# Patient Record
Sex: MALE | Race: BLACK OR AFRICAN AMERICAN | Employment: UNEMPLOYED | URBAN - METROPOLITAN AREA
[De-identification: names, ages, dates, MRNs, and addresses within clinical notes are randomized per-mention and may not be internally consistent; named-entity substitution may affect disease eponyms.]

---

## 2022-07-15 ENCOUNTER — HOSPITAL ENCOUNTER (INPATIENT)
Age: 62
LOS: 3 days | Discharge: NURSING, REHAB OR LONG-TERM CARE | DRG: 463 | End: 2022-07-18
Attending: EMERGENCY MEDICINE | Admitting: INTERNAL MEDICINE
Payer: MEDICAID

## 2022-07-15 ENCOUNTER — APPOINTMENT (OUTPATIENT)
Dept: CT IMAGING | Age: 62
DRG: 463 | End: 2022-07-15
Payer: MEDICAID

## 2022-07-15 ENCOUNTER — APPOINTMENT (OUTPATIENT)
Dept: GENERAL RADIOLOGY | Age: 62
DRG: 463 | End: 2022-07-15
Payer: MEDICAID

## 2022-07-15 DIAGNOSIS — R41.0 DELIRIUM: Primary | ICD-10-CM

## 2022-07-15 DIAGNOSIS — N30.00 ACUTE CYSTITIS WITHOUT HEMATURIA: ICD-10-CM

## 2022-07-15 PROBLEM — G93.41 ACUTE METABOLIC ENCEPHALOPATHY: Status: ACTIVE | Noted: 2022-07-15

## 2022-07-15 LAB
A/G RATIO: 0.8 (ref 1.1–2.2)
ALBUMIN SERPL-MCNC: 3.5 G/DL (ref 3.4–5)
ALP BLD-CCNC: 144 U/L (ref 40–129)
ALT SERPL-CCNC: 51 U/L (ref 10–40)
ANION GAP SERPL CALCULATED.3IONS-SCNC: 14 MMOL/L (ref 3–16)
AST SERPL-CCNC: 34 U/L (ref 15–37)
BACTERIA: ABNORMAL /HPF
BASE EXCESS VENOUS: 3.5 MMOL/L
BASOPHILS ABSOLUTE: 0.1 K/UL (ref 0–0.2)
BASOPHILS RELATIVE PERCENT: 0.9 %
BILIRUB SERPL-MCNC: 0.8 MG/DL (ref 0–1)
BILIRUBIN URINE: NEGATIVE
BLOOD, URINE: ABNORMAL
BUN BLDV-MCNC: 27 MG/DL (ref 7–20)
CALCIUM SERPL-MCNC: 9.9 MG/DL (ref 8.3–10.6)
CARBOXYHEMOGLOBIN: 1.3 %
CHLORIDE BLD-SCNC: 106 MMOL/L (ref 99–110)
CLARITY: ABNORMAL
CO2: 23 MMOL/L (ref 21–32)
COLOR: YELLOW
COMMENT UA: ABNORMAL
CREAT SERPL-MCNC: 0.9 MG/DL (ref 0.8–1.3)
EKG ATRIAL RATE: 72 BPM
EKG DIAGNOSIS: NORMAL
EKG P AXIS: 32 DEGREES
EKG P-R INTERVAL: 154 MS
EKG Q-T INTERVAL: 398 MS
EKG QRS DURATION: 82 MS
EKG QTC CALCULATION (BAZETT): 435 MS
EKG R AXIS: 33 DEGREES
EKG T AXIS: 51 DEGREES
EKG VENTRICULAR RATE: 72 BPM
EOSINOPHILS ABSOLUTE: 0 K/UL (ref 0–0.6)
EOSINOPHILS RELATIVE PERCENT: 0.1 %
EPITHELIAL CELLS, UA: 0 /HPF (ref 0–5)
GFR AFRICAN AMERICAN: >60
GFR NON-AFRICAN AMERICAN: >60
GLUCOSE BLD-MCNC: 103 MG/DL (ref 70–99)
GLUCOSE URINE: NEGATIVE MG/DL
HCO3 VENOUS: 27 MMOL/L (ref 23–29)
HCT VFR BLD CALC: 37.3 % (ref 40.5–52.5)
HEMOGLOBIN: 12.1 G/DL (ref 13.5–17.5)
HYALINE CASTS: 1 /LPF (ref 0–8)
KETONES, URINE: 15 MG/DL
LACTIC ACID: 1.2 MMOL/L (ref 0.4–2)
LEUKOCYTE ESTERASE, URINE: ABNORMAL
LYMPHOCYTES ABSOLUTE: 0.9 K/UL (ref 1–5.1)
LYMPHOCYTES RELATIVE PERCENT: 5.8 %
MCH RBC QN AUTO: 28.4 PG (ref 26–34)
MCHC RBC AUTO-ENTMCNC: 32.5 G/DL (ref 31–36)
MCV RBC AUTO: 87.5 FL (ref 80–100)
METHEMOGLOBIN VENOUS: 0.2 %
MICROSCOPIC EXAMINATION: YES
MONOCYTES ABSOLUTE: 1.5 K/UL (ref 0–1.3)
MONOCYTES RELATIVE PERCENT: 10.2 %
NEUTROPHILS ABSOLUTE: 12.5 K/UL (ref 1.7–7.7)
NEUTROPHILS RELATIVE PERCENT: 83 %
NITRITE, URINE: POSITIVE
O2 SAT, VEN: 80 %
O2 THERAPY: ABNORMAL
PCO2, VEN: 37.2 MMHG (ref 40–50)
PDW BLD-RTO: 13.7 % (ref 12.4–15.4)
PH UA: 5.5 (ref 5–8)
PH VENOUS: 7.47 (ref 7.35–7.45)
PLATELET # BLD: 346 K/UL (ref 135–450)
PMV BLD AUTO: 8.9 FL (ref 5–10.5)
PO2, VEN: 44 MMHG
POTASSIUM REFLEX MAGNESIUM: 4.5 MMOL/L (ref 3.5–5.1)
PROTEIN UA: NEGATIVE MG/DL
RBC # BLD: 4.26 M/UL (ref 4.2–5.9)
RBC UA: ABNORMAL /HPF (ref 0–4)
SARS-COV-2, NAAT: NOT DETECTED
SODIUM BLD-SCNC: 143 MMOL/L (ref 136–145)
SPECIFIC GRAVITY UA: 1.02 (ref 1–1.03)
TCO2 CALC VENOUS: 28 MMOL/L
TOTAL PROTEIN: 7.9 G/DL (ref 6.4–8.2)
TROPONIN: <0.01 NG/ML
URINE REFLEX TO CULTURE: YES
URINE TYPE: ABNORMAL
UROBILINOGEN, URINE: 2 E.U./DL
WBC # BLD: 15.1 K/UL (ref 4–11)
WBC UA: 129 /HPF (ref 0–5)

## 2022-07-15 PROCEDURE — 2580000003 HC RX 258: Performed by: INTERNAL MEDICINE

## 2022-07-15 PROCEDURE — 6360000002 HC RX W HCPCS: Performed by: INTERNAL MEDICINE

## 2022-07-15 PROCEDURE — 80053 COMPREHEN METABOLIC PANEL: CPT

## 2022-07-15 PROCEDURE — 6370000000 HC RX 637 (ALT 250 FOR IP): Performed by: INTERNAL MEDICINE

## 2022-07-15 PROCEDURE — 99285 EMERGENCY DEPT VISIT HI MDM: CPT

## 2022-07-15 PROCEDURE — 2580000003 HC RX 258: Performed by: EMERGENCY MEDICINE

## 2022-07-15 PROCEDURE — 71045 X-RAY EXAM CHEST 1 VIEW: CPT

## 2022-07-15 PROCEDURE — 87186 SC STD MICRODIL/AGAR DIL: CPT

## 2022-07-15 PROCEDURE — 74176 CT ABD & PELVIS W/O CONTRAST: CPT

## 2022-07-15 PROCEDURE — 87088 URINE BACTERIA CULTURE: CPT

## 2022-07-15 PROCEDURE — 70450 CT HEAD/BRAIN W/O DYE: CPT

## 2022-07-15 PROCEDURE — 81001 URINALYSIS AUTO W/SCOPE: CPT

## 2022-07-15 PROCEDURE — 93005 ELECTROCARDIOGRAM TRACING: CPT | Performed by: EMERGENCY MEDICINE

## 2022-07-15 PROCEDURE — 6360000002 HC RX W HCPCS: Performed by: EMERGENCY MEDICINE

## 2022-07-15 PROCEDURE — 96374 THER/PROPH/DIAG INJ IV PUSH: CPT

## 2022-07-15 PROCEDURE — 93010 ELECTROCARDIOGRAM REPORT: CPT | Performed by: INTERNAL MEDICINE

## 2022-07-15 PROCEDURE — 36415 COLL VENOUS BLD VENIPUNCTURE: CPT

## 2022-07-15 PROCEDURE — 85025 COMPLETE CBC W/AUTO DIFF WBC: CPT

## 2022-07-15 PROCEDURE — 87635 SARS-COV-2 COVID-19 AMP PRB: CPT

## 2022-07-15 PROCEDURE — 82803 BLOOD GASES ANY COMBINATION: CPT

## 2022-07-15 PROCEDURE — 96365 THER/PROPH/DIAG IV INF INIT: CPT

## 2022-07-15 PROCEDURE — 1200000000 HC SEMI PRIVATE

## 2022-07-15 PROCEDURE — 87040 BLOOD CULTURE FOR BACTERIA: CPT

## 2022-07-15 PROCEDURE — 83605 ASSAY OF LACTIC ACID: CPT

## 2022-07-15 PROCEDURE — 84484 ASSAY OF TROPONIN QUANT: CPT

## 2022-07-15 PROCEDURE — 87086 URINE CULTURE/COLONY COUNT: CPT

## 2022-07-15 RX ORDER — HALOPERIDOL 5 MG/ML
5 INJECTION INTRAMUSCULAR ONCE
Status: COMPLETED | OUTPATIENT
Start: 2022-07-15 | End: 2022-07-15

## 2022-07-15 RX ORDER — ENOXAPARIN SODIUM 100 MG/ML
30 INJECTION SUBCUTANEOUS 2 TIMES DAILY
Status: DISCONTINUED | OUTPATIENT
Start: 2022-07-15 | End: 2022-07-18 | Stop reason: HOSPADM

## 2022-07-15 RX ORDER — ACETAMINOPHEN 325 MG/1
650 TABLET ORAL EVERY 6 HOURS PRN
Status: DISCONTINUED | OUTPATIENT
Start: 2022-07-15 | End: 2022-07-18 | Stop reason: HOSPADM

## 2022-07-15 RX ORDER — SODIUM CHLORIDE 0.9 % (FLUSH) 0.9 %
5-40 SYRINGE (ML) INJECTION PRN
Status: DISCONTINUED | OUTPATIENT
Start: 2022-07-15 | End: 2022-07-18 | Stop reason: HOSPADM

## 2022-07-15 RX ORDER — POLYETHYLENE GLYCOL 3350 17 G/17G
17 POWDER, FOR SOLUTION ORAL DAILY PRN
Status: DISCONTINUED | OUTPATIENT
Start: 2022-07-15 | End: 2022-07-18 | Stop reason: HOSPADM

## 2022-07-15 RX ORDER — ONDANSETRON 2 MG/ML
4 INJECTION INTRAMUSCULAR; INTRAVENOUS EVERY 6 HOURS PRN
Status: DISCONTINUED | OUTPATIENT
Start: 2022-07-15 | End: 2022-07-18 | Stop reason: HOSPADM

## 2022-07-15 RX ORDER — ACETAMINOPHEN 650 MG/1
650 SUPPOSITORY RECTAL EVERY 6 HOURS PRN
Status: DISCONTINUED | OUTPATIENT
Start: 2022-07-15 | End: 2022-07-18 | Stop reason: HOSPADM

## 2022-07-15 RX ORDER — ASPIRIN 81 MG/1
81 TABLET, CHEWABLE ORAL DAILY
Status: DISCONTINUED | OUTPATIENT
Start: 2022-07-15 | End: 2022-07-18 | Stop reason: HOSPADM

## 2022-07-15 RX ORDER — 0.9 % SODIUM CHLORIDE 0.9 %
1000 INTRAVENOUS SOLUTION INTRAVENOUS ONCE
Status: COMPLETED | OUTPATIENT
Start: 2022-07-15 | End: 2022-07-15

## 2022-07-15 RX ORDER — SODIUM CHLORIDE 0.9 % (FLUSH) 0.9 %
5-40 SYRINGE (ML) INJECTION EVERY 12 HOURS SCHEDULED
Status: DISCONTINUED | OUTPATIENT
Start: 2022-07-15 | End: 2022-07-18 | Stop reason: HOSPADM

## 2022-07-15 RX ORDER — VITAMIN B COMPLEX
1 TABLET ORAL DAILY
Status: DISCONTINUED | OUTPATIENT
Start: 2022-07-16 | End: 2022-07-18 | Stop reason: HOSPADM

## 2022-07-15 RX ORDER — ONDANSETRON 4 MG/1
4 TABLET, ORALLY DISINTEGRATING ORAL EVERY 8 HOURS PRN
Status: DISCONTINUED | OUTPATIENT
Start: 2022-07-15 | End: 2022-07-18 | Stop reason: HOSPADM

## 2022-07-15 RX ORDER — SODIUM CHLORIDE 9 MG/ML
INJECTION, SOLUTION INTRAVENOUS PRN
Status: DISCONTINUED | OUTPATIENT
Start: 2022-07-15 | End: 2022-07-18 | Stop reason: HOSPADM

## 2022-07-15 RX ADMIN — HALOPERIDOL LACTATE 5 MG: 5 INJECTION, SOLUTION INTRAMUSCULAR at 12:45

## 2022-07-15 RX ADMIN — SODIUM CHLORIDE, PRESERVATIVE FREE 10 ML: 5 INJECTION INTRAVENOUS at 23:33

## 2022-07-15 RX ADMIN — SODIUM CHLORIDE 1000 ML: 9 INJECTION, SOLUTION INTRAVENOUS at 13:20

## 2022-07-15 RX ADMIN — ENOXAPARIN SODIUM 30 MG: 100 INJECTION SUBCUTANEOUS at 21:57

## 2022-07-15 RX ADMIN — ASPIRIN 81 MG 81 MG: 81 TABLET ORAL at 16:44

## 2022-07-15 RX ADMIN — CEFTRIAXONE 1000 MG: 1 INJECTION, POWDER, FOR SOLUTION INTRAMUSCULAR; INTRAVENOUS at 13:20

## 2022-07-15 ASSESSMENT — PAIN SCALES - PAIN ASSESSMENT IN ADVANCED DEMENTIA (PAINAD)
BODYLANGUAGE: 0
NEGVOCALIZATION: 0
FACIALEXPRESSION: 0
BODYLANGUAGE: 0
NEGVOCALIZATION: 0
CONSOLABILITY: 0
BREATHING: 0
TOTALSCORE: 0
TOTALSCORE: 0
BREATHING: 0
CONSOLABILITY: 0
FACIALEXPRESSION: 0

## 2022-07-15 NOTE — ED PROVIDER NOTES
[address] [facility]      Pt Name: Sunny Mosqueda  MRN: [id number]  [place] 1960  Date ofevaluation: 7/15/2022  Provider: Lyssa Dao MD    CHIEF COMPLAINT       Chief Complaint   Patient presents with    Altered Mental Status     Pt to ED via [address] [facility] from [facility] d/t AMS. Per nursing home report pt has not been talking, is confused, agitated and incontinent. States pt is usually alert and oriented with occasional confusion, ambulates with walker and continent of b&b. Per nursing home staff, LKW was 3-4 days ago. HISTORY OF PRESENT ILLNESS   (Location/Symptom, Timing/Onset,Context/Setting, Quality, Duration, Modifying Factors, Severity)  Note limiting factors. Sunny Mosqueda is a 59 y.o. male  who  has a past medical history of Antisocial personality disorder ([address] Blindness right eye category 5, normal vision left eye, Drug induced subacute dyskinesia, Insomnia, Major depressive disorder, recurrent, unspecified ([address] Melena, Schizoaffective disorder ([address] and Substance abuse ([address] who presents to the emergency department    57-year-old male who presents for altered mental status from nursing facility. Patient brought in via EMS. Per nursing home report patient is typically conversive but is often confused however he has not been talking very much for the last 2 days. Unknown last known well. Medical records and paperwork with EMS show a history of schizoaffective disorder, substance abuse, antisocial personality disorder, depression. They are unaware if he is taking any new or different medications. No report of recent fevers or chills. Upon my arrival to the room patient is groaning and moving all extremities opening his eyes but otherwise not providing any further history. No clear or obvious onset no known modifying factors no known associated symptoms.   No interventions have been completed at this time.        NursingNotes were reviewed. REVIEW OF SYSTEMS    (2-9 systems for level 4, 10 or more for level 5)     Review of Systems   Unable to perform ROS: Mental status change     Except as noted above the remainder of the review of systems was reviewed and negative. PAST MEDICAL HISTORY     Past Medical History:   Diagnosis Date    Antisocial [facility]     Blindness right eye category 5, normal vision left eye     Drug induced subacute dyskinesia     Insomnia     Major depressive disorder, recurrent, unspecified ([facility]     Melena     Schizoaffective disorder ([facility]     [facility]          SURGICALHISTORY     History reviewed. No pertinent surgical history. CURRENT MEDICATIONS       Previous Medications    VITAMIN D 25 MCG (1000 UT) CAPS    Take by mouth            Codeine, Thorazine [chlorpromazine], and Tylenol [acetaminophen]    FAMILY HISTORY     History reviewed. No pertinent family history. SOCIAL HISTORY       Social History     Socioeconomic History    Marital status: Single     Spouse name: None    Number of children: None    Years of education: None    Highest education level: None   Tobacco Use    Smoking status: Unknown   Vaping Use    Vaping Use: Unknown   Substance and Sexual Activity    Alcohol use: Not Currently     Comment: unknown    Drug use: Not Currently     Types: Cocaine     Comment: unknown    Sexual activity: Not Currently   Social History Narrative    ** Merged History Encounter **            SCREENINGS    Gregory Coma Scale  Eye Opening: To speech  Best Verbal Response: Inappropriate words  Best Motor Response:  Withdraws from pain  [place] Coma Scale Score: 10        PHYSICAL EXAM    (up to 7 for level 4, 8 or more for level 5)     ED Triage Vitals [07/15/22 1115]   BP Temp Temp Source Heart Rate Resp SpO2 Height Weight   (!) 129/93 99 °F (37.2 °C) Axillary 78 (!) 40 (!) 88 % 5' 10\" (1.778 m) 230 lb 2.6 oz (104.4 kg)       Physical Exam  Vitals and nursing note reviewed. Constitutional:       General: He is not in acute distress. Appearance: He is normal weight. He is not ill-appearing, toxic-appearing or diaphoretic. HENT:      Head: Normocephalic and atraumatic. Mouth/Throat:      Mouth: Mucous membranes are moist.      Pharynx: Oropharynx is clear. Eyes:      Extraocular Movements: Extraocular movements intact. Pupils: Pupils are equal, round, and reactive to light. Cardiovascular:      Rate and Rhythm: Normal rate and regular rhythm. Pulses: Normal pulses. Heart sounds: Normal heart sounds. Pulmonary:      Effort: Pulmonary effort is normal.      Breath sounds: Normal breath sounds. No decreased breath sounds, wheezing, rhonchi or rales. Chest:      Chest wall: No tenderness. Abdominal:      General: Bowel sounds are normal.      Palpations: Abdomen is soft. Tenderness: There is no abdominal tenderness. Musculoskeletal:         General: Normal range of motion. Cervical back: Normal range of motion and neck supple. Right lower leg: No edema. Left lower leg: No edema. Skin:     General: Skin is warm and dry. Capillary Refill: Capillary refill takes less than 2 seconds. Findings: No rash. Neurological:      General: No focal deficit present. Mental Status: He is disoriented and confused. GCS: GCS eye subscore is 4. GCS verbal subscore is 2. GCS motor subscore is 5.    Psychiatric:      Comments: Garbled speech incomprehensible sounds    On further evaluation patient having confabulations and rapid speech       RESULTS     EKG: All EKG's are interpreted by the Emergency Department Physician who either signs or Co-signsthis chart in the absence of a cardiologist.    EKG Interpretation    Interpreted by emergency department physician    Rhythm: normal sinus   Rate: normal  Axis: normal  Ectopy: none  Conduction: normal  ST Segments: normal  T Waves: normal  Q Waves: none    Clinical Impression: Normal sinus rhythm, normal EKG          RADIOLOGY:   Non-plain filmimages such as CT, Ultrasound and MRI are read by the radiologist.   Interpretation per the Radiologist below, if available at the time ofthis note:    CT Head WO Contrast   Final Result   No acute intracranial abnormality. XR CHEST PORTABLE   Final Result   Mild cardiomegaly. Minimal interstitial prominence in the left lung.                ED BEDSIDE ULTRASOUND:   Performed by ED Physician - none    LABS:  Labs Reviewed   CBC WITH AUTO DIFFERENTIAL - Abnormal; Notable for the following components:       Result Value    WBC 15.1 (*)     Hemoglobin 12.1 (*)     Hematocrit 37.3 (*)     Neutrophils Absolute 12.5 (*)     Lymphocytes Absolute 0.9 (*)     Monocytes Absolute 1.5 (*)     All other components within normal limits   COMPREHENSIVE METABOLIC PANEL W/ REFLEX TO MG FOR LOW K - Abnormal; Notable for the following components:    Glucose 103 (*)     BUN 27 (*)     Albumin/Globulin Ratio 0.8 (*)     Alkaline Phosphatase 144 (*)     ALT 51 (*)     All other components within normal limits   URINALYSIS WITH REFLEX TO CULTURE - Abnormal; Notable for the following components:    Clarity, UA CLOUDY (*)     Ketones, Urine 15 (*)     Blood, Urine MODERATE (*)     Urobilinogen, Urine 2.0 (*)     Nitrite, Urine POSITIVE (*)     Leukocyte Esterase, Urine LARGE (*)     All other components within normal limits   BLOOD GAS, VENOUS - Abnormal; Notable for the following components:    pH, Gunnar 7.472 (*)     pCO2, [place] 37.2 (*)     All other components within normal limits   MICROSCOPIC URINALYSIS - Abnormal; Notable for the following components:    Bacteria, UA 1+ (*)     WBC,  (*)     All other components within normal limits   COVID-19, RAPID   CULTURE, BLOOD 1   CULTURE, BLOOD 2   CULTURE, URINE   TROPONIN   LACTIC ACID       All other labs were within normal range or not returned as of this dictation. EMERGENCY DEPARTMENT COURSE and DIFFERENTIAL DIAGNOSIS/MDM:   Vitals:    Vitals:    07/15/22 1116 07/15/22 1130 07/15/22 1215 07/15/22 1301   BP:  131/81 (!) 140/78 134/80   Pulse:  73 65 88   Resp: (!) 39 (!) 34 18 25   Temp:    98.5 °F (36.9 °C)   TempSrc:    Oral   SpO2: 90% 96% 97% 96%   Weight:       Height:           Patient was given thefollowing medications:  Medications   cefTRIAXone (ROCEPHIN) 1000 mg IVPB in 50 mL D5W minibag (1,000 mg IntraVENous New Bag 7/15/22 1320)   0.9 % sodium chloride bolus (1,000 mLs IntraVENous New Bag 7/15/22 1320)   haloperidol lactate (HALDOL) injection 5 mg (5 mg IntraMUSCular Given 7/15/22 1245)       ED COURSE & MEDICAL DECISION MAKING    Pertinent Labs & Imaging studies reviewed. (See chart for details)   -  Patient seen and evaluated in the emergency department. -  Triage and nursing notes reviewed and incorporated. -  Old chart records reviewed and incorporated.   -  Differential diagnosis includes: Differential Diagnosis:    Hypoxemia/ischemic encephalopathy, hepatic encephalopathy   Seizure or postictal state   Alterations of glucose such as hypoglycemia and hyperglycemia    Alterations in perfusions such as hypotension and hypoperfusion    Alterations in electrolytes such as disturbances in sodium or calcium   Infectious processes such as sepsis from a pneumonia or urinary tract infection    Substance use or withdrawal, especially alcohol and drugs    Medication adverse event or interaction    Vitamin deficiencies such as Wernicke's encephalopathy    CNS lesion, injury, infection (CVA, subdural hematoma, meningitis, encephalitis)    Alterations in hormones such as thyroid or adrenal abnormalities    Alterations in cardiac functioning such as arrhythmia, MI or CHF    Alteration in temperature such as hyperthermia or hypothermia    Dehydration, sleep deprivation   Change in medical regimen    Alteration in lifestyle, environment, or personal relationships    26-year-old male presents with altered mental status. Differential is broad at this time however patient has history of psychiatric and substance abuse. We will also order broad laboratory work-up for possible infection including UTI and pneumonia. Will order CT head. Currently patient is calm no indication for emergent medication however if patient becomes more agitated as report was at nursing home will give patient medication for possible sedation which it appears the patient has required multiple times in the past.  Glucose is normal.  Rest of exam relatively unremarkable. Patient is afebrile not tachycardic saturating well on room air. He is normotensive. Will reeval closely and disposition accordingly. -  Work-up included:  See above  -  ED treatment included: See above  -  Results discussed with patient. The patient is agreeable with plan of care and disposition. Is this patient to be included in the SEP-1 Core Measure due to severe sepsis or septic shock? No   Exclusion criteria - the patient is NOT to be included for SEP-1 Core Measure due to:  2+ SIRS criteria are not met     REASSESSMENT     ED Course as of 07/15/22 1343   Fri Jul 15, 2022   1251 Spoke to patient at length he is now more verbal.  He was initially coherent but then began to have confabulations stating that there were people around him throwing firecrackers and that he felt like everybody was flying on space ships. Patient has a history of prior psychiatric disorder as well as drug abuse however his urinalysis shows that he likely has a UTI causing delirium. His vitals otherwise remained stable his white blood cell count is slightly elevated but he is not septic at this time. Awaiting further lab work-up will give antibiotics. [SC]   1257 Lactate normal [SC]   1826 Patient was calm and was able to be taken to CT scan. Back at this time. No other change in vitals.   Still plan at this time is to give IV fluids and antibiotics and admit. [SC]   1340 CT head unremarkable. Will admit at this time. [SC]      ED Course User Index  [SC] Nurys Gross MD         CRITICAL CARE TIME   Total Critical Care time was 36 minutes, excluding separately reportable procedures. There was a high probability of clinically significant/life threatening deterioration in the patient's condition which required my urgent intervention. This includes multiple reevaluations, vital sign monitoring, pulse oximetry monitoring, telemetry monitoring, clinical response to the IV medications, reviewing the nursing notes, consultation time, dictation/documentation time, and interpretation of the labwork. (This time excludes time spent performing procedures). CONSULTS:  IP CONSULT TO HOSPITALIST    PROCEDURES:  Unless otherwise noted below, none     Procedures    FINAL IMPRESSION      1. Delirium    2. Acute cystitis without hematuria          DISPOSITION/PLAN   DISPOSITION Decision To Admit 07/15/2022 01:40:41 PM      PATIENT REFERREDTO:  No follow-up provider specified.     DISCHARGEMEDICATIONS:  New Prescriptions    No medications on file          (Please note that portions of this note were completed with a voice recognition program.  Efforts were made to edit the dictations but occasionally words are mis-transcribed.)    Nurys Gross MD (electronically signed)  Attending Emergency Physician          Nurys Gross MD  07/15/22 9122

## 2022-07-15 NOTE — H&P
Hospital Medicine History & Physical      PCP: No primary care provider on file. Date of Admission: 7/15/2022    Date of Service: Pt seen/examined on 7/15/22 and Admitted to Inpatient with expected LOS greater than two midnights due to medical therapy. Chief Complaint:    Acute metabolic encephalopathy. Patient is unable to give history. History Of Present Illness:   Valentina Carter is a 75-year-old male with a history of drug-induced subacute dyskinesia, insomnia, schizoaffective disorder and a history of substance abuse. He is admitted from his nursing home for confusional state. of 8 today. At baseline patient compresses but has not been doing so in the last 2 days . No fevers or chills described . No nausea or vomiting . No chest pain or cough . Patient unable to give a history and just moaning and groaning in the ED . Past Medical History:          Diagnosis Date    Antisocial [facility] disorder ([facility]     Blindness right eye category 5, normal vision left eye     Drug induced subacute dyskinesia     Insomnia     Major depressive disorder, recurrent, unspecified ([address]     Melena     Schizoaffective disorder ([facility]     Substance abuse ([facility]        Past Surgical History:      History reviewed. No pertinent surgical history. Medications Prior to Admission:      Prior to Admission medications    Medication Sig Start Date End Date Taking? Authorizing Provider   vitamin D 25 MCG (1000 UT) CAPS Take by mouth   Yes Historical Provider, MD       Allergies:  Codeine, Thorazine [chlorpromazine], and Tylenol [acetaminophen]    Social History:      The patient currently lives lives in the nursing home  TOBACCO:   has no history on file for tobacco use. ETOH:   reports that he does not currently use alcohol.   E-cigarette/Vaping       Questions Responses    E-cigarette/Vaping Use Unknown If Ever Used    Start Date     Passive Exposure     Quit Date     Counseling Given     Comments Family History:       Reviewed and negative in regards to presenting illness/complaint. History reviewed. No pertinent family history. REVIEW OF SYSTEMS COMPLETED:   Pertinent positives as noted in the HPI. All other systems reviewed and negative. PHYSICAL EXAM PERFORMED:    /80   Pulse 88   Temp 98.5 °F (36.9 °C) (Oral)   Resp 25   Ht 5' 10\" (1.778 m)   Wt 230 lb 2.6 oz (104.4 kg)   SpO2 96%   BMI 33.02 kg/m²     General appearance:  , Restless speaking incomprehensibly . HEENT:  Normal cephalic, atraumatic without obvious deformity. Pupils equal, round, and reactive to light. Extra ocular muscles intact. Conjunctivae/corneas clear. Neck: Supple, with full range of motion. No jugular venous distention. Trachea midline. Respiratory:  Normal respiratory effort. Clear to auscultation, bilaterally without Rales/Wheezes/Rhonchi. Cardiovascular:  Regular rate and rhythm with normal S1/S2 without murmurs, rubs or gallops. Abdomen: Soft, suprapubic tenderness, non-distended with normal bowel sounds. Musculoskeletal:  No clubbing, cyanosis or edema bilaterally. Full range of motion without deformity. Skin: Skin color, texture, turgor normal.  No rashes or lesions. Neurologic:  Neurovascularly intact without any focal sensory/motor deficits. Cranial nerves: II-XII intact, grossly non-focal.  Psychiatric:  Alert and oriented x 1, thought content appropriate, normal insight  Capillary Refill: Brisk,3 seconds, normal  Peripheral Pulses: +2 palpable, equal bilaterally       Labs:     Recent Labs     07/15/22  1200   WBC 15.1*   HGB 12.1*   HCT 37.3*        Recent Labs     07/15/22  1200      K 4.5      CO2 23   BUN 27*   CREATININE 0.9   CALCIUM 9.9     Recent Labs     07/15/22  1200   AST 34   ALT 51*   BILITOT 0.8   ALKPHOS 144*     No results for input(s): INR in the last 72 hours.   Recent Labs     07/15/22  1200   TROPONINI <0.01       Urinalysis:      Lab Results Component Value Date/Time    NITRU POSITIVE 07/15/2022 12:00 PM    WBCUA 129 07/15/2022 12:00 PM    BACTERIA 1+ 07/15/2022 12:00 PM    RBCUA 0-2 07/15/2022 12:00 PM    BLOODU MODERATE 07/15/2022 12:00 PM    SPECGRAV 1.018 07/15/2022 12:00 PM    GLUCOSEU Negative 07/15/2022 12:00 PM       Radiology:     CXR: I have reviewed the CXR with the following interpretation: Mild cardiomegaly with minimal interstitial prominence in the left lung. EKG:  I have reviewed the EKG with the following interpretation: Normal sinus rhythm. CT Head WO Contrast   Final Result   No acute intracranial abnormality. XR CHEST PORTABLE   Final Result   Mild cardiomegaly. Minimal interstitial prominence in the left lung. Consults:    IP CONSULT TO HOSPITALIST    ASSESSMENT:    1. Acute metabolic encephalopathy secondary to #2  2. UT I with pyuria  3. Schizoaffective disorder  4. Dehydration      PLAN:  1. Fluid resuscitation with normal saline  2. Ceftriaxone 2 g IV daily  3. Blood cultures and urine cultures  4. CT abdomen and pelvis  5. Continue home medications      DVT Prophylaxis: Lovenox  Diet: No diet orders on file  Code Status: No Order    PT/OT Eval Status: Ordered    Dispo -pending clinical improvement       Yaima Henry MD    Thank you No primary care provider on file. for the opportunity to be involved in this patient's care. If you have any questions or concerns please feel free to contact me at [phone].

## 2022-07-15 NOTE — ED NOTES
ED SBAR report provider to [facility]. Patient to be transported to Room 3112 via stretcher by transport tech. Patient transported with bedside cardiac monitor and with IV medications infusing. IV site clean, dry, and intact. MEWS score and pain assessed and documented. Updated patient on plan of care.      Minerva Lopez RN  07/15/22 4000

## 2022-07-15 NOTE — PROGRESS NOTES
Medication Reconciliation     List of medications patient is currently taking is complete. Source of information: 1. Review of paperwork from [facility]                                      2. EPIC records      Allergies  Codeine, Thorazine [chlorpromazine], and Tylenol [acetaminophen]     Notes regarding home medications:   1.  Patient's only medication listed on SNF paperwork was vitamin D3     Ulisses Taylor, Pharmacy Intern  7/15/2022 12:08 PM

## 2022-07-15 NOTE — ED NOTES
Pt to ED via [address] [facility] from [facility] d/t AMS. Per nursing home report pt has not been talking, is confused, agitated and incontinent. States pt is usually alert and oriented with occasional confusion, ambulates with walker and continent of b&b. Per nursing home staff, LKW was 3-4 days ago. Upon ED arrival, pt is not answering questions but will mumble, will briefly open eyes to voice, pt is tachypneic with o2 sats 88% on ra. Pt placed on 3L o2/nc with sats increasing to 95%. Pt placed on cardiac monitor, EKG being obtained and IV being placed.       Delmy Rubalcava, RN  07/15/22 [facility], RN  07/15/22 0703

## 2022-07-15 NOTE — ED NOTES
Report given to receiving SABAS Hawkins, all questions answered.       Scott Oneal RN  07/15/22 9878

## 2022-07-15 NOTE — ED NOTES
Caleb Sarmiento with [facility] called to check on pt status. Rylan Leiva states that she has been pt's  for over 2 years. She states pt is supposed to be receiving psych meds at the nursing home, but she is not sure exactly which meds he is on at this time. The medication sheet provided to hospital has Vitamin D listed as the only medication pt is currently taking. Rylan Leiva left her number for staff to contact her with any further questions. Her number is [phone]. Second attempt made at this time to obtain pt medication list from [facility] without success.        Yamila Sherman, RN  07/15/22 Raúl Del Angel, RN  07/15/22 2845

## 2022-07-16 PROBLEM — E87.0 HYPERNATREMIA: Status: ACTIVE | Noted: 2022-07-16

## 2022-07-16 PROBLEM — N39.0 E. COLI UTI: Status: ACTIVE | Noted: 2022-07-16

## 2022-07-16 PROBLEM — E86.0 DEHYDRATION: Status: ACTIVE | Noted: 2022-07-16

## 2022-07-16 PROBLEM — B96.20 E. COLI UTI: Status: ACTIVE | Noted: 2022-07-16

## 2022-07-16 LAB
A/G RATIO: 0.8 (ref 1.1–2.2)
ALBUMIN SERPL-MCNC: 3.2 G/DL (ref 3.4–5)
ALP BLD-CCNC: 147 U/L (ref 40–129)
ALT SERPL-CCNC: 85 U/L (ref 10–40)
ANION GAP SERPL CALCULATED.3IONS-SCNC: 11 MMOL/L (ref 3–16)
ANION GAP SERPL CALCULATED.3IONS-SCNC: 11 MMOL/L (ref 3–16)
AST SERPL-CCNC: 77 U/L (ref 15–37)
BASE EXCESS VENOUS: 1.9 MMOL/L
BILIRUB SERPL-MCNC: 0.8 MG/DL (ref 0–1)
BUN BLDV-MCNC: 21 MG/DL (ref 7–20)
BUN BLDV-MCNC: 23 MG/DL (ref 7–20)
CALCIUM SERPL-MCNC: 9.4 MG/DL (ref 8.3–10.6)
CALCIUM SERPL-MCNC: 9.5 MG/DL (ref 8.3–10.6)
CARBOXYHEMOGLOBIN: 1 %
CHLORIDE BLD-SCNC: 112 MMOL/L (ref 99–110)
CHLORIDE BLD-SCNC: 113 MMOL/L (ref 99–110)
CO2: 24 MMOL/L (ref 21–32)
CO2: 24 MMOL/L (ref 21–32)
CREAT SERPL-MCNC: 0.8 MG/DL (ref 0.8–1.3)
CREAT SERPL-MCNC: 0.9 MG/DL (ref 0.8–1.3)
ESTIMATED AVERAGE GLUCOSE: 134.1 MG/DL
GFR AFRICAN AMERICAN: >60
GFR AFRICAN AMERICAN: >60
GFR NON-AFRICAN AMERICAN: >60
GFR NON-AFRICAN AMERICAN: >60
GLUCOSE BLD-MCNC: 123 MG/DL (ref 70–99)
GLUCOSE BLD-MCNC: 99 MG/DL (ref 70–99)
HBA1C MFR BLD: 6.3 %
HCO3 VENOUS: 27 MMOL/L (ref 23–29)
HCT VFR BLD CALC: 38.3 % (ref 40.5–52.5)
HEMOGLOBIN: 12.4 G/DL (ref 13.5–17.5)
MCH RBC QN AUTO: 28.2 PG (ref 26–34)
MCHC RBC AUTO-ENTMCNC: 32.4 G/DL (ref 31–36)
MCV RBC AUTO: 87.3 FL (ref 80–100)
METHEMOGLOBIN VENOUS: 0.6 %
O2 SAT, VEN: 89 %
O2 THERAPY: NORMAL
PCO2, VEN: 42.9 MMHG (ref 40–50)
PDW BLD-RTO: 14.1 % (ref 12.4–15.4)
PH VENOUS: 7.41 (ref 7.35–7.45)
PLATELET # BLD: 366 K/UL (ref 135–450)
PMV BLD AUTO: 8.9 FL (ref 5–10.5)
PO2, VEN: 59 MMHG
POTASSIUM REFLEX MAGNESIUM: 4.6 MMOL/L (ref 3.5–5.1)
POTASSIUM SERPL-SCNC: 4.1 MMOL/L (ref 3.5–5.1)
RBC # BLD: 4.38 M/UL (ref 4.2–5.9)
SODIUM BLD-SCNC: 147 MMOL/L (ref 136–145)
SODIUM BLD-SCNC: 148 MMOL/L (ref 136–145)
TCO2 CALC VENOUS: 28 MMOL/L
TOTAL PROTEIN: 7.4 G/DL (ref 6.4–8.2)
WBC # BLD: 15.9 K/UL (ref 4–11)

## 2022-07-16 PROCEDURE — 36415 COLL VENOUS BLD VENIPUNCTURE: CPT

## 2022-07-16 PROCEDURE — 6360000002 HC RX W HCPCS: Performed by: INTERNAL MEDICINE

## 2022-07-16 PROCEDURE — 85027 COMPLETE CBC AUTOMATED: CPT

## 2022-07-16 PROCEDURE — 2580000003 HC RX 258: Performed by: INTERNAL MEDICINE

## 2022-07-16 PROCEDURE — 6370000000 HC RX 637 (ALT 250 FOR IP): Performed by: INTERNAL MEDICINE

## 2022-07-16 PROCEDURE — 83036 HEMOGLOBIN GLYCOSYLATED A1C: CPT

## 2022-07-16 PROCEDURE — 80053 COMPREHEN METABOLIC PANEL: CPT

## 2022-07-16 PROCEDURE — 6360000002 HC RX W HCPCS: Performed by: NURSE PRACTITIONER

## 2022-07-16 PROCEDURE — 82803 BLOOD GASES ANY COMBINATION: CPT

## 2022-07-16 PROCEDURE — 94760 N-INVAS EAR/PLS OXIMETRY 1: CPT

## 2022-07-16 PROCEDURE — 2580000003 HC RX 258: Performed by: NURSE PRACTITIONER

## 2022-07-16 PROCEDURE — 1200000000 HC SEMI PRIVATE

## 2022-07-16 RX ORDER — DEXTROSE MONOHYDRATE 50 MG/ML
INJECTION, SOLUTION INTRAVENOUS CONTINUOUS
Status: DISPENSED | OUTPATIENT
Start: 2022-07-16 | End: 2022-07-17

## 2022-07-16 RX ORDER — ZIPRASIDONE MESYLATE 20 MG/ML
10 INJECTION, POWDER, LYOPHILIZED, FOR SOLUTION INTRAMUSCULAR ONCE
Status: DISCONTINUED | OUTPATIENT
Start: 2022-07-17 | End: 2022-07-18 | Stop reason: HOSPADM

## 2022-07-16 RX ORDER — DIVALPROEX SODIUM 125 MG/1
125 CAPSULE, COATED PELLETS ORAL EVERY 8 HOURS SCHEDULED
Status: DISCONTINUED | OUTPATIENT
Start: 2022-07-16 | End: 2022-07-18 | Stop reason: HOSPADM

## 2022-07-16 RX ORDER — HALOPERIDOL 5 MG/ML
5 INJECTION INTRAMUSCULAR ONCE
Status: COMPLETED | OUTPATIENT
Start: 2022-07-16 | End: 2022-07-16

## 2022-07-16 RX ORDER — SODIUM CHLORIDE 9 MG/ML
INJECTION, SOLUTION INTRAVENOUS CONTINUOUS
Status: DISCONTINUED | OUTPATIENT
Start: 2022-07-16 | End: 2022-07-17

## 2022-07-16 RX ADMIN — ENOXAPARIN SODIUM 30 MG: 100 INJECTION SUBCUTANEOUS at 10:50

## 2022-07-16 RX ADMIN — CEFTRIAXONE 2000 MG: 2 INJECTION, POWDER, FOR SOLUTION INTRAMUSCULAR; INTRAVENOUS at 06:15

## 2022-07-16 RX ADMIN — ENOXAPARIN SODIUM 30 MG: 100 INJECTION SUBCUTANEOUS at 20:20

## 2022-07-16 RX ADMIN — Medication 1000 UNITS: at 10:50

## 2022-07-16 RX ADMIN — DEXTROSE MONOHYDRATE: 50 INJECTION, SOLUTION INTRAVENOUS at 12:21

## 2022-07-16 RX ADMIN — SODIUM CHLORIDE: 9 INJECTION, SOLUTION INTRAVENOUS at 01:32

## 2022-07-16 RX ADMIN — SODIUM CHLORIDE, PRESERVATIVE FREE 10 ML: 5 INJECTION INTRAVENOUS at 20:20

## 2022-07-16 RX ADMIN — ASPIRIN 81 MG 81 MG: 81 TABLET ORAL at 10:50

## 2022-07-16 RX ADMIN — DIVALPROEX SODIUM 125 MG: 125 CAPSULE, COATED PELLETS ORAL at 16:30

## 2022-07-16 RX ADMIN — HALOPERIDOL LACTATE 5 MG: 5 INJECTION, SOLUTION INTRAMUSCULAR at 20:49

## 2022-07-16 RX ADMIN — DIVALPROEX SODIUM 125 MG: 125 CAPSULE, COATED PELLETS ORAL at 20:20

## 2022-07-16 ASSESSMENT — PAIN SCALES - PAIN ASSESSMENT IN ADVANCED DEMENTIA (PAINAD)
NEGVOCALIZATION: 0
BREATHING: 1
TOTALSCORE: 4
FACIALEXPRESSION: 1
CONSOLABILITY: 1
BODYLANGUAGE: 1

## 2022-07-16 ASSESSMENT — PAIN SCALES - WONG BAKER: WONGBAKER_NUMERICALRESPONSE: 0

## 2022-07-16 NOTE — PROGRESS NOTES
Report received from day shift RN. Patient resting comfortably in bed. No signs of discomfort or distress. Bed is in lowest position, wheels locked, 2/2 side rails up. Bedside table and call light within reach. White board updated. Will continue to monitor patient. Ovidio Gallegos RN    8:34 PM  Shift assessment complete. (See findings in flowsheet). Med pass complete. (See MAR). VSS. Patient is very restless/agitated. He pulled out his IV, and is trying to get out of bed. Message sent to provider. One time dose at haldol ordered, and given without complication. Ovidio Gallegos RN    10:16 PM  Patient resting quietly with eyes closed. No apparent needs at this time. Ovidio Gallegos RN    1:04 AM  Patient resting quietly in bed. VSS. Patient is restless, but otherwise calm. Tele sitter in place. Ovidio Gallegos RN    4:49 AM  Patient resting in bed quietly. VSS. Patient is calm and quiet. Still refusing tele box and new IV.  Ovidio Gallegos RN

## 2022-07-16 NOTE — PROGRESS NOTES
Pt has pulled off tele box and refusing to put it back on at this time. Dr Maya Guan made aware awaiting response.

## 2022-07-16 NOTE — PLAN OF CARE
Problem: Discharge Planning  Goal: Discharge to home or other facility with appropriate resources  Outcome: Progressing  Flowsheets (Taken 7/16/2022 0058 by Maria Alejandra Toro RN)  Discharge to home or other facility with appropriate resources:   Identify barriers to discharge with patient and caregiver   Identify discharge learning needs (meds, wound care, etc)   Refer to discharge planning if patient needs post-hospital services based on physician order or complex needs related to functional status, cognitive ability or social support system   Arrange for needed discharge resources and transportation as appropriate   Arrange for interpreters to assist at discharge as needed     Problem: Pain  Goal: Verbalizes/displays adequate comfort level or baseline comfort level  Outcome: Progressing  Flowsheets (Taken 7/16/2022 1556)  Verbalizes/displays adequate comfort level or baseline comfort level:   Encourage patient to monitor pain and request assistance   Administer analgesics based on type and severity of pain and evaluate response   Consider cultural and social influences on pain and pain management   Assess pain using appropriate pain scale   Implement non-pharmacological measures as appropriate and evaluate response   Notify Licensed Independent Practitioner if interventions unsuccessful or patient reports new pain     Problem: Skin/Tissue Integrity  Goal: Absence of new skin breakdown  Outcome: Progressing  Note: No new skin areas       Problem: Safety - Adult  Goal: Free from fall injury  Outcome: Progressing  Flowsheets (Taken 7/16/2022 1556)  Free From Fall Injury: Instruct family/caregiver on patient safety     Problem: ABCDS Injury Assessment  Goal: Absence of physical injury  Outcome: Progressing  Flowsheets (Taken 7/16/2022 1556)  Absence of Physical Injury: Implement safety measures based on patient assessment     Problem: Confusion  Goal: Confusion, delirium, dementia, or psychosis is improved or at baseline  Outcome: Progressing  Flowsheets (Taken 7/16/2022 5162)  Effect of thought disturbance (confusion, delirium, dementia, or psychosis) are managed with adequate functional status:   Assess for contributors to thought disturbance, including medications, impaired vision or hearing, underlying metabolic abnormalities, dehydration, psychiatric diagnoses, notify [place] high risk fall precautions, as indicated   Provide frequent short contacts to provide reality reorientation, refocusing and direction   Decrease environmental stimuli, including noise as appropriate

## 2022-07-16 NOTE — PROGRESS NOTES
Patient continues to rest in bed this shift, alert but does not answer questions, incomprehensible words at times, appears comfortable. IVF and abx infusing, bed alarm on.

## 2022-07-16 NOTE — PROGRESS NOTES
Attempted bedside swallow eval by this RN, patient able to take a sip of water with no straw but agitated through this assessment. Continues to grab at cup while hands are shaky, patient raising his voice at this nurse but cannot understand what patient is trying to say.

## 2022-07-16 NOTE — PROGRESS NOTES
Pt becoming more non cooperative as the day progresses. Trying to verbalize but speech non sensical. Pt begins to flail arms when attempting to take vital signs. Pt also becomes agitated when asked to repeat himself. Dr Nina Loya made aware.

## 2022-07-16 NOTE — PROGRESS NOTES
CALCIUM 9.9 9.5     Recent Labs     07/15/22  1200 07/16/22  0421   AST 34 77*   ALT 51* 85*   BILITOT 0.8 0.8   ALKPHOS 144* 147*     No results for input(s): INR in the last 72 hours. Recent Labs     07/15/22  1200 07/15/22  2012 07/15/22  2253   TROPONINI <0.01 <0.01 <0.01       Urinalysis:      Lab Results   Component Value Date/Time    NITRU POSITIVE 07/15/2022 12:00 PM    WBCUA 129 07/15/2022 12:00 PM    BACTERIA 1+ 07/15/2022 12:00 PM    RBCUA 0-2 07/15/2022 12:00 PM    BLOODU MODERATE 07/15/2022 12:00 PM    SPECGRAV 1.018 07/15/2022 12:00 PM    GLUCOSEU Negative 07/15/2022 12:00 PM       Radiology:  CT ABDOMEN PELVIS WO CONTRAST Additional Contrast? None   Final Result   1. No acute process in the abdomen or pelvis. 2. Suspected changes of diffuse liver disease. Clinical correlation advised. 3. Mild aortic valvular calcification         CT Head WO Contrast   Final Result   No acute intracranial abnormality. XR CHEST PORTABLE   Final Result   Mild cardiomegaly. Minimal interstitial prominence in the left lung. Assessment/Plan:    Active Hospital Problems    Diagnosis     Hypernatremia [E87.0]      Priority: Medium    E. coli UTI [N39.0, B96.20]      Priority: Medium    Dehydration [E86.0]      Priority: Medium    Acute metabolic encephalopathy [E99.10]      Priority: Medium     Cont IV abx empirically  Follow up on urine cx, growing E coli  Adjust abx as needed   Leucocytosis due to above  IVF hydration , d5w  Monitor BMP  Monitor Is and Os   Depakote sprinkles for agitation  Bedside RN to call facility for updated med list, follow up with pharmacy once obtained  Supportive care  Monitor for improvement in mental status with treatment of infection      DVT Prophylaxis: lovenox  Diet: ADULT DIET; Regular;  No Caffeine  Code Status: Full Code    PT/OT when able    Dispo - possible dc back to ECF in 2-3 days pending progress and cultures    Brooklynn Hawkins MD

## 2022-07-16 NOTE — PROGRESS NOTES
Patient to floor from ED, appears anxious and restless, does not answer questions appropriately, incomprehensible words at times. Patient turned in bed and brief changed, neeraj care provided. Tele is on - NSR, bed alarm on, will continue to monitor.

## 2022-07-16 NOTE — PROGRESS NOTES
Spoke with nurse Jeronimo Chandler from [facility] who is sending over an order summary for this patient.

## 2022-07-16 NOTE — PLAN OF CARE
Problem: Discharge Planning  Goal: Discharge to home or other facility with appropriate resources  Outcome: Progressing  Flowsheets (Taken 7/16/2022 0058)  Discharge to home or other facility with appropriate resources:   Identify barriers to discharge with patient and caregiver   Identify discharge learning needs (meds, wound care, etc)   Refer to discharge planning if patient needs post-hospital services based on physician order or complex needs related to functional status, cognitive ability or social support system   Arrange for needed discharge resources and transportation as appropriate   Arrange for interpreters to assist at discharge as needed  Note: Patient will be discharged with appropriate resources      Problem: Pain  Goal: Verbalizes/displays adequate comfort level or baseline comfort level  Outcome: Progressing  Note: Patients comfort level will be at baseline      Problem: Skin/Tissue Integrity  Goal: Absence of new skin breakdown  Description: 1. Monitor for areas of redness and/or skin breakdown  2. Assess vascular access sites hourly  3. Every 4-6 hours minimum:  Change oxygen saturation probe site  4. Every 4-6 hours:  If on nasal continuous positive airway pressure, respiratory therapy assess nares and determine need for appliance change or resting period. Outcome: Progressing  Note: Patient will be absent of new skin breakdown     Problem: Safety - Adult  Goal: Free from fall injury  Outcome: Progressing  Note: Patient will be free from falls      Problem: ABCDS Injury Assessment  Goal: Absence of physical injury  Outcome: Progressing  Note: Patient will be absent of physical injury     Problem: Confusion  Goal: Confusion, delirium, dementia, or psychosis is improved or at baseline  Description: INTERVENTIONS:  1.  Assess for possible contributors to thought disturbance, including medications, impaired vision or hearing, underlying metabolic abnormalities, dehydration, psychiatric diagnoses, and notify attending LIP  2. [place] high risk fall precautions, as indicated  3. Provide frequent short contacts to provide reality reorientation, refocusing and direction  4. Decrease environmental stimuli, including noise as appropriate  5. Monitor and intervene to maintain adequate nutrition, hydration, elimination, sleep and activity  6. If unable to ensure safety without constant attention obtain sitter and review sitter guidelines with assigned personnel  7.  Initiate Psychosocial CNS and Spiritual Care consult, as indicated  Outcome: Progressing  Note: Patients cognition will improve to baseline

## 2022-07-17 LAB
A/G RATIO: 0.7 (ref 1.1–2.2)
ALBUMIN SERPL-MCNC: 3.2 G/DL (ref 3.4–5)
ALP BLD-CCNC: 145 U/L (ref 40–129)
ALT SERPL-CCNC: 92 U/L (ref 10–40)
ANION GAP SERPL CALCULATED.3IONS-SCNC: 13 MMOL/L (ref 3–16)
AST SERPL-CCNC: 54 U/L (ref 15–37)
BILIRUB SERPL-MCNC: 0.5 MG/DL (ref 0–1)
BUN BLDV-MCNC: 16 MG/DL (ref 7–20)
CALCIUM SERPL-MCNC: 9.5 MG/DL (ref 8.3–10.6)
CHLORIDE BLD-SCNC: 111 MMOL/L (ref 99–110)
CO2: 23 MMOL/L (ref 21–32)
CREAT SERPL-MCNC: 0.8 MG/DL (ref 0.8–1.3)
GFR AFRICAN AMERICAN: >60
GFR NON-AFRICAN AMERICAN: >60
GLUCOSE BLD-MCNC: 91 MG/DL (ref 70–99)
HCT VFR BLD CALC: 39.1 % (ref 40.5–52.5)
HEMOGLOBIN: 12.6 G/DL (ref 13.5–17.5)
MCH RBC QN AUTO: 28.1 PG (ref 26–34)
MCHC RBC AUTO-ENTMCNC: 32.2 G/DL (ref 31–36)
MCV RBC AUTO: 87.3 FL (ref 80–100)
ORGANISM: ABNORMAL
PDW BLD-RTO: 13.7 % (ref 12.4–15.4)
PLATELET # BLD: 375 K/UL (ref 135–450)
PMV BLD AUTO: 8.6 FL (ref 5–10.5)
POTASSIUM REFLEX MAGNESIUM: 4.8 MMOL/L (ref 3.5–5.1)
RBC # BLD: 4.48 M/UL (ref 4.2–5.9)
SODIUM BLD-SCNC: 147 MMOL/L (ref 136–145)
TOTAL PROTEIN: 7.5 G/DL (ref 6.4–8.2)
URINE CULTURE, ROUTINE: ABNORMAL
WBC # BLD: 14.1 K/UL (ref 4–11)

## 2022-07-17 PROCEDURE — 85027 COMPLETE CBC AUTOMATED: CPT

## 2022-07-17 PROCEDURE — 6370000000 HC RX 637 (ALT 250 FOR IP): Performed by: INTERNAL MEDICINE

## 2022-07-17 PROCEDURE — 94760 N-INVAS EAR/PLS OXIMETRY 1: CPT

## 2022-07-17 PROCEDURE — 36415 COLL VENOUS BLD VENIPUNCTURE: CPT

## 2022-07-17 PROCEDURE — 6360000002 HC RX W HCPCS: Performed by: INTERNAL MEDICINE

## 2022-07-17 PROCEDURE — 80053 COMPREHEN METABOLIC PANEL: CPT

## 2022-07-17 PROCEDURE — 1200000000 HC SEMI PRIVATE

## 2022-07-17 RX ORDER — CEFUROXIME AXETIL 250 MG/1
250 TABLET ORAL EVERY 12 HOURS SCHEDULED
Status: DISCONTINUED | OUTPATIENT
Start: 2022-07-17 | End: 2022-07-18 | Stop reason: HOSPADM

## 2022-07-17 RX ORDER — DEXTROSE AND SODIUM CHLORIDE 5; .2 G/100ML; G/100ML
INJECTION, SOLUTION INTRAVENOUS CONTINUOUS
Status: DISCONTINUED | OUTPATIENT
Start: 2022-07-17 | End: 2022-07-18 | Stop reason: HOSPADM

## 2022-07-17 RX ADMIN — DIVALPROEX SODIUM 125 MG: 125 CAPSULE, COATED PELLETS ORAL at 17:00

## 2022-07-17 RX ADMIN — DIVALPROEX SODIUM 125 MG: 125 CAPSULE, COATED PELLETS ORAL at 06:31

## 2022-07-17 RX ADMIN — DIVALPROEX SODIUM 125 MG: 125 CAPSULE, COATED PELLETS ORAL at 20:24

## 2022-07-17 RX ADMIN — ENOXAPARIN SODIUM 30 MG: 100 INJECTION SUBCUTANEOUS at 20:25

## 2022-07-17 RX ADMIN — Medication 1000 UNITS: at 09:29

## 2022-07-17 RX ADMIN — ASPIRIN 81 MG 81 MG: 81 TABLET ORAL at 09:29

## 2022-07-17 RX ADMIN — ENOXAPARIN SODIUM 30 MG: 100 INJECTION SUBCUTANEOUS at 09:30

## 2022-07-17 RX ADMIN — CEFUROXIME AXETIL 250 MG: 250 TABLET ORAL at 20:24

## 2022-07-17 NOTE — PLAN OF CARE
Problem: Discharge Planning  Goal: Discharge to home or other facility with appropriate resources  Outcome: Progressing  Flowsheets (Taken 7/16/2022 0800)  Discharge to home or other facility with appropriate resources: Identify barriers to discharge with patient and caregiver     Problem: Pain  Goal: Verbalizes/displays adequate comfort level or baseline comfort level  Outcome: Progressing  Flowsheets (Taken 7/16/2022 1556)  Verbalizes/displays adequate comfort level or baseline comfort level:   Encourage patient to monitor pain and request assistance   Administer analgesics based on type and severity of pain and evaluate response   Consider cultural and social influences on pain and pain management   Assess pain using appropriate pain scale   Implement non-pharmacological measures as appropriate and evaluate response   Notify Licensed Independent Practitioner if interventions unsuccessful or patient reports new pain     Problem: Skin/Tissue Integrity  Goal: Absence of new skin breakdown  Outcome: Progressing  Note: No new areas of breakdown noted     Problem: Safety - Adult  Goal: Free from fall injury  Outcome: Progressing  Flowsheets (Taken 7/17/2022 1527)  Free From Fall Injury: Instruct family/caregiver on patient safety     Problem: ABCDS Injury Assessment  Goal: Absence of physical injury  Outcome: Progressing  Flowsheets (Taken 7/17/2022 1527)  Absence of Physical Injury: Implement safety measures based on patient assessment     Problem: Confusion  Goal: Confusion, delirium, dementia, or psychosis is improved or at baseline  Outcome: Progressing  Flowsheets (Taken 7/16/2022 1556)  Effect of thought disturbance (confusion, delirium, dementia, or psychosis) are managed with adequate functional status:   Assess for contributors to thought disturbance, including medications, impaired vision or hearing, underlying metabolic abnormalities, dehydration, psychiatric diagnoses, notify [place] high risk fall precautions, as indicated   Provide frequent short contacts to provide reality reorientation, refocusing and direction   Decrease environmental stimuli, including noise as appropriate

## 2022-07-17 NOTE — PROGRESS NOTES
Hospitalist Progress Note      PCP: No primary care provider on file. Date of Admission: 7/15/2022      Hospital Course: pt admitted from [facility] with AMS POA thought to be due to underlying infection, UTI. Subjective: Remains intermittently confused. Sodium is still elevated. Not back to baseline. .       Medications:  Reviewed    Infusion Medications    sodium chloride 100 mL/hr at 07/16/22 0132    sodium chloride       Scheduled Medications    divalproex  125 mg Oral 3 times per day    ziprasidone  10 mg IntraMUSCular Once    Vitamin D  1 tablet Oral Daily    sodium chloride flush  5-40 mL IntraVENous 2 times per day    enoxaparin  30 mg SubCUTAneous BID    aspirin  81 mg Oral Daily    cefTRIAXone (ROCEPHIN) IV  2,000 mg IntraVENous Q24H     PRN Meds: sodium chloride flush, sodium chloride, ondansetron **OR** ondansetron, acetaminophen **OR** acetaminophen, polyethylene glycol      Intake/Output Summary (Last 24 hours) at 7/17/2022 1441  Last data filed at 7/17/2022 1204  Gross per 24 hour   Intake 1080 ml   Output --   Net 1080 ml         Physical Exam Performed:    /68   Pulse 61   Temp 98 °F (36.7 °C) (Axillary)   Resp 17   Ht 5' 10\" (1.778 m)   Wt 219 lb 12.8 oz (99.7 kg)   SpO2 96%   BMI 31.54 kg/m²     General appearance: Agitated , restless. Confused  HEENT: Pupils equal, round, and reactive to light. Neck: Supple, with full range of motion. Respiratory:  Normal respiratory effort. Clear to auscultation, bilaterally without Rales/Wheezes/Rhonchi. Cardiovascular: Regular rate and rhythm with normal S1/S2   Abdomen: Soft, non-tender, non-distended with normal bowel sounds. Musculoskeletal: No clubbing, cyanosis or edema bilaterally.     Neurologic:  grossly non-focal.      Labs:   Recent Labs     07/15/22  1200 07/16/22  0421   WBC 15.1* 15.9*   HGB 12.1* 12.4*   HCT 37.3* 38.3*    366       Recent Labs     07/15/22  1200 07/16/22  0421 07/16/22  1623    147* 148*   K 4.5 4.6 4.1    112* 113*   CO2 23 24 24   BUN 27* 23* 21*   CREATININE 0.9 0.8 0.9   CALCIUM 9.9 9.5 9.4       Recent Labs     07/15/22  1200 07/16/22  0421   AST 34 77*   ALT 51* 85*   BILITOT 0.8 0.8   ALKPHOS 144* 147*       No results for input(s): INR in the last 72 hours. Recent Labs     07/15/22  1200 07/15/22  2012 07/15/22  2253   TROPONINI <0.01 <0.01 <0.01         Urinalysis:      Lab Results   Component Value Date/Time    NITRU POSITIVE 07/15/2022 12:00 PM    WBCUA 129 07/15/2022 12:00 PM    BACTERIA 1+ 07/15/2022 12:00 PM    RBCUA 0-2 07/15/2022 12:00 PM    BLOODU MODERATE 07/15/2022 12:00 PM    SPECGRAV 1.018 07/15/2022 12:00 PM    GLUCOSEU Negative 07/15/2022 12:00 PM       Radiology:  CT ABDOMEN PELVIS WO CONTRAST Additional Contrast? None   Final Result   1. No acute process in the abdomen or pelvis. 2. Suspected changes of diffuse liver disease. Clinical correlation advised. 3. Mild aortic valvular calcification         CT Head WO Contrast   Final Result   No acute intracranial abnormality. XR CHEST PORTABLE   Final Result   Mild cardiomegaly. Minimal interstitial prominence in the left lung.                  Assessment/Plan:    Active Hospital Problems    Diagnosis     Hypernatremia [E87.0]      Priority: Medium    E. coli UTI [N39.0, B96.20]      Priority: Medium    Dehydration [E86.0]      Priority: Medium    Acute metabolic encephalopathy [X36.60]      Priority: Medium     Cont IV abx empirically  Still with white count elevation  Await sensitivities may need to be switched to cefepime but will hold for now  IVF hydration , d5w will increase rate we will change IV fluids to D5 quarter normal saline   monitor BMP  Monitor Is and Os   Depangelate sprinkles for agitation  Bedside RN to call facility for updated med list, follow up with pharmacy once obtained  Supportive care  Monitor for improvement in mental status with treatment of infection      DVT Prophylaxis: lovenox  Diet: ADULT DIET; Regular;  No Caffeine  Code Status: Full Code    PT/OT when able    Dispo - possible dc back to ECF in 2-3 days pending progress and cultures    Tani Bolden MD

## 2022-07-18 VITALS
HEART RATE: 71 BPM | DIASTOLIC BLOOD PRESSURE: 72 MMHG | BODY MASS INDEX: 31.47 KG/M2 | WEIGHT: 219.8 LBS | HEIGHT: 70 IN | SYSTOLIC BLOOD PRESSURE: 115 MMHG | OXYGEN SATURATION: 93 % | TEMPERATURE: 98 F | RESPIRATION RATE: 18 BRPM

## 2022-07-18 LAB — SARS-COV-2, NAAT: NOT DETECTED

## 2022-07-18 PROCEDURE — 6370000000 HC RX 637 (ALT 250 FOR IP): Performed by: INTERNAL MEDICINE

## 2022-07-18 PROCEDURE — 87635 SARS-COV-2 COVID-19 AMP PRB: CPT

## 2022-07-18 PROCEDURE — 94760 N-INVAS EAR/PLS OXIMETRY 1: CPT

## 2022-07-18 PROCEDURE — 6360000002 HC RX W HCPCS: Performed by: INTERNAL MEDICINE

## 2022-07-18 RX ORDER — ASPIRIN 81 MG/1
81 TABLET, CHEWABLE ORAL DAILY
Qty: 30 TABLET | Refills: 3 | Status: SHIPPED | OUTPATIENT
Start: 2022-07-19

## 2022-07-18 RX ORDER — CEFUROXIME AXETIL 250 MG/1
250 TABLET ORAL EVERY 12 HOURS SCHEDULED
Qty: 8 TABLET | Refills: 0 | Status: SHIPPED | OUTPATIENT
Start: 2022-07-18 | End: 2022-07-22

## 2022-07-18 RX ORDER — DIVALPROEX SODIUM 125 MG/1
125 CAPSULE, COATED PELLETS ORAL EVERY 8 HOURS SCHEDULED
Qty: 60 CAPSULE | Refills: 3 | Status: SHIPPED | OUTPATIENT
Start: 2022-07-18 | End: 2022-07-21

## 2022-07-18 RX ADMIN — CEFUROXIME AXETIL 250 MG: 250 TABLET ORAL at 09:20

## 2022-07-18 RX ADMIN — DIVALPROEX SODIUM 125 MG: 125 CAPSULE, COATED PELLETS ORAL at 05:28

## 2022-07-18 RX ADMIN — ENOXAPARIN SODIUM 30 MG: 100 INJECTION SUBCUTANEOUS at 09:20

## 2022-07-18 RX ADMIN — DIVALPROEX SODIUM 125 MG: 125 CAPSULE, COATED PELLETS ORAL at 14:11

## 2022-07-18 RX ADMIN — ASPIRIN 81 MG 81 MG: 81 TABLET ORAL at 09:20

## 2022-07-18 RX ADMIN — Medication 1000 UNITS: at 09:20

## 2022-07-18 NOTE — CARE COORDINATION
DISCHARGE SUMMARY and initial assessment     DATE OF DISCHARGE: 7/18/22    DISCHARGE DESTINATION: [address] [place] [address] Avenue:   FACILITY    Level of Care: Long Term    Report Number: [phone]    Fax Number:  [phone]    Precert Obtained: N/A    Nidia Completed: N/A    PASARR: N/A    Notified: RN, Family, and Facility/Agency  Notified pt father Angeles Lincoln # [phone] of dc today- he is agreeable to return to [address] [place] [address] Road: [employer] Name: [employer] up Time: 3:45PM    Phone Number: [phone]    NEW DME ORDERED: N/A    COMMENTS: COVID negative 7/18/22  Electronically signed by Jonny Montelongo on 7/18/2022 at 11:51 AM  #[phone]

## 2022-07-18 NOTE — PROGRESS NOTES
Bedside report received from day shift RN. Patient resting comfortably in bed. No signs of discomfort or distress. Bed is in lowest position, wheels locked, 2/2 side rails up. Bedside table and call light within reach. White board updated. Will continue to monitor patient. Santiago Burdick RN    9:25 PM  Shift assessment complete. (See findings in flowsheet). Med pass complete. (See MAR). VSS. Patient with no complaints at this time. Santiago Burdick RN    2:01 AM  Patient resting quietly in bed. No needs at this time.  Santiago Burdick RN

## 2022-07-18 NOTE — PROGRESS NOTES
Attempted to christiano report to [facility] at [phone] when transferred to nursing floor no one picked up the phone this was attempted X 2.

## 2022-07-18 NOTE — PLAN OF CARE
Problem: Discharge Planning  Goal: Discharge to home or other facility with appropriate resources  Outcome: Resolved/Met     Problem: Pain  Goal: Verbalizes/displays adequate comfort level or baseline comfort level  Outcome: Resolved/Met     Problem: Skin/Tissue Integrity  Goal: Absence of new skin breakdown  Description: 1. Monitor for areas of redness and/or skin breakdown  2. Assess vascular access sites hourly  3. Every 4-6 hours minimum:  Change oxygen saturation probe site  4. Every 4-6 hours:  If on nasal continuous positive airway pressure, respiratory therapy assess nares and determine need for appliance change or resting period. Outcome: Resolved/Met     Problem: Safety - Adult  Goal: Free from fall injury  Outcome: Resolved/Met     Problem: ABCDS Injury Assessment  Goal: Absence of physical injury  Outcome: Resolved/Met     Problem: Confusion  Goal: Confusion, delirium, dementia, or psychosis is improved or at baseline  Description: INTERVENTIONS:  1. Assess for possible contributors to thought disturbance, including medications, impaired vision or hearing, underlying metabolic abnormalities, dehydration, psychiatric diagnoses, and notify attending LIP  2. [place] high risk fall precautions, as indicated  3. Provide frequent short contacts to provide reality reorientation, refocusing and direction  4. Decrease environmental stimuli, including noise as appropriate  5. Monitor and intervene to maintain adequate nutrition, hydration, elimination, sleep and activity  6. If unable to ensure safety without constant attention obtain sitter and review sitter guidelines with assigned personnel  7.  Initiate Psychosocial CNS and Spiritual Care consult, as indicated  Outcome: Resolved/Met

## 2022-07-18 NOTE — PROGRESS NOTES
Hospitalist Progress Note      PCP: No primary care provider on file. Date of Admission: 7/15/2022      Hospital Course: pt admitted from [facility] with AMS POA thought to be due to underlying infection, UTI. Subjective: Feels okay denies complaints. Medications:  Reviewed    Infusion Medications    dextrose 5 % and 0.2 % NaCl Stopped (07/17/22 1701)    sodium chloride       Scheduled Medications    cefUROXime  250 mg Oral 2 times per day    divalproex  125 mg Oral 3 times per day    ziprasidone  10 mg IntraMUSCular Once    Vitamin D  1 tablet Oral Daily    sodium chloride flush  5-40 mL IntraVENous 2 times per day    enoxaparin  30 mg SubCUTAneous BID    aspirin  81 mg Oral Daily     PRN Meds: sodium chloride flush, sodium chloride, ondansetron **OR** ondansetron, acetaminophen **OR** acetaminophen, polyethylene glycol      Intake/Output Summary (Last 24 hours) at 7/18/2022 1523  Last data filed at 7/18/2022 1320  Gross per 24 hour   Intake 1100 ml   Output --   Net 1100 ml         Physical Exam Performed:    /72   Pulse 71   Temp 98 °F (36.7 °C) (Oral)   Resp 18   Ht 5' 10\" (1.778 m)   Wt 219 lb 12.8 oz (99.7 kg)   SpO2 93%   BMI 31.54 kg/m²     General appearance: Agitated , restless. Confused  HEENT: Pupils equal, round, and reactive to light. Neck: Supple, with full range of motion. Respiratory:  Normal respiratory effort. Clear to auscultation, bilaterally without Rales/Wheezes/Rhonchi. Cardiovascular: Regular rate and rhythm with normal S1/S2   Abdomen: Soft, non-tender, non-distended with normal bowel sounds. Musculoskeletal: No clubbing, cyanosis or edema bilaterally.     Neurologic:  grossly non-focal.      Labs:   Recent Labs     07/16/22  0421 07/17/22  1440   WBC 15.9* 14.1*   HGB 12.4* 12.6*   HCT 38.3* 39.1*    375       Recent Labs     07/16/22  0421 07/16/22  1623 07/17/22  1440   * 148* 147*   K 4.6 4.1 4.8   * 113* 111*   CO2 24 24 23   BUN 23* 21* 16   CREATININE 0.8 0.9 0.8   CALCIUM 9.5 9.4 9.5       Recent Labs     07/16/22  0421 07/17/22  1440   AST 77* 54*   ALT 85* 92*   BILITOT 0.8 0.5   ALKPHOS 147* 145*       No results for input(s): INR in the last 72 hours. Recent Labs     07/15/22  2012 07/15/22  2253   TROPONINI <0.01 <0.01         Urinalysis:      Lab Results   Component Value Date/Time    NITRU POSITIVE 07/15/2022 12:00 PM    WBCUA 129 07/15/2022 12:00 PM    BACTERIA 1+ 07/15/2022 12:00 PM    RBCUA 0-2 07/15/2022 12:00 PM    BLOODU MODERATE 07/15/2022 12:00 PM    SPECGRAV 1.018 07/15/2022 12:00 PM    GLUCOSEU Negative 07/15/2022 12:00 PM       Radiology:  CT ABDOMEN PELVIS WO CONTRAST Additional Contrast? None   Final Result   1. No acute process in the abdomen or pelvis. 2. Suspected changes of diffuse liver disease. Clinical correlation advised. 3. Mild aortic valvular calcification         CT Head WO Contrast   Final Result   No acute intracranial abnormality. XR CHEST PORTABLE   Final Result   Mild cardiomegaly. Minimal interstitial prominence in the left lung. Assessment/Plan:    Active Hospital Problems    Diagnosis     Hypernatremia [E87.0]      Priority: Medium    E. coli UTI [N39.0, B96.20]      Priority: Medium    Dehydration [E86.0]      Priority: Medium    Acute metabolic encephalopathy [N78.40]      Priority: Medium     Off antibiotics IV  Changed to oral Ceftin  White count coming down  Encourage oral fluids at facility  DC paperwork done        DVT Prophylaxis: lovenox  Diet: ADULT DIET; Regular;  No Caffeine  Code Status: Full Code    PT/OT when able    Dispo - possible dc back to ECF today if bed is available    Husam Ferraro MD

## 2022-07-18 NOTE — DISCHARGE INSTR - COC
Continuity of Care Form    Patient Name: Rola Worthington   :  1960  MRN:  [id number]    Admit date:  7/15/2022  Discharge date:      Code Status Order: Full Code   Advance Directives:     Admitting Physician:  No admitting provider for patient encounter. PCP: No primary care provider on file. Discharging Nurse: Radha Malcolm RN   [address] Unit/Room#: A3X-7664/3112-01  Discharging Unit Phone Number: [phone]    Emergency Contact:   Extended Emergency Contact Information  Primary Emergency Contact: Armando Lewis  Home Phone: [phone]  Relation: Parent  Secondary Emergency Contact: Jai Meza  Mobile Phone: [phone]  Relation: Other    Past Surgical History:  History reviewed. No pertinent surgical history.     Immunization History:   Immunization History   Administered Date(s) Administered    COVID-19, J&J, (age 18y+), IM, 0.5 mL 2021       Active Problems:  Patient Active Problem List   Diagnosis Code    Acute metabolic encephalopathy X42.32    Hypernatremia E87.0    E. coli UTI N39.0, B96.20    Dehydration E86.0       Isolation/Infection:   Isolation            No Isolation          Patient Infection Status       Infection Onset Added Last Indicated Last Indicated By Review Planned Expiration Resolved Resolved By    None active    Resolved    COVID-19 (Rule Out) 07/15/22 07/15/22 07/15/22 COVID-19, Rapid (Ordered)   07/15/22 Rule-Out Test Resulted            Nurse Assessment:  Last Vital Signs: /72   Pulse 71   Temp 98 °F (36.7 °C) (Oral)   Resp 18   Ht 5' 10\" (1.778 m)   Wt 219 lb 12.8 oz (99.7 kg)   SpO2 93%   BMI 31.54 kg/m²     Last documented pain score (0-10 scale):    Last Weight:   Wt Readings from Last 1 Encounters:   22 219 lb 12.8 oz (99.7 kg)     Mental Status:   calm and cooperative; slow to respond at times     IV Access:  - None    Nursing Mobility/ADLs:  Walking   Assisted  Transfer  Assisted  Bathing  Assisted  Dressing Assisted  Toileting  Dependent  Feeding  Assisted  Med Admin  Assisted  Med Delivery   whole    Wound Care Documentation and Therapy:        Elimination:  Continence: Bowel: No  Bladder: No  Urinary Catheter: None   Colostomy/Ileostomy/Ileal Conduit: No       Date of Last BM: 7/18/2022    Intake/Output Summary (Last 24 hours) at 7/18/2022 1101  Last data filed at 7/18/2022 0905  Gross per 24 hour   Intake 1370 ml   Output --   Net 1370 ml     I/O last 3 completed shifts: In: 1440 [P.O.:1440]  Out: -     Safety Concerns:     History of Falls (last 30 days), At Risk for Falls, and skin breakdown risks     Impairments/Disabilities:      Speech and Vision    Nutrition Therapy:  Current Nutrition Therapy:   - Oral Diet:  General    Routes of Feeding: Oral  Liquids: Thin Liquids  Daily Fluid Restriction: no  Last Modified Barium Swallow with Video (Video Swallowing Test): not done    Treatments at the Time of Hospital Discharge:   Respiratory Treatments: none   Oxygen Therapy:  is not on home oxygen therapy.   Ventilator:    - No ventilator support    Rehab Therapies: Physical Therapy and Occupational Therapy  Weight Bearing Status/Restrictions: No weight bearing restrictions  Other Medical Equipment (for information only, NOT a DME order):  hospital bed  Other Treatments: none     Patient's personal belongings (please select all that are sent with patient):  None    RN SIGNATURE:  Electronically signed by Tasia Bustillo RN on 7/18/22 at 11:14 AM EDT    CASE MANAGEMENT/SOCIAL WORK SECTION    Inpatient Status Date: 07/15/2022    Readmission Risk Assessment Score:  Readmission Risk              Risk of Unplanned Readmission:  [id number]           Discharging to Facility/ Agency   Discharging to Facility/ Agency   Name:  [facility]  Address:  [address] [place] [address]   Phone:  [phone]  Fax:  [phone]    / signature: Electronically signed by Maria Isabel Givens on 7/18/22 at 11:56 AM EDT    PHYSICIAN SECTION    Prognosis: Fair    Condition at Discharge: Stable    Rehab Potential (if transferring to Rehab): Fair    Recommended Labs or Other Treatments After Discharge:     Physician Certification: I certify the above information and transfer of Zaria Ramirez  is necessary for the continuing treatment of the diagnosis listed and that he requires [facility] for less 30 days.      Update Admission H&P: Changes in H&P as follows - see dc summary    PHYSICIAN SIGNATURE:  Electronically signed by Tani Bolden MD on 7/18/22 at 11:01 AM EDT

## 2022-07-18 NOTE — PROGRESS NOTES
Data- discharge order received, pt or father  (appointed legal authority) verbalized agreement to discharge, disposition to [address]  , [address] reviewed and signed by physician. Action- AVS prepared, SHANNAN completed/ reported faxed by case management/. Discharge instruction summary: Diet- regular , Activity- bedrest , immunizations reviewed and up to date , medications prescriptions to be filled at receiving facility except for the controlled prescriptions to be sent: none , Transfer code status: Full Code, LDAs to remain with discharge: none . DME used: no .     Response- Bedside RN to call report to receiving facility. Pt belongings gathered, peripheral IV and cardiac monitoring removed. Disposition to Discharged via cart/stretcher and via ambulance to skilled nursing by EMS transportation, no complications reported.

## 2022-07-19 LAB
BLOOD CULTURE, ROUTINE: NORMAL
CULTURE, BLOOD 2: NORMAL

## 2022-08-15 PROBLEM — E86.0 DEHYDRATION: Status: RESOLVED | Noted: 2022-07-16 | Resolved: 2022-08-15

## 2022-08-25 NOTE — DISCHARGE SUMMARY
Hospital Medicine Discharge Summary    Patient ID: Sunny Mosqueda      Patient's PCP: No primary care provider on file. Admit Date: 7/15/2022     Discharge Date: 7/18/2022      Admitting Provider: María Brooks MD     Discharge Provider: Buell Lanes, MD     Discharge Diagnoses: Active Hospital Problems    Diagnosis     Hypernatremia [E87.0]      Priority: Medium    E. coli UTI [N39.0, B96.20]      Priority: Medium    Acute metabolic encephalopathy [U10.11]      Priority: Medium       The patient was seen and examined on day of discharge and this discharge summary is in conjunction with any daily progress note from day of discharge. Hospital Course: 67-year-old male admitted to the hospital with altered mental status. Found to have UTI. Found to be hyponatremic. Received IV fluids. Received IV antibiotics. This was switched to oral Ceftin. Encouraged to take oral fluids. White count came down. Sodium levels improved          Physical Exam Performed:     /72   Pulse 71   Temp 98 °F (36.7 °C) (Oral)   Resp 18   Ht 5' 10\" (1.778 m)   Wt 219 lb 12.8 oz (99.7 kg)   SpO2 93%   BMI 31.54 kg/m²       General appearance:  No apparent distress, appears stated age and cooperative. HEENT:  Normal cephalic, atraumatic without obvious deformity. Pupils equal, round, and reactive to light. Extra ocular muscles intact. Conjunctivae/corneas clear. Neck: Supple, with full range of motion. No jugular venous distention. Trachea midline. Respiratory:  Normal respiratory effort. Clear to auscultation, bilaterally without Rales/Wheezes/Rhonchi. Cardiovascular:  Regular rate and rhythm with normal S1/S2 without murmurs, rubs or gallops. Abdomen: Soft, non-tender, non-distended with normal bowel sounds. Musculoskeletal:  No clubbing, cyanosis or edema bilaterally. Full range of motion without deformity. Skin: Skin color, texture, turgor normal.  No rashes or lesions.   Neurologic: Neurovascularly intact without any focal sensory/motor deficits. Cranial nerves: II-XII intact, grossly non-focal.  Psychiatric:  Alert and oriented, thought content appropriate, normal insight  Capillary Refill: Brisk,< 3 seconds   Peripheral Pulses: +2 palpable, equal bilaterally       Labs: For convenience and continuity at follow-up the following most recent labs are provided:      CBC:    Lab Results   Component Value Date/Time    WBC 14.1 07/17/2022 02:40 PM    HGB 12.6 07/17/2022 02:40 PM    HCT 39.1 07/17/2022 02:40 PM     07/17/2022 02:40 PM       Renal:    Lab Results   Component Value Date/Time     07/17/2022 02:40 PM    K 4.8 07/17/2022 02:40 PM     07/17/2022 02:40 PM    CO2 23 07/17/2022 02:40 PM    BUN 16 07/17/2022 02:40 PM    CREATININE 0.8 07/17/2022 02:40 PM    CALCIUM 9.5 07/17/2022 02:40 PM    PHOS 3.7 11/01/2011 07:50 AM         Significant Diagnostic Studies    Radiology:   CT ABDOMEN PELVIS WO CONTRAST Additional Contrast? None   Final Result   1. No acute process in the abdomen or pelvis. 2. Suspected changes of diffuse liver disease. Clinical correlation advised. 3. Mild aortic valvular calcification         CT Head WO Contrast   Final Result   No acute intracranial abnormality. XR CHEST PORTABLE   Final Result   Mild cardiomegaly. Minimal interstitial prominence in the left lung. Consults:     IP CONSULT TO HOSPITALIST    Disposition:  ecf     Condition at Discharge: Stable    Discharge Instructions/Follow-up:  No primary care provider on file. Code Status: full    Activity: activity as tolerated    Diet: cardiac diet      Discharge Medications:     Discharge Medication List as of 7/18/2022  3:24 PM             Details   cefUROXime (CEFTIN) 250 MG tablet Take 1 tablet by mouth in the morning and 1 tablet before bedtime. Do all this for 8 doses. , Disp-8 tablet, R-0Print      aspirin 81 MG chewable tablet Take 1 tablet by mouth in the morning., Disp-30 tablet, R-3Print      divalproex (DEPAKOTE SPRINKLE) 125 MG capsule Take 1 capsule by mouth in the morning and 1 capsule at noon and 1 capsule before bedtime. Do all this for 9 doses. , Disp-60 capsule, R-3Print                Details   vitamin D 25 MCG (1000 UT) CAPS Take by mouthHistorical Med             Time Spent on discharge is more than 45 minutes in the examination, evaluation, counseling and review of medications and discharge plan. Signed:    Martha Lepe MD   8/25/2022      Thank you No primary care provider on file. for the opportunity to be involved in this patient's care. If you have any questions or concerns, please feel free to contact me at [phone].

## 2022-11-27 ENCOUNTER — APPOINTMENT (OUTPATIENT)
Dept: CT IMAGING | Age: 62
DRG: 720 | End: 2022-11-27
Payer: MEDICAID

## 2022-11-27 ENCOUNTER — HOSPITAL ENCOUNTER (INPATIENT)
Age: 62
LOS: 9 days | Discharge: OTHER INSTITUTION | DRG: 720 | End: 2022-12-07
Attending: EMERGENCY MEDICINE | Admitting: INTERNAL MEDICINE
Payer: MEDICAID

## 2022-11-27 ENCOUNTER — APPOINTMENT (OUTPATIENT)
Dept: GENERAL RADIOLOGY | Age: 62
DRG: 720 | End: 2022-11-27
Payer: MEDICAID

## 2022-11-27 DIAGNOSIS — A41.9 SEPTICEMIA (HCC): Primary | ICD-10-CM

## 2022-11-27 LAB
A/G RATIO: 0.8 (ref 1.1–2.2)
ALBUMIN SERPL-MCNC: 3.2 G/DL (ref 3.4–5)
ALP BLD-CCNC: 101 U/L (ref 40–129)
ALT SERPL-CCNC: 58 U/L (ref 10–40)
ANION GAP SERPL CALCULATED.3IONS-SCNC: 13 MMOL/L (ref 3–16)
AST SERPL-CCNC: 105 U/L (ref 15–37)
BACTERIA: NORMAL /HPF
BASE EXCESS ARTERIAL: 5.8 MMOL/L (ref -3–3)
BASOPHILS ABSOLUTE: 0 K/UL (ref 0–0.2)
BASOPHILS RELATIVE PERCENT: 0 %
BILIRUB SERPL-MCNC: 0.4 MG/DL (ref 0–1)
BILIRUBIN URINE: NEGATIVE
BLOOD, URINE: NEGATIVE
BUN BLDV-MCNC: 25 MG/DL (ref 7–20)
C-REACTIVE PROTEIN: 143.2 MG/L (ref 0–5.1)
CALCIUM SERPL-MCNC: 9.6 MG/DL (ref 8.3–10.6)
CARBOXYHEMOGLOBIN ARTERIAL: 1.8 % (ref 0–1.5)
CHLORIDE BLD-SCNC: 103 MMOL/L (ref 99–110)
CLARITY: CLEAR
CO2: 25 MMOL/L (ref 21–32)
COLOR: YELLOW
CREAT SERPL-MCNC: 0.8 MG/DL (ref 0.8–1.3)
EOSINOPHILS ABSOLUTE: 0 K/UL (ref 0–0.6)
EOSINOPHILS RELATIVE PERCENT: 0 %
EPITHELIAL CELLS, UA: 0 /HPF (ref 0–5)
GFR SERPL CREATININE-BSD FRML MDRD: >60 ML/MIN/{1.73_M2}
GLUCOSE BLD-MCNC: 112 MG/DL (ref 70–99)
GLUCOSE URINE: NEGATIVE MG/DL
HCO3 ARTERIAL: 30.8 MMOL/L (ref 21–29)
HCT VFR BLD CALC: 39.1 % (ref 40.5–52.5)
HEMATOLOGY PATH CONSULT: YES
HEMOGLOBIN, ART, EXTENDED: 12.9 G/DL (ref 13.5–17.5)
HEMOGLOBIN: 12.6 G/DL (ref 13.5–17.5)
HYALINE CASTS: 1 /LPF (ref 0–8)
KETONES, URINE: NEGATIVE MG/DL
LACTIC ACID: 1 MMOL/L (ref 0.4–2)
LEUKOCYTE ESTERASE, URINE: NEGATIVE
LYMPHOCYTES ABSOLUTE: 0.5 K/UL (ref 1–5.1)
LYMPHOCYTES RELATIVE PERCENT: 3 %
MCH RBC QN AUTO: 27.3 PG (ref 26–34)
MCHC RBC AUTO-ENTMCNC: 32.1 G/DL (ref 31–36)
MCV RBC AUTO: 84.9 FL (ref 80–100)
METHEMOGLOBIN ARTERIAL: 0.1 %
MICROSCOPIC EXAMINATION: YES
MONOCYTES ABSOLUTE: 2 K/UL (ref 0–1.3)
MONOCYTES RELATIVE PERCENT: 11 %
NEUTROPHILS ABSOLUTE: 15.5 K/UL (ref 1.7–7.7)
NEUTROPHILS RELATIVE PERCENT: 86 %
NITRITE, URINE: NEGATIVE
O2 SAT, ARTERIAL: 93.7 %
O2 THERAPY: ABNORMAL
PCO2 ARTERIAL: 45.1 MMHG (ref 35–45)
PDW BLD-RTO: 15.5 % (ref 12.4–15.4)
PH ARTERIAL: 7.44 (ref 7.35–7.45)
PH UA: 5 (ref 5–8)
PLATELET # BLD: 233 K/UL (ref 135–450)
PMV BLD AUTO: 9.2 FL (ref 5–10.5)
PO2 ARTERIAL: 66.1 MMHG (ref 75–108)
POIKILOCYTES: ABNORMAL
POTASSIUM REFLEX MAGNESIUM: 4.3 MMOL/L (ref 3.5–5.1)
PROCALCITONIN: 0.09 NG/ML (ref 0–0.15)
PROTEIN UA: ABNORMAL MG/DL
RAPID INFLUENZA  B AGN: NEGATIVE
RAPID INFLUENZA A AGN: NEGATIVE
RBC # BLD: 4.61 M/UL (ref 4.2–5.9)
RBC UA: 1 /HPF (ref 0–4)
SARS-COV-2, NAAT: NOT DETECTED
SODIUM BLD-SCNC: 141 MMOL/L (ref 136–145)
SPECIFIC GRAVITY UA: 1.04 (ref 1–1.03)
TARGET CELLS: ABNORMAL
TCO2 ARTERIAL: 32.2 MMOL/L
TOTAL PROTEIN: 7.3 G/DL (ref 6.4–8.2)
URINE REFLEX TO CULTURE: ABNORMAL
URINE TYPE: ABNORMAL
UROBILINOGEN, URINE: 1 E.U./DL
WBC # BLD: 18 K/UL (ref 4–11)
WBC UA: 1 /HPF (ref 0–5)

## 2022-11-27 PROCEDURE — 86140 C-REACTIVE PROTEIN: CPT

## 2022-11-27 PROCEDURE — 87635 SARS-COV-2 COVID-19 AMP PRB: CPT

## 2022-11-27 PROCEDURE — 6360000004 HC RX CONTRAST MEDICATION: Performed by: NURSE PRACTITIONER

## 2022-11-27 PROCEDURE — 96365 THER/PROPH/DIAG IV INF INIT: CPT

## 2022-11-27 PROCEDURE — 81001 URINALYSIS AUTO W/SCOPE: CPT

## 2022-11-27 PROCEDURE — 36600 WITHDRAWAL OF ARTERIAL BLOOD: CPT

## 2022-11-27 PROCEDURE — 99285 EMERGENCY DEPT VISIT HI MDM: CPT

## 2022-11-27 PROCEDURE — 36415 COLL VENOUS BLD VENIPUNCTURE: CPT

## 2022-11-27 PROCEDURE — 2580000003 HC RX 258: Performed by: NURSE PRACTITIONER

## 2022-11-27 PROCEDURE — 6360000002 HC RX W HCPCS: Performed by: NURSE PRACTITIONER

## 2022-11-27 PROCEDURE — 74177 CT ABD & PELVIS W/CONTRAST: CPT

## 2022-11-27 PROCEDURE — 87804 INFLUENZA ASSAY W/OPTIC: CPT

## 2022-11-27 PROCEDURE — 83605 ASSAY OF LACTIC ACID: CPT

## 2022-11-27 PROCEDURE — 71045 X-RAY EXAM CHEST 1 VIEW: CPT

## 2022-11-27 PROCEDURE — 80053 COMPREHEN METABOLIC PANEL: CPT

## 2022-11-27 PROCEDURE — 84145 PROCALCITONIN (PCT): CPT

## 2022-11-27 PROCEDURE — 82803 BLOOD GASES ANY COMBINATION: CPT

## 2022-11-27 PROCEDURE — 87040 BLOOD CULTURE FOR BACTERIA: CPT

## 2022-11-27 PROCEDURE — 85025 COMPLETE CBC W/AUTO DIFF WBC: CPT

## 2022-11-27 PROCEDURE — 94761 N-INVAS EAR/PLS OXIMETRY MLT: CPT

## 2022-11-27 RX ORDER — 0.9 % SODIUM CHLORIDE 0.9 %
30 INTRAVENOUS SOLUTION INTRAVENOUS ONCE
Status: COMPLETED | OUTPATIENT
Start: 2022-11-27 | End: 2022-11-28

## 2022-11-27 RX ADMIN — IOPAMIDOL 75 ML: 755 INJECTION, SOLUTION INTRAVENOUS at 20:09

## 2022-11-27 RX ADMIN — PIPERACILLIN AND TAZOBACTAM 3375 MG: 3; .375 INJECTION, POWDER, FOR SOLUTION INTRAVENOUS at 23:39

## 2022-11-27 NOTE — ED PROVIDER NOTES
[address]  [address] [address]  Dept: [phone]  Loc: [facility]      I have seen and evaluated this patient with my supervising physician, Dr. Iwona Knight. CHIEF COMPLAINT    Chief Complaint   Patient presents with    Emesis     Per nursing home pt was acting fine after dinner pt had emesis and was not feeling well        HPI    Saintclair Senters is a 57 y.o. nontoxic and well-appearing, mildly distressed male with medical history including but not limited to, antisocial personality disorder, blindness of right eye, drug-induced subacute dyskinesia, insomnia, major depressive disorder, melanoma, and schizoaffective disorder who presents to the emergency department from [facility] with reported nausea and vomiting. Onset was prior to arrival. The context was shortly PTA after dinner. The patient complains of a cough. The quality is dry. Patient denies chest pain, lightheadedness, dizziness, presyncope, shortness of breath, fever, chills, sweats, changeability smell/taste hemoptysis, leg/calf pain or swelling, headache and body aches, abdominal pain, diarrhea, urinary symptoms/retention, other concerns. He does arrive with bilateral nephrostomy tubes in place. [address] [facility] [facility] [address] [place]ON[place] and spoke with the nurse on duty Augusta Jason who states that the nurse who was present during the day just left but she could try to get me information regarding the patient. She states that the patient is generally awake and alert. He normally uses a walker to ambulate but over the past 1-2 weeks she has noticed a gradual decline in his arm, leg, and hand strength. She also endorses that the nurse did not note some nausea and vomiting but did not report the number of episodes or character of the emesis.     REVIEW OF SYSTEMS    Review of Systems   Unable to perform ROS: Psychiatric disorder Patient only responds that he has had some nausea and vomiting and that he has a cough. He thereafter states \"why you ask so many questions? All my information is in the computer\" and stopped responding to questions. Called SNF regarding patient. PAST MEDICAL & SURGICAL HISTORY    Past Medical History:   Diagnosis Date    Antisocial personality disorder ([facility]     Blindness right eye category 5, normal vision left eye     Drug induced subacute dyskinesia     Insomnia     Major depressive disorder, recurrent, unspecified ([facility]     Melena     Schizoaffective disorder ([facility]     Substance abuse ([facility]      No past surgical history on file. CURRENT MEDICATIONS    Current Outpatient Rx   Medication Sig Dispense Refill    aspirin 81 MG chewable tablet Take 1 tablet by mouth in the morning. 30 tablet 3    divalproex (DEPAKOTE SPRINKLE) 125 MG capsule Take 1 capsule by mouth in the morning and 1 capsule at noon and 1 capsule before bedtime. Do all this for 9 doses. 60 capsule 3    vitamin D 25 MCG (1000 UT) CAPS Take by mouth         ALLERGIES    Allergies   Allergen Reactions    Codeine     Thorazine [Chlorpromazine]     Tylenol [Acetaminophen]        SOCIAL & FAMILY HISTORY    Social History     Socioeconomic History    Marital status: Single   Tobacco Use    Smoking status: Unknown    Tobacco comments:     Quit since in [facility]    Vaping Use    Vaping Use: Unknown   Substance and Sexual Activity    Alcohol use: Not Currently     Comment: unknown    Drug use: Not Currently     Types: Cocaine     Comment: unknown    Sexual activity: Not Currently   Social History Narrative    ** Merged History Encounter **          No family history on file.     PHYSICAL EXAM    VITAL SIGNS: BP 94/64   Pulse 94   Temp 98.3 °F (36.8 °C)   Resp 22   Ht 5' 11\" (1.803 m)   Wt 236 lb 8.9 oz (107.3 kg)   SpO2 94%   BMI 32.99 kg/m²    Constitutional:  Ill appearing, appearing in mild acute distress   Eyes: Left eye reactive to light, sclera nonicteric  HENT:  Araumatic, no trismus  Neck: supple, no JVD, no  posterior neck tenderness  Respiratory:  Lungs diminished x 5 lung fields, no retractions   Cardiovascular:  Regular rate and rhythm, no murmurs, rubs, or gallops  GI:  Soft, nontender, normal bowel sounds  Back: No bony midline thoracic or lumbar spine tenderness. + bilateral nephrostomy tubes in place with clear appearing urine present bilaterally  Musculoskeletal: No deformity, tenderness, or decreased ROM of bilateral up or lower extremities  Vascular:  pulses are 2+ equal bilaterally  Integument:  Well hydrated, no petechiae   Neurologic:  Awake, alert, oriented to self,  place, and situation, no aphasia, no slurred speech, CN II-XII intact, normal finger to nose test bilaterally, sensation to light touch intact bilaterally, patellar reflexes 2+ and equal bilaterally  Psych: Flat affect, no hallucinations          EKG   Please see the ED Physician note for EKG interpretation. RADIOLOGY  (interpreted by the radiologist)  No orders to display       ED [facility] studies reviewed and interpreted. (See chart for details)    See chart for details of medications given during the ED stay. Vitals:    11/27/22 1852   BP: 94/64   Pulse: 94   Resp: 22   Temp: 98.3 °F (36.8 °C)   SpO2: 94%   Weight: 236 lb 8.9 oz (107.3 kg)   Height: 5' 11\" (1.803 m)       Differential Diagnosis: Cardiac Arrhythmia, Stroke, intravascular source of sepsis (infected intravenous line), CNS source of sepsis (bacterial meningitis), lower respiratory tract cause of sepsis (pneumonia, empyema, lung abscess), gastrointestinal source of sepsis (liver, gallbladder, colon)      Patient presents to the emergency department from [facility] with a cute onset of nausea and vomiting that onset shortly after he ate today. I will call facility to get more of a report.   Patient is awake and alert but unable to tell me his date of birth \"right now\" or way he is. He asks Kaye Chantel are you asking so many questions\". He does have bilateral nephrostomy tubes in place. There is a strong smell/foul smell of urine emanating from his presence. He is potentially uroseptic versus pneumonia as his oxygen saturation is as low as 91% on room air in the room. He does have a bit of a cough. Patient is hypotensive at 94/64 mmHg, has a heart rate of 94, respirations are 22-32 bpm and oxygen saturation is low. We will obtain basic and sepsis labs, COVID-19, influenza, and CXR as the patient is in congregate living. Work-up pending.       Labs reviewed:  I have reviewed and interpreted all of the currently available lab results from this visit:  Results for orders placed or performed during the hospital encounter of 11/27/22   COVID-19, Rapid    Specimen: Nasopharyngeal Swab   Result Value Ref Range    SARS-CoV-2, NAAT Not Detected Not Detected   Rapid influenza A/B antigens    Specimen: Nasopharyngeal   Result Value Ref Range    Rapid Influenza A Ag Negative Negative    Rapid Influenza B Ag Negative Negative   CBC with Auto Differential   Result Value Ref Range    WBC 18.0 (H) 4.0 - 11.0 K/uL    RBC 4.61 4.20 - 5.90 M/uL    Hemoglobin 12.6 (L) 13.5 - 17.5 g/dL    Hematocrit 39.1 (L) 40.5 - 52.5 %    MCV 84.9 80.0 - 100.0 fL    MCH 27.3 26.0 - 34.0 pg    MCHC 32.1 31.0 - 36.0 g/dL    RDW 15.5 (H) 12.4 - 15.4 %    Platelets 371 962 - 537 K/uL    MPV 9.2 5.0 - 10.5 fL    Path Consult Yes     Neutrophils % 86.0 %    Lymphocytes % 3.0 %    Monocytes % 11.0 %    Eosinophils % 0.0 %    Basophils % 0.0 %    Neutrophils Absolute 15.5 (H) 1.7 - 7.7 K/uL    Lymphocytes Absolute 0.5 (L) 1.0 - 5.1 K/uL    Monocytes Absolute 2.0 (H) 0.0 - 1.3 K/uL    Eosinophils Absolute 0.0 0.0 - 0.6 K/uL    Basophils Absolute 0.0 0.0 - 0.2 K/uL    Poikilocytes Occasional (A)     Target Cells Occasional (A)    Comprehensive Metabolic Panel w/ Reflex to MG   Result Value Ref Range Sodium 141 136 - 145 mmol/L    Potassium reflex Magnesium 4.3 3.5 - 5.1 mmol/L    Chloride 103 99 - 110 mmol/L    CO2 25 21 - 32 mmol/L    Anion Gap 13 3 - 16    Glucose 112 (H) 70 - 99 mg/dL    BUN 25 (H) 7 - 20 mg/dL    Creatinine 0.8 0.8 - 1.3 mg/dL    Est, Glom Filt Rate >60 >60    Calcium 9.6 8.3 - 10.6 mg/dL    Total Protein 7.3 6.4 - 8.2 g/dL    Albumin 3.2 (L) 3.4 - 5.0 g/dL    Albumin/Globulin Ratio 0.8 (L) 1.1 - 2.2    Total Bilirubin 0.4 0.0 - 1.0 mg/dL    Alkaline Phosphatase 101 40 - 129 U/L    ALT 58 (H) 10 - 40 U/L     (H) 15 - 37 U/L   Lactic Acid   Result Value Ref Range    Lactic Acid 1.0 0.4 - 2.0 mmol/L   C-Reactive Protein   Result Value Ref Range    .2 (H) 0.0 - 5.1 mg/L   Procalcitonin   Result Value Ref Range    Procalcitonin 0.09 0.00 - 0.15 ng/mL   Blood Gas, Arterial   Result Value Ref Range    pH, Arterial 7.443 7.350 - 7.450    pCO2, Arterial 45.1 (H) 35.0 - 45.0 mmHg    pO2, Arterial 66.1 (L) 75.0 - 108.0 mmHg    HCO3, Arterial 30.8 (H) 21.0 - 29.0 mmol/L    Base Excess, Arterial 5.8 (H) -3.0 - 3.0 mmol/L    Hemoglobin, Art, Extended 12.9 (L) 13.5 - 17.5 g/dL    O2 Sat, Arterial 93.7 >92 %    Carboxyhgb, Arterial 1.8 (H) 0.0 - 1.5 %    Methemoglobin, Arterial 0.1 <1.5 %    TCO2, Arterial 32.2 Not Established mmol/L    O2 Therapy Unknown    Urinalysis with Reflex to Culture    Specimen: Urine   Result Value Ref Range    Color, UA Yellow Straw/Yellow    Clarity, UA Clear Clear    Glucose, Ur Negative Negative mg/dL    Bilirubin Urine Negative Negative    Ketones, Urine Negative Negative mg/dL    Specific Gravity, UA 1.039 1.005 - 1.030    Blood, Urine Negative Negative    pH, UA 5.0 5.0 - 8.0    Protein, UA TRACE (A) Negative mg/dL    Urobilinogen, Urine 1.0 <2.0 E.U./dL    Nitrite, Urine Negative Negative    Leukocyte Esterase, Urine Negative Negative    Microscopic Examination YES     Urine Type NotGiven     Urine Reflex to Culture Not Indicated    Microscopic Urinalysis   Result Value Ref Range    Bacteria, UA None Seen None Seen /HPF    Hyaline Casts, UA 1 0 - 8 /LPF    WBC, UA 1 0 - 5 /HPF    RBC, UA 1 0 - 4 /HPF    Epithelial Cells, UA 0 0 - 5 /HPF         Imaging reviewed:  CT ABDOMEN PELVIS W IV CONTRAST Additional Contrast? None    Result Date: 11/28/2022  EXAMINATION: CT OF THE ABDOMEN AND PELVIS WITH CONTRAST 11/27/2022 7:59 pm TECHNIQUE: CT of the abdomen and pelvis was performed with the administration of intravenous contrast. Multiplanar reformatted images are provided for review. Automated exposure control, iterative reconstruction, and/or weight based adjustment of the mA/kV was utilized to reduce the radiation dose to as low as reasonably achievable. COMPARISON: 07/15/2022 HISTORY: ORDERING SYSTEM PROVIDED HISTORY: r/o intra-abd abnorrmality and eval nephrostomy tubes/other intra-abdominal pathology. TECHNOLOGIST PROVIDED HISTORY: Reason for exam:->r/o intra-abd abnorrmality and eval nephrostomy tubes/other intra-abdominal pathology. Additional Contrast?->None Decision Support Exception - unselect if not a suspected or confirmed emergency medical condition->Emergency Medical Condition (MA) Reason for Exam: r/o intra-abd abnorrmality and eval nephrostomy tubes/other intra-abdominal pathology. FINDINGS: Lower Chest: There is a trace left pleural effusion. The lung bases are otherwise clear. Organs: Patient motion limits evaluation. Limited evaluation of the liver, spleen, pancreas, gallbladder, adrenal glands and kidneys demonstrates no acute findings. GI/Bowel: There are no abnormally dilated loops of large or small bowel present. There is no mesenteric inflammatory stranding present. Pelvis: The urinary bladder is normal in appearance. There is no free fluid or pelvic mass identified. Peritoneum/Retroperitoneum: There is no pathologically enlarged lymphadenopathy present. Bones/Soft Tissues: No lytic or blastic osseous lesions are identified.      1. Limited exam due to patient motion with no acute findings identified within the abdomen or pelvis. 2. Trace left pleural effusion. XR CHEST PORTABLE    Result Date: 11/28/2022  EXAMINATION: ONE XRAY VIEW OF THE CHEST 11/27/2022 7:41 pm COMPARISON: 07/15/2022 HISTORY: ORDERING SYSTEM PROVIDED HISTORY: r/o pna TECHNOLOGIST PROVIDED HISTORY: Reason for exam:->r/o pna FINDINGS: The patient is rotated to the right which has accentuated the mediastinal and cardiac contours. There is no focal consolidation, pleural effusion or evidence of edema. No pneumothorax identified. A remote posterior left rib fracture is noted. Stable appearance of the chest without acute cardiopulmonary process identified.       Work-up reveals:  ABG: Normal pH with a PCO2 45.1, PO2 66.1, bicarb 30.8, BE ART at 5.8, hemoglobin, heart, extended 12.9, carboxyhemoglobin 1.8  COVID-19 not detected  Rapid influenza A/B: Negative  Urine reflex to culture: Protein trace without nitrites or leukocyte Estrace, or blood, inconsistent with a UTI  Microscopic urinalysis: Also inconsistent with a UTI with no bacteria seen, WBC 1, RBC 1, 0 epithelials inconsistent with UTI  Lactic acid: [email or web address]  CBC: Leukocytosis 18, hemoglobin hematocrit reduced to 12.6 and 39.1% respectively, RDW increased at 15.5, neutrophils absolute elevated at 15.5, lymphocytes absolute reduced at 0.5, monocytes absolute elevated at 2.0, poikilocytes occasional, target cells occasional, otherwise unremarkable  Metabolic panel: Hyperglycemic at 112 mg/dL, elevated BUN at 25 with normal creatinine and GFR at 0.8 and >60 respectively, elevated ALT at 58, reduced albumin at 3.2, reduced albumin/globulin [email or web address], elevated AST at 105, with normal bilirubin and alkaline phosphatase, otherwise unremarkable  CRP: Elevated 114.2  Procalcitonin: [email or web address]  CT A/P: As noted above identifying no acute findings and a motion degraded study with trace left pleural effusion  CXR: As noted above identifies stable appearance of the chest without acute cardiopulmonary process identified      Is this patient to be included in the SEP-1 Core Measure due to severe sepsis or septic shock? Yes   SEP-1 CORE MEASURE DATA      Sepsis Criteria   Severe Sepsis Criteria   Septic Shock Criteria     Must be confirmed or suspected to move forward with diagnosis of sepsis. Must meet 2:    [] Temperature > 100.9 F (38.3 C)        or < 96.8 F (36 C)  [x] HR > 90  [x] RR > 20  [x] WBC > 12 or < 4 or 10% bands      AND:      [x] Infection Confirmed or        Suspected. Must meet 1:    [] Lactate > 2       or   [] Signs of Organ Dysfunction:    - SBP < 90 or MAP < 65  - Altered mental status  - Creatinine > 2 or increased from      baseline  - Urine Output < 0.5 ml/kg/hr  - Bilirubin > 2  - INR > 1.5 (not anticoagulated)  - Platelets < 382,048  - Acute Respiratory Failure as     evidenced by new need for NIPPV     or mechanical ventilation      [x] No criteria met for Severe Sepsis. Must meet 1:    [] Lactate > 4        or   [] SBP < 90 or MAP < 65 for at        least two readings in the first        hour after fluid bolus        administration      [] Vasopressors initiated (if hypotension persists after fluid resuscitation)        [] No criteria met for Septic Shock. Patient Vitals for the past 6 hrs:   BP Temp Pulse Resp SpO2 Height Weight Weight Method Percent Weight Change   11/27/22 1852 94/64 98.3 °F (36.8 °C) 94 22 94 % 5' 11\" (1.803 m) 236 lb 8.9 oz (107.3 kg) Actual 0      No results for input(s): WBC, LACTA, CREATININE, BILITOT, INR, PLT in the last 72 hours. Time  1900  Identified: Sepsis    Fluid Resuscitation Rational: at least 30mL/kg based on entered actual weight at time of triage      Repeat lactate level: not indicated due to initial lactate < 2    Reassessment Exam:   Not applicable. Patient does not have septic shock.       CRITICAL CARE NOTE:  There was a high probability of clinically significant life-threatening deterioration of the patient's condition requiring my urgent intervention. Total critical care time was at least 17 minutes. This includes vital sign monitoring, pulse oximetry monitoring, telemetry monitoring, clinical response to the IV medications, reviewing the nursing notes, consultation time, dictation/documentation time, and interpretation of the labwork. This excludes any separately billable procedures performed. The critical care time above also includes time spent obtaining history from staff RN Melissa Saxena from [facility], as the patient was unable to provide the history AND the history obtained was directly relevant to the care of the patient. Consultation with hospitalist: I contacted Dr. Hyacinth Dey via [employer], and the patient was accepted for admission. FINAL IMPRESSION      ICD-10-CM    1.  Septicemia ([facility]  A41.9             PLAN  Admission to the hospital      (Please note that this note was completed with a voice recognition program.  Every attempt was made to edit the dictations, but inevitably there remain words that are mis-transcribed.)           MARIANO Zavala - CNP  11/28/22 9470

## 2022-11-28 PROBLEM — A41.9 SEPSIS (HCC): Status: ACTIVE | Noted: 2022-11-28

## 2022-11-28 LAB
HCT VFR BLD CALC: 36.9 % (ref 40.5–52.5)
HEMATOLOGY PATH CONSULT: NORMAL
HEMOGLOBIN: 11.8 G/DL (ref 13.5–17.5)
PROCALCITONIN: 0.11 NG/ML (ref 0–0.15)

## 2022-11-28 PROCEDURE — 84145 PROCALCITONIN (PCT): CPT

## 2022-11-28 PROCEDURE — 6360000002 HC RX W HCPCS: Performed by: INTERNAL MEDICINE

## 2022-11-28 PROCEDURE — 99254 IP/OBS CNSLTJ NEW/EST MOD 60: CPT | Performed by: INTERNAL MEDICINE

## 2022-11-28 PROCEDURE — 2580000003 HC RX 258: Performed by: INTERNAL MEDICINE

## 2022-11-28 PROCEDURE — 1200000000 HC SEMI PRIVATE

## 2022-11-28 PROCEDURE — 87040 BLOOD CULTURE FOR BACTERIA: CPT

## 2022-11-28 PROCEDURE — 96367 TX/PROPH/DG ADDL SEQ IV INF: CPT

## 2022-11-28 PROCEDURE — 36415 COLL VENOUS BLD VENIPUNCTURE: CPT

## 2022-11-28 PROCEDURE — 6370000000 HC RX 637 (ALT 250 FOR IP): Performed by: INTERNAL MEDICINE

## 2022-11-28 PROCEDURE — C9113 INJ PANTOPRAZOLE SODIUM, VIA: HCPCS | Performed by: INTERNAL MEDICINE

## 2022-11-28 PROCEDURE — 85014 HEMATOCRIT: CPT

## 2022-11-28 PROCEDURE — 2580000003 HC RX 258: Performed by: NURSE PRACTITIONER

## 2022-11-28 PROCEDURE — 6360000002 HC RX W HCPCS: Performed by: NURSE PRACTITIONER

## 2022-11-28 PROCEDURE — 85018 HEMOGLOBIN: CPT

## 2022-11-28 RX ORDER — GAUZE BANDAGE 2" X 2"
100 BANDAGE TOPICAL DAILY
Status: DISCONTINUED | OUTPATIENT
Start: 2022-11-28 | End: 2022-12-07 | Stop reason: HOSPADM

## 2022-11-28 RX ORDER — TAMSULOSIN HYDROCHLORIDE 0.4 MG/1
0.4 CAPSULE ORAL DAILY
COMMUNITY

## 2022-11-28 RX ORDER — ONDANSETRON 2 MG/ML
4 INJECTION INTRAMUSCULAR; INTRAVENOUS EVERY 6 HOURS PRN
Status: DISCONTINUED | OUTPATIENT
Start: 2022-11-28 | End: 2022-12-07 | Stop reason: HOSPADM

## 2022-11-28 RX ORDER — LANOLIN ALCOHOL/MO/W.PET/CERES
3 CREAM (GRAM) TOPICAL NIGHTLY PRN
COMMUNITY

## 2022-11-28 RX ORDER — FLUOXETINE 10 MG/1
10 TABLET, FILM COATED ORAL DAILY
COMMUNITY

## 2022-11-28 RX ORDER — ONDANSETRON 4 MG/1
4 TABLET, ORALLY DISINTEGRATING ORAL EVERY 8 HOURS PRN
Status: DISCONTINUED | OUTPATIENT
Start: 2022-11-28 | End: 2022-12-07 | Stop reason: HOSPADM

## 2022-11-28 RX ORDER — RISPERIDONE 1 MG/1
1 TABLET ORAL EVERY MORNING
COMMUNITY

## 2022-11-28 RX ORDER — ACETAMINOPHEN 650 MG/1
650 SUPPOSITORY RECTAL EVERY 6 HOURS PRN
Status: DISCONTINUED | OUTPATIENT
Start: 2022-11-28 | End: 2022-11-28

## 2022-11-28 RX ORDER — SODIUM CHLORIDE 9 MG/ML
INJECTION, SOLUTION INTRAVENOUS PRN
Status: DISCONTINUED | OUTPATIENT
Start: 2022-11-28 | End: 2022-12-07 | Stop reason: HOSPADM

## 2022-11-28 RX ORDER — SODIUM CHLORIDE 0.9 % (FLUSH) 0.9 %
5-40 SYRINGE (ML) INJECTION PRN
Status: DISCONTINUED | OUTPATIENT
Start: 2022-11-28 | End: 2022-12-07 | Stop reason: HOSPADM

## 2022-11-28 RX ORDER — PANTOPRAZOLE SODIUM 40 MG/10ML
40 INJECTION, POWDER, LYOPHILIZED, FOR SOLUTION INTRAVENOUS DAILY
Status: DISCONTINUED | OUTPATIENT
Start: 2022-11-28 | End: 2022-11-29

## 2022-11-28 RX ORDER — SODIUM CHLORIDE 0.9 % (FLUSH) 0.9 %
5-40 SYRINGE (ML) INJECTION EVERY 12 HOURS SCHEDULED
Status: DISCONTINUED | OUTPATIENT
Start: 2022-11-28 | End: 2022-12-07 | Stop reason: HOSPADM

## 2022-11-28 RX ORDER — ACETAMINOPHEN 325 MG/1
650 TABLET ORAL EVERY 6 HOURS PRN
Status: DISCONTINUED | OUTPATIENT
Start: 2022-11-28 | End: 2022-11-28

## 2022-11-28 RX ORDER — RISPERIDONE 1 MG/1
2 TABLET ORAL NIGHTLY
COMMUNITY

## 2022-11-28 RX ORDER — MIRTAZAPINE 7.5 MG/1
7.5 TABLET, FILM COATED ORAL NIGHTLY
COMMUNITY

## 2022-11-28 RX ORDER — FERROUS SULFATE 325(65) MG
325 TABLET ORAL
Status: ON HOLD | COMMUNITY
End: 2022-12-03 | Stop reason: HOSPADM

## 2022-11-28 RX ORDER — ENOXAPARIN SODIUM 100 MG/ML
30 INJECTION SUBCUTANEOUS 2 TIMES DAILY
Status: DISCONTINUED | OUTPATIENT
Start: 2022-11-28 | End: 2022-12-07 | Stop reason: HOSPADM

## 2022-11-28 RX ORDER — POLYETHYLENE GLYCOL 3350 17 G/17G
17 POWDER, FOR SOLUTION ORAL DAILY PRN
Status: DISCONTINUED | OUTPATIENT
Start: 2022-11-28 | End: 2022-12-07 | Stop reason: HOSPADM

## 2022-11-28 RX ADMIN — Medication 1500 MG: at 14:05

## 2022-11-28 RX ADMIN — ONDANSETRON 4 MG: 2 INJECTION INTRAMUSCULAR; INTRAVENOUS at 05:22

## 2022-11-28 RX ADMIN — SODIUM CHLORIDE 8 MG/HR: 9 INJECTION, SOLUTION INTRAVENOUS at 11:31

## 2022-11-28 RX ADMIN — SODIUM CHLORIDE 3219 ML: 9 INJECTION, SOLUTION INTRAVENOUS at 00:33

## 2022-11-28 RX ADMIN — PANTOPRAZOLE SODIUM 40 MG: 40 INJECTION, POWDER, FOR SOLUTION INTRAVENOUS at 17:06

## 2022-11-28 RX ADMIN — CEFEPIME 2000 MG: 2 INJECTION, POWDER, FOR SOLUTION INTRAVENOUS at 10:52

## 2022-11-28 RX ADMIN — Medication 100 MG: at 10:52

## 2022-11-28 RX ADMIN — SODIUM CHLORIDE 80 MG: 9 INJECTION, SOLUTION INTRAVENOUS at 09:32

## 2022-11-28 RX ADMIN — VANCOMYCIN HYDROCHLORIDE 1750 MG: 1 INJECTION, POWDER, LYOPHILIZED, FOR SOLUTION INTRAVENOUS at 00:30

## 2022-11-28 RX ADMIN — CEFEPIME 2000 MG: 2 INJECTION, POWDER, FOR SOLUTION INTRAVENOUS at 17:06

## 2022-11-28 ASSESSMENT — PAIN - FUNCTIONAL ASSESSMENT: PAIN_FUNCTIONAL_ASSESSMENT: NONE - DENIES PAIN

## 2022-11-28 ASSESSMENT — PAIN SCALES - GENERAL
PAINLEVEL_OUTOF10: 2
PAINLEVEL_OUTOF10: 2
PAINLEVEL_OUTOF10: 0

## 2022-11-28 ASSESSMENT — PAIN SCALES - WONG BAKER: WONGBAKER_NUMERICALRESPONSE: 2

## 2022-11-28 NOTE — ED NOTES
Patient resting comfortably, respirations easy, unlabored. Patient in no acute distress. Denies needs at this time. Call light within reach, bed in lowest position, side rails up x 2.         Polly Phillip RN  11/28/22 0047

## 2022-11-28 NOTE — PROGRESS NOTES
Medication Reconciliation    List of medications patient is currently taking is complete. Source of information: 1.  Conversation with patient/RN                                      2. EPIC records                                       3. Medication records from [address] [place] [address]  Codeine, Thorazine [chlorpromazine], and Tylenol [acetaminophen]     Renaldo Keenan, [address] [place] [address], PharmD, BCPS  11/28/2022 2:53 PM

## 2022-11-28 NOTE — CONSULTS
Infectious Diseases Inpatient Consult Note      Reason for Consult:  Sepsis, fevers WBC elevation     Requesting Physician:         Primary Care Physician:  No primary care provider on file. History Obtained From:  Epic     CHIEF COMPLAINT:     Chief Complaint   Patient presents with    Emesis     Per nursing home pt was acting fine after dinner pt had emesis and was not feeling well          HISTORY OF PRESENT ILLNESS:  57 y.o. man nursing home resident history of major depressive disorder, seizure affective disorder history of substance abuse in the past, antisocial personality disorder, drug-induced subacute dyskinesia, admitted to the hospital secondary to not feeling well fevers and change in mental status. Patient is unable to provide any history admission labs indicate WBC elevation T-max 100.9 blood cultures are obtained urinalysis negative, CRP elevated 143.2 ALT 58 , COVID-19 negative rapid flu negative urinalysis negative given the concern for sepsis we are consulted for recommendations. Past Medical History:    Past Medical History:   Diagnosis Date    Antisocial personality disorder ([facility] [address]     Blindness right eye category 5, normal vision left eye     Drug induced subacute dyskinesia     Insomnia     Major depressive disorder, recurrent, unspecified ([facility] [address]     Melena     Schizoaffective disorder ([facility] [address]     Substance abuse ([facility] [address]        Past Surgical History:    History reviewed. No pertinent surgical history.     Current Medications:    Outpatient Medications Marked as Taking for the 11/27/22 encounter [facility] Encounter)   Medication Sig Dispense Refill    tamsulosin (FLOMAX) 0.4 MG capsule Take 0.4 mg by mouth daily      cyanocobalamin 1000 MCG tablet Take 1,000 mcg by mouth daily      ferrous sulfate (IRON 325) 325 (65 Fe) MG tablet Take 325 mg by mouth daily (with breakfast)      FLUoxetine (PROZAC) 10 MG tablet Take 10 mg by mouth daily      melatonin 3 MG TABS tablet Take 3 mg by mouth nightly as needed      mirtazapine (REMERON) 7.5 MG tablet Take 7.5 mg by mouth nightly      risperiDONE (RISPERDAL) 1 MG tablet Take 1 mg by mouth every morning      risperiDONE (RISPERDAL) 1 MG tablet Take 2 mg by mouth nightly         Allergies:  Codeine, Thorazine [chlorpromazine], and Tylenol [acetaminophen]    Immunizations :   Immunization History   Administered Date(s) Administered    COVID-19, J&J, (age 18y+), IM, 0.5 mL 05/13/2021         Social History:    Social History     Tobacco Use    Smoking status: Unknown    Smokeless tobacco: Never    Tobacco comments:     Quit since in [facility]    Vaping Use    Vaping Use: Unknown   Substance Use Topics    Alcohol use: Not Currently     Comment: unknown    Drug use: Not Currently     Types: Cocaine     Comment: unknown     Social History     Tobacco Use   Smoking Status Unknown   Smokeless Tobacco Never   Tobacco Comments    Quit since in [facility]       Family History : not available       REVIEW OF SYSTEMS:     Not possible due to change in mentation   PHYSICAL EXAM:      Vitals:    /86   Pulse 88   Temp 99 °F (37.2 °C) (Axillary)   Resp 22   Ht 5' 11\" (1.803 m)   Wt 227 lb 8.2 oz (103.2 kg)   SpO2 96%   BMI 31.73 kg/m²     General Appearance: awake not following commands and has Dyskinesia++ acute distress, no pallor, no icterus   Skin: warm and dry, no rash or erythema  Head: normocephalic and atraumatic  Eyes: pupils equal, round, and reactive to light, conjunctivae normal  ENT: tympanic membrane, external ear and ear canal normal bilaterally, nose without deformity, nasal mucosa and turbinates normal without polyps  Neck: supple and non-tender without mass, no thyromegaly  no cervical lymphadenopathy  Pulmonary/Chest: clear to auscultation bilaterally- no wheezes, rales or rhonchi, normal air movement, no respiratory distress  Cardiovascular: normal rate, regular rhythm, normal S1 and S2, no murmurs, rubs, clicks, or gallops, no carotid bruits  Abdomen: soft, non-tender, non-distended, normal bowel sounds, no masses or organomegaly  Extremities: no cyanosis, clubbing or edema  Musculoskeletal: normal range of motion, no joint swelling, deformity or tenderness  Integumentary: No rashes, no abnormal skin lesions, no petechiae  Neurologic: reflexes normal and symmetric, no cranial nerve deficit Dyskinesia+ lynda praxial ++     Lines: IV    DATA:    CBC:   Lab Results   Component Value Date    WBC 18.0 (H) 11/27/2022    HGB 11.8 (L) 11/28/2022    HCT 36.9 (L) 11/28/2022    MCV 84.9 11/27/2022     11/27/2022     RENAL:   Lab Results   Component Value Date    CREATININE 0.8 11/27/2022    BUN 25 (H) 11/27/2022     11/27/2022    K 4.3 11/27/2022     11/27/2022    CO2 25 11/27/2022     SED RATE: No results found for: SEDRATE  CK:   Lab Results   Component Value Date/Time    CKTOTAL 678 07/21/2017 06:00 AM     CRP:   Lab Results   Component Value Date/Time    .2 11/27/2022 07:05 PM     Hepatic Function Panel:   Lab Results   Component Value Date/Time    ALKPHOS 101 11/27/2022 07:05 PM    ALT 58 11/27/2022 07:05 PM     11/27/2022 07:05 PM    PROT 7.3 11/27/2022 07:05 PM    PROT 7.1 11/01/2011 07:50 AM    BILITOT 0.4 11/27/2022 07:05 PM    BILIDIR 0.0 11/01/2011 07:50 AM    LABALBU 3.2 11/27/2022 07:05 PM     UA:  Lab Results   Component Value Date/Time    COLORU Yellow 11/27/2022 07:18 PM    CLARITYU Clear 11/27/2022 07:18 PM    GLUCOSEU Negative 11/27/2022 07:18 PM    BILIRUBINUR Negative 11/27/2022 07:18 PM    KETUA Negative 11/27/2022 07:18 PM    SPECGRAV 1.039 11/27/2022 07:18 PM    BLOODU Negative 11/27/2022 07:18 PM    PHUR 5.0 11/27/2022 07:18 PM    PROTEINU TRACE 11/27/2022 07:18 PM    UROBILINOGEN 1.0 11/27/2022 07:18 PM    NITRU Negative 11/27/2022 07:18 PM    LEUKOCYTESUR Negative 11/27/2022 07:18 PM    LABMICR YES 11/27/2022 07:18 PM    URINETYPE NotGiven 11/27/2022 07:18 PM      Urine Microscopic:   Lab Results   Component Value Date/Time    BACTERIA None Seen 11/27/2022 07:18 PM    COMU see below 07/15/2022 12:00 PM    HYALCAST 1 11/27/2022 07:18 PM    WBCUA 1 11/27/2022 07:18 PM    RBCUA 1 11/27/2022 07:18 PM    EPIU 0 11/27/2022 07:18 PM     Urine Reflex to Culture:   Lab Results   Component Value Date/Time    URRFLXCULT Not Indicated 11/27/2022 07:18 PM         MICRO: cultures reviewed and updated by me        Micro results (current encounter only)    Procedure Component Value Units Date/Time   Culture, Blood 2 [[id number]] Collected: 11/28/22 1037   Order Status: Sent Specimen: Blood Updated: 11/28/22 1048   Culture, Urine [[id number]]    Order Status: Sent Specimen: Urine, clean catch    COVID-19, Rapid [[id number]] Collected: 11/27/22 1919   Order Status: Completed Specimen: Nasopharyngeal Swab Updated: 11/27/22 2003    SARS-CoV-2, NAAT Not Detected    Comment: Rapid NAAT:   Negative results should be treated as presumptive and,   if inconsistent with clinical signs and symptoms or necessary for   patient management, should be tested with an alternative molecular   assay. Negative results do not preclude SARS-CoV-2 infection and   should not be used as the sole basis for patient management decisions. This test has been authorized by the FDA under an Emergency Use   Authorization (EUA) for use by authorized laboratories.      Fact sheet for Healthcare Providers:   http://www.negin-jose.lucretia/   Fact sheet for Patients: http://www.negin-jose.lucretia/     METHODOLOGY: Isothermal Nucleic Acid Amplification       Rapid influenza A/B antigens [[id number]] Collected: 11/27/22 1919   Order Status: Completed Specimen: Nasopharyngeal Updated: 11/27/22 1949    Rapid Influenza A Ag Negative    Rapid Influenza B Ag Negative   Culture, Blood 1 [[id number]] Collected: 11/27/22 1905   Order Status: Sent Specimen: Blood Updated       Blood Culture:   Lab Results   Component Value Date/Time    BC No Growth after 4 days of incubation. 07/15/2022 12:00 PM    BLOODCULT2 No Growth after 4 days of incubation. 07/15/2022 08:12 PM       Viral Culture:    Lab Results   Component Value Date/Time    COVID19 Not Detected 11/27/2022 07:19 PM     Urine Culture: No results for input(s): Elfida Dominion in the last 72 hours. Scheduled Meds:   sodium chloride flush  5-40 mL IntraVENous 2 times per day    [Held by provider] enoxaparin  30 mg SubCUTAneous BID    thiamine mononitrate  100 mg Oral Daily    cefepime  2,000 mg IntraVENous Q8H    vancomycin  1,500 mg IntraVENous Q12H    vancomycin (VANCOCIN) intermittent dosing (placeholder)   Other RX Placeholder    pantoprazole  40 mg IntraVENous Daily       Continuous Infusions:   sodium chloride         PRN Meds:  sodium chloride flush, sodium chloride, ondansetron **OR** ondansetron, polyethylene glycol    Imaging:   CT ABDOMEN PELVIS W IV CONTRAST Additional Contrast? None   Final Result   1. Limited exam due to patient motion with no acute findings identified   within the abdomen or pelvis. 2. Trace left pleural effusion. XR CHEST PORTABLE   Final Result   Stable appearance of the chest without acute cardiopulmonary process   identified. US ABDOMEN COMPLETE    (Results Pending)       All pertinent images and reports for the current Hospitalization were reviewed by me. IMPRESSION:    Patient Active Problem List   Diagnosis    Acute metabolic encephalopathy    Hypernatremia    E. coli UTI    Sepsis ([facility] [address]     Sepsis   Source not clear  WBC elevation   Fevers on admit  Change in mentation  CT abd/pelvis -ve  CXR  -VE  UA  -ve  LFT elevation   CRP elevation     Wbc NOW trending down on IV abx and Fever subsiding and he has Dyskinesia from his Psychiatric medications no history from patient - will be able to complete IV abx course soon     Per RN he was receiving IV Fluids through subcutaneous needle at the NH ?  Due to concern for Dehydration       Labs, Microbiology, Radiology and pertinent results from current hospitalization and care every where were reviewed by me as a part of the consultation. PLAN :  Cont IV  Cefepime x 2 gm q 8 HRS  bLOOD cx in process  Check CK  D/C IV Vancomycin    Tren LFT  USG of the liver      Discussed with patient/Family and Nursing   Risk of Complications/Morbidity: High      Illness(es)/ Infection present that pose threat to bodily function. There is potential for severe exacerbation of infection/side effects of treatment. Therapy requires intensive monitoring for antimicrobial agent toxicity. Thanks for allowing me to participate in your patient's care please call me with any questions or concerns.     Dr. Hilda Gutierrez MD  [address] [place] [address] Physician  Phone: [phone]   Fax : [phone]

## 2022-11-28 NOTE — ED NOTES
Pt sheets, gown and depends changed with the assistance of SABAS Coker. tubes noted in pts back. Pt unable to say what these tubes were. Pt tolerated well.       Alferd Bence, RN  11/28/22 5221

## 2022-11-28 NOTE — ED NOTES
Pt is vomiting on the floor and all over the room, pt is refusing to use a vomit bag when this Rn handed him a vomit bag he hit my arm and threw the bag on the floor and stated \" get the fuck out of my room I ain't puking no dam bag\"  pt then started yelling for water      Nichelle Netters, RN  11/28/22 7463

## 2022-11-28 NOTE — CONSULTS
Clinical Pharmacy Note  Vancomycin Consult    Ame Arteaga is a 57 y.o. male ordered Vancomycin for sepsis; consult received from Dr. Juanita Aquino to manage therapy. Also receiving Cefepime. Allergies:  Codeine, Thorazine [chlorpromazine], and Tylenol [acetaminophen]     Temp max:  Temp (24hrs), Av.8 °F (37.7 °C), Min:98.3 °F (36.8 °C), Max:100.9 °F (38.3 °C)      Recent Labs     22  190   WBC 18.0*       Recent Labs     22  190   BUN 25*   CREATININE 0.8       No intake or output data in the 24 hours ending 22 0749    Culture Results:      Ht Readings from Last 1 Encounters:   22 5' 11\" (1.803 m)        Wt Readings from Last 1 Encounters:   22 236 lb 8.9 oz (107.3 kg)         Estimated Creatinine Clearance: 116 mL/min (based on SCr of 0.8 mg/dL). Assessment/Plan:  Day # 1 of vancomycin. Patient received Vancomycin 1750mg in ED at 0030  Vancomycin 1500 mg IV every 12 hours ordered to start at noon today  Goal -600  Predicted   Random level prior to 4 th dose      Thank you for the consult.    Viri Andres, [facility], 2022 7:50 AM

## 2022-11-28 NOTE — ED NOTES
Pt allowing two staff to completely change his gown and sheets. Pt also allowing phlebotomy to draw morning labs and second set of blood cultures.       Linda Delong RN  11/28/22 6878

## 2022-11-28 NOTE — CONSULTS
Clinical Pharmacy Note  Vancomycin Consult    Pharmacy consult received for one-time dose of vancomycin in the Emergency Department per MARIANO Munguia. Ht Readings from Last 1 Encounters:   11/27/22 5' 11\" (1.803 m)        Wt Readings from Last 1 Encounters:   11/27/22 236 lb 8.9 oz (107.3 kg)         Assessment/Plan:  Vancomycin 1750 x 1 in ED. If Vancomycin is to continue on admission and pharmacy is to manage dosing, please re-consult with admission orders.     Kermit Orozco, PharmD  11/28/2022 4:27 AM

## 2022-11-28 NOTE — PROGRESS NOTES
Called [facility] to obtain information on patient. Per facility patient had two needle that were being used to give Clysis treatments through. MD notified.

## 2022-11-28 NOTE — ED NOTES
Pt is refusing an emesis bag and is spitting all over the floor and his self      Mian Hernandez RN  11/28/22 7143

## 2022-11-28 NOTE — ED PROVIDER NOTES
Hospitalist was notified by the nurse practitioner. .    Vitals:    11/28/22 0523   BP: 127/82   Pulse: 88   Resp: 30   Temp:    SpO2: 95%       Lab results  Labs Reviewed   CBC WITH AUTO DIFFERENTIAL - Abnormal; Notable for the following components:       Result Value    WBC 18.0 (*)     Hemoglobin 12.6 (*)     Hematocrit 39.1 (*)     RDW 15.5 (*)     Neutrophils Absolute 15.5 (*)     Lymphocytes Absolute 0.5 (*)     Monocytes Absolute 2.0 (*)     Poikilocytes Occasional (*)     Target Cells Occasional (*)     All other components within normal limits   COMPREHENSIVE METABOLIC PANEL W/ REFLEX TO MG FOR LOW K - Abnormal; Notable for the following components:    Glucose 112 (*)     BUN 25 (*)     Albumin 3.2 (*)     Albumin/Globulin Ratio 0.8 (*)     ALT 58 (*)      (*)     All other components within normal limits   C-REACTIVE PROTEIN - Abnormal; Notable for the following components:    .2 (*)     All other components within normal limits   BLOOD GAS, ARTERIAL - Abnormal; Notable for the following components:    pCO2, Arterial 45.1 (*)     pO2, Arterial 66.1 (*)     HCO3, Arterial 30.8 (*)     Base Excess, Arterial 5.8 (*)     Hemoglobin, Art, Extended 12.9 (*)     Carboxyhgb, Arterial 1.8 (*)     All other components within normal limits   URINALYSIS WITH REFLEX TO CULTURE - Abnormal; Notable for the following components:    Protein, UA TRACE (*)     All other components within normal limits   COVID-19, RAPID   RAPID INFLUENZA A/B ANTIGENS   CULTURE, BLOOD 1   CULTURE, BLOOD 2   LACTIC ACID   PROCALCITONIN   MICROSCOPIC URINALYSIS         Radiology results  CT ABDOMEN PELVIS W IV CONTRAST Additional Contrast? None   Final Result   1. Limited exam due to patient motion with no acute findings identified   within the abdomen or pelvis. 2. Trace left pleural effusion. XR CHEST PORTABLE   Final Result   Stable appearance of the chest without acute cardiopulmonary process   identified. Medications   sodium chloride flush 0.9 % injection 5-40 mL (has no administration in time range)   sodium chloride flush 0.9 % injection 5-40 mL (has no administration in time range)   0.9 % sodium chloride infusion (has no administration in time range)   enoxaparin Sodium (LOVENOX) injection 30 mg (has no administration in time range)   ondansetron (ZOFRAN-ODT) disintegrating tablet 4 mg ( Oral See Alternative 11/28/22 0522)     Or   ondansetron (ZOFRAN) injection 4 mg (4 mg IntraVENous Given 11/28/22 0522)   polyethylene glycol (GLYCOLAX) packet 17 g (has no administration in time range)   iopamidol (ISOVUE-370) 76 % injection 75 mL (75 mLs IntraVENous Given 11/27/22 2009)   piperacillin-tazobactam (ZOSYN) 3,375 mg in dextrose 5 % 50 mL IVPB (mini-bag) (0 mg IntraVENous Stopped 11/28/22 0022)   0.9 % sodium chloride bolus (0 mL/kg × 107.3 kg IntraVENous Stopped 11/28/22 0417)   vancomycin (VANCOCIN) 1,750 mg in dextrose 5 % 500 mL IVPB (0 mg IntraVENous Stopped 11/28/22 0417)       New Prescriptions    No medications on file       The patient's blood pressure was not found to be elevated according to CMS/Medicare and the Affordable Care Act/ObamaCare criteria. IMPRESSION(S):  1. Septicemia ([address]      ?           Sonal Casillas, DO  11/28/22 9884

## 2022-11-28 NOTE — ED NOTES
Dr. Worley Leaks at bedside      [facility], [address] [place] [address]  11/28/22 4480

## 2022-11-28 NOTE — CONSULTS
GASTROENTEROLOGY INPATIENT CONSULTATION        IDENTIFYING DATA/REASON FOR CONSULTATION   PATIENT:  Norma Rodriguez  MRN:  [id number]  ADMIT DATE: 11/27/2022  TIME OF EVALUATION: 11/28/2022 1:07 PM  HOSPITAL STAY:   LOS: 0 days     REASON FOR CONSULTATION:  black vomit    HISTORY OF PRESENT ILLNESS   Norma Rodriguez is a 57 y.o. male with a PMH of Schizoaffective disorder, substance abuse who presented on 11/27/2022 with weakness, nausea, vomiting. We have been consulted regarding black emesis. According to RN notes patient had multiple vomiting episodes while in the ED. Emesis dark in color. He has been uncooperative with using an emesis bag and instead has been vomiting on the floor. He has been stating profanity. Pt appears to be a poor historian. At time of my visit he states he is feeling well, denies nausea or abdominal pain. He reports bowel movements are normal but not sure of any recent black stools. He is unsure of NSAID use. He admits to drinking alcohol, mostly beer. He is unsure of when he last drank alcohol. CT A/P showed no acute findings however was limited due to patient motion. Blood work noted white count of 18, hgb of 12.6, normal platelet count. BUN 25. Lactic acid normal. Liver enzymes mildly elevated with , ALT 58. Alk phos and bili normal.        Prior Endoscopic Evaluations: 5/11/21 EGD    Findings: The examined esophagus was normal.  The entire examined stomach was normal.  One cratered duodenal ulcer with a visible vessel was found in the   duodenal bulb. The lesion was 30 mm in largest dimension. Area was   successfully injected with of a 1:10,000 solution of epinephrine for   hemostasis prior to intervention. For hemostasis, one hemostatic clip   was successfully placed. Upon placement of clip the vessel began acutely   bleeding. Second clip was attempted however due to the amount of   bleeding visualization was poor and had to be aborted.    PAST MEDICAL, SURGICAL, FAMILY, and SOCIAL HISTORY     Past Medical History:   Diagnosis Date    Antisocial personality disorder ([facility]     Blindness right eye category 5, normal vision left eye     Drug induced subacute dyskinesia     Insomnia     Major depressive disorder, recurrent, unspecified ([facility]     Melena     Schizoaffective disorder ([facility]     Substance abuse ([facility]      History reviewed. No pertinent surgical history. History reviewed. No pertinent family history.   Social History     Socioeconomic History    Marital status: Single     Spouse name: None    Number of children: None    Years of education: None    Highest education level: None   Tobacco Use    Smoking status: Unknown    Smokeless tobacco: Never    Tobacco comments:     Quit since in [facility]    Vaping Use    Vaping Use: Unknown   Substance and Sexual Activity    Alcohol use: Not Currently     Comment: unknown    Drug use: Not Currently     Types: Cocaine     Comment: unknown    Sexual activity: Not Currently   Social History Narrative    ** Merged History Encounter **            MEDICATIONS   SCHEDULED:  sodium chloride flush, 5-40 mL, 2 times per day  [Held by provider] enoxaparin, 30 mg, BID  thiamine mononitrate, 100 mg, Daily  cefepime, 2,000 mg, Q8H  vancomycin, 1,500 mg, Q12H  vancomycin (VANCOCIN) intermittent dosing (placeholder), , RX Placeholder      FLUIDS/DRIPS:     sodium chloride      pantoprazole 8 mg/hr (11/28/22 1131)     PRNs: sodium chloride flush, 5-40 mL, PRN  sodium chloride, , PRN  ondansetron, 4 mg, Q8H PRN   Or  ondansetron, 4 mg, Q6H PRN  polyethylene glycol, 17 g, Daily PRN      ALLERGIES:  He   Allergies   Allergen Reactions    Codeine     Thorazine [Chlorpromazine]     Tylenol [Acetaminophen]        REVIEW OF SYSTEMS   Pertinent ROS noted in HPI    PHYSICAL EXAM     Vitals:    11/28/22 0523 11/28/22 0647 11/28/22 0930 11/28/22 1045   BP: 127/82 123/82 104/70 (!) 135/104   Pulse: 88 91 (!) 102 96   Resp: 30 30 (!) 36 25   Temp:  99.9 °F (37.7 °C) 99 °F (37.2 °C)    TempSrc:  Axillary Axillary    SpO2: 95% 95% 95% 95%   Weight:       Height:           No intake/output data recorded. Physical Exam:  General appearance: disheveled, NAD  Eyes: Anicteric, right eye nystagmus    Head: Normocephalic, without obvious abnormality  Lungs: clear to auscultation bilaterally, Normal Effort  Heart: regular rate and rhythm, normal S1 and S2  Abdomen: soft, distended, non-tender. Bowel sounds normal  Extremities: atraumatic, no cyanosis or edema  Skin: warm and dry, no jaundice  Neuro: Grossly intact, alert, confused      LABS AND IMAGING   Laboratory   Recent Labs     11/27/22 1905   WBC 18.0*   HGB 12.6*   HCT 39.1*   MCV 84.9        Recent Labs     11/27/22 1905      K 4.3      CO2 25   BUN 25*   CREATININE 0.8     Recent Labs     11/27/22 1905   *   ALT 58*   BILITOT 0.4   ALKPHOS 101     No results for input(s): LIPASE, AMYLASE in the last 72 hours. No results for input(s): PROTIME, INR in the last 72 hours. Imaging  CT ABDOMEN PELVIS W IV CONTRAST Additional Contrast? None   Final Result   1. Limited exam due to patient motion with no acute findings identified   within the abdomen or pelvis. 2. Trace left pleural effusion. XR CHEST PORTABLE   Final Result   Stable appearance of the chest without acute cardiopulmonary process   identified. ASSESSMENT AND RECOMMENDATIONS   57 y.o. male with a PMH of Schizoaffective disorder, substance abuse who presented on 11/27/2022 with weakness, nausea, vomiting. IMPRESSION:  Nausea, vomiting. Cause unclear, ?acute gastroenteritis. Pt poor historian. CT with no acute finding. LFTs mildly elevated which I suspect is chronic and related to fatty liver/alcohol use (has been elevated at least since July). Continue supportive care and monitor  Dark emesis. Hgb appears stable. BUN stable. Will treat with PPI and monitor for now.     Sepsis with leukocytosis and tachycardia. Source unclear. UA neg. CXR unremarkable. CT with no acute findings. Started on empiric antibiotics. RECOMMENDATIONS:    PPI IV BID  Vanc and Cefepime per primary team.  May need ID input if leukocytosis worsened  Follow up on blood cultures. Monitor h/h and for sign of overt bleeding  Antiemetics as needed  Further input and recommendations by Dr. Santos Berger to follow. If you have any questions or need any further information, please feel free to contact our consult team.  Thank you for allowing us to participate in the care of David Hughes. The note was completed using Dragon voice recognition transcription. Every effort was made to ensure accuracy; however, inadvertent transcription errors may be present despite my best efforts to edit errors. Tanya St PA-C    Attending physician addendum:      I have personally seen and examined the patient, reviewed the patient's medical record and pertinent labs and clinical imaging. I have personally staffed the case with JARRET Anderson. I agree with her consultation note, exam findings, assessment and plans  as written above. I have made appropriate modifications and edited her assessment and plan where needed to reflect my impression and plans for this patient. 58-year-old male with a history of schizoaffective disorder, polysubstance abuse including alcohol, remote peptic ulcer disease from a duodenal ulcer thought to be H. pylori related in May 2021, who had presented with complaints of weakness, nausea, vomiting. He had reported some symptoms of possible coffee-ground emesis. He also had been poorly cooperative in the ER and is a poor historian. He denies any nausea, vomiting, or abdominal pain currently when examined. He did not report any melenic stool or hematochezia. He does admit to drinking alcohol. He does not quantify this. He is unsure of NSAID use.   He had a CT of the abdomen pelvis performed on admission that showed no acute findings, but was limited due to patient motion. Patient's blood work showed a significant leukocytosis of 18,000 hemoglobin 12.6 with normal platelet count. BUN is slightly elevated at 25. Lactic acid was normal.  Liver enzymes showed an elevated AST of 105 and ALT of 58, with normal alkaline phosphatase and bilirubin. We are consulted for the elevated liver enzymes in the symptoms of nausea vomiting and question of coffee-ground emesis    /86   Pulse 88   Temp 99 °F (37.2 °C) (Axillary)   Resp 22   Ht 5' 11\" (1.803 m)   Wt 227 lb 8.2 oz (103.2 kg)   SpO2 96%   BMI 31.73 kg/m²      Gen- NAD, A and O x 1  ENT_ poor dentition  CV- RRR  Lungs- CTAB  Abd- soft, ND, NT    Labs and CT reviewed. Impression:    1) Leukocytosis - .2, Procalcitonin normal.  UA normal.  CXR negative. CT negative. Covid 19 negative. Flu negativeBlood cultures negative. 2) Nausea and vomiting with ? Coffee ground emesis-prior history of duodenal ulcer in May 2021.  ? NSAID vs H. pylori induced. Unclear if the patient clearly received any eradication of H.pylori. Patient reportedly was taking aspirin although not clear if he was actually taking this medicine. No PPI was reported in his medication list.  Possibility of gastritis versus recurrent peptic ulcer disease. Hemoglobin has remained stable    3) Elevated Liver enzymes- were elevated in 7/2022.  ? Fatty liver vs Etoh related. 4) Schizoaffective disorder    Plan:  Check H pylori stool antigen  H and H q 12  PPI IV bid   If patient with dropping Hgb or signs of active GI bleeding, could repeat EGD  Etoh cessation  Monitor trend of LFT  Could check an US abdomen if concern for cholecystitis or abscess clinically. Will hold off for now  Check iron studies. Thank you for allowing me to participate in this patient's care.   If there are any questions or concerns regarding this patient, or the plan we have set in place, please feel free to contact me at [phone].      Kenny Cleary, DO

## 2022-11-28 NOTE — PROGRESS NOTES
Patient admitted to room 4123. Alert to self only. Unable to complete any admission questions. Patient non cooperative for any questions at this time. Patient soiled in urine. When rolled to clean up. Two tubes were laying in the bed, not attached to patient. MD notified. Bed locked in lowest position, call light in reach.

## 2022-11-28 NOTE — H&P
Hospital Medicine History & Physical      PCP: No primary care provider on file. Date of Admission: 11/27/2022    Date of Service: Pt seen/examined on 11/28/2022 and Admitted to Inpatient with expected LOS greater than two midnights due to medical therapy. Chief Complaint: Generalized weakness, nausea vomiting. History Of Present Illness:      57 y.o. male who presented to [facility] with reported generalized weakness for few days, worsening, without obvious elevator or aggravating factors, patient does have bilateral nephrostomy tubes presentation to ED found to be tachycardic, febrile and tachypneic with elevated white count. Patient is very poor historian cannot elaborate more on history including context, severity, aggravating or aggravating factors, but note that he is also complaining of nausea and vomiting, with his darker content. No family member at bedside again patient cannot give any further history. Patient does have involuntary facial movement. Past Medical History:          Diagnosis Date    Antisocial personality disorder ([facility]     Blindness right eye category 5, normal vision left eye     Drug induced subacute dyskinesia     Insomnia     Major depressive disorder, recurrent, unspecified ([facility]     Melena     Schizoaffective disorder ([facility]     Substance abuse ([facility]        Past Surgical History:      History reviewed. No pertinent surgical history. Medications Prior to Admission:      Prior to Admission medications    Medication Sig Start Date End Date Taking? Authorizing Provider   aspirin 81 MG chewable tablet Take 1 tablet by mouth in the morning. 7/19/22   Tam Ramon MD   divalproex (DEPAKOTE SPRINKLE) 125 MG capsule Take 1 capsule by mouth in the morning and 1 capsule at noon and 1 capsule before bedtime. Do all this for 9 doses.  7/18/22 7/21/22  Tam Ramon MD   vitamin D 25 MCG (1000 UT) CAPS Take by mouth    Historical Provider, MD       Allergies:  Codeine, Thorazine [chlorpromazine], and Tylenol [acetaminophen]    Social History:      The patient currently lives at a NH    TOBACCO:   reports that he has an unknown smoking status. He has never used smokeless tobacco.  ETOH:   reports that he does not currently use alcohol. E-cigarette/Vaping       Questions Responses    E-cigarette/Vaping Use Unknown If Ever Used    Start Date     Passive Exposure     Quit Date     Counseling Given     Comments               Family History:      Reviewed and negative in regards to presenting illness/complaint. History reviewed. No pertinent family history. REVIEW OF SYSTEMS COMPLETED:   Pertinent positives as noted in the HPI. All other systems reviewed and negative with the limitation of patient being very poor historian. PHYSICAL EXAM PERFORMED:    /82   Pulse 91   Temp 99.9 °F (37.7 °C) (Axillary)   Resp 30   Ht 5' 11\" (1.803 m)   Wt 236 lb 8.9 oz (107.3 kg)   SpO2 95%   BMI 32.99 kg/m²     General appearance:  No apparent distress  HEENT:  Normal cephalic  Neck: Supple  Respiratory:  Normal respiratory effort. Clear to auscultation, bilaterally without Rales/Wheezes/Rhonchi. Cardiovascular:  Regular rate and rhythm with normal S1/S2 without murmurs, rubs or gallops. Abdomen: Soft, non-tender  Musculoskeletal:  No clubbing, cyanosis  Skin: Skin color, texture, turgor normal.  No rashes or lesions. Neurologic: Moving his extremities  Psychiatric: Awake with some confusion  Capillary Refill: Brisk,3 seconds, normal  Peripheral Pulses: +2 palpable, equal bilaterally       Labs:     Recent Labs     11/27/22 1905   WBC 18.0*   HGB 12.6*   HCT 39.1*        Recent Labs     11/27/22 1905      K 4.3      CO2 25   BUN 25*   CREATININE 0.8   CALCIUM 9.6     Recent Labs     11/27/22 1905   *   ALT 58*   BILITOT 0.4   ALKPHOS 101     No results for input(s): INR in the last 72 hours.   No results for input(s): Marie Moulton in the last 72 hours. Urinalysis:      Lab Results   Component Value Date/Time    NITRU Negative 11/27/2022 07:18 PM    WBCUA 1 11/27/2022 07:18 PM    BACTERIA None Seen 11/27/2022 07:18 PM    RBCUA 1 11/27/2022 07:18 PM    BLOODU Negative 11/27/2022 07:18 PM    SPECGRAV 1.039 11/27/2022 07:18 PM    GLUCOSEU Negative 11/27/2022 07:18 PM       Radiology:     CXR: I have reviewed the CXR with the following interpretation: No infiltrate      CT ABDOMEN PELVIS W IV CONTRAST Additional Contrast? None   Final Result   1. Limited exam due to patient motion with no acute findings identified   within the abdomen or pelvis. 2. Trace left pleural effusion. XR CHEST PORTABLE   Final Result   Stable appearance of the chest without acute cardiopulmonary process   identified. Consults:    PHARMACY TO DOSE VANCOMYCIN  IP CONSULT TO HOSPITALIST  PHARMACY TO DOSE VANCOMYCIN  IP CONSULT TO INFECTIOUS DISEASES  Corrected to GI consult    ASSESSMENT:    Active Hospital Problems    Diagnosis Date Noted    Sepsis ([facility] [A41.9] 11/28/2022     Priority: Medium         PLAN:    Sepsis of unknown origin, suspecting urine, suspecting complicated UTI, patient refused to give urine so far, patient started on cefepime and vancomycin will keep for now, ID consulted, will repeat CBC CMP in a.m., CRP very elevated, will repeat in a.m., also order procalcitonin. Nausea and vomiting was dark content, will hold Lovenox, started on pantoprazole drip, consulted GI for further recommendations, frequent H&H  Generalized weakness, PT OT  Acute likely on chronic metabolic encephalopathy likely due to infection, monitor closely for now expecting improvement discussed with nursing staff who got report from nursing facility  History of dyskinesia, monitor closely noticed involuntary facial movement  History of antisocial personality disorder          DVT Prophylaxis: SCD  Diet: ADULT DIET;  Regular  Code Status: Full Code    PT/OT Eval Status: Ordered    Dispo -inpatient 2 to 3 days       Luis Enrique Hawkins MD    Thank you No primary care provider on file. for the opportunity to be involved in this patient's care. If you have any questions or concerns please feel free to contact me at [phone].

## 2022-11-28 NOTE — ED NOTES
Patient continues to \"spit\" color of sputum appears to be brown. Patient was given zofran IV 4mg @ 0522. Perfect serve to IGNACIO Hernandez MD.      Nya Mace RN  11/28/22 4218

## 2022-11-28 NOTE — ED NOTES
Pt continuing to refuse to allow for an emesis bag or basin to be placed on his lap. Garbage can placed at the head of the bed and pt instructed to try to vomit in the can when and if he has to. Pt assisted to drink his OJ and milk at this time.       Linda Delong RN  11/28/22 4373

## 2022-11-28 NOTE — ED NOTES
Report received from [address] in bed with eyes closed even respirations, no distress noted.  VSS, NSR  Bed in lowest position, call light within reach, side rails up x2   Will continue to monitor          Chalmer Hatchet, RN  11/28/22 9755

## 2022-11-28 NOTE — ED NOTES
Pt found as per report with darkly colored emesis on the floor and his person. Pt saying that he doesn't want it cleaned up and he can't vomit into a bag or basin because his hands don't work. Pt moving his upper extremities freely but states that he can't move his lower limbs. This nurse proceeded to clean as much emesis as possible. Pt refusing to allow an emesis bag or basin to stay on his lap. Provided the pt with ice water to drink as requested and contacted lab for morning labs and blood cultures so abx can be started.       Linda Delong, SABAS  11/28/22 30 [facility], SABAS  11/28/22 3006

## 2022-11-28 NOTE — ED NOTES
Patient in room laying on cot. Patient continues to spit refuses to use emesis bag. This nurse cleaning up floor and patient. Gave zofran 4mg IV per order. Patient with nausea.       Deven Aguirre RN  11/28/22 4014

## 2022-11-29 ENCOUNTER — APPOINTMENT (OUTPATIENT)
Dept: ULTRASOUND IMAGING | Age: 62
DRG: 720 | End: 2022-11-29
Payer: MEDICAID

## 2022-11-29 PROBLEM — R50.9 FEVER AND CHILLS: Status: ACTIVE | Noted: 2022-11-29

## 2022-11-29 PROBLEM — A41.9 SEPTICEMIA (HCC): Status: ACTIVE | Noted: 2022-11-29

## 2022-11-29 PROBLEM — R79.89 LFT ELEVATION: Status: ACTIVE | Noted: 2022-11-29

## 2022-11-29 PROBLEM — D72.9 NEUTROPHILIA: Status: ACTIVE | Noted: 2022-11-29

## 2022-11-29 PROBLEM — R79.82 CRP ELEVATED: Status: ACTIVE | Noted: 2022-11-29

## 2022-11-29 PROBLEM — R41.0 DISORIENTATION: Status: ACTIVE | Noted: 2022-11-29

## 2022-11-29 PROBLEM — D72.828 NEUTROPHILIA: Status: ACTIVE | Noted: 2022-11-29

## 2022-11-29 LAB
A/G RATIO: 1.1 (ref 1.1–2.2)
ALBUMIN SERPL-MCNC: 3.2 G/DL (ref 3.4–5)
ALP BLD-CCNC: 114 U/L (ref 40–129)
ALT SERPL-CCNC: 84 U/L (ref 10–40)
ANION GAP SERPL CALCULATED.3IONS-SCNC: 12 MMOL/L (ref 3–16)
AST SERPL-CCNC: 108 U/L (ref 15–37)
BASOPHILS ABSOLUTE: 0 K/UL (ref 0–0.2)
BASOPHILS RELATIVE PERCENT: 0.3 %
BILIRUB SERPL-MCNC: 0.5 MG/DL (ref 0–1)
BUN BLDV-MCNC: 22 MG/DL (ref 7–20)
C-REACTIVE PROTEIN: 131.6 MG/L (ref 0–5.1)
CALCIUM SERPL-MCNC: 9 MG/DL (ref 8.3–10.6)
CHLORIDE BLD-SCNC: 102 MMOL/L (ref 99–110)
CO2: 26 MMOL/L (ref 21–32)
CREAT SERPL-MCNC: 0.7 MG/DL (ref 0.8–1.3)
EOSINOPHILS ABSOLUTE: 0.2 K/UL (ref 0–0.6)
EOSINOPHILS RELATIVE PERCENT: 1.6 %
FERRITIN: 1240 NG/ML (ref 30–400)
GFR SERPL CREATININE-BSD FRML MDRD: >60 ML/MIN/{1.73_M2}
GLUCOSE BLD-MCNC: 100 MG/DL (ref 70–99)
HCT VFR BLD CALC: 35.9 % (ref 40.5–52.5)
HCT VFR BLD CALC: 36 % (ref 40.5–52.5)
HEMATOLOGY PATH CONSULT: NO
HEMOGLOBIN: 11.5 G/DL (ref 13.5–17.5)
HEMOGLOBIN: 11.7 G/DL (ref 13.5–17.5)
IRON SATURATION: 11 % (ref 20–50)
IRON: 22 UG/DL (ref 59–158)
LYMPHOCYTES ABSOLUTE: 0.8 K/UL (ref 1–5.1)
LYMPHOCYTES RELATIVE PERCENT: 6.1 %
MCH RBC QN AUTO: 27.8 PG (ref 26–34)
MCHC RBC AUTO-ENTMCNC: 32.6 G/DL (ref 31–36)
MCV RBC AUTO: 85.5 FL (ref 80–100)
MONOCYTES ABSOLUTE: 2.1 K/UL (ref 0–1.3)
MONOCYTES RELATIVE PERCENT: 17.4 %
NEUTROPHILS ABSOLUTE: 9.2 K/UL (ref 1.7–7.7)
NEUTROPHILS RELATIVE PERCENT: 74.6 %
PDW BLD-RTO: 15.5 % (ref 12.4–15.4)
PLATELET # BLD: 229 K/UL (ref 135–450)
PMV BLD AUTO: 8.6 FL (ref 5–10.5)
POTASSIUM SERPL-SCNC: 4.4 MMOL/L (ref 3.5–5.1)
RBC # BLD: 4.2 M/UL (ref 4.2–5.9)
SODIUM BLD-SCNC: 140 MMOL/L (ref 136–145)
TOTAL CK: 1960 U/L (ref 39–308)
TOTAL IRON BINDING CAPACITY: 192 UG/DL (ref 260–445)
TOTAL PROTEIN: 6.1 G/DL (ref 6.4–8.2)
WBC # BLD: 12.3 K/UL (ref 4–11)

## 2022-11-29 PROCEDURE — 97530 THERAPEUTIC ACTIVITIES: CPT

## 2022-11-29 PROCEDURE — 85014 HEMATOCRIT: CPT

## 2022-11-29 PROCEDURE — 80053 COMPREHEN METABOLIC PANEL: CPT

## 2022-11-29 PROCEDURE — 97161 PT EVAL LOW COMPLEX 20 MIN: CPT

## 2022-11-29 PROCEDURE — 36415 COLL VENOUS BLD VENIPUNCTURE: CPT

## 2022-11-29 PROCEDURE — 6360000002 HC RX W HCPCS: Performed by: INTERNAL MEDICINE

## 2022-11-29 PROCEDURE — 82728 ASSAY OF FERRITIN: CPT

## 2022-11-29 PROCEDURE — 1200000000 HC SEMI PRIVATE

## 2022-11-29 PROCEDURE — 97535 SELF CARE MNGMENT TRAINING: CPT

## 2022-11-29 PROCEDURE — 82550 ASSAY OF CK (CPK): CPT

## 2022-11-29 PROCEDURE — 2580000003 HC RX 258: Performed by: INTERNAL MEDICINE

## 2022-11-29 PROCEDURE — 6370000000 HC RX 637 (ALT 250 FOR IP): Performed by: INTERNAL MEDICINE

## 2022-11-29 PROCEDURE — 85018 HEMOGLOBIN: CPT

## 2022-11-29 PROCEDURE — 86140 C-REACTIVE PROTEIN: CPT

## 2022-11-29 PROCEDURE — 94760 N-INVAS EAR/PLS OXIMETRY 1: CPT

## 2022-11-29 PROCEDURE — 83540 ASSAY OF IRON: CPT

## 2022-11-29 PROCEDURE — 85025 COMPLETE CBC W/AUTO DIFF WBC: CPT

## 2022-11-29 PROCEDURE — C9113 INJ PANTOPRAZOLE SODIUM, VIA: HCPCS | Performed by: INTERNAL MEDICINE

## 2022-11-29 PROCEDURE — 76700 US EXAM ABDOM COMPLETE: CPT

## 2022-11-29 PROCEDURE — 99232 SBSQ HOSP IP/OBS MODERATE 35: CPT | Performed by: INTERNAL MEDICINE

## 2022-11-29 PROCEDURE — 97166 OT EVAL MOD COMPLEX 45 MIN: CPT

## 2022-11-29 PROCEDURE — 83550 IRON BINDING TEST: CPT

## 2022-11-29 RX ORDER — TAMSULOSIN HYDROCHLORIDE 0.4 MG/1
0.4 CAPSULE ORAL DAILY
Status: DISCONTINUED | OUTPATIENT
Start: 2022-11-29 | End: 2022-12-07 | Stop reason: HOSPADM

## 2022-11-29 RX ORDER — MIRTAZAPINE 15 MG/1
7.5 TABLET, FILM COATED ORAL NIGHTLY
Status: DISCONTINUED | OUTPATIENT
Start: 2022-11-29 | End: 2022-12-07 | Stop reason: HOSPADM

## 2022-11-29 RX ORDER — FLUOXETINE 10 MG/1
10 TABLET, FILM COATED ORAL DAILY
Status: DISCONTINUED | OUTPATIENT
Start: 2022-11-29 | End: 2022-12-07 | Stop reason: HOSPADM

## 2022-11-29 RX ORDER — FERROUS SULFATE TAB EC 324 MG (65 MG FE EQUIVALENT) 324 (65 FE) MG
325 TABLET DELAYED RESPONSE ORAL
Status: DISCONTINUED | OUTPATIENT
Start: 2022-11-29 | End: 2022-11-30

## 2022-11-29 RX ORDER — PANTOPRAZOLE SODIUM 40 MG/10ML
40 INJECTION, POWDER, LYOPHILIZED, FOR SOLUTION INTRAVENOUS 2 TIMES DAILY
Status: DISCONTINUED | OUTPATIENT
Start: 2022-11-29 | End: 2022-12-07 | Stop reason: HOSPADM

## 2022-11-29 RX ORDER — RISPERIDONE 0.5 MG/1
2 TABLET ORAL NIGHTLY
Status: DISCONTINUED | OUTPATIENT
Start: 2022-11-29 | End: 2022-12-07 | Stop reason: HOSPADM

## 2022-11-29 RX ORDER — RISPERIDONE 0.5 MG/1
1 TABLET ORAL EVERY MORNING
Status: DISCONTINUED | OUTPATIENT
Start: 2022-11-29 | End: 2022-12-07 | Stop reason: HOSPADM

## 2022-11-29 RX ORDER — LANOLIN ALCOHOL/MO/W.PET/CERES
1000 CREAM (GRAM) TOPICAL DAILY
Status: DISCONTINUED | OUTPATIENT
Start: 2022-11-29 | End: 2022-12-07 | Stop reason: HOSPADM

## 2022-11-29 RX ADMIN — RISPERIDONE 2 MG: 1 TABLET ORAL at 23:21

## 2022-11-29 RX ADMIN — SODIUM CHLORIDE, PRESERVATIVE FREE 10 ML: 5 INJECTION INTRAVENOUS at 10:19

## 2022-11-29 RX ADMIN — FLUOXETINE HYDROCHLORIDE 10 MG: 10 TABLET ORAL at 10:41

## 2022-11-29 RX ADMIN — RISPERIDONE 1 MG: 1 TABLET ORAL at 10:18

## 2022-11-29 RX ADMIN — PANTOPRAZOLE SODIUM 40 MG: 40 INJECTION, POWDER, FOR SOLUTION INTRAVENOUS at 10:18

## 2022-11-29 RX ADMIN — PANTOPRAZOLE SODIUM 40 MG: 40 INJECTION, POWDER, FOR SOLUTION INTRAVENOUS at 23:21

## 2022-11-29 RX ADMIN — CYANOCOBALAMIN TAB 1000 MCG 1000 MCG: 1000 TAB at 10:41

## 2022-11-29 RX ADMIN — FERROUS SULFATE TAB EC 324 MG (65 MG FE EQUIVALENT) 325 MG: 324 (65 FE) TABLET DELAYED RESPONSE at 10:18

## 2022-11-29 RX ADMIN — CEFEPIME 2000 MG: 2 INJECTION, POWDER, FOR SOLUTION INTRAVENOUS at 16:25

## 2022-11-29 RX ADMIN — SODIUM CHLORIDE, PRESERVATIVE FREE 10 ML: 5 INJECTION INTRAVENOUS at 23:21

## 2022-11-29 RX ADMIN — Medication 100 MG: at 10:18

## 2022-11-29 RX ADMIN — CEFEPIME 2000 MG: 2 INJECTION, POWDER, FOR SOLUTION INTRAVENOUS at 10:20

## 2022-11-29 RX ADMIN — CEFEPIME 2000 MG: 2 INJECTION, POWDER, FOR SOLUTION INTRAVENOUS at 02:07

## 2022-11-29 RX ADMIN — Medication 1500 MG: at 00:21

## 2022-11-29 RX ADMIN — TAMSULOSIN HYDROCHLORIDE 0.4 MG: 0.4 CAPSULE ORAL at 10:18

## 2022-11-29 RX ADMIN — MIRTAZAPINE 7.5 MG: 15 TABLET, FILM COATED ORAL at 23:20

## 2022-11-29 ASSESSMENT — PAIN SCALES - GENERAL
PAINLEVEL_OUTOF10: 0
PAINLEVEL_OUTOF10: 0

## 2022-11-29 NOTE — CARE COORDINATION
Patient came from [facility] prior to arrival.  Call to Riki Worthy, [phone], at [facility] who confirmed the patient is:  [x] [address], no Precert required for return. [x] Is fully vaccinated for Covid. [x] Rapid Covid test needed before return within: [x] 48 hours    SW will f/u with pt to confirm d/c plan. Infectious disease MD is following. SW will keep Riki Worthy at [facility] updated with pts d/c needs.      Electronically signed by Verdene Nissen on 11/29/2022 at 10:13 AM

## 2022-11-29 NOTE — PROGRESS NOTES
Occupational Therapy  Facility/Department: [facility]  Occupational Therapy Initial Assessment and Discharge    Name: Sofia Zaidi  : 1960  MRN: [id number]  Date of Service: 2022    Discharge Recommendations:  [address] without OT        Sofia Zaidi scored a 6/24 on the AM-PAC ADL Inpatient form. At this time, no further OT is recommended upon discharge due to pf functioning at/near baseline. Recommend patient returns to prior setting with prior services. Patient Diagnosis(es): The encounter diagnosis was Septicemia ([facility] Past Medical History:  has a past medical history of Antisocial personality disorder ([facility] [facility] Blindness right eye category 5, normal vision left eye, Drug induced subacute dyskinesia, Insomnia, Major depressive disorder, recurrent, unspecified ([facility] [facility] Melena, Schizoaffective disorder ([facility] [address] [facility] and Substance abuse ([facility] [facility] Past Surgical History:  has no past surgical history on file. Assessment   Performance deficits / Impairments: Decreased functional mobility ; Decreased ADL status; Decreased ROM; Decreased strength;Decreased cognition;Decreased balance;Decreased fine motor control;Decreased coordination  Assessment: The pt is a 56 yo male who presented to the ED with generalized weakness x few days. In the ED he was found to be tachy, febrile and with an elevated white count. The pt is a LT resident of [facility] and per pt report he no longer gets out of the bed to the / and has been non-ambulatory. He appears that he is dependent for self-care and for feeding due to tremors/impaired coordination/strength/ROM B UE's. PMHx:  Antisocial personality disorder, Blindness right eye category 5, normal vision left eye, Drug induced subacute dyskinesia,  Insomnia,  Major depressive disorder, recurrent, unspecified, Melena,  Schizoaffective disorder,  Substance abuse, bilateral nephrostomy tubes.   This date, pt mod A x2 for rolling, max A/total A for partial grooming in bed, and anticipate pt would require max A/total A for ADLs. Pt currently functioning at/near baseline, and does not require additional acute OT. Anticipate return to [facility] with total care as prior. No acute OT services indicated,  will discharge. Prognosis: Poor;Guarded  Decision Making: Medium Complexity  History: see above  REQUIRES OT FOLLOW-UP: No  Activity Tolerance  Activity Tolerance: Patient Tolerated treatment well        Plan   Occupational Therapy Plan  Times Per Week: d/c acute OT     Restrictions  Restrictions/Precautions  Restrictions/Precautions: Fall Risk  Position Activity Restriction  Other position/activity restrictions: IV, pure-wick; uncontrolled movements of B UE's    Subjective   General  Chart Reviewed: Yes  Additional Pertinent Hx: Per Valentino Turk MD H&P on 11-: The pt is a 56 yo male who presented to the ED with generalized weakness x few days. In the ED he was found to be tachy, febrile and with an elevated white count. PMHx:  Antisocial personality disorder, Blindness right eye category 5, normal vision left eye, Drug induced subacute dyskinesia,  Insomnia,  Major depressive disorder, recurrent, unspecified, Melena,  Schizoaffective disorder,  Substance abuse, bilateral nephrostomy tubes  Family / Caregiver Present: No  Referring Practitioner: Valentino Turk MD  Diagnosis: Sepsis of unknown origin; Nausea and vomiting; Generalized weakness; Acute likely on chronic metabolic encephalopathy  Subjective  Subjective: Pt met b/s for OT eval/cotx with PT. Pt in bed on arrival, agreeable to participate in therapy. Speech difficult to understand, but able to answer questions with increased time. Pt reporting discomfort R eye and not being able to open it.      Social/Functional History  Social/Functional History  Type of Home: Facility (LTC at [facility])  Has the patient had two or more falls in the past year or any fall with injury in the past year?: Unknown  ADL Assistance: Needs assistance (reports he gets a bed bath and uses depends for toileting)  Homemaking Responsibilities: No  Ambulation Assistance: Non-ambulatory  Transfer Assistance: Needs assistance (reports he doesn't get up to a w/c anymore)  Active : No       Objective       Observation/Palpation  Observation: uncontrolled tremors of B UE and uncontrolled mouth/tongue movements  Edema: B hands appeared edematous    Safety Devices  Type of Devices: Bed alarm in place;Call light within reach; Heels elevated for pressure relief;Patient at risk for falls; Left in bed;Nurse notified    Gait  Overall Level of Assistance:  (the pt is non-ambulatory at baseline)    AROM: Grossly decreased, non-functional (carlyn elbow flexion lacks ~15* end range, carlyn shoulder flexion limited to ~20*, sig limitations digit flexion carlyn hands pt unable to use hands functionally for ADLs)  PROM:  (carlyn shoulders and elbows WFL, impaired digit ext)  Strength: Grossly decreased, non-functional  Coordination: Grossly decreased, non-functional    ADL  Grooming: Maximum assistance;Dependent/Total  Grooming Skilled Clinical Factors: to wash face with prepared cloth, B UE's very uncoordinated and pt unable to grasp washcloth  Additional Comments: Anticipate pt is total A feeding, grooming, bathing, and dressing based on ROM/strength, cognition, endurance, rolling.     Activity Tolerance  Activity Tolerance: Patient tolerated evaluation without incident    Bed mobility  Rolling to Right: Moderate assistance;2 Person assistance  Supine to Sit: Unable to assess  Sit to Supine: Unable to assess  Scooting: Dependent/Total;2 Person assistance (to scoot up in the bed)  Bed Mobility Comments: did not sit the pt up on the EOB due to tremors and decreased safety; also it is an activity he no longer does at his facility    Transfers  Transfer Comments: deemed unsafe to have the pt in the chair due to tremors; he reports he no longer gets up to his w/c at the [facility]    Vision  Vision: Impaired (per PMHx he is blind in the R eye and normal vision in the L eye)  Hearing  Hearing: Within functional limits    Cognition  Overall Cognitive Status: Exceptions  Arousal/Alertness: Delayed responses to stimuli  Following Commands: Follows one step commands with increased time; Follows one step commands with repetition  Attention Span: Attends with cues to redirect  Memory: Decreased recall of biographical Information;Decreased recall of precautions;Decreased recall of recent events;Decreased short term memory  Safety Judgement: Decreased awareness of need for assistance;Decreased awareness of need for safety  Initiation: Requires cues for some  Sequencing: Requires cues for some  Orientation  Overall Orientation Status: Impaired  Orientation Level: Oriented to time;Oriented to place;Oriented to person;Disoriented to situation      Education Given To: Patient  Education Provided: Role of Therapy  Barriers to Learning: Cognition                            AM-PAC Score        AM-PAC Inpatient Daily Activity Raw Score: 6 (11/29/22 1559)  AM-PAC Inpatient ADL T-Scale Score : 17.07 (11/29/22 1559)  ADL Inpatient CMS 0-100% Score: 100 (11/29/22 1559)  ADL Inpatient CMS G-Code Modifier : CN (11/29/22 1559)         Goals  Short Term Goals  Time Frame for Short Term Goals: no acute OT goals identified.        Therapy Time   Individual Concurrent Group Co-treatment   Time In 1300         Time Out 1339         Minutes 39         Timed Code Treatment Minutes: [address], OTR/L 1109

## 2022-11-29 NOTE — PROGRESS NOTES
INPATIENT PROGRESS NOTE        IDENTIFYING DATA/REASON FOR CONSULTATION   PATIENT:  Sofia Zaidi  MRN:  [id number]  ADMIT DATE: 2022  TIME OF EVALUATION: 2022 11:02 AM  HOSPITAL STAY:   LOS: 1 day   CONSULTING PHYSICIAN: Luis Enrique Pedro MD   REASON FOR CONSULTATION: black vomit    Subjective:    Patient seen in follow up   Resting in bed no acute distress  Denies abd pain this morning. Denies nausea or vomiting. MEDICATIONS   SCHEDULED:  pantoprazole, 40 mg, BID  cyanocobalamin, 1,000 mcg, Daily  ferrous sulfate, 325 mg, Daily with breakfast  FLUoxetine, 10 mg, Daily  mirtazapine, 7.5 mg, Nightly  risperiDONE, 1 mg, QAM  risperiDONE, 2 mg, Nightly  tamsulosin, 0.4 mg, Daily  sodium chloride flush, 5-40 mL, 2 times per day  [Held by provider] enoxaparin, 30 mg, BID  thiamine mononitrate, 100 mg, Daily  cefepime, 2,000 mg, Q8H      FLUIDS/DRIPS:     sodium chloride       PRNs: sodium chloride flush, 5-40 mL, PRN  sodium chloride, , PRN  ondansetron, 4 mg, Q8H PRN   Or  ondansetron, 4 mg, Q6H PRN  polyethylene glycol, 17 g, Daily PRN      ALLERGIES:    Allergies   Allergen Reactions    Codeine     Thorazine [Chlorpromazine]     Tylenol [Acetaminophen]          PHYSICAL EXAM   VITALS:  /68   Pulse 70   Temp 98.2 °F (36.8 °C) (Oral)   Resp 22   Ht 5' 11\" (1.803 m)   Wt 227 lb 1.2 oz (103 kg)   SpO2 93%   BMI 31.67 kg/m²   TEMPERATURE:  Current - Temp: 98.2 °F (36.8 °C); Max - Temp  Av.8 °F (37.1 °C)  Min: 98.2 °F (36.8 °C)  Max: 99.1 °F (37.3 °C)    Physical Exam:  General appearance: dishevel, NAD  Eyes: Anicteric  Head: Normocephalic, without obvious abnormality  Lungs: clear to auscultation bilaterally, Normal Effort  Heart: regular rate and rhythm, normal S1 and S2, no murmurs or rubs  Abdomen: soft, non-distended, non-tender.  Bowel sounds normal.   Extremities: atraumatic, no cyanosis or edema  Skin: warm and dry, no jaundice  Neuro: Grossly intact, Alert, confused    LABS AND IMAGING   Laboratory   Recent Labs     11/27/22 1905 11/28/22  1406 11/29/22  0509   WBC 18.0*  --  12.3*   HGB 12.6* 11.8* 11.7*   HCT 39.1* 36.9* 35.9*   MCV 84.9  --  85.5     --  229     Recent Labs     11/27/22  1905 11/29/22  0509    140   K 4.3 4.4    102   CO2 25 26   BUN 25* 22*   CREATININE 0.8 0.7*     Recent Labs     11/27/22  1905 11/29/22  0509   * 108*   ALT 58* 84*   BILITOT 0.4 0.5   ALKPHOS 101 114     No results for input(s): LIPASE, AMYLASE in the last 72 hours. No results for input(s): PROTIME, INR in the last 72 hours. Imaging  US ABDOMEN COMPLETE   Final Result   1. Nonvisualization of the pancreas and abdominal aorta due to bowel gas. 2. Top-normal liver size. No focal lesions in the liver. 3. Normal gallbladder, common bile duct, spleen and kidneys. 4. Trace ascites right upper quadrant. CT ABDOMEN PELVIS W IV CONTRAST Additional Contrast? None   Final Result   1. Limited exam due to patient motion with no acute findings identified   within the abdomen or pelvis. 2. Trace left pleural effusion. XR CHEST PORTABLE   Final Result   Stable appearance of the chest without acute cardiopulmonary process   identified. Endoscopy      ASSESSMENT AND RECOMMENDATIONS   Lambert Rojas is a 57 y.o. male with PMH of of Schizoaffective disorder, substance abuse who presented on 11/27/2022 with weakness, nausea, vomiting    Sepsis with leukocytosis, elevated CRP, tachycardia. Source unclear. UA neg. CXR unremarkable. CT with no acute findings. Started on empiric antibiotics. Nausea, vomiting with dark emesis. Prior history of duodenal ulcer in May 2021.  ? NSAID vs H. pylori induced. CT with no acute finding. LFTs mildly elevated which I suspect is chronic and related to fatty liver/alcohol use (has been elevated at least since July). RUQ US unremarkable. His hgb is stable and no further vomiting episodes overnight.   Will continue to treat with PPI BID and supportive care     RECOMMENDATIONS:    PPI BID  Antiemetics as needed  Diet advancement as tolerated    If you have any questions or need any further information, please feel free to contact us [phone]. Thank you for allowing us to participate in the care of Nettie Nunn. The note was completed using Dragon voice recognition transcription. Every effort was made to ensure accuracy; however, inadvertent transcription errors may be present despite my best efforts to edit errors. Dov WHEAT    Attending physician addendum:      I have personally seen and examined the patient, reviewed the patient's medical record and pertinent labs and clinical imaging. I have personally staffed the case with JARRET Lee. I agree with her consultation note, exam findings, assessment and plans  as written above. I have made appropriate modifications and edited her assessment and plan where needed to reflect my impression and plans for this patient. Patient more alert and responsive than yesterday    /72   Pulse 92   Temp 99 °F (37.2 °C) (Oral)   Resp 16   Ht 5' 11\" (1.803 m)   Wt 227 lb 1.2 oz (103 kg)   SpO2 92%   BMI 31.67 kg/m²      Gen- NAD, nods and responds to questions  ENT_ poor dentition  CV- RRR  Lungs- CTAB  Abd- soft, ND, NT     Labs and CT reviewed. US reviewed. GB normal.  Trace ascites. Impression:     1) Leukocytosis - improving. Cultures and imaging not showing source. Initial .2, Procalcitonin normal.  UA normal.  CXR negative. CT negative. Covid 19 negative. Flu negativeBlood cultures negative. Trace ascites on US. On Cefepime      2) Nausea and vomiting with ? Coffee ground emesis- resolved. Hgb stable. Prior history of duodenal ulcer in May 2021.  ? NSAID vs H. pylori induced. Unclear if the patient clearly received any eradication of H.pylori.   Patient reportedly was taking aspirin although not clear if he was actually taking this medicine. No PPI was reported in his medication list.  Possibility of gastritis versus recurrent peptic ulcer disease. Hemoglobin has remained stable     3) Elevated Liver enzymes- were elevated in 7/2022.  ? Fatty liver vs Etoh related. 4) Schizoaffective disorder    5) Anemia of chronic disease     Plan:  Check H pylori stool antigen  CBC daily  Abx duration per ID  PPI PO bid   If patient with dropping Hgb or signs of active GI bleeding, could repeat EGD  Etoh cessation  Monitor trend of LFT      Thank you for allowing me to participate in this patient's care. If there are any questions or concerns regarding this patient, or the plan we have set in place, please feel free to contact me at [phone].      Duke Light, DO

## 2022-11-29 NOTE — PROGRESS NOTES
Infectious Diseases Inpatient Progress notes     CHIEF COMPLAINT:     Sepsis  Fevers  WBC elevation    HISTORY OF PRESENT ILLNESS:  57 y.o. man nursing home resident history of major depressive disorder, seizure affective disorder history of substance abuse in the past, antisocial personality disorder, drug-induced subacute dyskinesia, admitted to the hospital secondary to not feeling well fevers and change in mental status. Patient is unable to provide any history admission labs indicate WBC elevation T-max 100.9 blood cultures are obtained urinalysis negative, CRP elevated 143.2 ALT 58 , COVID-19 negative rapid flu negative urinalysis negative given the concern for sepsis we are consulted for recommendations. Interval :  fever trend down and WBC improving no chills and no history from patient    Past Medical History:    Past Medical History:   Diagnosis Date    Antisocial personality disorder ([facility] [address]     Blindness right eye category 5, normal vision left eye     Drug induced subacute dyskinesia     Insomnia     Major depressive disorder, recurrent, unspecified ([facility] [address]     Melena     Schizoaffective disorder ([facility] [address]     Substance abuse ([facility] [address]        Past Surgical History:    History reviewed. No pertinent surgical history.     Current Medications:    Outpatient Medications Marked as Taking for the 11/27/22 encounter [facility] Encounter)   Medication Sig Dispense Refill    tamsulosin (FLOMAX) 0.4 MG capsule Take 0.4 mg by mouth daily      cyanocobalamin 1000 MCG tablet Take 1,000 mcg by mouth daily      ferrous sulfate (IRON 325) 325 (65 Fe) MG tablet Take 325 mg by mouth daily (with breakfast)      FLUoxetine (PROZAC) 10 MG tablet Take 10 mg by mouth daily      melatonin 3 MG TABS tablet Take 3 mg by mouth nightly as needed      mirtazapine (REMERON) 7.5 MG tablet Take 7.5 mg by mouth nightly      risperiDONE (RISPERDAL) 1 MG tablet Take 1 mg by mouth every morning      risperiDONE (RISPERDAL) 1 MG tablet Take 2 mg by mouth nightly         Allergies:  Codeine, Thorazine [chlorpromazine], and Tylenol [acetaminophen]    Immunizations :   Immunization History   Administered Date(s) Administered    COVID-19, J&J, (age 18y+), IM, 0.5 mL 05/13/2021         Social History:    Social History     Tobacco Use    Smoking status: Unknown    Smokeless tobacco: Never    Tobacco comments:     Quit since in [facility]    Vaping Use    Vaping Use: Unknown   Substance Use Topics    Alcohol use: Not Currently     Comment: unknown    Drug use: Not Currently     Types: Cocaine     Comment: unknown     Social History     Tobacco Use   Smoking Status Unknown   Smokeless Tobacco Never   Tobacco Comments    Quit since in [facility]       Family History : not available       REVIEW OF SYSTEMS:     Not possible due to change in mentation   PHYSICAL EXAM:      Vitals:    /68   Pulse 70   Temp 98.2 °F (36.8 °C) (Oral)   Resp 18   Ht 5' 11\" (1.803 m)   Wt 227 lb 1.2 oz (103 kg)   SpO2 93%   BMI 31.67 kg/m²     General Appearance: awake not following commands and has Dyskinesia++ acute distress, no pallor, no icterus   Skin: warm and dry, no rash or erythema  Head: normocephalic and atraumatic  Eyes: pupils equal, round, and reactive to light, conjunctivae normal  ENT: tympanic membrane, external ear and ear canal normal bilaterally, nose without deformity, nasal mucosa and turbinates normal without polyps  Neck: supple and non-tender without mass, no thyromegaly  no cervical lymphadenopathy  Pulmonary/Chest: clear to auscultation bilaterally- no wheezes, rales or rhonchi, normal air movement, no respiratory distress  Cardiovascular: normal rate, regular rhythm, normal S1 and S2, no murmurs, rubs, clicks, or gallops, no carotid bruits  Abdomen: soft, non-tender, non-distended, normal bowel sounds, no masses or organomegaly  Extremities: no cyanosis, clubbing or edema  Musculoskeletal: normal range of motion, no joint swelling, deformity or tenderness  Integumentary: No rashes, no abnormal skin lesions, no petechiae  Neurologic: reflexes normal and symmetric, no cranial nerve deficit Dyskinesia+ lynda praxial ++     Lines: IV    DATA:    CBC:   Lab Results   Component Value Date    WBC 12.3 (H) 11/29/2022    HGB 11.5 (L) 11/29/2022    HCT 36.0 (L) 11/29/2022    MCV 85.5 11/29/2022     11/29/2022     RENAL:   Lab Results   Component Value Date    CREATININE 0.7 (L) 11/29/2022    BUN 22 (H) 11/29/2022     11/29/2022    K 4.4 11/29/2022     11/29/2022    CO2 26 11/29/2022     SED RATE: No results found for: SEDRATE  CK:   Lab Results   Component Value Date/Time    CKTOTAL 1,960 11/29/2022 05:09 AM     CRP:   Lab Results   Component Value Date/Time    .6 11/29/2022 05:09 AM     Hepatic Function Panel:   Lab Results   Component Value Date/Time    ALKPHOS 114 11/29/2022 05:09 AM    ALT 84 11/29/2022 05:09 AM     11/29/2022 05:09 AM    PROT 6.1 11/29/2022 05:09 AM    PROT 7.1 11/01/2011 07:50 AM    BILITOT 0.5 11/29/2022 05:09 AM    BILIDIR 0.0 11/01/2011 07:50 AM    LABALBU 3.2 11/29/2022 05:09 AM     UA:  Lab Results   Component Value Date/Time    COLORU Yellow 11/27/2022 07:18 PM    CLARITYU Clear 11/27/2022 07:18 PM    GLUCOSEU Negative 11/27/2022 07:18 PM    BILIRUBINUR Negative 11/27/2022 07:18 PM    KETUA Negative 11/27/2022 07:18 PM    SPECGRAV 1.039 11/27/2022 07:18 PM    BLOODU Negative 11/27/2022 07:18 PM    PHUR 5.0 11/27/2022 07:18 PM    PROTEINU TRACE 11/27/2022 07:18 PM    UROBILINOGEN 1.0 11/27/2022 07:18 PM    NITRU Negative 11/27/2022 07:18 PM    LEUKOCYTESUR Negative 11/27/2022 07:18 PM    LABMICR YES 11/27/2022 07:18 PM    URINETYPE NotGiven 11/27/2022 07:18 PM      Urine Microscopic:   Lab Results   Component Value Date/Time    BACTERIA None Seen 11/27/2022 07:18 PM    COMU see below 07/15/2022 12:00 PM    HYALCAST 1 11/27/2022 07:18 PM    WBCUA 1 11/27/2022 07:18 PM    RBCUA 1 11/27/2022 07:18 PM    EPIU 0 11/27/2022 07:18 PM     Urine Reflex to Culture:   Lab Results   Component Value Date/Time    URRFLXCULT Not Indicated 11/27/2022 07:18 PM         MICRO: cultures reviewed and updated by me        Micro results (current encounter only)    Procedure Component Value Units Date/Time   Culture, Blood 2 [[id number]] Collected: 11/28/22 1037   Order Status: Sent Specimen: Blood Updated: 11/28/22 1048   Culture, Urine [[id number]]    Order Status: Sent Specimen: Urine, clean catch    COVID-19, Rapid [[id number]] Collected: 11/27/22 1919   Order Status: Completed Specimen: Nasopharyngeal Swab Updated: 11/27/22 2003    SARS-CoV-2, NAAT Not Detected    Comment: Rapid NAAT:   Negative results should be treated as presumptive and,   if inconsistent with clinical signs and symptoms or necessary for   patient management, should be tested with an alternative molecular   assay. Negative results do not preclude SARS-CoV-2 infection and   should not be used as the sole basis for patient management decisions. This test has been authorized by the FDA under an Emergency Use   Authorization (EUA) for use by authorized laboratories. Fact sheet for Healthcare Providers:   http://www.negin-jose.biankita/   Fact sheet for Patients: http://www.kam-garcia.biz/     METHODOLOGY: Isothermal Nucleic Acid Amplification       Rapid influenza A/B antigens [[id number]] Collected: 11/27/22 1919   Order Status: Completed Specimen: Nasopharyngeal Updated: 11/27/22 1949    Rapid Influenza A Ag Negative    Rapid Influenza B Ag Negative   Culture, Blood 1 [[id number]] Collected: 11/27/22 1905   Order Status: Sent Specimen: Blood Updated       Blood Culture:   Lab Results   Component Value Date/Time    [facility]  11/27/2022 07:05 PM     No Growth to date. Any change in status will be called. Jose Blood  11/28/2022 10:37 AM     No Growth to date. Any change in status will be called.        Viral Culture:    Lab Results   Component Value Date/Time    COVID19 Not Detected 11/27/2022 07:19 PM     Urine Culture: No results for input(s): LABURIN in the last 72 hours. Scheduled Meds:   pantoprazole  40 mg IntraVENous BID    cyanocobalamin  1,000 mcg Oral Daily    ferrous sulfate  325 mg Oral Daily with breakfast    FLUoxetine  10 mg Oral Daily    mirtazapine  7.5 mg Oral Nightly    risperiDONE  1 mg Oral QAM    risperiDONE  2 mg Oral Nightly    tamsulosin  0.4 mg Oral Daily    sodium chloride flush  5-40 mL IntraVENous 2 times per day    [Held by provider] enoxaparin  30 mg SubCUTAneous BID    thiamine mononitrate  100 mg Oral Daily    cefepime  2,000 mg IntraVENous Q8H       Continuous Infusions:   sodium chloride         PRN Meds:  sodium chloride flush, sodium chloride, ondansetron **OR** ondansetron, polyethylene glycol    Imaging:   US ABDOMEN COMPLETE   Final Result   1. Nonvisualization of the pancreas and abdominal aorta due to bowel gas. 2. Top-normal liver size. No focal lesions in the liver. 3. Normal gallbladder, common bile duct, spleen and kidneys. 4. Trace ascites right upper quadrant. CT ABDOMEN PELVIS W IV CONTRAST Additional Contrast? None   Final Result   1. Limited exam due to patient motion with no acute findings identified   within the abdomen or pelvis. 2. Trace left pleural effusion. XR CHEST PORTABLE   Final Result   Stable appearance of the chest without acute cardiopulmonary process   identified. All pertinent images and reports for the current Hospitalization were reviewed by me.     IMPRESSION:    Patient Active Problem List   Diagnosis    Acute metabolic encephalopathy    Hypernatremia    E. coli UTI    Sepsis ([facility] [address]    Fever and chills    Neutrophilia    CRP elevated    Disorientation    LFT elevation    Septicemia (HCC)     Sepsis   Source not clear  WBC elevation   Fevers on admit  Change in mentation  CT abd/pelvis -ve  CXR  -VE  UA  -ve  LFT elevation   CRP elevation     Wbc NOW trending down on IV abx and Fever subsiding and he has Dyskinesia from his Psychiatric medications no history from patient - will be able to complete IV abx course soon      USG of the LIver noted and no obstruction and CRP still elevated and will have to cont IV abx pending clinical improvement       Labs, Microbiology, Radiology and pertinent results from current hospitalization and care every where were reviewed by me as a part of the consultation. PLAN :  Cont IV  Cefepime x 2 gm q 8 HRS  bLOOD cx in process NGTD  Check CK 1960 concern if the AST/ALT from the muscle check Gamma GT  USG of the liver  -VE       Discussed with patient/Family and Nursing   Risk of Complications/Morbidity: High      Illness(es)/ Infection present that pose threat to bodily function. There is potential for severe exacerbation of infection/side effects of treatment. Therapy requires intensive monitoring for antimicrobial agent toxicity. Thanks for allowing me to participate in your patient's care please call me with any questions or concerns.     Dr. Hilda Gutierrez MD  [facility] Physician  Phone: [phone]   Fax : [phone]

## 2022-11-29 NOTE — PROGRESS NOTES
Physical Therapy  Facility/Department: [facility]  Physical Therapy Initial and Discharge Assessment    Name: Joaquina Bhat  : 1960  MRN: [id number]  Date of Service: 2022    Discharge Recommendations:  [address] without PT, No therapy recommended at discharge   Joaquina Bhat scored a 7/24 on the AM-PAC short mobility form. At this time, no further PT is recommended upon discharge due to no needs. Recommend patient returns to prior setting with prior services. PT Equipment Recommendations  Equipment Needed: No      Patient Diagnosis(es): The encounter diagnosis was Septicemia ([facility] [address] Past Medical History:  has a past medical history of Antisocial personality disorder ([facility] [address] Blindness right eye category 5, normal vision left eye, Drug induced subacute dyskinesia, Insomnia, Major depressive disorder, recurrent, unspecified ([facility] [address] Melena, Schizoaffective disorder ([facility] [address] and Substance abuse ([facility] [address] Past Surgical History:  has no past surgical history on file. Assessment   Assessment: The pt is a 56 yo male who presented to the ED with generalized weakness x few days. In the ED he was found to be tachy, febrile and with an elevated white count. The pt is a LTC resident of [facility] and per pt report he no longer gets out of the bed to the w/c and has been non-ambulatory. He appears that he is dependent for self-care and for feeding due to tremors. PMHx:  Antisocial personality disorder, Blindness right eye category 5, normal vision left eye, Drug induced subacute dyskinesia,  Insomnia,  Major depressive disorder, recurrent, unspecified, Melena,  Schizoaffective disorder,  Substance abuse, bilateral nephrostomy tubes    Today, the pt demonstrated that he is at his baseline of being dependent for self-care and anticipate dependent for mobility. The pt required mod A of 2 to turn to the R in the bed and was dependent for scooting up in the bed.  Attempts at sitting EOB and getting out of bed were not completed due to safety concerns and his tremors. Anticipate that the pt is at his baseline and that he will return to his LTC facility at d/c. Will d/c from acute care PT services due to no needs. Decision Making: Low Complexity  Barriers to Learning: cog  No Skilled PT: No PT goals identified  Requires PT Follow-Up: No  Activity Tolerance  Activity Tolerance: Patient tolerated evaluation without incident     Plan   Physcial Therapy Plan  Additional Comments: d/c from acute care PT services  Safety Devices  Type of Devices: Bed alarm in place, Call light within reach, Heels elevated for pressure relief, Patient at risk for falls, Left in bed, Nurse notified     Restrictions  Restrictions/Precautions  Restrictions/Precautions: Fall Risk, Strict Bedrest  Position Activity Restriction  Other position/activity restrictions: IV, pure-wick; uncontrolled movements of B UE's     Subjective   General  Chart Reviewed: Yes  Additional Pertinent Hx: Per Tamie Infante MD H&P on 11-: The pt is a 58 yo male who presented to the ED with generalized weakness x few days. In the ED he was found to be tachy, febrile and with an elevated white count. PMHx:  Antisocial personality disorder, Blindness right eye category 5, normal vision left eye, Drug induced subacute dyskinesia,  Insomnia,  Major depressive disorder, recurrent, unspecified, Melena,  Schizoaffective disorder,  Substance abuse, bilateral nephrostomy tubes  Response To Previous Treatment: Not applicable  Family / Caregiver Present: No  Referring Practitioner: Tamie Infante MD  Referral Date : 11/28/22  Diagnosis: Sepsis of unknown origin; Nausea and vomiting; Generalized weakness;  Acute likely on chronic metabolic encephalopathy  Follows Commands: Impaired  Subjective  Subjective: the pt was found to be asleep in the bed upon arrival to the room; the pt awakened and initially was limited in verbalizations but began to be able to answer questions; speech difficult to understand due to tremors; reporting discomfort in his R eye and not being able to open it         Social/Functional History  Social/Functional History  Type of Home: Facility (LTC at [facility])  Has the patient had two or more falls in the past year or any fall with injury in the past year?: Unknown  ADL Assistance: Needs assistance (reports he gets a bed bath and uses depends for toileting)  Homemaking Responsibilities: No  Ambulation Assistance: Non-ambulatory  Transfer Assistance: Needs assistance (reports he doesn't get up to a w/c anymore)  Active : No  Vision/Hearing  Vision  Vision: Impaired (per PMHx he is blind in the R eye and normal vision in the L eye)  Hearing  Hearing: Within functional limits    Cognition   Orientation  Overall Orientation Status: Impaired  Orientation Level: Oriented to time;Oriented to place;Oriented to person;Disoriented to situation  Cognition  Overall Cognitive Status: Exceptions  Arousal/Alertness: Delayed responses to stimuli  Following Commands: Follows one step commands with increased time; Follows one step commands with repetition  Attention Span: Attends with cues to redirect  Memory: Decreased recall of biographical Information;Decreased recall of precautions;Decreased recall of recent events;Decreased short term memory  Safety Judgement: Decreased awareness of need for assistance;Decreased awareness of need for safety  Initiation: Requires cues for some  Sequencing: Requires cues for some     Objective        Observation/Palpation  Observation: uncontrolled tremors of B UE and uncontrolled mouth/tongue movements  Edema: B hands appeared edematous    Gross Assessment  AROM: Grossly decreased, non-functional  PROM: Grossly decreased, non-functional  Strength: Grossly decreased, non-functional  Coordination: Grossly decreased, non-functional  Tone: Abnormal     Bed mobility  Rolling to Right: Moderate assistance;2 Person assistance  Supine to Sit: Unable to assess  Sit to Supine: Unable to assess  Scooting: Dependent/Total;2 Person assistance (to scoot up in the bed)  Bed Mobility Comments: did not sit the pt up on the EOB due to tremors and decreased safety; also it is an activity he no longer does at his facility  Transfers  Sit to Stand: Unable to assess  Stand to Sit: Unable to assess  Bed to Chair: Unable to assess  Comment: deemed unsafe to have the pt in the chair due to tremors; he reports he no longer gets up to his w/c at the [facility]  Ambulation  Comments: the pt is non-ambulatory at baseline                 AM-PAC Score  AM-PAC Inpatient Mobility Raw Score : 7 (11/29/22 1337)  AM-PAC Inpatient T-Scale Score : 26.42 (11/29/22 1337)  Mobility Inpatient CMS 0-100% Score: 92.36 (11/29/22 1337)  Mobility Inpatient CMS G-Code Modifier : CM (11/29/22 1337)          Goals  Short Term Goals  Time Frame for Short Term Goals: no acute care PT goals identified; the pt is dependent at baseline  Patient Goals   Patient Goals : none stated per pt       Education  Patient Education  Education Given To: Patient  Education Provided: Role of Therapy  Education Method: Verbal  Barriers to Learning: Cognition;Vision  Education Outcome: Verbalized understanding      Therapy Time   Individual Concurrent Group Co-treatment   Time In 1306         Time Out 1345         Minutes 39         Timed Code Treatment Minutes: 24 Minutes     Electronically signed by Eliza Nguyen, PT [id number] on 11/29/2022 at 1:48 PM

## 2022-11-29 NOTE — PROGRESS NOTES
Hospitalist Progress Note      PCP: No primary care provider on file. Date of Admission: 11/27/2022    Subjective: More awake less agitated no fever or chills      Medications:  Reviewed    Infusion Medications    sodium chloride       Scheduled Medications    pantoprazole  40 mg IntraVENous BID    sodium chloride flush  5-40 mL IntraVENous 2 times per day    [Held by provider] enoxaparin  30 mg SubCUTAneous BID    thiamine mononitrate  100 mg Oral Daily    cefepime  2,000 mg IntraVENous Q8H    vancomycin  1,500 mg IntraVENous Q12H    vancomycin (VANCOCIN) intermittent dosing (placeholder)   Other RX Placeholder     PRN Meds: sodium chloride flush, sodium chloride, ondansetron **OR** ondansetron, polyethylene glycol      Intake/Output Summary (Last 24 hours) at 11/29/2022 0815  Last data filed at 11/29/2022 0431  Gross per 24 hour   Intake --   Output 700 ml   Net -700 ml       Physical Exam Performed:    /74   Pulse 80   Temp 99.1 °F (37.3 °C) (Axillary)   Resp 22   Ht 5' 11\" (1.803 m)   Wt 227 lb 1.2 oz (103 kg)   SpO2 95%   BMI 31.67 kg/m²     General appearance: No apparent distress  Neck: Supple  Respiratory:  Normal respiratory effort. Clear to auscultation, bilaterally without Rales/Wheezes/Rhonchi. Cardiovascular: Regular rate and rhythm with normal S1/S2 without murmurs, rubs or gallops. Abdomen: Soft, non-tender  Musculoskeletal: No clubbing, cyanosis  Skin: Skin color, texture, turgor normal.  No rashes or lesions.   Neurologic: Moving all his extremities  Psychiatric: awake   Capillary Refill: Brisk, 3 seconds, normal   Peripheral Pulses: +2 palpable, equal bilaterally       Labs:   Recent Labs     11/27/22 1905 11/28/22  1406 11/29/22  0509   WBC 18.0*  --  12.3*   HGB 12.6* 11.8* 11.7*   HCT 39.1* 36.9* 35.9*     --  229     Recent Labs     11/27/22  1905 11/29/22  0509    140   K 4.3 4.4    102   CO2 25 26   BUN 25* 22*   CREATININE 0.8 0.7*   CALCIUM 9.6 9.0 Recent Labs     11/27/22  1905 11/29/22  0509   * 108*   ALT 58* 84*   BILITOT 0.4 0.5   ALKPHOS 101 114     No results for input(s): INR in the last 72 hours. No results for input(s): [place] in the last 72 hours. Urinalysis:      Lab Results   Component Value Date/Time    NITRU Negative 11/27/2022 07:18 PM    WBCUA 1 11/27/2022 07:18 PM    BACTERIA None Seen 11/27/2022 07:18 PM    RBCUA 1 11/27/2022 07:18 PM    BLOODU Negative 11/27/2022 07:18 PM    SPECGRAV 1.039 11/27/2022 07:18 PM    GLUCOSEU Negative 11/27/2022 07:18 PM       Radiology:  CT ABDOMEN PELVIS W IV CONTRAST Additional Contrast? None   Final Result   1. Limited exam due to patient motion with no acute findings identified   within the abdomen or pelvis. 2. Trace left pleural effusion. XR CHEST PORTABLE   Final Result   Stable appearance of the chest without acute cardiopulmonary process   identified. US ABDOMEN COMPLETE    (Results Pending)       PHARMACY TO DOSE VANCOMYCIN  IP CONSULT TO HOSPITALIST  PHARMACY TO DOSE VANCOMYCIN  IP CONSULT TO INFECTIOUS DISEASES  IP CONSULT TO GI    Assessment/Plan:    Active Hospital Problems    Diagnosis     Sepsis ([facility] [address] [A41.9]      Priority: Medium       Sepsis of unknown origin, suspecting urine, with urine infection on admission, patient refused to give urine admission patient started on cefepime and vancomycin will keep for now, ID consulted, procalcitonin within normal limits, CRP repeated this morning still significantly elevated  Nausea and vomiting was dark content, GI consulted on admission patient was started on pantoprazole drip, changed to scheduled twice daily per GI.   Anemia, appears iron deficiency anemia, hemoglobin dropped minimally since admission could be dilutional  Generalized weakness, PT OT  Acute likely on chronic metabolic encephalopathy likely due to infection, monitor closely   History of antisocial personality disorder          Diet: ADULT DIET; Regular  Code Status: Full Code      Ekta Foy MD

## 2022-11-30 LAB
GAMMA GLUTAMYL TRANSFERASE: 27 U/L (ref 8–61)
HCT VFR BLD CALC: 35 % (ref 40.5–52.5)
HEMOGLOBIN: 11.3 G/DL (ref 13.5–17.5)
MCH RBC QN AUTO: 27.6 PG (ref 26–34)
MCHC RBC AUTO-ENTMCNC: 32.3 G/DL (ref 31–36)
MCV RBC AUTO: 85.4 FL (ref 80–100)
PDW BLD-RTO: 15.7 % (ref 12.4–15.4)
PLATELET # BLD: 245 K/UL (ref 135–450)
PMV BLD AUTO: 9.2 FL (ref 5–10.5)
RBC # BLD: 4.1 M/UL (ref 4.2–5.9)
TOTAL CK: 770 U/L (ref 39–308)
WBC # BLD: 11.1 K/UL (ref 4–11)

## 2022-11-30 PROCEDURE — C9113 INJ PANTOPRAZOLE SODIUM, VIA: HCPCS | Performed by: INTERNAL MEDICINE

## 2022-11-30 PROCEDURE — 6360000002 HC RX W HCPCS: Performed by: INTERNAL MEDICINE

## 2022-11-30 PROCEDURE — 2580000003 HC RX 258: Performed by: INTERNAL MEDICINE

## 2022-11-30 PROCEDURE — 6370000000 HC RX 637 (ALT 250 FOR IP): Performed by: INTERNAL MEDICINE

## 2022-11-30 PROCEDURE — 82977 ASSAY OF GGT: CPT

## 2022-11-30 PROCEDURE — 1200000000 HC SEMI PRIVATE

## 2022-11-30 PROCEDURE — 99233 SBSQ HOSP IP/OBS HIGH 50: CPT | Performed by: INTERNAL MEDICINE

## 2022-11-30 PROCEDURE — 85027 COMPLETE CBC AUTOMATED: CPT

## 2022-11-30 PROCEDURE — 82550 ASSAY OF CK (CPK): CPT

## 2022-11-30 PROCEDURE — 36415 COLL VENOUS BLD VENIPUNCTURE: CPT

## 2022-11-30 RX ORDER — SODIUM CHLORIDE 9 MG/ML
INJECTION, SOLUTION INTRAVENOUS CONTINUOUS
Status: DISCONTINUED | OUTPATIENT
Start: 2022-11-30 | End: 2022-12-01

## 2022-11-30 RX ADMIN — Medication 100 MG: at 08:47

## 2022-11-30 RX ADMIN — CEFEPIME 2000 MG: 2 INJECTION, POWDER, FOR SOLUTION INTRAVENOUS at 16:59

## 2022-11-30 RX ADMIN — RISPERIDONE 1 MG: 1 TABLET ORAL at 08:46

## 2022-11-30 RX ADMIN — RISPERIDONE 2 MG: 1 TABLET ORAL at 22:09

## 2022-11-30 RX ADMIN — FLUOXETINE HYDROCHLORIDE 10 MG: 10 TABLET ORAL at 08:46

## 2022-11-30 RX ADMIN — POLYETHYLENE GLYCOL 3350 17 G: 17 POWDER, FOR SOLUTION ORAL at 08:46

## 2022-11-30 RX ADMIN — SODIUM CHLORIDE: 9 INJECTION, SOLUTION INTRAVENOUS at 10:46

## 2022-11-30 RX ADMIN — PANTOPRAZOLE SODIUM 40 MG: 40 INJECTION, POWDER, FOR SOLUTION INTRAVENOUS at 08:46

## 2022-11-30 RX ADMIN — TAMSULOSIN HYDROCHLORIDE 0.4 MG: 0.4 CAPSULE ORAL at 08:47

## 2022-11-30 RX ADMIN — PANTOPRAZOLE SODIUM 40 MG: 40 INJECTION, POWDER, FOR SOLUTION INTRAVENOUS at 22:09

## 2022-11-30 RX ADMIN — SODIUM CHLORIDE, PRESERVATIVE FREE 10 ML: 5 INJECTION INTRAVENOUS at 22:10

## 2022-11-30 RX ADMIN — CYANOCOBALAMIN TAB 1000 MCG 1000 MCG: 1000 TAB at 08:47

## 2022-11-30 RX ADMIN — SODIUM CHLORIDE, PRESERVATIVE FREE 10 ML: 5 INJECTION INTRAVENOUS at 08:54

## 2022-11-30 RX ADMIN — FERROUS SULFATE TAB EC 324 MG (65 MG FE EQUIVALENT) 325 MG: 324 (65 FE) TABLET DELAYED RESPONSE at 08:47

## 2022-11-30 RX ADMIN — CEFEPIME 2000 MG: 2 INJECTION, POWDER, FOR SOLUTION INTRAVENOUS at 01:27

## 2022-11-30 RX ADMIN — MIRTAZAPINE 7.5 MG: 15 TABLET, FILM COATED ORAL at 22:09

## 2022-11-30 RX ADMIN — CEFEPIME 2000 MG: 2 INJECTION, POWDER, FOR SOLUTION INTRAVENOUS at 08:47

## 2022-11-30 NOTE — PROGRESS NOTES
Hospitalist Progress Note      PCP: No primary care provider on file. Date of Admission: 11/27/2022        Subjective: No new complaints, denies chest pain or shortness of breath no family member at bedside. Denies any muscle cramps or tenderness. Medications:  Reviewed    Infusion Medications    sodium chloride      sodium chloride       Scheduled Medications    pantoprazole  40 mg IntraVENous BID    cyanocobalamin  1,000 mcg Oral Daily    ferrous sulfate  325 mg Oral Daily with breakfast    FLUoxetine  10 mg Oral Daily    mirtazapine  7.5 mg Oral Nightly    risperiDONE  1 mg Oral QAM    risperiDONE  2 mg Oral Nightly    tamsulosin  0.4 mg Oral Daily    sodium chloride flush  5-40 mL IntraVENous 2 times per day    [Held by provider] enoxaparin  30 mg SubCUTAneous BID    thiamine mononitrate  100 mg Oral Daily    cefepime  2,000 mg IntraVENous Q8H     PRN Meds: sodium chloride flush, sodium chloride, ondansetron **OR** ondansetron, polyethylene glycol      Intake/Output Summary (Last 24 hours) at 11/30/2022 1017  Last data filed at 11/30/2022 0944  Gross per 24 hour   Intake 700 ml   Output 1100 ml   Net -400 ml       Physical Exam Performed:    /76   Pulse 77   Temp 98 °F (36.7 °C)   Resp 20   Ht 5' 11\" (1.803 m)   Wt 228 lb 2.8 oz (103.5 kg)   SpO2 96%   BMI 31.82 kg/m²     General appearance: No apparent distress  Neck: Supple  Respiratory:  Normal respiratory effort. Clear to auscultation, bilaterally without Rales/Wheezes/Rhonchi. Cardiovascular: Regular rate and rhythm with normal S1/S2 without murmurs, rubs or gallops. Abdomen: Soft, non-tender, non-distended   Musculoskeletal: No clubbing, cyanosis   Skin: Skin color, texture, turgor normal.  No rashes or lesions.   Neurologic: Moving his extremities  Psychiatric: Alert   Capillary Refill: Brisk, 3 seconds, normal   Peripheral Pulses: +2 palpable, equal bilaterally       Labs:   Recent Labs     11/27/22  1905 11/28/22  1406 11/29/22  0509 11/29/22  1415 11/30/22  0535   WBC 18.0*  --  12.3*  --  11.1*   HGB 12.6*   < > 11.7* 11.5* 11.3*   HCT 39.1*   < > 35.9* 36.0* 35.0*     --  229  --  245    < > = values in this interval not displayed. Recent Labs     11/27/22  1905 11/29/22  0509    140   K 4.3 4.4    102   CO2 25 26   BUN 25* 22*   CREATININE 0.8 0.7*   CALCIUM 9.6 9.0     Recent Labs     11/27/22  1905 11/29/22  0509   * 108*   ALT 58* 84*   BILITOT 0.4 0.5   ALKPHOS 101 114     No results for input(s): INR in the last 72 hours. Recent Labs     11/29/22  0509   CKTOTAL 1,960*       Urinalysis:      Lab Results   Component Value Date/Time    NITRU Negative 11/27/2022 07:18 PM    WBCUA 1 11/27/2022 07:18 PM    BACTERIA None Seen 11/27/2022 07:18 PM    RBCUA 1 11/27/2022 07:18 PM    BLOODU Negative 11/27/2022 07:18 PM    SPECGRAV 1.039 11/27/2022 07:18 PM    GLUCOSEU Negative 11/27/2022 07:18 PM       Radiology:  US ABDOMEN COMPLETE   Final Result   1. Nonvisualization of the pancreas and abdominal aorta due to bowel gas. 2. Top-normal liver size. No focal lesions in the liver. 3. Normal gallbladder, common bile duct, spleen and kidneys. 4. Trace ascites right upper quadrant. CT ABDOMEN PELVIS W IV CONTRAST Additional Contrast? None   Final Result   1. Limited exam due to patient motion with no acute findings identified   within the abdomen or pelvis. 2. Trace left pleural effusion. XR CHEST PORTABLE   Final Result   Stable appearance of the chest without acute cardiopulmonary process   identified.              PHARMACY TO DOSE VANCOMYCIN  IP CONSULT TO HOSPITALIST  IP CONSULT TO INFECTIOUS DISEASES  IP CONSULT TO GI    Assessment/Plan:    Active Hospital Problems    Diagnosis     Fever and chills [R50.9]      Priority: Medium    Neutrophilia [D72.9]      Priority: Medium    CRP elevated [R79.82]      Priority: Medium    Disorientation [R41.0]      Priority: Medium    LFT elevation [R79.89]      Priority: Medium    Septicemia ([facility] [address] [A41.9]      Priority: Medium    Sepsis ([facility] [address] [A41.9]      Priority: Medium     Sepsis of unknown origin, suspecting urine, with urine infection on admission, patient refused to give urine admission patient started on cefepime and vancomycin, ID consulted currently patient continues to be on cefepime. Nausea and vomiting was dark content, GI consulted on admission patient was started on pantoprazole drip, changed to scheduled twice daily per GI. Anemia, appears iron deficiency anemia, hemoglobin dropped minimally since admission could be dilutional, added p.o. iron  Generalized weakness, PT OT  Acute likely on chronic metabolic encephalopathy likely due to infection, monitor closely near baseline  History of antisocial personality disorder  Elevated CK, possibly rhabdomyolysis, nontraumatic, IV fluid we will repeat      Diet: ADULT DIET;  Regular  Code Status: Full Code      Saul Lepe MD

## 2022-11-30 NOTE — PROGRESS NOTES
mouth every morning      risperiDONE (RISPERDAL) 1 MG tablet Take 2 mg by mouth nightly         Allergies:  Codeine, Thorazine [chlorpromazine], and Tylenol [acetaminophen]    Immunizations :   Immunization History   Administered Date(s) Administered    COVID-19, J&J, (age 18y+), IM, 0.5 mL 05/13/2021         Social History:    Social History     Tobacco Use    Smoking status: Unknown    Smokeless tobacco: Never    Tobacco comments:     Quit since in ECF    Vaping Use    Vaping Use: Unknown   Substance Use Topics    Alcohol use: Not Currently     Comment: unknown    Drug use: Not Currently     Types: Cocaine     Comment: unknown     Social History     Tobacco Use   Smoking Status Unknown   Smokeless Tobacco Never   Tobacco Comments    Quit since in ECF       Family History : not available       REVIEW OF SYSTEMS:     Not possible due to change in mentation   PHYSICAL EXAM:      Vitals:    /76   Pulse 77   Temp 98 °F (36.7 °C)   Resp 20   Ht 5' 11\" (1.803 m)   Wt 228 lb 2.8 oz (103.5 kg)   SpO2 96%   BMI 31.82 kg/m²     General Appearance: awake not following commands and has Dyskinesia++ acute distress, no pallor, no icterus   Skin: warm and dry, no rash or erythema  Head: normocephalic and atraumatic  Eyes: pupils equal, round, and reactive to light, conjunctivae normal  ENT: tympanic membrane, external ear and ear canal normal bilaterally, nose without deformity, nasal mucosa and turbinates normal without polyps  Neck: supple and non-tender without mass, no thyromegaly  no cervical lymphadenopathy  Pulmonary/Chest: clear to auscultation bilaterally- no wheezes, rales or rhonchi, normal air movement, no respiratory distress  Cardiovascular: normal rate, regular rhythm, normal S1 and S2, no murmurs, rubs, clicks, or gallops, no carotid bruits  Abdomen: soft, non-tender, non-distended, normal bowel sounds, no masses or organomegaly  Extremities: no cyanosis, clubbing or edema  Musculoskeletal: normal range of motion, no joint swelling, deformity or tenderness  Integumentary: No rashes, no abnormal skin lesions, no petechiae  Neurologic: reflexes normal and symmetric, no cranial nerve deficit Dyskinesia+ lynda praxial ++     Lines: IV    DATA:    CBC:   Lab Results   Component Value Date    WBC 11.1 (H) 11/30/2022    HGB 11.3 (L) 11/30/2022    HCT 35.0 (L) 11/30/2022    MCV 85.4 11/30/2022     11/30/2022     RENAL:   Lab Results   Component Value Date    CREATININE 0.7 (L) 11/29/2022    BUN 22 (H) 11/29/2022     11/29/2022    K 4.4 11/29/2022     11/29/2022    CO2 26 11/29/2022     SED RATE: No results found for: SEDRATE  CK:   Lab Results   Component Value Date/Time    CKTOTAL 770 11/30/2022 05:35 AM     CRP:   Lab Results   Component Value Date/Time    .6 11/29/2022 05:09 AM     Hepatic Function Panel:   Lab Results   Component Value Date/Time    ALKPHOS 114 11/29/2022 05:09 AM    ALT 84 11/29/2022 05:09 AM     11/29/2022 05:09 AM    PROT 6.1 11/29/2022 05:09 AM    PROT 7.1 11/01/2011 07:50 AM    BILITOT 0.5 11/29/2022 05:09 AM    BILIDIR 0.0 11/01/2011 07:50 AM    LABALBU 3.2 11/29/2022 05:09 AM     UA:  Lab Results   Component Value Date/Time    COLORU Yellow 11/27/2022 07:18 PM    CLARITYU Clear 11/27/2022 07:18 PM    GLUCOSEU Negative 11/27/2022 07:18 PM    BILIRUBINUR Negative 11/27/2022 07:18 PM    KETUA Negative 11/27/2022 07:18 PM    SPECGRAV 1.039 11/27/2022 07:18 PM    BLOODU Negative 11/27/2022 07:18 PM    PHUR 5.0 11/27/2022 07:18 PM    PROTEINU TRACE 11/27/2022 07:18 PM    UROBILINOGEN 1.0 11/27/2022 07:18 PM    NITRU Negative 11/27/2022 07:18 PM    LEUKOCYTESUR Negative 11/27/2022 07:18 PM    LABMICR YES 11/27/2022 07:18 PM    Kenyon Salamanca NotGiven 11/27/2022 07:18 PM      Urine Microscopic:   Lab Results   Component Value Date/Time    BACTERIA None Seen 11/27/2022 07:18 PM    COMU see below 07/15/2022 12:00 PM    HYALCAST 1 11/27/2022 07:18 PM    WBCUA 1 11/27/2022 07:18 PM    RBCUA 1 11/27/2022 07:18 PM    EPIU 0 11/27/2022 07:18 PM     Urine Reflex to Culture:   Lab Results   Component Value Date/Time    URRFLXCULT Not Indicated 11/27/2022 07:18 PM         MICRO: cultures reviewed and updated by me        Micro results (current encounter only)    Procedure Component Value Units Date/Time   Culture, Blood 2 [[id number]] Collected: 11/28/22 1037   Order Status: Sent Specimen: Blood Updated: 11/28/22 1048   Culture, Urine [[id number]]    Order Status: Sent Specimen: Urine, clean catch    COVID-19, Rapid [[id number]] Collected: 11/27/22 1919   Order Status: Completed Specimen: Nasopharyngeal Swab Updated: 11/27/22 2003    SARS-CoV-2, NAAT Not Detected    Comment: Rapid NAAT:   Negative results should be treated as presumptive and,   if inconsistent with clinical signs and symptoms or necessary for   patient management, should be tested with an alternative molecular   assay. Negative results do not preclude SARS-CoV-2 infection and   should not be used as the sole basis for patient management decisions. This test has been authorized by the FDA under an Emergency Use   Authorization (EUA) for use by authorized laboratories. Fact sheet for Healthcare Providers:   http://www.yahir.lucretia/   Fact sheet for Patients: http://www.negin-jose.lucretia/     METHODOLOGY: Isothermal Nucleic Acid Amplification       Rapid influenza A/B antigens [[id number]] Collected: 11/27/22 1919   Order Status: Completed Specimen: Nasopharyngeal Updated: 11/27/22 1949    Rapid Influenza A Ag Negative    Rapid Influenza B Ag Negative   Culture, Blood 1 [[id number]] Collected: 11/27/22 1905   Order Status: Sent Specimen: Blood Updated       Blood Culture:   Lab Results   Component Value Date/Time    [facility]  11/27/2022 07:05 PM     No Growth to date. Any change in status will be called. [address] [place] [address]  11/28/2022 10:37 AM     No Growth to date.   Any change in status will be called. Viral Culture:    Lab Results   Component Value Date/Time    COVID19 Not Detected 11/27/2022 07:19 PM     Urine Culture: No results for input(s): Naomi Feliz in the last 72 hours. Scheduled Meds:   pantoprazole  40 mg IntraVENous BID    cyanocobalamin  1,000 mcg Oral Daily    ferrous sulfate  325 mg Oral Daily with breakfast    FLUoxetine  10 mg Oral Daily    mirtazapine  7.5 mg Oral Nightly    risperiDONE  1 mg Oral QAM    risperiDONE  2 mg Oral Nightly    tamsulosin  0.4 mg Oral Daily    sodium chloride flush  5-40 mL IntraVENous 2 times per day    [Held by provider] enoxaparin  30 mg SubCUTAneous BID    thiamine mononitrate  100 mg Oral Daily    cefepime  2,000 mg IntraVENous Q8H       Continuous Infusions:   sodium chloride 100 mL/hr at 11/30/22 1046    sodium chloride         PRN Meds:  sodium chloride flush, sodium chloride, ondansetron **OR** ondansetron, polyethylene glycol    Imaging:   US ABDOMEN COMPLETE   Final Result   1. Nonvisualization of the pancreas and abdominal aorta due to bowel gas. 2. Top-normal liver size. No focal lesions in the liver. 3. Normal gallbladder, common bile duct, spleen and kidneys. 4. Trace ascites right upper quadrant. CT ABDOMEN PELVIS W IV CONTRAST Additional Contrast? None   Final Result   1. Limited exam due to patient motion with no acute findings identified   within the abdomen or pelvis. 2. Trace left pleural effusion. XR CHEST PORTABLE   Final Result   Stable appearance of the chest without acute cardiopulmonary process   identified. All pertinent images and reports for the current Hospitalization were reviewed by me.     IMPRESSION:    Patient Active Problem List   Diagnosis    Acute metabolic encephalopathy    Hypernatremia    E. coli UTI    Sepsis ([facility] [address]    Fever and chills    Neutrophilia    CRP elevated    Disorientation    LFT elevation    Septicemia (HCC)     Sepsis   Source not clear  WBC elevation   Fevers on admit  Change in mentation  CT abd/pelvis -ve  CXR  -VE  UA  -ve  LFT elevation   CRP elevation     Wbc NOW trending down on IV abx and Fever subsiding and he has Dyskinesia from his Psychiatric medications no history from patient - will be able to complete IV abx course soon      USG of the LIver noted and no obstruction and CRP still elevated and will have to cont IV abx pending clinical improvement     AST/ALT elevation from the Muscle damage than from the Liver       Labs, Microbiology, Radiology and pertinent results from current hospitalization and care every where were reviewed by me as a part of the consultation. PLAN :  Cont IV  Cefepime x 2 gm q 12  hrs for now  If he does well and WBC normal can d/c abx   Do not anticipate abx at d/c if he improves   bLOOD cx in process NGTD  Check CK 1960 concern if the AST/ALT from the muscle check Gamma GT NORMAL   USG of the liver  -VE       Discussed with patient/Family and Nursing   Risk of Complications/Morbidity: High      Illness(es)/ Infection present that pose threat to bodily function. There is potential for severe exacerbation of infection/side effects of treatment. Therapy requires intensive monitoring for antimicrobial agent toxicity. Thanks for allowing me to participate in your patient's care please call me with any questions or concerns.     Dr. Memo Cervantes MD  [facility] Physician  Phone: [phone]   Fax : [phone]

## 2022-11-30 NOTE — ACP (ADVANCE CARE PLANNING)
Advance Care Planning     Advance Care Planning Activator (Inpatient)  Conversation Note      Date of ACP Conversation: 11/30/2022     Conversation Conducted with:     ACP Activator: Malika Escobar RN    Health Care Decision Maker:     Current Designated Health Care Decision Maker: No One    Care Preferences    Ventilation: \"If you were in your present state of health and suddenly became very ill and were unable to breathe on your own, what would your preference be about the use of a ventilator (breathing machine) if it were available to you? \"      Would the patient desire the use of ventilator (breathing machine)?: Pt. Unable to answer    \"If your health worsens and it becomes clear that your chance of recovery is unlikely, what would your preference be about the use of a ventilator (breathing machine) if it were available to you? \"     Would the patient desire the use of ventilator (breathing machine)?: Pt. Unable to answer      Resuscitation  \"CPR works best to restart the heart when there is a sudden event, like a heart attack, in someone who is otherwise healthy. Unfortunately, CPR does not typically restart the heart for people who have serious health conditions or who are very sick. \"    \"In the event your heart stopped as a result of an underlying serious health condition, would you want attempts to be made to restart your heart (answer \"yes\" for attempt to resuscitate) or would you prefer a natural death (answer \"no\" for do not attempt to resuscitate)? \"   Pt. Unable to answer     [] Yes   [x] No   Educated Patient / Sharon Grimm regarding differences between Advance Directives and portable DNR orders.     Length of ACP Conversation in minutes:  5    Conversation Outcomes:  [] ACP discussion completed  [] Existing advance directive reviewed with patient; no changes to patient's previously recorded wishes  [] New Advance Directive completed  [] Portable Do Not Rescitate prepared for Provider review and signature  [] POLST/POST/MOLST/MOST prepared for Provider review and signature      Follow-up plan:    [] Schedule follow-up conversation to continue planning  [] Referred individual to Provider for additional questions/concerns   [] Advised patient/agent/surrogate to review completed ACP document and update if needed with changes in condition, patient preferences or care setting    [] This note routed to one or more involved healthcare providers    No decision maker. Patient is unable to answer questions.

## 2022-11-30 NOTE — PROGRESS NOTES
INPATIENT PROGRESS NOTE        IDENTIFYING DATA/REASON FOR CONSULTATION   PATIENT:  David Hughes  MRN:  [id number]  ADMIT DATE: 2022  TIME OF EVALUATION: 2022 9:19 AM  HOSPITAL STAY:   LOS: 2 days   CONSULTING PHYSICIAN: Luis Enrique Dalton MD   REASON FOR CONSULTATION: black vomit    Subjective:    Patient seen in follow up   Resting in bed no acute distress  No further nausea or vomiting  No fevers overnight     MEDICATIONS   SCHEDULED:  pantoprazole, 40 mg, BID  cyanocobalamin, 1,000 mcg, Daily  ferrous sulfate, 325 mg, Daily with breakfast  FLUoxetine, 10 mg, Daily  mirtazapine, 7.5 mg, Nightly  risperiDONE, 1 mg, QAM  risperiDONE, 2 mg, Nightly  tamsulosin, 0.4 mg, Daily  sodium chloride flush, 5-40 mL, 2 times per day  [Held by provider] enoxaparin, 30 mg, BID  thiamine mononitrate, 100 mg, Daily  cefepime, 2,000 mg, Q8H    FLUIDS/DRIPS:     sodium chloride       PRNs: sodium chloride flush, 5-40 mL, PRN  sodium chloride, , PRN  ondansetron, 4 mg, Q8H PRN   Or  ondansetron, 4 mg, Q6H PRN  polyethylene glycol, 17 g, Daily PRN    ALLERGIES:    Allergies   Allergen Reactions    Codeine     Thorazine [Chlorpromazine]     Tylenol [Acetaminophen]          PHYSICAL EXAM   VITALS:  /76   Pulse 77   Temp 98 °F (36.7 °C)   Resp 20   Ht 5' 11\" (1.803 m)   Wt 228 lb 2.8 oz (103.5 kg)   SpO2 96%   BMI 31.82 kg/m²   TEMPERATURE:  Current - Temp: 98 °F (36.7 °C); Max - Temp  Av.6 °F (37 °C)  Min: 97.7 °F (36.5 °C)  Max: 100.3 °F (37.9 °C)    Physical Exam:  General appearance: dishevel, NAD  Eyes: Anicteric  Head: Normocephalic, without obvious abnormality  Lungs: clear to auscultation bilaterally, Normal Effort  Heart: regular rate and rhythm, normal S1 and S2, no murmurs or rubs  Abdomen: soft, non-distended, non-tender.  Bowel sounds normal.   Extremities: atraumatic, no cyanosis or edema  Skin: warm and dry, no jaundice  Neuro: Grossly intact, Alert, confused    LABS AND IMAGING   Laboratory Recent Labs     11/27/22  1905 11/28/22  1406 11/29/22  0509 11/29/22  1415 11/30/22  0535   WBC 18.0*  --  12.3*  --  11.1*   HGB 12.6*   < > 11.7* 11.5* 11.3*   HCT 39.1*   < > 35.9* 36.0* 35.0*   MCV 84.9  --  85.5  --  85.4     --  229  --  245    < > = values in this interval not displayed. Recent Labs     11/27/22  1905 11/29/22  0509    140   K 4.3 4.4    102   CO2 25 26   BUN 25* 22*   CREATININE 0.8 0.7*     Recent Labs     11/27/22 1905 11/29/22  0509   * 108*   ALT 58* 84*   BILITOT 0.4 0.5   ALKPHOS 101 114     No results for input(s): LIPASE, AMYLASE in the last 72 hours. No results for input(s): PROTIME, INR in the last 72 hours. Imaging  US ABDOMEN COMPLETE   Final Result   1. Nonvisualization of the pancreas and abdominal aorta due to bowel gas. 2. Top-normal liver size. No focal lesions in the liver. 3. Normal gallbladder, common bile duct, spleen and kidneys. 4. Trace ascites right upper quadrant. CT ABDOMEN PELVIS W IV CONTRAST Additional Contrast? None   Final Result   1. Limited exam due to patient motion with no acute findings identified   within the abdomen or pelvis. 2. Trace left pleural effusion. XR CHEST PORTABLE   Final Result   Stable appearance of the chest without acute cardiopulmonary process   identified. Endoscopy      ASSESSMENT AND RECOMMENDATIONS   Karissa Lee is a 57 y.o. male with PMH of of Schizoaffective disorder, substance abuse who presented on 11/27/2022 with weakness, nausea, vomiting    Sepsis with leukocytosis, elevated CRP, tachycardia. Source unclear. UA neg. CXR unremarkable. CT with no acute findings. US unremarkable. ID following. Started on empiric antibiotics. Nausea, vomiting with dark emesis. Prior history of duodenal ulcer in May 2021.  ? NSAID vs H. pylori induced. CT with no acute finding.   LFTs mildly elevated which I suspect is chronic and related to fatty liver/alcohol use (has been elevated at least since July). RUQ US unremarkable. His hgb is stable and no further vomiting episodes since admission. Will continue to treat with PPI BID and supportive care   Elevated LFTs. ? Fatty liver vs Etoh related. Stable  Alcohol abuse  Schizoaffective disorder    RECOMMENDATIONS:    PPI BID  Antiemetics as needed  Antibiotics per ID  Diet advancement as tolerated  Encourage ETOH cessation  Will sign off. Please call with questions    If you have any questions or need any further information, please feel free to contact us [phone]. Thank you for allowing us to participate in the care of Ellen Ventura. The note was completed using Dragon voice recognition transcription. Every effort was made to ensure accuracy; however, inadvertent transcription errors may be present despite my best efforts to edit errors. Richard WHEAT    Attending physician addendum:      I have personally seen and examined the patient, reviewed the patient's medical record and pertinent labs and clinical imaging. I have personally staffed the case with JARRET Francisco. I agree with her consultation note, exam findings, assessment and plans  as written above. I have made appropriate modifications and edited her assessment and plan where needed to reflect my impression and plans for this patient. No further CGE or melena reported    BP (!) 144/78   Pulse 78   Temp 98.7 °F (37.1 °C) (Axillary)   Resp 19   Ht 5' 11\" (1.803 m)   Wt 228 lb 2.8 oz (103.5 kg)   SpO2 96%   BMI 31.82 kg/m²      Gen- NAD, nods and responds to questions  ENT_ poor dentition  CV- RRR  Lungs- CTAB  Abd- soft, ND, NT     Labs and CT reviewed. US reviewed. GB normal.  Trace ascites. Impression:     1) Leukocytosis - improving. ID empirically placed on Cefepime. No clear source. Cultures and imaging not showing source. Initial .2, Procalcitonin normal.  UA normal.  CXR negative. CT negative.  Covid 19 negative. Flu negativeBlood cultures negative. Trace ascites on US. 2) Nausea and vomiting with ? Coffee ground emesis- resolved. Hgb stable. Prior history of duodenal ulcer in May 2021.  ? NSAID vs H. pylori induced. Unclear if the patient clearly received any eradication of H.pylori. Patient reportedly was taking aspirin although not clear if he was actually taking this medicine. No PPI was reported in his medication list.  Possibility of gastritis versus recurrent peptic ulcer disease. Hemoglobin has remained stable     3) Elevated Liver enzymes- were elevated in 7/2022. GGT normal.  CK elevated to 1960. So may be partially related to rhabdo +/- Fatty liver vs Etoh related. 4) Schizoaffective disorder     5) Anemia of chronic disease- ferritin >1000. Plan:  D/C PO iron  Follow up on H pylori stool antigen  CBC daily  Abx duration per ID  PPI PO bid   If patient with dropping Hgb or signs of active GI bleeding, could repeat EGD  Etoh cessation  Monitor trend of LFT, suspect they may have been elevated from Rhabdo and elevated CK (likely underlying fatty liver)     Will sign off. Call with ? Or active bleeding. Thank you for allowing me to participate in this patient's care. If there are any questions or concerns regarding this patient, or the plan we have set in place, please feel free to contact me at [phone].      Bevely Last, DO

## 2022-12-01 LAB
BLOOD CULTURE, ROUTINE: NORMAL
HCT VFR BLD CALC: 33.7 % (ref 40.5–52.5)
HEMOGLOBIN: 10.8 G/DL (ref 13.5–17.5)
MCH RBC QN AUTO: 27.4 PG (ref 26–34)
MCHC RBC AUTO-ENTMCNC: 32.2 G/DL (ref 31–36)
MCV RBC AUTO: 85.1 FL (ref 80–100)
PDW BLD-RTO: 15.3 % (ref 12.4–15.4)
PLATELET # BLD: 244 K/UL (ref 135–450)
PMV BLD AUTO: 9.2 FL (ref 5–10.5)
RBC # BLD: 3.95 M/UL (ref 4.2–5.9)
WBC # BLD: 11.5 K/UL (ref 4–11)

## 2022-12-01 PROCEDURE — 2580000003 HC RX 258: Performed by: INTERNAL MEDICINE

## 2022-12-01 PROCEDURE — 6370000000 HC RX 637 (ALT 250 FOR IP): Performed by: INTERNAL MEDICINE

## 2022-12-01 PROCEDURE — C9113 INJ PANTOPRAZOLE SODIUM, VIA: HCPCS | Performed by: INTERNAL MEDICINE

## 2022-12-01 PROCEDURE — 85027 COMPLETE CBC AUTOMATED: CPT

## 2022-12-01 PROCEDURE — 6360000002 HC RX W HCPCS: Performed by: INTERNAL MEDICINE

## 2022-12-01 PROCEDURE — 1200000000 HC SEMI PRIVATE

## 2022-12-01 PROCEDURE — 94760 N-INVAS EAR/PLS OXIMETRY 1: CPT

## 2022-12-01 PROCEDURE — 36415 COLL VENOUS BLD VENIPUNCTURE: CPT

## 2022-12-01 PROCEDURE — 99232 SBSQ HOSP IP/OBS MODERATE 35: CPT | Performed by: INTERNAL MEDICINE

## 2022-12-01 RX ADMIN — CEFEPIME 2000 MG: 2 INJECTION, POWDER, FOR SOLUTION INTRAVENOUS at 17:52

## 2022-12-01 RX ADMIN — FLUOXETINE HYDROCHLORIDE 10 MG: 10 TABLET ORAL at 08:29

## 2022-12-01 RX ADMIN — MIRTAZAPINE 7.5 MG: 15 TABLET, FILM COATED ORAL at 20:28

## 2022-12-01 RX ADMIN — CEFEPIME 2000 MG: 2 INJECTION, POWDER, FOR SOLUTION INTRAVENOUS at 06:22

## 2022-12-01 RX ADMIN — Medication 100 MG: at 08:29

## 2022-12-01 RX ADMIN — SODIUM CHLORIDE, PRESERVATIVE FREE 10 ML: 5 INJECTION INTRAVENOUS at 20:29

## 2022-12-01 RX ADMIN — RISPERIDONE 1 MG: 1 TABLET ORAL at 08:29

## 2022-12-01 RX ADMIN — PANTOPRAZOLE SODIUM 40 MG: 40 INJECTION, POWDER, FOR SOLUTION INTRAVENOUS at 20:28

## 2022-12-01 RX ADMIN — CYANOCOBALAMIN TAB 1000 MCG 1000 MCG: 1000 TAB at 08:29

## 2022-12-01 RX ADMIN — SODIUM CHLORIDE: 9 INJECTION, SOLUTION INTRAVENOUS at 17:51

## 2022-12-01 RX ADMIN — RISPERIDONE 2 MG: 1 TABLET ORAL at 20:28

## 2022-12-01 RX ADMIN — SODIUM CHLORIDE: 9 INJECTION, SOLUTION INTRAVENOUS at 03:26

## 2022-12-01 RX ADMIN — TAMSULOSIN HYDROCHLORIDE 0.4 MG: 0.4 CAPSULE ORAL at 08:29

## 2022-12-01 ASSESSMENT — PAIN SCALES - GENERAL: PAINLEVEL_OUTOF10: 0

## 2022-12-01 ASSESSMENT — PAIN SCALES - WONG BAKER: WONGBAKER_NUMERICALRESPONSE: 0

## 2022-12-01 NOTE — PROGRESS NOTES
Hospitalist Progress Note      PCP: No primary care provider on file. Date of Admission: 11/27/2022      Subjective: More talkative and communicative today, denies fever chills nausea vomiting no abdominal pain. Medications:  Reviewed    Infusion Medications    sodium chloride 100 mL/hr at 12/01/22 0326    sodium chloride       Scheduled Medications    cefepime  2,000 mg IntraVENous Q12H    pantoprazole  40 mg IntraVENous BID    cyanocobalamin  1,000 mcg Oral Daily    FLUoxetine  10 mg Oral Daily    mirtazapine  7.5 mg Oral Nightly    risperiDONE  1 mg Oral QAM    risperiDONE  2 mg Oral Nightly    tamsulosin  0.4 mg Oral Daily    sodium chloride flush  5-40 mL IntraVENous 2 times per day    [Held by provider] enoxaparin  30 mg SubCUTAneous BID    thiamine mononitrate  100 mg Oral Daily     PRN Meds: sodium chloride flush, sodium chloride, ondansetron **OR** ondansetron, polyethylene glycol      Intake/Output Summary (Last 24 hours) at 12/1/2022 0749  Last data filed at 12/1/2022 8576  Gross per 24 hour   Intake 3025.13 ml   Output 1600 ml   Net 1425.13 ml       Physical Exam Performed:    /82   Pulse 71   Temp 98.1 °F (36.7 °C) (Oral)   Resp 18   Ht 5' 11\" (1.803 m)   Wt 229 lb 4.5 oz (104 kg)   SpO2 94%   BMI 31.98 kg/m²     General appearance: No apparent distress  Neck: Supple  Respiratory:  Normal respiratory effort. Clear to auscultation, bilaterally without Rales/Wheezes/Rhonchi. Cardiovascular: Regular rate and rhythm with normal S1/S2 without murmurs, rubs or gallops. Abdomen: Soft, non-tender  Musculoskeletal: No clubbing, cyanosis   Skin: Skin color, texture, turgor normal.  No rashes or lesions.   Neurologic: No drifting  Psychiatric: Alert   Capillary Refill: Brisk, 3 seconds, normal   Peripheral Pulses: +2 palpable, equal bilaterally       Labs:   Recent Labs     11/29/22  0509 11/29/22  1415 11/30/22  0535 12/01/22  0509   WBC 12.3*  --  11.1* 11.5*   HGB 11.7* 11.5* 11.3* 10.8*   HCT 35.9* 36.0* 35.0* 33.7*     --  245 244     Recent Labs     11/29/22  0509      K 4.4      CO2 26   BUN 22*   CREATININE 0.7*   CALCIUM 9.0     Recent Labs     11/29/22  0509   *   ALT 84*   BILITOT 0.5   ALKPHOS 114     No results for input(s): INR in the last 72 hours. Recent Labs     11/29/22  0509 11/30/22  0535   CKTOTAL 1,960* 770*       Urinalysis:      Lab Results   Component Value Date/Time    NITRU Negative 11/27/2022 07:18 PM    WBCUA 1 11/27/2022 07:18 PM    BACTERIA None Seen 11/27/2022 07:18 PM    RBCUA 1 11/27/2022 07:18 PM    BLOODU Negative 11/27/2022 07:18 PM    SPECGRAV 1.039 11/27/2022 07:18 PM    GLUCOSEU Negative 11/27/2022 07:18 PM       Radiology:  US ABDOMEN COMPLETE   Final Result   1. Nonvisualization of the pancreas and abdominal aorta due to bowel gas. 2. Top-normal liver size. No focal lesions in the liver. 3. Normal gallbladder, common bile duct, spleen and kidneys. 4. Trace ascites right upper quadrant. CT ABDOMEN PELVIS W IV CONTRAST Additional Contrast? None   Final Result   1. Limited exam due to patient motion with no acute findings identified   within the abdomen or pelvis. 2. Trace left pleural effusion. XR CHEST PORTABLE   Final Result   Stable appearance of the chest without acute cardiopulmonary process   identified.              PHARMACY TO DOSE VANCOMYCIN  IP CONSULT TO HOSPITALIST  IP CONSULT TO INFECTIOUS DISEASES  IP CONSULT TO GI    Assessment/Plan:    Active Hospital Problems    Diagnosis     Fever and chills [R50.9]      Priority: Medium    Neutrophilia [D72.9]      Priority: Medium    CRP elevated [R79.82]      Priority: Medium    Disorientation [R41.0]      Priority: Medium    LFT elevation [R79.89]      Priority: Medium    Septicemia ([address] [A41.9]      Priority: Medium    Sepsis ([address] [A41.9]      Priority: Medium     Sepsis of unknown origin, suspecting urine, with urine infection on admission, patient refused to give urine admission patient started on cefepime and vancomycin, ID consulted currently patient continues to be on cefepime. Febrile for the last 24 hours White count still elevated but improving  Nausea and vomiting was dark content, GI consulted on admission patient was started on pantoprazole drip, changed to scheduled twice daily per GI. Anemia, per GI with chronic disease  Generalized weakness, PT OT  Acute likely on chronic metabolic encephalopathy likely due to infection, monitor closely near baseline  History of antisocial personality disorder  Elevated CK, possibly rhabdomyolysis, nontraumatic, CK improved yesterday less than 1000 we will discontinue IV fluid today      Diet: ADULT DIET;  Regular  Code Status: Full Code      Manjeet Jackson MD

## 2022-12-01 NOTE — PROGRESS NOTES
Infectious Diseases Inpatient Progress notes     CHIEF COMPLAINT:     Sepsis  Fevers  WBC elevation  CK elevation     HISTORY OF PRESENT ILLNESS:  57 y.o. man nursing home resident history of major depressive disorder, seizure affective disorder history of substance abuse in the past, antisocial personality disorder, drug-induced subacute dyskinesia, admitted to the hospital secondary to not feeling well fevers and change in mental status. Patient is unable to provide any history admission labs indicate WBC elevation T-max 100.9 blood cultures are obtained urinalysis negative, CRP elevated 143.2 ALT 58 , COVID-19 negative rapid flu negative urinalysis negative given the concern for sepsis we are consulted for recommendations. Interval :  fever trend down and WBC improving not normalized and will need to Trend CK still elevated        Past Medical History:    Past Medical History:   Diagnosis Date    Antisocial personality disorder ([facility]     Blindness right eye category 5, normal vision left eye     Drug induced subacute dyskinesia     Insomnia     Major depressive disorder, recurrent, unspecified ([facility]     Melena     Schizoaffective disorder ([facility]     Substance abuse ([facility]        Past Surgical History:    History reviewed. No pertinent surgical history.     Current Medications:    Outpatient Medications Marked as Taking for the 11/27/22 encounter [facility] Encounter)   Medication Sig Dispense Refill    tamsulosin (FLOMAX) 0.4 MG capsule Take 0.4 mg by mouth daily      cyanocobalamin 1000 MCG tablet Take 1,000 mcg by mouth daily      ferrous sulfate (IRON 325) 325 (65 Fe) MG tablet Take 325 mg by mouth daily (with breakfast)      FLUoxetine (PROZAC) 10 MG tablet Take 10 mg by mouth daily      melatonin 3 MG TABS tablet Take 3 mg by mouth nightly as needed      mirtazapine (REMERON) 7.5 MG tablet Take 7.5 mg by mouth nightly      risperiDONE (RISPERDAL) 1 MG tablet Take 1 mg by mouth every morning risperiDONE (RISPERDAL) 1 MG tablet Take 2 mg by mouth nightly         Allergies:  Codeine, Thorazine [chlorpromazine], and Tylenol [acetaminophen]    Immunizations :   Immunization History   Administered Date(s) Administered    COVID-19, J&J, (age 18y+), IM, 0.5 mL 05/13/2021         Social History:    Social History     Tobacco Use    Smoking status: Unknown    Smokeless tobacco: Never    Tobacco comments:     Quit since in [facility]    Vaping Use    Vaping Use: Unknown   Substance Use Topics    Alcohol use: Not Currently     Comment: unknown    Drug use: Not Currently     Types: Cocaine     Comment: unknown     Social History     Tobacco Use   Smoking Status Unknown   Smokeless Tobacco Never   Tobacco Comments    Quit since in F       Family History : not available       REVIEW OF SYSTEMS:     Not possible due to change in mentation   PHYSICAL EXAM:      Vitals:    /82   Pulse 71   Temp 98.1 °F (36.7 °C) (Oral)   Resp 18   Ht 5' 11\" (1.803 m)   Wt 229 lb 4.5 oz (104 kg)   SpO2 94%   BMI 31.98 kg/m²     General Appearance: awake not following commands and has Dyskinesia++ acute distress, no pallor, no icterus   Skin: warm and dry, no rash or erythema  Head: normocephalic and atraumatic  Eyes: pupils equal, round, and reactive to light, conjunctivae normal  ENT: tympanic membrane, external ear and ear canal normal bilaterally, nose without deformity, nasal mucosa and turbinates normal without polyps  Neck: supple and non-tender without mass, no thyromegaly  no cervical lymphadenopathy  Pulmonary/Chest: clear to auscultation bilaterally- no wheezes, rales or rhonchi, normal air movement, no respiratory distress  Cardiovascular: normal rate, regular rhythm, normal S1 and S2, no murmurs, rubs, clicks, or gallops, no carotid bruits  Abdomen: soft, non-tender, non-distended, normal bowel sounds, no masses or organomegaly  Extremities: no cyanosis, clubbing or edema  Musculoskeletal: normal range of motion, no joint swelling, deformity or tenderness  Integumentary: No rashes, no abnormal skin lesions, no petechiae  Neurologic: reflexes normal and symmetric, no cranial nerve deficit Dyskinesia+ lynda praxial ++     Lines: IV    DATA:    CBC:   Lab Results   Component Value Date    WBC 11.5 (H) 12/01/2022    HGB 10.8 (L) 12/01/2022    HCT 33.7 (L) 12/01/2022    MCV 85.1 12/01/2022     12/01/2022     RENAL:   Lab Results   Component Value Date    CREATININE 0.7 (L) 11/29/2022    BUN 22 (H) 11/29/2022     11/29/2022    K 4.4 11/29/2022     11/29/2022    CO2 26 11/29/2022     SED RATE: No results found for: SEDRATE  CK:   Lab Results   Component Value Date/Time    CKTOTAL 770 11/30/2022 05:35 AM     CRP:   Lab Results   Component Value Date/Time    .6 11/29/2022 05:09 AM     Hepatic Function Panel:   Lab Results   Component Value Date/Time    ALKPHOS 114 11/29/2022 05:09 AM    ALT 84 11/29/2022 05:09 AM     11/29/2022 05:09 AM    PROT 6.1 11/29/2022 05:09 AM    PROT 7.1 11/01/2011 07:50 AM    BILITOT 0.5 11/29/2022 05:09 AM    BILIDIR 0.0 11/01/2011 07:50 AM    LABALBU 3.2 11/29/2022 05:09 AM     UA:  Lab Results   Component Value Date/Time    COLORU Yellow 11/27/2022 07:18 PM    CLARITYU Clear 11/27/2022 07:18 PM    GLUCOSEU Negative 11/27/2022 07:18 PM    BILIRUBINUR Negative 11/27/2022 07:18 PM    KETUA Negative 11/27/2022 07:18 PM    SPECGRAV 1.039 11/27/2022 07:18 PM    BLOODU Negative 11/27/2022 07:18 PM    PHUR 5.0 11/27/2022 07:18 PM    PROTEINU TRACE 11/27/2022 07:18 PM    UROBILINOGEN 1.0 11/27/2022 07:18 PM    NITRU Negative 11/27/2022 07:18 PM    LEUKOCYTESUR Negative 11/27/2022 07:18 PM    LABMICR YES 11/27/2022 07:18 PM    URINETYPE NotGiven 11/27/2022 07:18 PM      Urine Microscopic:   Lab Results   Component Value Date/Time    BACTERIA None Seen 11/27/2022 07:18 PM    COMU see below 07/15/2022 12:00 PM    HYALCAST 1 11/27/2022 07:18 PM    WBCUA 1 11/27/2022 07:18 PM    RBCUA 1 11/27/2022 07:18 PM    EPIU 0 11/27/2022 07:18 PM     Urine Reflex to Culture:   Lab Results   Component Value Date/Time    URRFLXCULT Not Indicated 11/27/2022 07:18 PM         MICRO: cultures reviewed and updated by me        Micro results (current encounter only)    Procedure Component Value Units Date/Time   Culture, Blood 2 [[id number]] Collected: 11/28/22 1037   Order Status: Sent Specimen: Blood Updated: 11/28/22 1048   Culture, Urine [[id number]]    Order Status: Sent Specimen: Urine, clean catch    COVID-19, Rapid [[id number]] Collected: 11/27/22 1919   Order Status: Completed Specimen: Nasopharyngeal Swab Updated: 11/27/22 2003    SARS-CoV-2, NAAT Not Detected    Comment: Rapid NAAT:   Negative results should be treated as presumptive and,   if inconsistent with clinical signs and symptoms or necessary for   patient management, should be tested with an alternative molecular   assay. Negative results do not preclude SARS-CoV-2 infection and   should not be used as the sole basis for patient management decisions. This test has been authorized by the FDA under an Emergency Use   Authorization (EUA) for use by authorized laboratories. Fact sheet for Healthcare Providers:   http://www.negin-jose.lucretia/   Fact sheet for Patients: http://www.negin-jose.biankita/     METHODOLOGY: Isothermal Nucleic Acid Amplification       Rapid influenza A/B antigens [[id number]] Collected: 11/27/22 1919   Order Status: Completed Specimen: Nasopharyngeal Updated: 11/27/22 1949    Rapid Influenza A Ag Negative    Rapid Influenza B Ag Negative   Culture, Blood 1 [[id number]] Collected: 11/27/22 1905   Order Status: Sent Specimen: Blood Updated       Blood Culture:   Lab Results   Component Value Date/Time    [facility]  11/27/2022 07:05 PM     No Growth to date. Any change in status will be called. [facility] [employer] [place]  11/28/2022 10:37 AM     No Growth to date. Any change in status will be called.        Viral on IV abx and Fever subsiding and he has Dyskinesia from his Psychiatric medications no history from patient - will be able to complete IV abx course soon      USG of the LIver noted and no obstruction and CRP still elevated and will have to cont IV abx pending clinical improvement     AST/ALT elevation from the Muscle damage than from the Liver     If more fevers will send Resp virus panel as some times Rapid flu can be negative        Labs, Microbiology, Radiology and pertinent results from current hospitalization and care every where were reviewed by me as a part of the consultation. PLAN :  Cont IV  Cefepime x 2 gm q 12  hrs for now  If he does well and WBC normal can d/c abx   Do not anticipate abx at d/c if he improves   bLOOD cx in process NGTD  Check CK 1960 concern if the AST/ALT from the muscle check Gamma GT NORMAL   USG of the liver  -VE   If more fevers send resp virus panel as Rapid flu -ve but PCR can be positive        Discussed with patient/Family and Nursing   Risk of Complications/Morbidity: High      Illness(es)/ Infection present that pose threat to bodily function. There is potential for severe exacerbation of infection/side effects of treatment. Therapy requires intensive monitoring for antimicrobial agent toxicity. Thanks for allowing me to participate in your patient's care please call me with any questions or concerns.     Dr. Rojelio Skiff MD  [facility] Physician  Phone: [phone]   Fax : [phone]

## 2022-12-01 NOTE — PLAN OF CARE
Problem: Discharge Planning  Goal: Discharge to home or other facility with appropriate resources  Outcome: Progressing  Flowsheets (Taken 12/1/2022 0900)  Discharge to home or other facility with appropriate resources: Identify barriers to discharge with patient and caregiver     Problem: Pain  Goal: Verbalizes/displays adequate comfort level or baseline comfort level  Outcome: Progressing     Problem: Skin/Tissue Integrity  Goal: Absence of new skin breakdown  Description: 1. Monitor for areas of redness and/or skin breakdown  2. Assess vascular access sites hourly  3. Every 4-6 hours minimum:  Change oxygen saturation probe site  4. Every 4-6 hours:  If on nasal continuous positive airway pressure, respiratory therapy assess nares and determine need for appliance change or resting period.   Outcome: Progressing     Problem: Safety - Adult  Goal: Free from fall injury  Outcome: Progressing     Problem: ABCDS Injury Assessment  Goal: Absence of physical injury  Outcome: Progressing

## 2022-12-02 LAB
CULTURE, BLOOD 2: NORMAL
GLUCOSE BLD-MCNC: 109 MG/DL (ref 70–99)
HCT VFR BLD CALC: 35.6 % (ref 40.5–52.5)
HEMOGLOBIN: 11.3 G/DL (ref 13.5–17.5)
MCH RBC QN AUTO: 27.4 PG (ref 26–34)
MCHC RBC AUTO-ENTMCNC: 31.7 G/DL (ref 31–36)
MCV RBC AUTO: 86.4 FL (ref 80–100)
ORGANISM: ABNORMAL
PDW BLD-RTO: 15.4 % (ref 12.4–15.4)
PERFORMED ON: ABNORMAL
PLATELET # BLD: 281 K/UL (ref 135–450)
PMV BLD AUTO: 9.1 FL (ref 5–10.5)
RBC # BLD: 4.12 M/UL (ref 4.2–5.9)
REPORT: NORMAL
RESPIRATORY PANEL PCR: ABNORMAL
WBC # BLD: 11.7 K/UL (ref 4–11)

## 2022-12-02 PROCEDURE — 6360000002 HC RX W HCPCS: Performed by: INTERNAL MEDICINE

## 2022-12-02 PROCEDURE — 85027 COMPLETE CBC AUTOMATED: CPT

## 2022-12-02 PROCEDURE — 0202U NFCT DS 22 TRGT SARS-COV-2: CPT

## 2022-12-02 PROCEDURE — 36415 COLL VENOUS BLD VENIPUNCTURE: CPT

## 2022-12-02 PROCEDURE — C9113 INJ PANTOPRAZOLE SODIUM, VIA: HCPCS | Performed by: INTERNAL MEDICINE

## 2022-12-02 PROCEDURE — 6370000000 HC RX 637 (ALT 250 FOR IP): Performed by: INTERNAL MEDICINE

## 2022-12-02 PROCEDURE — 2580000003 HC RX 258: Performed by: INTERNAL MEDICINE

## 2022-12-02 PROCEDURE — 99232 SBSQ HOSP IP/OBS MODERATE 35: CPT | Performed by: INTERNAL MEDICINE

## 2022-12-02 PROCEDURE — 1200000000 HC SEMI PRIVATE

## 2022-12-02 PROCEDURE — 87040 BLOOD CULTURE FOR BACTERIA: CPT

## 2022-12-02 PROCEDURE — 94760 N-INVAS EAR/PLS OXIMETRY 1: CPT

## 2022-12-02 RX ADMIN — SODIUM CHLORIDE, PRESERVATIVE FREE 10 ML: 5 INJECTION INTRAVENOUS at 10:51

## 2022-12-02 RX ADMIN — MIRTAZAPINE 7.5 MG: 15 TABLET, FILM COATED ORAL at 22:28

## 2022-12-02 RX ADMIN — RISPERIDONE 1 MG: 1 TABLET ORAL at 10:51

## 2022-12-02 RX ADMIN — SODIUM CHLORIDE, PRESERVATIVE FREE 10 ML: 5 INJECTION INTRAVENOUS at 22:29

## 2022-12-02 RX ADMIN — CEFEPIME 2000 MG: 2 INJECTION, POWDER, FOR SOLUTION INTRAVENOUS at 05:39

## 2022-12-02 RX ADMIN — Medication 100 MG: at 10:51

## 2022-12-02 RX ADMIN — PANTOPRAZOLE SODIUM 40 MG: 40 INJECTION, POWDER, FOR SOLUTION INTRAVENOUS at 22:29

## 2022-12-02 RX ADMIN — RISPERIDONE 2 MG: 1 TABLET ORAL at 22:28

## 2022-12-02 RX ADMIN — CYANOCOBALAMIN TAB 1000 MCG 1000 MCG: 1000 TAB at 10:51

## 2022-12-02 RX ADMIN — TAMSULOSIN HYDROCHLORIDE 0.4 MG: 0.4 CAPSULE ORAL at 10:51

## 2022-12-02 RX ADMIN — PANTOPRAZOLE SODIUM 40 MG: 40 INJECTION, POWDER, FOR SOLUTION INTRAVENOUS at 10:50

## 2022-12-02 RX ADMIN — CEFEPIME 2000 MG: 2 INJECTION, POWDER, FOR SOLUTION INTRAVENOUS at 17:30

## 2022-12-02 RX ADMIN — FLUOXETINE HYDROCHLORIDE 10 MG: 10 TABLET ORAL at 10:51

## 2022-12-02 NOTE — PROGRESS NOTES
Infectious Diseases Inpatient Progress notes     CHIEF COMPLAINT:     Sepsis  Fevers  WBC elevation  CK elevation   RSV     HISTORY OF PRESENT ILLNESS:  57 y.o. man nursing home resident history of major depressive disorder, seizure affective disorder history of substance abuse in the past, antisocial personality disorder, drug-induced subacute dyskinesia, admitted to the hospital secondary to not feeling well fevers and change in mental status. Patient is unable to provide any history admission labs indicate WBC elevation T-max 100.9 blood cultures are obtained urinalysis negative, CRP elevated 143.2 ALT 58 , COVID-19 negative rapid flu negative urinalysis negative given the concern for sepsis we are consulted for recommendations. Interval :  fever SPIKE again and not much history from pt due to his medical status - wILL trend ck given lack of symptoms check for virus illness as influenza or rsv CAN leak to CK elevation       Past Medical History:    Past Medical History:   Diagnosis Date    Antisocial personality disorder ([facility] [address]     Blindness right eye category 5, normal vision left eye     Drug induced subacute dyskinesia     Insomnia     Major depressive disorder, recurrent, unspecified ([facility] [address]     Melena     Schizoaffective disorder ([facility] [address]     Substance abuse ([facility] [address]        Past Surgical History:    History reviewed. No pertinent surgical history.     Current Medications:    Outpatient Medications Marked as Taking for the 11/27/22 encounter [facility] Encounter)   Medication Sig Dispense Refill    tamsulosin (FLOMAX) 0.4 MG capsule Take 0.4 mg by mouth daily      cyanocobalamin 1000 MCG tablet Take 1,000 mcg by mouth daily      ferrous sulfate (IRON 325) 325 (65 Fe) MG tablet Take 325 mg by mouth daily (with breakfast)      FLUoxetine (PROZAC) 10 MG tablet Take 10 mg by mouth daily      melatonin 3 MG TABS tablet Take 3 mg by mouth nightly as needed      mirtazapine (REMERON) 7.5 MG tablet Take 7.5 mg by mouth nightly      risperiDONE (RISPERDAL) 1 MG tablet Take 1 mg by mouth every morning      risperiDONE (RISPERDAL) 1 MG tablet Take 2 mg by mouth nightly         Allergies:  Codeine, Thorazine [chlorpromazine], and Tylenol [acetaminophen]    Immunizations :   Immunization History   Administered Date(s) Administered    COVID-19, J&J, (age 18y+), IM, 0.5 mL 05/13/2021         Social History:    Social History     Tobacco Use    Smoking status: Unknown    Smokeless tobacco: Never    Tobacco comments:     Quit since in [facility]    Vaping Use    Vaping Use: Unknown   Substance Use Topics    Alcohol use: Not Currently     Comment: unknown    Drug use: Not Currently     Types: Cocaine     Comment: unknown     Social History     Tobacco Use   Smoking Status Unknown   Smokeless Tobacco Never   Tobacco Comments    Quit since in [facility]       Family History : not available       REVIEW OF SYSTEMS:     Not possible due to change in mentation   PHYSICAL EXAM:      Vitals:    /72   Pulse 87   Temp 99.3 °F (37.4 °C) (Axillary)   Resp 18   Ht 5' 11\" (1.803 m)   Wt 228 lb 6.3 oz (103.6 kg)   SpO2 93%   BMI 31.85 kg/m²     General Appearance: awake not following commands and has Dyskinesia++ acute distress, no pallor, no icterus   Skin: warm and dry, no rash or erythema  Head: normocephalic and atraumatic  Eyes: pupils equal, round, and reactive to light, conjunctivae normal  ENT: tympanic membrane, external ear and ear canal normal bilaterally, nose without deformity, nasal mucosa and turbinates normal without polyps  Neck: supple and non-tender without mass, no thyromegaly  no cervical lymphadenopathy  Pulmonary/Chest: clear to auscultation bilaterally- no wheezes, rales or rhonchi, normal air movement, no respiratory distress  Cardiovascular: normal rate, regular rhythm, normal S1 and S2, no murmurs, rubs, clicks, or gallops, no carotid bruits  Abdomen: soft, non-tender, non-distended, normal bowel sounds, no masses or organomegaly  Extremities: no cyanosis, clubbing or edema  Musculoskeletal: normal range of motion, no joint swelling, deformity or tenderness  Integumentary: No rashes, no abnormal skin lesions, no petechiae  Neurologic: reflexes normal and symmetric, no cranial nerve deficit Dyskinesia+ lynda praxial ++     Lines: IV    DATA:    CBC:   Lab Results   Component Value Date    WBC 11.7 (H) 12/02/2022    HGB 11.3 (L) 12/02/2022    HCT 35.6 (L) 12/02/2022    MCV 86.4 12/02/2022     12/02/2022     RENAL:   Lab Results   Component Value Date    CREATININE 0.7 (L) 11/29/2022    BUN 22 (H) 11/29/2022     11/29/2022    K 4.4 11/29/2022     11/29/2022    CO2 26 11/29/2022     SED RATE: No results found for: SEDRATE  CK:   Lab Results   Component Value Date/Time    CKTOTAL 770 11/30/2022 05:35 AM     CRP:   Lab Results   Component Value Date/Time    .6 11/29/2022 05:09 AM     Hepatic Function Panel:   Lab Results   Component Value Date/Time    ALKPHOS 114 11/29/2022 05:09 AM    ALT 84 11/29/2022 05:09 AM     11/29/2022 05:09 AM    PROT 6.1 11/29/2022 05:09 AM    PROT 7.1 11/01/2011 07:50 AM    BILITOT 0.5 11/29/2022 05:09 AM    BILIDIR 0.0 11/01/2011 07:50 AM    LABALBU 3.2 11/29/2022 05:09 AM     UA:  Lab Results   Component Value Date/Time    COLORU Yellow 11/27/2022 07:18 PM    CLARITYU Clear 11/27/2022 07:18 PM    GLUCOSEU Negative 11/27/2022 07:18 PM    BILIRUBINUR Negative 11/27/2022 07:18 PM    KETUA Negative 11/27/2022 07:18 PM    SPECGRAV 1.039 11/27/2022 07:18 PM    BLOODU Negative 11/27/2022 07:18 PM    PHUR 5.0 11/27/2022 07:18 PM    PROTEINU TRACE 11/27/2022 07:18 PM    UROBILINOGEN 1.0 11/27/2022 07:18 PM    NITRU Negative 11/27/2022 07:18 PM    LEUKOCYTESUR Negative 11/27/2022 07:18 PM    LABMICR YES 11/27/2022 07:18 PM    URINETYPE NotGiven 11/27/2022 07:18 PM      Urine Microscopic:   Lab Results   Component Value Date/Time    BACTERIA None Seen 11/27/2022 07:18 PM    COMU see below 07/15/2022 12:00 PM    HYALCAST 1 11/27/2022 07:18 PM    WBCUA 1 11/27/2022 07:18 PM    RBCUA 1 11/27/2022 07:18 PM    EPIU 0 11/27/2022 07:18 PM     Urine Reflex to Culture:   Lab Results   Component Value Date/Time    URRFLXCULT Not Indicated 11/27/2022 07:18 PM         MICRO: cultures reviewed and updated by me        Micro results (current encounter only)    Procedure Component Value Units Date/Time   Culture, Blood 2 [[id number]] Collected: 11/28/22 1037   Order Status: Sent Specimen: Blood Updated: 11/28/22 1048   Culture, Urine [[id number]]    Order Status: Sent Specimen: Urine, clean catch    COVID-19, Rapid [[id number]] Collected: 11/27/22 1919   Order Status: Completed Specimen: Nasopharyngeal Swab Updated: 11/27/22 2003    SARS-CoV-2, NAAT Not Detected    Comment: Rapid NAAT:   Negative results should be treated as presumptive and,   if inconsistent with clinical signs and symptoms or necessary for   patient management, should be tested with an alternative molecular   assay. Negative results do not preclude SARS-CoV-2 infection and   should not be used as the sole basis for patient management decisions. This test has been authorized by the FDA under an Emergency Use   Authorization (EUA) for use by authorized laboratories. Fact sheet for Healthcare Providers:   http://www.yahir.lucretia/   Fact sheet for Patients: http://www.negin-jose.biz/     METHODOLOGY: Isothermal Nucleic Acid Amplification       Rapid influenza A/B antigens [[id number]] Collected: 11/27/22 1919   Order Status: Completed Specimen: Nasopharyngeal Updated: 11/27/22 1949    Rapid Influenza A Ag Negative    Rapid Influenza B Ag Negative   Culture, Blood 1 [[id number]] Collected: 11/27/22 1905   Order Status: Sent Specimen: Blood Updated       Blood Culture:   Lab Results   Component Value Date/Time    BC No Growth after 4 days of incubation.  11/27/2022 07:05 PM    BLOODCULT2  11/28/2022 10:37 AM No Growth to date. Any change in status will be called. Viral Culture:    Lab Results   Component Value Date/Time    COVID19 Not Detected 11/27/2022 07:19 PM     Urine Culture: No results for input(s): Juana Sethi in the last 72 hours. Scheduled Meds:   cefepime  2,000 mg IntraVENous Q12H    pantoprazole  40 mg IntraVENous BID    cyanocobalamin  1,000 mcg Oral Daily    FLUoxetine  10 mg Oral Daily    mirtazapine  7.5 mg Oral Nightly    risperiDONE  1 mg Oral QAM    risperiDONE  2 mg Oral Nightly    tamsulosin  0.4 mg Oral Daily    sodium chloride flush  5-40 mL IntraVENous 2 times per day    [Held by provider] enoxaparin  30 mg SubCUTAneous BID    thiamine mononitrate  100 mg Oral Daily       Continuous Infusions:   sodium chloride Stopped (12/02/22 0539)       PRN Meds:  sodium chloride flush, sodium chloride, ondansetron **OR** ondansetron, polyethylene glycol    Imaging:   US ABDOMEN COMPLETE   Final Result   1. Nonvisualization of the pancreas and abdominal aorta due to bowel gas. 2. Top-normal liver size. No focal lesions in the liver. 3. Normal gallbladder, common bile duct, spleen and kidneys. 4. Trace ascites right upper quadrant. CT ABDOMEN PELVIS W IV CONTRAST Additional Contrast? None   Final Result   1. Limited exam due to patient motion with no acute findings identified   within the abdomen or pelvis. 2. Trace left pleural effusion. XR CHEST PORTABLE   Final Result   Stable appearance of the chest without acute cardiopulmonary process   identified. All pertinent images and reports for the current Hospitalization were reviewed by me.     IMPRESSION:    Patient Active Problem List   Diagnosis    Acute metabolic encephalopathy    Hypernatremia    E. coli UTI    Sepsis ([facility] [address]    Fever and chills    Neutrophilia    CRP elevated    Disorientation    LFT elevation    Septicemia (HCC)     Sepsis   Source not clear  WBC elevation   Fevers on admit  Change in mentation  CT abd/pelvis -ve  CXR  -VE  UA  -ve  LFT elevation   CRP elevation     Wbc NOW trending down on IV abx and Fever subsiding and he has Dyskinesia from his Psychiatric medications no history from patient - will be able to complete IV abx course soon      USG of the LIver noted and no obstruction and CRP still elevated and will have to cont IV abx pending clinical improvement     AST/ALT elevation from the Muscle damage than from the Liver     If more fevers will send Resp virus panel as some times Rapid flu can be negative  and check for RSV      Influenza and RSV can lead to CK elevation -        Labs, Microbiology, Radiology and pertinent results from current hospitalization and care every where were reviewed by me as a part of the consultation. PLAN :  D/c IV  Cefepime   If he does well and WBC normal can d/c abx   Do not anticipate abx at d/c if he improves   bLOOD cx in process NGTD  Check CK 1960 concern if the AST/ALT from the muscle check Gamma GT NORMAL   USG of the liver  -VE   Will send RESP virus panel due to fever spike        Addendeum : +VE FOR RSV + AND could explain fever and CK elevation      If no fevers and doing ok can make d/c plans =      Discussed with patient/Family and Nursing   Risk of Complications/Morbidity: High      Illness(es)/ Infection present that pose threat to bodily function. There is potential for severe exacerbation of infection/side effects of treatment. Therapy requires intensive monitoring for antimicrobial agent toxicity. Thanks for allowing me to participate in your patient's care please call me with any questions or concerns.     Dr. Nallely Little MD  [address] [place] [address] Physician  Phone: [phone]   Fax : [phone]

## 2022-12-02 NOTE — PROGRESS NOTES
Hospitalist Progress Note      PCP: No primary care provider on file. Date of Admission: 11/27/2022    Subjective: Feels okay, no new complaints      Medications:  Reviewed    Infusion Medications    sodium chloride 10 mL/hr at 12/01/22 2300     Scheduled Medications    cefepime  2,000 mg IntraVENous Q12H    pantoprazole  40 mg IntraVENous BID    cyanocobalamin  1,000 mcg Oral Daily    FLUoxetine  10 mg Oral Daily    mirtazapine  7.5 mg Oral Nightly    risperiDONE  1 mg Oral QAM    risperiDONE  2 mg Oral Nightly    tamsulosin  0.4 mg Oral Daily    sodium chloride flush  5-40 mL IntraVENous 2 times per day    [Held by provider] enoxaparin  30 mg SubCUTAneous BID    thiamine mononitrate  100 mg Oral Daily     PRN Meds: sodium chloride flush, sodium chloride, ondansetron **OR** ondansetron, polyethylene glycol      Intake/Output Summary (Last 24 hours) at 12/2/2022 0447  Last data filed at 12/1/2022 2300  Gross per 24 hour   Intake 1256.07 ml   Output 1900 ml   Net -643.93 ml       Physical Exam Performed:    /71   Pulse 75   Temp 99.7 °F (37.6 °C) (Axillary)   Resp 19   Ht 5' 11\" (1.803 m)   Wt 229 lb 4.5 oz (104 kg)   SpO2 91%   BMI 31.98 kg/m²     General appearance: No apparent distress  Neck: Supple  Respiratory:  Normal respiratory effort. Clear to auscultation, bilaterally without Rales/Wheezes/Rhonchi. Cardiovascular: Regular rate and rhythm with normal S1/S2 without murmurs, rubs or gallops. Abdomen: Soft, non-tender  Musculoskeletal: No clubbing, cyanosis   Skin: Skin color, texture, turgor normal.  No rashes or lesions.   Neurologic: Moving his extremities  Psychiatric: Awake  Capillary Refill: Brisk, 3 seconds, normal   Peripheral Pulses: +2 palpable, equal bilaterally       Labs:   Recent Labs     11/29/22  0509 11/29/22  1415 11/30/22  0535 12/01/22  0509   WBC 12.3*  --  11.1* 11.5*   HGB 11.7* 11.5* 11.3* 10.8*   HCT 35.9* 36.0* 35.0* 33.7*     --  245 244     Recent Labs 11/29/22  0509      K 4.4      CO2 26   BUN 22*   CREATININE 0.7*   CALCIUM 9.0     Recent Labs     11/29/22  0509   *   ALT 84*   BILITOT 0.5   ALKPHOS 114     No results for input(s): INR in the last 72 hours. Recent Labs     11/29/22  0509 11/30/22  0535   CKTOTAL 1,960* 770*       Urinalysis:      Lab Results   Component Value Date/Time    NITRU Negative 11/27/2022 07:18 PM    WBCUA 1 11/27/2022 07:18 PM    BACTERIA None Seen 11/27/2022 07:18 PM    RBCUA 1 11/27/2022 07:18 PM    BLOODU Negative 11/27/2022 07:18 PM    SPECGRAV 1.039 11/27/2022 07:18 PM    GLUCOSEU Negative 11/27/2022 07:18 PM       Radiology:  US ABDOMEN COMPLETE   Final Result   1. Nonvisualization of the pancreas and abdominal aorta due to bowel gas. 2. Top-normal liver size. No focal lesions in the liver. 3. Normal gallbladder, common bile duct, spleen and kidneys. 4. Trace ascites right upper quadrant. CT ABDOMEN PELVIS W IV CONTRAST Additional Contrast? None   Final Result   1. Limited exam due to patient motion with no acute findings identified   within the abdomen or pelvis. 2. Trace left pleural effusion. XR CHEST PORTABLE   Final Result   Stable appearance of the chest without acute cardiopulmonary process   identified.              PHARMACY TO DOSE VANCOMYCIN  IP CONSULT TO HOSPITALIST  IP CONSULT TO INFECTIOUS DISEASES  IP CONSULT TO GI    Assessment/Plan:    Active Hospital Problems    Diagnosis     Fever and chills [R50.9]      Priority: Medium    Neutrophilia [D72.9]      Priority: Medium    CRP elevated [R79.82]      Priority: Medium    Disorientation [R41.0]      Priority: Medium    LFT elevation [R79.89]      Priority: Medium    Septicemia ([address] [A41.9]      Priority: Medium    Sepsis ([address] [A41.9]      Priority: Medium     Sepsis of unknown origin, suspecting urine, with urine infection on admission, patient refused to give urine admission patient started on cefepime and vancomycin, ID consulted currently patient continues to be on cefepime. Spiking again fever up to 102 this morning, blood culture ordered. Nausea and vomiting was dark content, GI consulted on admission patient was started on pantoprazole drip, changed to scheduled twice daily per GI. Anemia, per GI with chronic disease  Generalized weakness, PT OT  Acute likely on chronic metabolic encephalopathy likely due to infection, monitor closely near baseline  History of antisocial personality disorder  Elevated CK, possibly rhabdomyolysis, nontraumatic, CK improved yesterday less than 1000 we will discontinue IV fluid today      Diet: ADULT DIET;  Regular  Code Status: Full Code      Gaurav Foote MD

## 2022-12-02 NOTE — PLAN OF CARE
Problem: Discharge Planning  Goal: Discharge to home or other facility with appropriate resources  12/1/2022 2248 by Shannan Cunningham RN  Outcome: Progressing  Flowsheets (Taken 12/1/2022 2035)  Discharge to home or other facility with appropriate resources:   Identify barriers to discharge with patient and caregiver   Arrange for needed discharge resources and transportation as appropriate   Identify discharge learning needs (meds, wound care, etc)   Refer to discharge planning if patient needs post-hospital services based on physician order or complex needs related to functional status, cognitive ability or social support system  Flowsheets (Taken 12/1/2022 0900)  Discharge to home or other facility with appropriate resources: Identify barriers to discharge with patient and caregiver     Problem: Pain  Goal: Verbalizes/displays adequate comfort level or baseline comfort level  12/1/2022 2248 by Shannan Cunningham RN  Outcome: Progressing     Problem: Skin/Tissue Integrity  Goal: Absence of new skin breakdown  Description: 1. Monitor for areas of redness and/or skin breakdown  2. Assess vascular access sites hourly  3. Every 4-6 hours minimum:  Change oxygen saturation probe site  4. Every 4-6 hours:  If on nasal continuous positive airway pressure, respiratory therapy assess nares and determine need for appliance change or resting period.   12/1/2022 2248 by Shannan Cunningham RN  Outcome: Progressing     Problem: Safety - Adult  Goal: Free from fall injury  12/1/2022 2248 by Shannan Cunningham RN  Outcome: Progressing     Problem: ABCDS Injury Assessment  Goal: Absence of physical injury  12/1/2022 2248 by Shannan Cunningham RN  Outcome: Progressing     Problem: Gastrointestinal - Adult  Goal: Minimal or absence of nausea and vomiting  Outcome: Progressing  Goal: Maintains or returns to baseline bowel function  Outcome: Progressing  Flowsheets (Taken 12/1/2022 2035)  Maintains or returns to baseline bowel function: Assess bowel function

## 2022-12-02 NOTE — PLAN OF CARE
Problem: Discharge Planning  Goal: Discharge to home or other facility with appropriate resources  12/2/2022 1114 by Marco A Newman RN  Outcome: Progressing  12/1/2022 2248 by Esthela Carr RN  Outcome: Progressing  Flowsheets (Taken 12/1/2022 2035)  Discharge to home or other facility with appropriate resources:   Identify barriers to discharge with patient and caregiver   Arrange for needed discharge resources and transportation as appropriate   Identify discharge learning needs (meds, wound care, etc)   Refer to discharge planning if patient needs post-hospital services based on physician order or complex needs related to functional status, cognitive ability or social support system     Problem: Pain  Goal: Verbalizes/displays adequate comfort level or baseline comfort level  12/2/2022 1114 by Marco A Newman RN  Outcome: Progressing  12/1/2022 2248 by Esthela Carr RN  Outcome: Progressing     Problem: Skin/Tissue Integrity  Goal: Absence of new skin breakdown  Description: 1. Monitor for areas of redness and/or skin breakdown  2. Assess vascular access sites hourly  3. Every 4-6 hours minimum:  Change oxygen saturation probe site  4. Every 4-6 hours:  If on nasal continuous positive airway pressure, respiratory therapy assess nares and determine need for appliance change or resting period.   12/2/2022 1114 by Marco A Newman RN  Outcome: Progressing  12/1/2022 2248 by Esthela Carr RN  Outcome: Progressing     Problem: Safety - Adult  Goal: Free from fall injury  12/2/2022 1114 by Marco A Newman RN  Outcome: Nichole Little (Taken 12/1/2022 2357 by Esthela Carr RN)  Free From Fall Injury: Instruct family/caregiver on patient safety  12/1/2022 2248 by Esthela Carr RN  Outcome: Progressing     Problem: ABCDS Injury Assessment  Goal: Absence of physical injury  12/2/2022 1114 by Marco A Newman RN  Outcome: Progressing  Flowsheets (Taken 12/1/2022 2357 by Bell Wilson Fredia Muckle, RN)  Absence of Physical Injury: Implement safety measures based on patient assessment  12/1/2022 2248 by Burak Alamo RN  Outcome: Progressing     Problem: Gastrointestinal - Adult  Goal: Minimal or absence of nausea and vomiting  12/2/2022 1114 by Max Dennis RN  Outcome: Progressing  12/1/2022 2248 by Burak Alamo RN  Outcome: Progressing  Goal: Maintains or returns to baseline bowel function  12/2/2022 1114 by Max Dennis RN  Outcome: Progressing  12/1/2022 2248 by Burak Alamo RN  Outcome: Progressing  Flowsheets (Taken 12/1/2022 2035)  Maintains or returns to baseline bowel function: Assess bowel function

## 2022-12-03 LAB
A/G RATIO: 0.9 (ref 1.1–2.2)
ALBUMIN SERPL-MCNC: 2.7 G/DL (ref 3.4–5)
ALP BLD-CCNC: 150 U/L (ref 40–129)
ALT SERPL-CCNC: 138 U/L (ref 10–40)
ANION GAP SERPL CALCULATED.3IONS-SCNC: 9 MMOL/L (ref 3–16)
AST SERPL-CCNC: 114 U/L (ref 15–37)
BASOPHILS ABSOLUTE: 0.1 K/UL (ref 0–0.2)
BASOPHILS RELATIVE PERCENT: 0.7 %
BILIRUB SERPL-MCNC: 0.3 MG/DL (ref 0–1)
BUN BLDV-MCNC: 14 MG/DL (ref 7–20)
CALCIUM SERPL-MCNC: 8.9 MG/DL (ref 8.3–10.6)
CHLORIDE BLD-SCNC: 100 MMOL/L (ref 99–110)
CO2: 26 MMOL/L (ref 21–32)
CREAT SERPL-MCNC: 0.6 MG/DL (ref 0.8–1.3)
EOSINOPHILS ABSOLUTE: 0.3 K/UL (ref 0–0.6)
EOSINOPHILS RELATIVE PERCENT: 2.7 %
GFR SERPL CREATININE-BSD FRML MDRD: >60 ML/MIN/{1.73_M2}
GLUCOSE BLD-MCNC: 120 MG/DL (ref 70–99)
HCT VFR BLD CALC: 35.7 % (ref 40.5–52.5)
HEMATOLOGY PATH CONSULT: NO
HEMOGLOBIN: 11.4 G/DL (ref 13.5–17.5)
LYMPHOCYTES ABSOLUTE: 0.9 K/UL (ref 1–5.1)
LYMPHOCYTES RELATIVE PERCENT: 7.4 %
MCH RBC QN AUTO: 27.3 PG (ref 26–34)
MCHC RBC AUTO-ENTMCNC: 31.9 G/DL (ref 31–36)
MCV RBC AUTO: 85.6 FL (ref 80–100)
MONOCYTES ABSOLUTE: 1.8 K/UL (ref 0–1.3)
MONOCYTES RELATIVE PERCENT: 14.2 %
NEUTROPHILS ABSOLUTE: 9.4 K/UL (ref 1.7–7.7)
NEUTROPHILS RELATIVE PERCENT: 75 %
PDW BLD-RTO: 15.5 % (ref 12.4–15.4)
PLATELET # BLD: 314 K/UL (ref 135–450)
PMV BLD AUTO: 9.1 FL (ref 5–10.5)
POTASSIUM SERPL-SCNC: 4.4 MMOL/L (ref 3.5–5.1)
RBC # BLD: 4.17 M/UL (ref 4.2–5.9)
SODIUM BLD-SCNC: 135 MMOL/L (ref 136–145)
TOTAL PROTEIN: 5.7 G/DL (ref 6.4–8.2)
WBC # BLD: 12.6 K/UL (ref 4–11)

## 2022-12-03 PROCEDURE — 6360000002 HC RX W HCPCS: Performed by: INTERNAL MEDICINE

## 2022-12-03 PROCEDURE — 6370000000 HC RX 637 (ALT 250 FOR IP): Performed by: INTERNAL MEDICINE

## 2022-12-03 PROCEDURE — 2580000003 HC RX 258: Performed by: INTERNAL MEDICINE

## 2022-12-03 PROCEDURE — 94760 N-INVAS EAR/PLS OXIMETRY 1: CPT

## 2022-12-03 PROCEDURE — 36415 COLL VENOUS BLD VENIPUNCTURE: CPT

## 2022-12-03 PROCEDURE — 80053 COMPREHEN METABOLIC PANEL: CPT

## 2022-12-03 PROCEDURE — C9113 INJ PANTOPRAZOLE SODIUM, VIA: HCPCS | Performed by: INTERNAL MEDICINE

## 2022-12-03 PROCEDURE — 85025 COMPLETE CBC W/AUTO DIFF WBC: CPT

## 2022-12-03 PROCEDURE — 1200000000 HC SEMI PRIVATE

## 2022-12-03 RX ORDER — THIAMINE MONONITRATE (VIT B1) 100 MG
100 TABLET ORAL DAILY
Qty: 30 TABLET | Refills: 0
Start: 2022-12-04

## 2022-12-03 RX ORDER — PANTOPRAZOLE SODIUM 40 MG/1
40 TABLET, DELAYED RELEASE ORAL
Qty: 60 TABLET | Refills: 0
Start: 2022-12-03

## 2022-12-03 RX ADMIN — FLUOXETINE HYDROCHLORIDE 10 MG: 10 TABLET ORAL at 08:17

## 2022-12-03 RX ADMIN — PANTOPRAZOLE SODIUM 40 MG: 40 INJECTION, POWDER, FOR SOLUTION INTRAVENOUS at 20:26

## 2022-12-03 RX ADMIN — CEFEPIME 2000 MG: 2 INJECTION, POWDER, FOR SOLUTION INTRAVENOUS at 04:59

## 2022-12-03 RX ADMIN — CYANOCOBALAMIN TAB 1000 MCG 1000 MCG: 1000 TAB at 08:17

## 2022-12-03 RX ADMIN — Medication 100 MG: at 08:17

## 2022-12-03 RX ADMIN — TAMSULOSIN HYDROCHLORIDE 0.4 MG: 0.4 CAPSULE ORAL at 08:17

## 2022-12-03 RX ADMIN — RISPERIDONE 2 MG: 1 TABLET ORAL at 20:26

## 2022-12-03 RX ADMIN — PANTOPRAZOLE SODIUM 40 MG: 40 INJECTION, POWDER, FOR SOLUTION INTRAVENOUS at 08:17

## 2022-12-03 RX ADMIN — ENOXAPARIN SODIUM 30 MG: 100 INJECTION SUBCUTANEOUS at 20:26

## 2022-12-03 RX ADMIN — MIRTAZAPINE 7.5 MG: 15 TABLET, FILM COATED ORAL at 20:26

## 2022-12-03 RX ADMIN — SODIUM CHLORIDE, PRESERVATIVE FREE 10 ML: 5 INJECTION INTRAVENOUS at 08:18

## 2022-12-03 RX ADMIN — RISPERIDONE 1 MG: 1 TABLET ORAL at 08:17

## 2022-12-03 ASSESSMENT — PAIN SCALES - GENERAL: PAINLEVEL_OUTOF10: 0

## 2022-12-03 NOTE — PROGRESS NOTES
Hospitalist Progress Note      PCP: No primary care provider on file. Date of Admission: 11/27/2022        Subjective: Feels okay, no new complaints. Medications:  Reviewed    Infusion Medications    sodium chloride Stopped (12/02/22 0539)     Scheduled Medications    pantoprazole  40 mg IntraVENous BID    cyanocobalamin  1,000 mcg Oral Daily    FLUoxetine  10 mg Oral Daily    mirtazapine  7.5 mg Oral Nightly    risperiDONE  1 mg Oral QAM    risperiDONE  2 mg Oral Nightly    tamsulosin  0.4 mg Oral Daily    sodium chloride flush  5-40 mL IntraVENous 2 times per day    enoxaparin  30 mg SubCUTAneous BID    thiamine mononitrate  100 mg Oral Daily     PRN Meds: sodium chloride flush, sodium chloride, ondansetron **OR** ondansetron, polyethylene glycol      Intake/Output Summary (Last 24 hours) at 12/3/2022 1323  Last data filed at 12/3/2022 1319  Gross per 24 hour   Intake 626 ml   Output 425 ml   Net 201 ml       Physical Exam Performed:    /74   Pulse 79   Temp 97.9 °F (36.6 °C) (Oral)   Resp 16   Ht 5' 11\" (1.803 m)   Wt 229 lb 15 oz (104.3 kg)   SpO2 94%   BMI 32.07 kg/m²     General appearance: No apparent distress  Neck: Supple  Respiratory:  Normal respiratory effort. Clear to auscultation, bilaterally without Rales/Wheezes/Rhonchi. Cardiovascular: Regular rate and rhythm with normal S1/S2 without murmurs, rubs or gallops. Abdomen: Soft, non-tender  Musculoskeletal: No clubbing, cyanosis   Skin: Skin color, texture, turgor normal.  No rashes or lesions. Neurologic: Moving his extremities  Psychiatric: Awake  Capillary Refill: Brisk, 3 seconds, normal   Peripheral Pulses: +2 palpable, equal bilaterally       Labs:   Recent Labs     12/01/22  0509 12/02/22  0531   WBC 11.5* 11.7*   HGB 10.8* 11.3*   HCT 33.7* 35.6*    281     No results for input(s): NA, K, CL, CO2, BUN, CREATININE, CALCIUM, PHOS in the last 72 hours.     Invalid input(s): MAGNES  No results for input(s): AST, ALT, BILIDIR, BILITOT, ALKPHOS in the last 72 hours. No results for input(s): INR in the last 72 hours. No results for input(s): Gladys Forget in the last 72 hours. Urinalysis:      Lab Results   Component Value Date/Time    NITRU Negative 11/27/2022 07:18 PM    WBCUA 1 11/27/2022 07:18 PM    BACTERIA None Seen 11/27/2022 07:18 PM    RBCUA 1 11/27/2022 07:18 PM    BLOODU Negative 11/27/2022 07:18 PM    SPECGRAV 1.039 11/27/2022 07:18 PM    GLUCOSEU Negative 11/27/2022 07:18 PM       Radiology:  US ABDOMEN COMPLETE   Final Result   1. Nonvisualization of the pancreas and abdominal aorta due to bowel gas. 2. Top-normal liver size. No focal lesions in the liver. 3. Normal gallbladder, common bile duct, spleen and kidneys. 4. Trace ascites right upper quadrant. CT ABDOMEN PELVIS W IV CONTRAST Additional Contrast? None   Final Result   1. Limited exam due to patient motion with no acute findings identified   within the abdomen or pelvis. 2. Trace left pleural effusion. XR CHEST PORTABLE   Final Result   Stable appearance of the chest without acute cardiopulmonary process   identified. PHARMACY TO DOSE VANCOMYCIN  IP CONSULT TO HOSPITALIST  IP CONSULT TO INFECTIOUS DISEASES  IP CONSULT TO GI    Assessment/Plan:    Active Hospital Problems    Diagnosis     Fever and chills [R50.9]      Priority: Medium    Neutrophilia [D72.9]      Priority: Medium    CRP elevated [R79.82]      Priority: Medium    Disorientation [R41.0]      Priority: Medium    LFT elevation [R79.89]      Priority: Medium    Septicemia ([address] [A41.9]      Priority: Medium    Sepsis ([address] [A41.9]      Priority: Medium     Sepsis of unknown origin on admission, suspecting urine, with urine infection on admission, patient refused to give urine admission patient started on cefepime and vancomycin, ID consulted completed course.   Fever could be due to sepsis of unknown origin but also patient tested positive for RSV, improved now  Nausea and vomiting was dark content, GI consulted on admission patient was started on pantoprazole drip, changed to scheduled twice daily per GI. No further recommendation per GI relatively stable, will start Lovenox for DVT prophylaxis  Anemia, per GI with chronic disease  Generalized weakness, PT OT  Acute likely on chronic metabolic encephalopathy likely due to infection, at baseline. History of antisocial personality disorder  Elevated CK, possibly rhabdomyolysis, nontraumatic, CK improved and IV fluid discontinued    Diet: ADULT DIET;  Regular  Code Status: Full Code    Kell Stoll MD

## 2022-12-03 NOTE — DISCHARGE INSTR - COC
Continuity of Care Form    Patient Name: Wily Lockhart   :  1960  MRN:  [id number]    Admit date:  2022  Discharge date:  2022    Code Status Order: Full Code   Advance Directives:     Admitting Physician:  Sharonda Nascimento MD  PCP: No primary care provider on file. Discharging Nurse: Lyly Murrieta [facility] Unit/Room#: K6X-7695/1923-84  Discharging Unit Phone Number: [phone]    Emergency Contact:   Extended Emergency Contact Information  Primary Emergency Contact: Armando Lewis  Home Phone: [phone]  Relation: Parent  Secondary Emergency Contact: Jai Meza  Mobile Phone: [phone]  Relation: Other    Past Surgical History:  History reviewed. No pertinent surgical history.     Immunization History:   Immunization History   Administered Date(s) Administered    COVID-19, J&J, (age 18y+), IM, 0.5 mL 2021       Active Problems:  Patient Active Problem List   Diagnosis Code    Acute metabolic encephalopathy K39.07    Hypernatremia E87.0    E. coli UTI N39.0, B96.20    Sepsis ([address] A41.9    Fever and chills R50.9    Neutrophilia D72.9    CRP elevated R79.82    Disorientation R41.0    LFT elevation R79.89    Septicemia ([address] A41.9       Isolation/Infection:   Isolation            No Isolation          Patient Infection Status       Infection Onset Added Last Indicated Last Indicated By Review Planned Expiration Resolved Resolved By    None active    Resolved    COVID-19 (Rule Out) 22 Respiratory Panel, Molecular, with COVID-19 (Restricted: peds pts or suitable admitted adults) (Ordered)   22 Rule-Out Test Resulted    COVID-19 (Rule Out) 22 COVID-19, Rapid (Ordered)   22 Rule-Out Test Resulted    COVID-19 (Rule Out) 07/15/22 07/15/22 07/15/22 COVID-19, Rapid (Ordered)   07/15/22 Rule-Out Test Resulted            Nurse Assessment:  Last Vital Signs: /74   Pulse 79   Temp 97.9 °F (36.6 °C) (Oral)   Resp 16   Ht 5' 11\" (1.803 m)   Wt 229 lb 15 oz (104.3 kg)   SpO2 94%   BMI 32.07 kg/m²     Last documented pain score (0-10 scale): Pain Level: 0  Last Weight:   Wt Readings from Last 1 Encounters:   12/03/22 229 lb 15 oz (104.3 kg)     Mental Status:  oriented, alert, and to person and place    IV Access:  - None    Nursing Mobility/ADLs:  Walking   Dependent  Transfer  Dependent  Bathing  Dependent  Dressing  Dependent  Toileting  Dependent  Feeding  Dependent  Med Admin  Dependent  Med Delivery   whole and preferred mixed with pudding or apple sauce    Wound Care Documentation and Therapy:        Elimination:  Continence: Bowel: No  Bladder: No  Urinary Catheter: None   Colostomy/Ileostomy/Ileal Conduit: No       Date of Last BM: 12/06/2022, smear    Intake/Output Summary (Last 24 hours) at 12/3/2022 1358  Last data filed at 12/3/2022 1319  Gross per 24 hour   Intake 626 ml   Output 425 ml   Net 201 ml     I/O last 3 completed shifts: In: 1226.8 [P.O.:740; I.V.:382.7; IV Piggyback:104.1]  Out: 1325 [Urine:1325]    Safety Concerns: At Risk for Falls    Impairments/Disabilities:      Vision    Nutrition Therapy:  Current Nutrition Therapy:   - Oral Diet:  General    Routes of Feeding: Oral  Liquids: No Restrictions  Daily Fluid Restriction: no  Last Modified Barium Swallow with Video (Video Swallowing Test): not done    Treatments at the Time of Hospital Discharge:   Respiratory Treatments: no  Oxygen Therapy:  is on oxygen at 2.5 L/min per nasal cannula.   Ventilator:    - No ventilator support    Rehab Therapies: Physical Therapy and Occupational Therapy  Weight Bearing Status/Restrictions: No weight bearing restrictions  Other Medical Equipment (for information only, NOT a DME order):  hospital bed  Other Treatments: none    Patient's personal belongings (please select all that are sent with patient):  Patient discharge with all belongings    RN SIGNATURE:  Electronically signed by Paco Chandler RN on 12/7/22 at 5:34 PM EST    CASE MANAGEMENT/SOCIAL WORK SECTION    Inpatient Status Date: 11/28/2022    Readmission Risk Assessment Score:  Readmission Risk              Risk of Unplanned Readmission:  16         Discharging to Facility/ Agency   Name:  [facility]  Address:  [address] [place], [place] [address] [address]   Phone:  [phone]  Fax:  [phone]   / signature: Electronically signed by Penny Mansfield RN on 12/7/22 at 5:01 PM EST    PHYSICIAN SECTION    Prognosis: Good    Condition at Discharge: Stable    Rehab Potential (if transferring to Rehab): Good    Recommended Labs or Other Treatments After Discharge: PT OT, follow-up with PCP in 1 week, follow-up with GI in 1 week    Physician Certification: I certify the above information and transfer of Elodia Gu  is necessary for the continuing treatment of the diagnosis listed and that he requires Intermediate/Mental Retardation/Developmental Disabilities Care for greater 30 days.      Update Admission H&P: No change in H&P    PHYSICIAN SIGNATURE:  Electronically signed by Nicolle Sarmiento MD on 12/7/2022 at 4:38 PM

## 2022-12-04 LAB
A/G RATIO: 0.9 (ref 1.1–2.2)
ALBUMIN SERPL-MCNC: 2.9 G/DL (ref 3.4–5)
ALP BLD-CCNC: 192 U/L (ref 40–129)
ALT SERPL-CCNC: 201 U/L (ref 10–40)
ANION GAP SERPL CALCULATED.3IONS-SCNC: 13 MMOL/L (ref 3–16)
ANISOCYTOSIS: ABNORMAL
AST SERPL-CCNC: 176 U/L (ref 15–37)
BASOPHILS ABSOLUTE: 0.1 K/UL (ref 0–0.2)
BASOPHILS RELATIVE PERCENT: 0.8 %
BILIRUB SERPL-MCNC: 0.5 MG/DL (ref 0–1)
BUN BLDV-MCNC: 14 MG/DL (ref 7–20)
CALCIUM SERPL-MCNC: 9.2 MG/DL (ref 8.3–10.6)
CHLORIDE BLD-SCNC: 102 MMOL/L (ref 99–110)
CO2: 25 MMOL/L (ref 21–32)
CREAT SERPL-MCNC: 0.8 MG/DL (ref 0.8–1.3)
EOSINOPHILS ABSOLUTE: 0.2 K/UL (ref 0–0.6)
EOSINOPHILS RELATIVE PERCENT: 1.5 %
GFR SERPL CREATININE-BSD FRML MDRD: >60 ML/MIN/{1.73_M2}
GLUCOSE BLD-MCNC: 94 MG/DL (ref 70–99)
HCT VFR BLD CALC: 34.7 % (ref 40.5–52.5)
HEMATOLOGY PATH CONSULT: NO
HEMOGLOBIN: 11.2 G/DL (ref 13.5–17.5)
HYPOCHROMIA: ABNORMAL
LYMPHOCYTES ABSOLUTE: 0.9 K/UL (ref 1–5.1)
LYMPHOCYTES RELATIVE PERCENT: 6.3 %
MCH RBC QN AUTO: 27 PG (ref 26–34)
MCHC RBC AUTO-ENTMCNC: 32.1 G/DL (ref 31–36)
MCV RBC AUTO: 83.9 FL (ref 80–100)
MONOCYTES ABSOLUTE: 2.3 K/UL (ref 0–1.3)
MONOCYTES RELATIVE PERCENT: 16.6 %
NEUTROPHILS ABSOLUTE: 10.5 K/UL (ref 1.7–7.7)
NEUTROPHILS RELATIVE PERCENT: 74.8 %
OVALOCYTES: ABNORMAL
PDW BLD-RTO: 15.3 % (ref 12.4–15.4)
PLATELET # BLD: 358 K/UL (ref 135–450)
PMV BLD AUTO: 8.6 FL (ref 5–10.5)
POIKILOCYTES: ABNORMAL
POLYCHROMASIA: ABNORMAL
POTASSIUM SERPL-SCNC: 4.7 MMOL/L (ref 3.5–5.1)
RBC # BLD: 4.13 M/UL (ref 4.2–5.9)
SLIDE REVIEW: ABNORMAL
SODIUM BLD-SCNC: 140 MMOL/L (ref 136–145)
TEAR DROP CELLS: ABNORMAL
TOTAL PROTEIN: 6.1 G/DL (ref 6.4–8.2)
WBC # BLD: 14.1 K/UL (ref 4–11)

## 2022-12-04 PROCEDURE — 6360000002 HC RX W HCPCS: Performed by: INTERNAL MEDICINE

## 2022-12-04 PROCEDURE — 36415 COLL VENOUS BLD VENIPUNCTURE: CPT

## 2022-12-04 PROCEDURE — C9113 INJ PANTOPRAZOLE SODIUM, VIA: HCPCS | Performed by: INTERNAL MEDICINE

## 2022-12-04 PROCEDURE — 80053 COMPREHEN METABOLIC PANEL: CPT

## 2022-12-04 PROCEDURE — 6360000002 HC RX W HCPCS: Performed by: NURSE PRACTITIONER

## 2022-12-04 PROCEDURE — 1200000000 HC SEMI PRIVATE

## 2022-12-04 PROCEDURE — 2580000003 HC RX 258: Performed by: INTERNAL MEDICINE

## 2022-12-04 PROCEDURE — 6370000000 HC RX 637 (ALT 250 FOR IP): Performed by: INTERNAL MEDICINE

## 2022-12-04 PROCEDURE — 85025 COMPLETE CBC W/AUTO DIFF WBC: CPT

## 2022-12-04 RX ORDER — KETOROLAC TROMETHAMINE 15 MG/ML
15 INJECTION, SOLUTION INTRAMUSCULAR; INTRAVENOUS ONCE
Status: COMPLETED | OUTPATIENT
Start: 2022-12-04 | End: 2022-12-04

## 2022-12-04 RX ORDER — DEXAMETHASONE SODIUM PHOSPHATE 10 MG/ML
10 INJECTION INTRAMUSCULAR; INTRAVENOUS ONCE
Status: COMPLETED | OUTPATIENT
Start: 2022-12-04 | End: 2022-12-04

## 2022-12-04 RX ADMIN — MIRTAZAPINE 7.5 MG: 15 TABLET, FILM COATED ORAL at 21:01

## 2022-12-04 RX ADMIN — DEXAMETHASONE SODIUM PHOSPHATE 10 MG: 10 INJECTION INTRAMUSCULAR; INTRAVENOUS at 23:22

## 2022-12-04 RX ADMIN — SODIUM CHLORIDE, PRESERVATIVE FREE 10 ML: 5 INJECTION INTRAVENOUS at 08:54

## 2022-12-04 RX ADMIN — TAMSULOSIN HYDROCHLORIDE 0.4 MG: 0.4 CAPSULE ORAL at 08:52

## 2022-12-04 RX ADMIN — Medication 100 MG: at 08:52

## 2022-12-04 RX ADMIN — FLUOXETINE HYDROCHLORIDE 10 MG: 10 TABLET ORAL at 08:52

## 2022-12-04 RX ADMIN — PANTOPRAZOLE SODIUM 40 MG: 40 INJECTION, POWDER, FOR SOLUTION INTRAVENOUS at 21:01

## 2022-12-04 RX ADMIN — RISPERIDONE 2 MG: 1 TABLET ORAL at 21:01

## 2022-12-04 RX ADMIN — ENOXAPARIN SODIUM 30 MG: 100 INJECTION SUBCUTANEOUS at 08:52

## 2022-12-04 RX ADMIN — ENOXAPARIN SODIUM 30 MG: 100 INJECTION SUBCUTANEOUS at 21:00

## 2022-12-04 RX ADMIN — PANTOPRAZOLE SODIUM 40 MG: 40 INJECTION, POWDER, FOR SOLUTION INTRAVENOUS at 08:52

## 2022-12-04 RX ADMIN — SODIUM CHLORIDE, PRESERVATIVE FREE 10 ML: 5 INJECTION INTRAVENOUS at 21:01

## 2022-12-04 RX ADMIN — KETOROLAC TROMETHAMINE 15 MG: 15 INJECTION, SOLUTION INTRAMUSCULAR; INTRAVENOUS at 23:22

## 2022-12-04 RX ADMIN — CYANOCOBALAMIN TAB 1000 MCG 1000 MCG: 1000 TAB at 08:52

## 2022-12-04 RX ADMIN — RISPERIDONE 1 MG: 1 TABLET ORAL at 08:52

## 2022-12-04 ASSESSMENT — PAIN SCALES - GENERAL: PAINLEVEL_OUTOF10: 0

## 2022-12-04 NOTE — PROGRESS NOTES
INPATIENT CONSULTATION:    IDENTIFYING DATA/REASON FOR CONSULTATION   PATIENT:  Christiane Shafer  MRN:  [id number]  ADMIT DATE: 11/27/2022  TIME OF EVALUATION: 12/4/2022 5:14 PM  HOSPITAL STAY:   LOS: 6 days   CONSULTING PHYSICIAN:  REASON FOR CONSULTATION:    Subjective:    Nausea/vomiting improved. Lethargic. MEDICATIONS   SCHEDULED:  pantoprazole, 40 mg, BID  cyanocobalamin, 1,000 mcg, Daily  FLUoxetine, 10 mg, Daily  mirtazapine, 7.5 mg, Nightly  risperiDONE, 1 mg, QAM  risperiDONE, 2 mg, Nightly  tamsulosin, 0.4 mg, Daily  sodium chloride flush, 5-40 mL, 2 times per day  enoxaparin, 30 mg, BID  thiamine mononitrate, 100 mg, Daily      FLUIDS/DRIPS:     sodium chloride Stopped (12/02/22 0539)     PRNs: sodium chloride flush, 5-40 mL, PRN  sodium chloride, , PRN  ondansetron, 4 mg, Q8H PRN   Or  ondansetron, 4 mg, Q6H PRN  polyethylene glycol, 17 g, Daily PRN      ALLERGIES:  He [unfilled]      PHYSICAL EXAM   [unfilled]   I/O last 3 completed shifts:   In: 1056 [P.O.:1046; I.V.:10]  Out: 1025 [Urine:1025]  Oxygen Therapy:  @[id number])@  @JOLVMWMNPH45([id number])@  @WJAGNNIOQT58([id number])@    Physical Exam:  Gen: Resting in bed, NAD   CV: RRR no MRG   Pul: CTAB   Abd: Good bowel sounds throughout, no scars, soft, NT/ND, no masses, no HSM   Ext: No edema   Neuro: No asterixis   Skin: No jaundice, spider angiomas, wilkerson erythema     LABS AND IMAGING     Recent Results (from the past 24 hour(s))   Comprehensive Metabolic Panel    Collection Time: 12/04/22  1:44 PM   Result Value Ref Range    Sodium 140 136 - 145 mmol/L    Potassium 4.7 3.5 - 5.1 mmol/L    Chloride 102 99 - 110 mmol/L    CO2 25 21 - 32 mmol/L    Anion Gap 13 3 - 16    Glucose 94 70 - 99 mg/dL    BUN 14 7 - 20 mg/dL    Creatinine 0.8 0.8 - 1.3 mg/dL    Est, Glom Filt Rate >60 >60    Calcium 9.2 8.3 - 10.6 mg/dL    Total Protein 6.1 (L) 6.4 - 8.2 g/dL    Albumin 2.9 (L) 3.4 - 5.0 g/dL    Albumin/Globulin Ratio 0.9 (L) 1.1 - 2.2    Total Bilirubin 0.5 0.0 - 1.0 mg/dL    Alkaline Phosphatase 192 (H) 40 - 129 U/L     (H) 10 - 40 U/L     (H) 15 - 37 U/L   CBC with Auto Differential    Collection Time: 12/04/22  1:44 PM   Result Value Ref Range    WBC 14.1 (H) 4.0 - 11.0 K/uL    RBC 4.13 (L) 4.20 - 5.90 M/uL    Hemoglobin 11.2 (L) 13.5 - 17.5 g/dL    Hematocrit 34.7 (L) 40.5 - 52.5 %    MCV 83.9 80.0 - 100.0 fL    MCH 27.0 26.0 - 34.0 pg    MCHC 32.1 31.0 - 36.0 g/dL    RDW 15.3 12.4 - 15.4 %    Platelets 320 997 - 970 K/uL    MPV 8.6 5.0 - 10.5 fL    SLIDE REVIEW see below     Path Consult No     Neutrophils % 74.8 %    Lymphocytes % 6.3 %    Monocytes % 16.6 %    Eosinophils % 1.5 %    Basophils % 0.8 %    Neutrophils Absolute 10.5 (H) 1.7 - 7.7 K/uL    Lymphocytes Absolute 0.9 (L) 1.0 - 5.1 K/uL    Monocytes Absolute 2.3 (H) 0.0 - 1.3 K/uL    Eosinophils Absolute 0.2 0.0 - 0.6 K/uL    Basophils Absolute 0.1 0.0 - 0.2 K/uL    Anisocytosis Occasional (A)     Polychromasia Occasional (A)     Hypochromia Occasional (A)     Poikilocytes Occasional (A)     Ovalocytes Occasional (A)     Tear Drop Cells Occasional (A)      Other Labs    Imaging    ASSESSMENT AND RECOMMENDATIONS   Elodia Gu is a 57 y.o. male with PMH of of Schizoaffective disorder, substance abuse who presented on 11/27/2022 with weakness, nausea, vomiting     Sepsis with leukocytosis, elevated CRP, tachycardia. Source unclear. UA neg. CXR unremarkable. CT with no acute findings. US unremarkable. ID following. Started on empiric antibiotics. Nausea, vomiting with dark emesis. Prior history of duodenal ulcer in May 2021.  ? NSAID vs H. pylori induced. CT with no acute finding. LFTs mildly elevated which I suspect is chronic and related to fatty liver/alcohol use (has been elevated at least since July). RUQ US unremarkable. His hgb is stable and no further vomiting episodes since admission. Will continue to treat with PPI BID and supportive care   Elevated LFTs. ? Fatty liver vs Etoh related. Stable  Alcohol abuse  Schizoaffective disorder     RECOMMENDATIONS  -Abnormal LFT-Chronic elevation. RUQ US normal appearing liver. No fatty liver. Concern for acute on chronic elevation secondary to infection/sepsis but have been chronically elevated and not worked up. Hx ETOH abuse and pattern was consistent on admission with AST >ALT. Will check a serologic work-up for now to exclude additional etiologies. Continue to trend LFT. -Nausea/vomiting improved and suspected secondary to underlying infection. H/H stable. Continue PPI. If you have any questions or need any further information, please feel free to contact anyone on our consult team.  Thank you for allowing us to participate in the care of Danielle Barnett.     Shawanda Sanches MD  [address] [facility]

## 2022-12-04 NOTE — PROGRESS NOTES
Pt turned and repositioned. Incontinence care performed and pt's stage 2 bedsores treated with zinc paste. Will continue to monitor pt.

## 2022-12-04 NOTE — PROGRESS NOTES
Hospitalist Progress Note      PCP: No primary care provider on file. Date of Admission: 11/27/2022      Subjective: No new complaints no fever or chills today      Medications:  Reviewed    Infusion Medications    sodium chloride Stopped (12/02/22 0539)     Scheduled Medications    pantoprazole  40 mg IntraVENous BID    cyanocobalamin  1,000 mcg Oral Daily    FLUoxetine  10 mg Oral Daily    mirtazapine  7.5 mg Oral Nightly    risperiDONE  1 mg Oral QAM    risperiDONE  2 mg Oral Nightly    tamsulosin  0.4 mg Oral Daily    sodium chloride flush  5-40 mL IntraVENous 2 times per day    enoxaparin  30 mg SubCUTAneous BID    thiamine mononitrate  100 mg Oral Daily     PRN Meds: sodium chloride flush, sodium chloride, ondansetron **OR** ondansetron, polyethylene glycol      Intake/Output Summary (Last 24 hours) at 12/4/2022 1435  Last data filed at 12/4/2022 1429  Gross per 24 hour   Intake 1330 ml   Output 2200 ml   Net -870 ml       Physical Exam Performed:    /74   Pulse 89   Temp 97.8 °F (36.6 °C) (Oral)   Resp 18   Ht 5' 11\" (1.803 m)   Wt 229 lb 15 oz (104.3 kg)   SpO2 90%   BMI 32.07 kg/m²     General appearance: No apparent distress  Neck: Supple. Respiratory:  Normal respiratory effort. Clear to auscultation, bilaterally without Rales/Wheezes/Rhonchi. Cardiovascular: Regular rate and rhythm with normal S1/S2 without murmurs, rubs or gallops. Abdomen: Soft, non-tender  Musculoskeletal: No clubbing, cyanosis  Skin: Skin color, texture, turgor normal.  No rashes or lesions.   Neurologic: Moving his extremities  Psychiatric: Awake  Capillary Refill: Brisk, 3 seconds, normal   Peripheral Pulses: +2 palpable, equal bilaterally       Labs:   Recent Labs     12/02/22  0531 12/03/22  1333 12/04/22  1344   WBC 11.7* 12.6* 14.1*   HGB 11.3* 11.4* 11.2*   HCT 35.6* 35.7* 34.7*    314 358     Recent Labs     12/03/22  1333 12/04/22  1344   * 140   K 4.4 4.7    102   CO2 26 25   BUN 14 14   CREATININE 0.6* 0.8   CALCIUM 8.9 9.2     Recent Labs     12/03/22  1333 12/04/22  1344   * 176*   * 201*   BILITOT 0.3 0.5   ALKPHOS 150* 192*     No results for input(s): INR in the last 72 hours. No results for input(s): Gladys Cruz in the last 72 hours. Urinalysis:      Lab Results   Component Value Date/Time    NITRU Negative 11/27/2022 07:18 PM    WBCUA 1 11/27/2022 07:18 PM    BACTERIA None Seen 11/27/2022 07:18 PM    RBCUA 1 11/27/2022 07:18 PM    BLOODU Negative 11/27/2022 07:18 PM    SPECGRAV 1.039 11/27/2022 07:18 PM    GLUCOSEU Negative 11/27/2022 07:18 PM       Radiology:  US ABDOMEN COMPLETE   Final Result   1. Nonvisualization of the pancreas and abdominal aorta due to bowel gas. 2. Top-normal liver size. No focal lesions in the liver. 3. Normal gallbladder, common bile duct, spleen and kidneys. 4. Trace ascites right upper quadrant. CT ABDOMEN PELVIS W IV CONTRAST Additional Contrast? None   Final Result   1. Limited exam due to patient motion with no acute findings identified   within the abdomen or pelvis. 2. Trace left pleural effusion. XR CHEST PORTABLE   Final Result   Stable appearance of the chest without acute cardiopulmonary process   identified.              PHARMACY TO DOSE VANCOMYCIN  IP CONSULT TO HOSPITALIST  IP CONSULT TO INFECTIOUS DISEASES  IP CONSULT TO GI  IP CONSULT TO GI    Assessment/Plan:    Active Hospital Problems    Diagnosis     Fever and chills [R50.9]      Priority: Medium    Neutrophilia [D72.9]      Priority: Medium    CRP elevated [R79.82]      Priority: Medium    Disorientation [R41.0]      Priority: Medium    LFT elevation [R79.89]      Priority: Medium    Septicemia ([address] [A41.9]      Priority: Medium    Sepsis ([address] [A41.9]      Priority: Medium     Sepsis of unknown origin on admission, suspecting urine, with urine infection on admission, patient refused to give urine admission patient started on cefepime and vancomycin, ID consulted completed course. Fever could be due to sepsis of unknown origin but also patient tested positive for RSV, improved now. Worsening elevation of LFTs, will reconsult GI for further recommendations likely to discharge tomorrow  Nausea and vomiting was dark content, GI consulted on admission patient was started on pantoprazole drip, changed to scheduled twice daily per GI. No further recommendation per GI relatively stable, started Lovenox for DVT prophylaxis  Anemia, per GI with chronic disease  Generalized weakness, PT OT  Acute likely on chronic metabolic encephalopathy likely due to infection, at baseline. History of antisocial personality disorder  Elevated CK, possibly rhabdomyolysis, nontraumatic, CK improved and IV fluid discontinued      Diet: ADULT DIET;  Regular  Code Status: Full Code      Juan Abarca MD

## 2022-12-05 ENCOUNTER — APPOINTMENT (OUTPATIENT)
Dept: GENERAL RADIOLOGY | Age: 62
DRG: 720 | End: 2022-12-05
Payer: MEDICAID

## 2022-12-05 ENCOUNTER — APPOINTMENT (OUTPATIENT)
Dept: CT IMAGING | Age: 62
DRG: 720 | End: 2022-12-05
Payer: MEDICAID

## 2022-12-05 LAB
C-REACTIVE PROTEIN: 147.1 MG/L (ref 0–5.1)
FERRITIN: 1190 NG/ML (ref 30–400)
HAV IGM SER IA-ACNC: NORMAL
HEPATITIS B CORE IGM ANTIBODY: NORMAL
HEPATITIS B SURFACE ANTIGEN INTERPRETATION: NORMAL
HEPATITIS C ANTIBODY INTERPRETATION: NORMAL
HIV AG/AB: NORMAL
HIV ANTIGEN: NORMAL
HIV-1 ANTIBODY: NORMAL
HIV-2 AB: NORMAL
IGA: 282 MG/DL (ref 70–400)
IRON SATURATION: 11 % (ref 20–50)
IRON: 20 UG/DL (ref 59–158)
PROCALCITONIN: 0.12 NG/ML (ref 0–0.15)
SEDIMENTATION RATE, ERYTHROCYTE: >130 MM/HR (ref 0–20)
TOTAL CK: 270 U/L (ref 39–308)
TOTAL IRON BINDING CAPACITY: 181 UG/DL (ref 260–445)

## 2022-12-05 PROCEDURE — 85652 RBC SED RATE AUTOMATED: CPT

## 2022-12-05 PROCEDURE — 6370000000 HC RX 637 (ALT 250 FOR IP): Performed by: INTERNAL MEDICINE

## 2022-12-05 PROCEDURE — 36415 COLL VENOUS BLD VENIPUNCTURE: CPT

## 2022-12-05 PROCEDURE — 6360000002 HC RX W HCPCS: Performed by: INTERNAL MEDICINE

## 2022-12-05 PROCEDURE — 2580000003 HC RX 258: Performed by: INTERNAL MEDICINE

## 2022-12-05 PROCEDURE — 86701 HIV-1ANTIBODY: CPT

## 2022-12-05 PROCEDURE — C9113 INJ PANTOPRAZOLE SODIUM, VIA: HCPCS | Performed by: INTERNAL MEDICINE

## 2022-12-05 PROCEDURE — 82390 ASSAY OF CERULOPLASMIN: CPT

## 2022-12-05 PROCEDURE — 82728 ASSAY OF FERRITIN: CPT

## 2022-12-05 PROCEDURE — 86038 ANTINUCLEAR ANTIBODIES: CPT

## 2022-12-05 PROCEDURE — 1200000000 HC SEMI PRIVATE

## 2022-12-05 PROCEDURE — 82550 ASSAY OF CK (CPK): CPT

## 2022-12-05 PROCEDURE — 82103 ALPHA-1-ANTITRYPSIN TOTAL: CPT

## 2022-12-05 PROCEDURE — 86702 HIV-2 ANTIBODY: CPT

## 2022-12-05 PROCEDURE — 86015 ACTIN ANTIBODY EACH: CPT

## 2022-12-05 PROCEDURE — 86140 C-REACTIVE PROTEIN: CPT

## 2022-12-05 PROCEDURE — 83550 IRON BINDING TEST: CPT

## 2022-12-05 PROCEDURE — 80074 ACUTE HEPATITIS PANEL: CPT

## 2022-12-05 PROCEDURE — 99233 SBSQ HOSP IP/OBS HIGH 50: CPT | Performed by: INTERNAL MEDICINE

## 2022-12-05 PROCEDURE — 84145 PROCALCITONIN (PCT): CPT

## 2022-12-05 PROCEDURE — 82784 ASSAY IGA/IGD/IGG/IGM EACH: CPT

## 2022-12-05 PROCEDURE — 74177 CT ABD & PELVIS W/CONTRAST: CPT

## 2022-12-05 PROCEDURE — 71045 X-RAY EXAM CHEST 1 VIEW: CPT

## 2022-12-05 PROCEDURE — 83540 ASSAY OF IRON: CPT

## 2022-12-05 PROCEDURE — 83516 IMMUNOASSAY NONANTIBODY: CPT

## 2022-12-05 PROCEDURE — 87390 HIV-1 AG IA: CPT

## 2022-12-05 PROCEDURE — 6360000004 HC RX CONTRAST MEDICATION: Performed by: INTERNAL MEDICINE

## 2022-12-05 RX ADMIN — SODIUM CHLORIDE, PRESERVATIVE FREE 10 ML: 5 INJECTION INTRAVENOUS at 22:10

## 2022-12-05 RX ADMIN — CYANOCOBALAMIN TAB 1000 MCG 1000 MCG: 1000 TAB at 11:23

## 2022-12-05 RX ADMIN — MIRTAZAPINE 7.5 MG: 15 TABLET, FILM COATED ORAL at 22:10

## 2022-12-05 RX ADMIN — CEFEPIME 2000 MG: 2 INJECTION, POWDER, FOR SOLUTION INTRAVENOUS at 15:12

## 2022-12-05 RX ADMIN — IOPAMIDOL 75 ML: 755 INJECTION, SOLUTION INTRAVENOUS at 16:53

## 2022-12-05 RX ADMIN — ENOXAPARIN SODIUM 30 MG: 100 INJECTION SUBCUTANEOUS at 22:11

## 2022-12-05 RX ADMIN — ENOXAPARIN SODIUM 30 MG: 100 INJECTION SUBCUTANEOUS at 11:22

## 2022-12-05 RX ADMIN — PANTOPRAZOLE SODIUM 40 MG: 40 INJECTION, POWDER, FOR SOLUTION INTRAVENOUS at 22:10

## 2022-12-05 RX ADMIN — SODIUM CHLORIDE, PRESERVATIVE FREE 10 ML: 5 INJECTION INTRAVENOUS at 11:38

## 2022-12-05 RX ADMIN — Medication 100 MG: at 11:23

## 2022-12-05 RX ADMIN — PANTOPRAZOLE SODIUM 40 MG: 40 INJECTION, POWDER, FOR SOLUTION INTRAVENOUS at 11:23

## 2022-12-05 RX ADMIN — RISPERIDONE 1 MG: 1 TABLET ORAL at 11:23

## 2022-12-05 RX ADMIN — TAMSULOSIN HYDROCHLORIDE 0.4 MG: 0.4 CAPSULE ORAL at 11:23

## 2022-12-05 RX ADMIN — FLUOXETINE HYDROCHLORIDE 10 MG: 10 TABLET ORAL at 11:33

## 2022-12-05 RX ADMIN — RISPERIDONE 2 MG: 1 TABLET ORAL at 22:10

## 2022-12-05 RX ADMIN — CEFEPIME 2000 MG: 2 INJECTION, POWDER, FOR SOLUTION INTRAVENOUS at 11:26

## 2022-12-05 ASSESSMENT — PAIN SCALES - WONG BAKER
WONGBAKER_NUMERICALRESPONSE: 0

## 2022-12-05 NOTE — PLAN OF CARE
Problem: Discharge Planning  Goal: Discharge to home or other facility with appropriate resources  12/5/2022 0223 by Ameena Sevilla RN  Outcome: Progressing     Problem: Pain  Goal: Verbalizes/displays adequate comfort level or baseline comfort level  12/5/2022 0223 by Ameena Sevilla RN  Outcome: Progressing     Problem: Skin/Tissue Integrity  Goal: Absence of new skin breakdown  Description: 1. Monitor for areas of redness and/or skin breakdown  2. Assess vascular access sites hourly  3. Every 4-6 hours minimum:  Change oxygen saturation probe site  4. Every 4-6 hours:  If on nasal continuous positive airway pressure, respiratory therapy assess nares and determine need for appliance change or resting period.   12/5/2022 0223 by Ameena Sevilla RN  Outcome: Progressing     Problem: Safety - Adult  Goal: Free from fall injury  12/5/2022 0223 by Ameena Sevilla RN  Outcome: Progressing     Problem: ABCDS Injury Assessment  Goal: Absence of physical injury  12/5/2022 0223 by Ameena Sevilla RN  Outcome: Progressing     Problem: Gastrointestinal - Adult  Goal: Minimal or absence of nausea and vomiting  12/5/2022 0223 by Ameena Sevilla RN  Outcome: Progressing     Problem: Gastrointestinal - Adult  Goal: Maintains or returns to baseline bowel function  12/5/2022 0223 by Ameena Sevilla RN  Outcome: Progressing

## 2022-12-05 NOTE — PROGRESS NOTES
Infectious Diseases Inpatient Progress notes     CHIEF COMPLAINT:     Sepsis  Fevers  WBC elevation  CK elevation   RSV     HISTORY OF PRESENT ILLNESS:  57 y.o. man nursing home resident history of major depressive disorder, seizure affective disorder history of substance abuse in the past, antisocial personality disorder, drug-induced subacute dyskinesia, admitted to the hospital secondary to not feeling well fevers and change in mental status. Patient is unable to provide any history admission labs indicate WBC elevation T-max 100.9 blood cultures are obtained urinalysis negative, CRP elevated 143.2 ALT 58 , COVID-19 negative rapid flu negative urinalysis negative given the concern for sepsis we are consulted for recommendations. Interval :  intermittent fevers spikes noted and no history from pt no rash but WBC up from Monocytosis and RSV  + on resp panel cough + and was placed back on IV abx due to fevers     Past Medical History:    Past Medical History:   Diagnosis Date    Antisocial personality disorder ([facility] [address]     Blindness right eye category 5, normal vision left eye     Drug induced subacute dyskinesia     Insomnia     Major depressive disorder, recurrent, unspecified ([facility] [address]     Melena     Schizoaffective disorder ([address]     Substance abuse ([facility] [address]        Past Surgical History:    History reviewed. No pertinent surgical history.     Current Medications:    Outpatient Medications Marked as Taking for the 11/27/22 encounter [facility] Encounter)   Medication Sig Dispense Refill    pantoprazole (PROTONIX) 40 MG tablet Take 1 tablet by mouth 2 times daily (before meals) 60 tablet 0    thiamine mononitrate (THIAMINE) 100 MG tablet Take 1 tablet by mouth daily 30 tablet 0    tamsulosin (FLOMAX) 0.4 MG capsule Take 0.4 mg by mouth daily      cyanocobalamin 1000 MCG tablet Take 1,000 mcg by mouth daily      FLUoxetine (PROZAC) 10 MG tablet Take 10 mg by mouth daily      melatonin 3 MG TABS tablet Take 3 mg by mouth nightly as needed      mirtazapine (REMERON) 7.5 MG tablet Take 7.5 mg by mouth nightly      risperiDONE (RISPERDAL) 1 MG tablet Take 1 mg by mouth every morning      risperiDONE (RISPERDAL) 1 MG tablet Take 2 mg by mouth nightly         Allergies:  Codeine, Thorazine [chlorpromazine], and Tylenol [acetaminophen]    Immunizations :   Immunization History   Administered Date(s) Administered    COVID-19, J&J, (age 18y+), IM, 0.5 mL 05/13/2021         Social History:    Social History     Tobacco Use    Smoking status: Unknown    Smokeless tobacco: Never    Tobacco comments:     Quit since in [facility]    Vaping Use    Vaping Use: Unknown   Substance Use Topics    Alcohol use: Not Currently     Comment: unknown    Drug use: Not Currently     Types: Cocaine     Comment: unknown     Social History     Tobacco Use   Smoking Status Unknown   Smokeless Tobacco Never   Tobacco Comments    Quit since in [facility]       Family History : not available       REVIEW OF SYSTEMS:     Not possible due to change in mentation   PHYSICAL EXAM:      Vitals:    /67   Pulse 71   Temp 98.8 °F (37.1 °C) (Axillary)   Resp 20   Ht 5' 11\" (1.803 m)   Wt 223 lb 1.7 oz (101.2 kg)   SpO2 91%   BMI 31.12 kg/m²     General Appearance: awake not following commands and has Dyskinesia++ acute distress, no pallor, no icterus on nasal cannula++  Skin: warm and dry, no rash or erythema  Head: normocephalic and atraumatic  Eyes: Rt eye visual impairment  , round, and reactive to light, conjunctivae normal  ENT: tympanic membrane, external ear and ear canal normal bilaterally, nose without deformity, nasal mucosa and turbinates normal without polyps  Neck: supple and non-tender without mass, no thyromegaly  no cervical lymphadenopathy  Pulmonary/Chest: clear to auscultation bilaterally- no wheezes, rales or rhonchi, normal air movement, no respiratory distress  Cardiovascular: normal rate, regular rhythm, normal S1 and S2, no murmurs, rubs, clicks, or gallops, no carotid bruits  Abdomen: soft, non-tender, non-distended, normal bowel sounds, no masses or organomegaly  Extremities: no cyanosis, clubbing or edema  Musculoskeletal: normal range of motion, no joint swelling, deformity or tenderness  Integumentary: No rashes, no abnormal skin lesions, no petechiae  Neurologic: reflexes normal and symmetric, no cranial nerve deficit Dyskinesia+ lynda praxial ++     Lines: IV    DATA:    CBC:   Lab Results   Component Value Date    WBC 14.1 (H) 12/04/2022    HGB 11.2 (L) 12/04/2022    HCT 34.7 (L) 12/04/2022    MCV 83.9 12/04/2022     12/04/2022     RENAL:   Lab Results   Component Value Date    CREATININE 0.8 12/04/2022    BUN 14 12/04/2022     12/04/2022    K 4.7 12/04/2022     12/04/2022    CO2 25 12/04/2022     SED RATE: No results found for: SEDRATE  CK:   Lab Results   Component Value Date/Time    CKTOTAL 770 11/30/2022 05:35 AM     CRP:   Lab Results   Component Value Date/Time    .6 11/29/2022 05:09 AM     Hepatic Function Panel:   Lab Results   Component Value Date/Time    ALKPHOS 192 12/04/2022 01:44 PM     12/04/2022 01:44 PM     12/04/2022 01:44 PM    PROT 6.1 12/04/2022 01:44 PM    PROT 7.1 11/01/2011 07:50 AM    BILITOT 0.5 12/04/2022 01:44 PM    BILIDIR 0.0 11/01/2011 07:50 AM    LABALBU 2.9 12/04/2022 01:44 PM     UA:  Lab Results   Component Value Date/Time    COLORU Yellow 11/27/2022 07:18 PM    CLARITYU Clear 11/27/2022 07:18 PM    GLUCOSEU Negative 11/27/2022 07:18 PM    BILIRUBINUR Negative 11/27/2022 07:18 PM    KETUA Negative 11/27/2022 07:18 PM    SPECGRAV 1.039 11/27/2022 07:18 PM    BLOODU Negative 11/27/2022 07:18 PM    PHUR 5.0 11/27/2022 07:18 PM    PROTEINU TRACE 11/27/2022 07:18 PM    UROBILINOGEN 1.0 11/27/2022 07:18 PM    NITRU Negative 11/27/2022 07:18 PM    LEUKOCYTESUR Negative 11/27/2022 07:18 PM    LABMICR YES 11/27/2022 07:18 PM    URINETYPE NotGiven 11/27/2022 07:18 PM      Urine Microscopic:   Lab Results   Component Value Date/Time    BACTERIA None Seen 11/27/2022 07:18 PM    COMU see below 07/15/2022 12:00 PM    HYALCAST 1 11/27/2022 07:18 PM    WBCUA 1 11/27/2022 07:18 PM    RBCUA 1 11/27/2022 07:18 PM    EPIU 0 11/27/2022 07:18 PM     Urine Reflex to Culture:   Lab Results   Component Value Date/Time    URRFLXCULT Not Indicated 11/27/2022 07:18 PM         MICRO: cultures reviewed and updated by me        Micro results (current encounter only)    Procedure Component Value Units Date/Time   Culture, Blood 2 [[id number]] Collected: 11/28/22 1037   Order Status: Sent Specimen: Blood Updated: 11/28/22 1048   Culture, Urine [[id number]]    Order Status: Sent Specimen: Urine, clean catch    COVID-19, Rapid [[id number]] Collected: 11/27/22 1919   Order Status: Completed Specimen: Nasopharyngeal Swab Updated: 11/27/22 2003    SARS-CoV-2, NAAT Not Detected    Comment: Rapid NAAT:   Negative results should be treated as presumptive and,   if inconsistent with clinical signs and symptoms or necessary for   patient management, should be tested with an alternative molecular   assay. Negative results do not preclude SARS-CoV-2 infection and   should not be used as the sole basis for patient management decisions. This test has been authorized by the FDA under an Emergency Use   Authorization (EUA) for use by authorized laboratories.      Fact sheet for Healthcare Providers:   http://www.negin-jose.lucretia/   Fact sheet for Patients: http://www.negin-jose.biz/     METHODOLOGY: Isothermal Nucleic Acid Amplification       Rapid influenza A/B antigens [[id number]] Collected: 11/27/22 1919   Order Status: Completed Specimen: Nasopharyngeal Updated: 11/27/22 1949    Rapid Influenza A Ag Negative    Rapid Influenza B Ag Negative   Culture, Blood 1 [[id number]] Collected: 11/27/22 1905   Order Status: Sent Specimen: Blood Updated       Blood Culture:   Lab Results   Component Value Date/Time    [facility]  12/02/2022 09:40 AM     No Growth to date. Any change in status will be called. BLOODCULT2 No Growth after 4 days of incubation. 11/28/2022 10:37 AM       Viral Culture:    Lab Results   Component Value Date/Time    COVID19 Not Detected 11/27/2022 07:19 PM     Urine Culture: No results for input(s): LABURIN in the last 72 hours. Scheduled Meds:   cefepime  2,000 mg IntraVENous Once    Followed by    cefepime  2,000 mg IntraVENous Q8H    pantoprazole  40 mg IntraVENous BID    cyanocobalamin  1,000 mcg Oral Daily    FLUoxetine  10 mg Oral Daily    mirtazapine  7.5 mg Oral Nightly    risperiDONE  1 mg Oral QAM    risperiDONE  2 mg Oral Nightly    tamsulosin  0.4 mg Oral Daily    sodium chloride flush  5-40 mL IntraVENous 2 times per day    enoxaparin  30 mg SubCUTAneous BID    thiamine mononitrate  100 mg Oral Daily       Continuous Infusions:   sodium chloride Stopped (12/02/22 0539)     Procedure Component Value Units Date/Time   Culture, Blood 1 [[id number]] Collected: 12/02/22 0940   Order Status: Completed Specimen: Blood Updated: 12/03/22 1115    Blood Culture, Routine No Growth to date. Any change in status will be called. Narrative:     ORDER#: [id number]                          ORDERED BY: Deedee Cary   SOURCE: Blood                              COLLECTED:  12/02/22 09:40   ANTIBIOTICS AT GUS.:                      RECEIVED :  12/02/22 09:57   If child <=2 yrs old please draw pediatric bottle. ~Blood Culture 1   Respiratory Panel Film Array Report [[id number]] Collected: 12/02/22 1145   Order Status: Completed Updated: 12/02/22 1308    Report SEE IMAGE   Respiratory Panel, Molecular, with COVID-19 (Restricted: peds pts or suitable admitted adults) [[id number]] (Abnormal) Collected: 12/02/22 1145   Order Status: Completed Specimen: Nasopharyngeal Updated: 12/02/22 1306    Organism Respiratory syncytial virus by PCR Abnormal     Respiratory Panel PCR --    POSITIVE FOR   See additional report for complete Respiratory Pathogen Panel    Narrative:     ORDER#: [id number]                          ORDERED BY: Goldie Mistry      PRN Meds:  sodium chloride flush, sodium chloride, ondansetron **OR** ondansetron, polyethylene glycol    Imaging:   US ABDOMEN COMPLETE   Final Result   1. Nonvisualization of the pancreas and abdominal aorta due to bowel gas. 2. Top-normal liver size. No focal lesions in the liver. 3. Normal gallbladder, common bile duct, spleen and kidneys. 4. Trace ascites right upper quadrant. CT ABDOMEN PELVIS W IV CONTRAST Additional Contrast? None   Final Result   1. Limited exam due to patient motion with no acute findings identified   within the abdomen or pelvis. 2. Trace left pleural effusion. XR CHEST PORTABLE   Final Result   Stable appearance of the chest without acute cardiopulmonary process   identified. XR CHEST (2 VW)    (Results Pending)       All pertinent images and reports for the current Hospitalization were reviewed by me.     IMPRESSION:    Patient Active Problem List   Diagnosis    Acute metabolic encephalopathy    Hypernatremia    E. coli UTI    Sepsis ([facility] [address]    Fever and chills    Neutrophilia    CRP elevated    Disorientation    LFT elevation    Septicemia (HCC)     Sepsis   Source not clear  WBC elevation   Fevers on admit  Change in mentation  CT abd/pelvis -ve  CXR  -VE  UA  -ve  LFT elevation   CRP elevation     Wbc NOW trending down on IV abx and and he has Dyskinesia from his Psychiatric medications no history from patient - will be able to complete IV abx course soon      USG of the LIver noted and no obstruction and CRP still elevated and will have to cont IV abx pending clinical improvement     AST/ALT elevation could be from Muscle  than from the Liver  as gamma GT normal     He has h/o substance abuse and Alcohol abuse with elevated Ammonia and LFT ELEVATIOn  in the past       Influenza and RSV can lead to CK elevation - Resp virus panel +Ve for RSV      WBC up but has Monocytosis on the  differential ,ESR, CRP elevation     Will check CT chest/abd/pelvis for any PNA and intra abd process given the marked elevation in markers and Monocytosis            Labs, Microbiology, Radiology and pertinent results from current hospitalization and care every where were reviewed by me as a part of the consultation. PLAN :  cONT  IV  Cefepime  X 2 GM Q 12 hrs for HAP pending Results  Check CT chest/abd/pelvis with contrast     CK   Esr,crp  HIV and Hepatitis panel  ARTHUR pending    bLOOD cx in process NGTD  Check CK 1960 concern if the AST/ALT from the muscle check Gamma GT NORMAL now trend down   USG of the liver  -VE   Procal not much elevation    If fevers continue DDX could be from Drug vs Auto immune given the marked elevation in ESR, CRP     Discussed with patient/Family and Nursing   Risk of Complications/Morbidity: High      Illness(es)/ Infection present that pose threat to bodily function. There is potential for severe exacerbation of infection/side effects of treatment. Therapy requires intensive monitoring for antimicrobial agent toxicity. Thanks for allowing me to participate in your patient's care please call me with any questions or concerns.     Dr. Sue Godoy MD  [facility] Physician  Phone: [phone]   Fax : [phone]

## 2022-12-05 NOTE — PROGRESS NOTES
Gastroenterology Progress Note    Mahogany Welch is a 57 y.o. male patient. Principal Problem:    Sepsis ([address]  Active Problems:    Fever and chills    Neutrophilia    CRP elevated    Disorientation    LFT elevation    Septicemia (HCC)  Resolved Problems:    * No resolved hospital problems. *      SUBJECTIVE:  No nausea or vomiting. + fevers. No abdominal pain. No hematemesis or melena. Current Facility-Administered Medications: cefepime (MAXIPIME) 2000 mg IVPB minibag, 2,000 mg, IntraVENous, Once **FOLLOWED BY** cefepime (MAXIPIME) 2000 mg IVPB minibag, 2,000 mg, IntraVENous, Q8H  pantoprazole (PROTONIX) injection 40 mg, 40 mg, IntraVENous, BID  vitamin B-12 (CYANOCOBALAMIN) tablet 1,000 mcg, 1,000 mcg, Oral, Daily  FLUoxetine (PROZAC) tablet 10 mg, 10 mg, Oral, Daily  mirtazapine (REMERON) tablet 7.5 mg, 7.5 mg, Oral, Nightly  risperiDONE (RISPERDAL) tablet 1 mg, 1 mg, Oral, QAM  risperiDONE (RISPERDAL) tablet 2 mg, 2 mg, Oral, Nightly  tamsulosin (FLOMAX) capsule 0.4 mg, 0.4 mg, Oral, Daily  sodium chloride flush 0.9 % injection 5-40 mL, 5-40 mL, IntraVENous, 2 times per day  sodium chloride flush 0.9 % injection 5-40 mL, 5-40 mL, IntraVENous, PRN  0.9 % sodium chloride infusion, , IntraVENous, PRN  enoxaparin Sodium (LOVENOX) injection 30 mg, 30 mg, SubCUTAneous, BID  ondansetron (ZOFRAN-ODT) disintegrating tablet 4 mg, 4 mg, Oral, Q8H PRN **OR** ondansetron (ZOFRAN) injection 4 mg, 4 mg, IntraVENous, Q6H PRN  polyethylene glycol (GLYCOLAX) packet 17 g, 17 g, Oral, Daily PRN  thiamine mononitrate tablet 100 mg, 100 mg, Oral, Daily    Physical    VITALS:  /67   Pulse 71   Temp 98.8 °F (37.1 °C) (Axillary)   Resp 20   Ht 5' 11\" (1.803 m)   Wt 223 lb 1.7 oz (101.2 kg)   SpO2 91%   BMI 31.12 kg/m²   TEMPERATURE:  Current - Temp: 98.8 °F (37.1 °C);  Max - Temp  Av.3 °F (37.4 °C)  Min: 97.4 °F (36.3 °C)  Max: 102.3 °F (39.1 °C)    NAD  Eyes: No icterus  RRR  Lungs CTA Bilaterally, normal effort  Abdomen soft, ND, NT, Bowel sounds normal.  Ext: no edema  Neuro: No tremor  Psych: Alert but not conversational    Data    Data Review:    Recent Labs     12/03/22  1333 12/04/22  1344   WBC 12.6* 14.1*   HGB 11.4* 11.2*   HCT 35.7* 34.7*   MCV 85.6 83.9    358     Recent Labs     12/03/22  1333 12/04/22  1344   * 140   K 4.4 4.7    102   CO2 26 25   BUN 14 14   CREATININE 0.6* 0.8     Recent Labs     12/03/22  1333 12/04/22  1344   * 176*   * 201*   BILITOT 0.3 0.5   ALKPHOS 150* 192*     No results for input(s): LIPASE, AMYLASE in the last 72 hours. No results for input(s): PROTIME, INR in the last 72 hours. No results for input(s): PTT in the last 72 hours. ASSESSMENT:  57 y.o. male with a PMH of Schizoaffective disorder and substance abuse who presented on 11/27/2022 with weakness, nausea, vomiting. We were originally consulted regarding black emesis. Refused emesis bag and vomits on floor and uses profanity. Denied melena. Unsure of NSAIDs. Drinks beer regularly. CT A/P showed no acute findings however was limited due to patient motion. Blood work noted white count of 18, hgb of 12.6, normal platelet count. BUN 25. Lactic acid normal. Liver enzymes mildly elevated with , ALT 58. Alk phos and bili normal.  EGD 5/11/21 showed normal esophagus and stomach and a 30mm cratered duodenal ulcer with a visible vessel in the duodenal bulb treated with dual therapy but then bled on clip placement and visualization lost so Emergent IR gastroduodenal embolization was needed and patient stabilized while inpatient. H pylori Stool Antigen returned positive and he was started on triple abx therapy (amox, clarithyro, omeprazole). Sepsis with leukocytosis, elevated CRP, tachycardia. Source unclear. UA neg but he had refused to give this on admission and was on abx already. CXR unremarkable. CT with no acute findings. US unremarkable. RSV was +.    Nausea, vomiting with dark emesis. Prior history of duodenal ulcer in May 2021.  ? NSAID vs H. pylori induced. Treated with triple therapy. CT with no acute finding. Will continue to treat with PPI BID and supportive care   Elevated LFTs. ? Fatty liver vs Etoh related. Chronic elevation. RUQ US normal appearing liver. No fatty liver. Concern for acute on chronic elevation secondary to infection/sepsis but have been chronically elevated and not worked up. Hx ETOH abuse and pattern was consistent on admission with AST >ALT. Will check a serologic work-up for now to exclude additional etiologies. Continue to trend LFT. Anti-social personality disorder  Alcohol abuse  Schizoaffective disorder     RECOMMENDATIONS  -Serologic liver work-up sent. -Repeat LFT's tomorrow.  - FUO work-up underway      Thank you for allowing me to participate in the care of your patient. Please feel free to contact me with any concerns.   [address], MD

## 2022-12-05 NOTE — PROGRESS NOTES
Hospitalist Progress Note      PCP: No primary care provider on file. Date of Admission: 11/27/2022      Hospital Course: 60-year male admitted to the hospital with generalized weakness, fever sepsis, had possible UTI, ID consulted, also had rhabdomyolysis which is improving, molecular viral test positive for RSV clinically was improving then starting to spike fever again last night, restarted on antibiotics, procalcitonin ordered, will follow on ID recommendations. To note that GI were consulted for elevated LFTs, which is increasing and work-up pending    Subjective: No new complaints denies fever or chills. Even though had fever last      Medications:  Reviewed    Infusion Medications    sodium chloride Stopped (12/02/22 0539)     Scheduled Medications    pantoprazole  40 mg IntraVENous BID    cyanocobalamin  1,000 mcg Oral Daily    FLUoxetine  10 mg Oral Daily    mirtazapine  7.5 mg Oral Nightly    risperiDONE  1 mg Oral QAM    risperiDONE  2 mg Oral Nightly    tamsulosin  0.4 mg Oral Daily    sodium chloride flush  5-40 mL IntraVENous 2 times per day    enoxaparin  30 mg SubCUTAneous BID    thiamine mononitrate  100 mg Oral Daily     PRN Meds: sodium chloride flush, sodium chloride, ondansetron **OR** ondansetron, polyethylene glycol      Intake/Output Summary (Last 24 hours) at 12/5/2022 0729  Last data filed at 12/5/2022 0421  Gross per 24 hour   Intake 1200 ml   Output 2200 ml   Net -1000 ml       Physical Exam Performed:    /66   Pulse 84   Temp 98.9 °F (37.2 °C) (Axillary)   Resp 20   Ht 5' 11\" (1.803 m)   Wt 223 lb 1.7 oz (101.2 kg)   SpO2 90%   BMI 31.12 kg/m²     General appearance: No apparent distress  Neck: Supple  Respiratory:  Normal respiratory effort. Clear to auscultation, bilaterally without Rales/Wheezes/Rhonchi. Cardiovascular: Regular rate and rhythm with normal S1/S2 without murmurs, rubs or gallops.   Abdomen: Soft, non-tender  Musculoskeletal: No clubbing  Skin: Skin color, texture, turgor normal.  No rashes or lesions. Neurologic: No focal weak  Psychiatric: Awake  Capillary Refill: Brisk, 3 seconds, normal   Peripheral Pulses: +2 palpable, equal bilaterally       Labs:   Recent Labs     12/03/22  1333 12/04/22  1344   WBC 12.6* 14.1*   HGB 11.4* 11.2*   HCT 35.7* 34.7*    358     Recent Labs     12/03/22  1333 12/04/22  1344   * 140   K 4.4 4.7    102   CO2 26 25   BUN 14 14   CREATININE 0.6* 0.8   CALCIUM 8.9 9.2     Recent Labs     12/03/22  1333 12/04/22  1344   * 176*   * 201*   BILITOT 0.3 0.5   ALKPHOS 150* 192*     No results for input(s): INR in the last 72 hours. No results for input(s): [place] in the last 72 hours. Urinalysis:      Lab Results   Component Value Date/Time    NITRU Negative 11/27/2022 07:18 PM    WBCUA 1 11/27/2022 07:18 PM    BACTERIA None Seen 11/27/2022 07:18 PM    RBCUA 1 11/27/2022 07:18 PM    BLOODU Negative 11/27/2022 07:18 PM    SPECGRAV 1.039 11/27/2022 07:18 PM    GLUCOSEU Negative 11/27/2022 07:18 PM       Radiology:  US ABDOMEN COMPLETE   Final Result   1. Nonvisualization of the pancreas and abdominal aorta due to bowel gas. 2. Top-normal liver size. No focal lesions in the liver. 3. Normal gallbladder, common bile duct, spleen and kidneys. 4. Trace ascites right upper quadrant. CT ABDOMEN PELVIS W IV CONTRAST Additional Contrast? None   Final Result   1. Limited exam due to patient motion with no acute findings identified   within the abdomen or pelvis. 2. Trace left pleural effusion. XR CHEST PORTABLE   Final Result   Stable appearance of the chest without acute cardiopulmonary process   identified.              PHARMACY TO DOSE VANCOMYCIN  IP CONSULT TO HOSPITALIST  IP CONSULT TO INFECTIOUS DISEASES  IP CONSULT TO GI  IP CONSULT TO GI    Assessment/Plan:    Active Hospital Problems    Diagnosis     Fever and chills [R50.9]      Priority: Medium Neutrophilia [D72.9]      Priority: Medium    CRP elevated [R79.82]      Priority: Medium    Disorientation [R41.0]      Priority: Medium    LFT elevation [R79.89]      Priority: Medium    Septicemia ([address] [A41.9]      Priority: Medium    Sepsis ([address] [A41.9]      Priority: Medium     Sepsis of unknown origin on admission, suspecting urine, with urine infection on admission, patient refused to give urine admission patient started on cefepime and vancomycin, ID consulted completed course. Patient spiking fever again now, check procalcitonin, chest x-ray restart cefepime pending further ID recommendations. Fever could be due to sepsis of unknown origin but also patient tested positive for RSV, improved for some time and then started to spike again fever yesterday, restarted on antibiotics will follow on ID recommendations  Worsening elevation of LFTs, GI consulted, work-up pending  Nausea and vomiting was dark content, GI consulted on admission patient was started on pantoprazole drip, changed to scheduled twice daily per GI. No further recommendation per GI relatively stable, started Lovenox for DVT prophylaxis  Anemia, per GI with chronic disease  Generalized weakness, PT OT  Acute likely on chronic metabolic encephalopathy likely due to infection, at baseline. History of antisocial personality disorder  Elevated CK, possibly rhabdomyolysis, nontraumatic, CK improved and IV fluid discontinued      Diet: ADULT DIET;  Regular  Code Status: Full Code      Mariel Bishop MD

## 2022-12-05 NOTE — PROGRESS NOTES
Comprehensive Nutrition Assessment    Type and Reason for Visit:  Initial, Wound    Nutrition Recommendations/Plan:   Regular diet  Ensure Enlive BID     Malnutrition Assessment:  Malnutrition Status:  Mild malnutrition (12/05/22 1448)    Context:  Chronic Illness     Findings of the 6 clinical characteristics of malnutrition:  Energy Intake:  Mild decrease in energy intake (Comment)  Weight Loss:  No significant weight loss     Body Fat Loss:  Mild body fat loss Orbital   Muscle Mass Loss:  Mild muscle mass loss Temples (temporalis)  Fluid Accumulation:  Mild Generalized   Strength:  Not Performed    Nutrition Assessment:    Pt triggered RD assessment d/t wounds. Noted to have N/V upon admission but has since improved. Hx of duodenal ulcer in 2021 and chronic LFT elevation. Current diet is regular with variable PO intakes throughout this admission. Weight is stable. Will trial Ensure Enlive BID to promote adequate nutrient intake. Nutrition Related Findings:    Trace generalized edema. Lytes WNL. LBM 12/3. +BS. Wound Type: Stage II, Pressure Injury       Current Nutrition Intake & Therapies:    Average Meal Intake: %, 1-25%  Average Supplements Intake: None Ordered  ADULT DIET; Regular    Anthropometric Measures:  Height: 5' 11\" (180.3 cm)  Ideal Body Weight (IBW): 172 lbs (78 kg)    Current Body Weight: 223 lb (101.2 kg),   IBW. Weight Source: Bed Scale  Current BMI (kg/m2): 31.1  BMI Categories: Obese Class 1 (BMI 30.0-34. 9)    Estimated Daily Nutrient Needs:  Energy Requirements Based On: Kcal/kg  Weight Used for Energy Requirements: Current  Energy (kcal/day): 7518-6913 (15-18 kcal/kg CBW)  Weight Used for Protein Requirements: Ideal  Protein (g/day):  1.2-2.0 gm/kg IBW)  Method Used for Fluid Requirements: 1 ml/kcal  Fluid (ml/day): 8595-9557    Nutrition Diagnosis:   Increased nutrient needs related to increase demand for energy/nutrients as evidenced by wounds    Nutrition Interventions: Food and/or Nutrient Delivery: Continue Current Diet, Start Oral Nutrition Supplement  Nutrition Education/Counseling: Education not indicated  Coordination of Nutrition Care: Continue to monitor while inpatient    Goals:  Goals: PO intake 50% or greater    Nutrition Monitoring and Evaluation:   Behavioral-Environmental Outcomes: None Identified  Food/Nutrient Intake Outcomes: Food and Nutrient Intake, Supplement Intake  Physical Signs/Symptoms Outcomes: Biochemical Data, Meal Time Behavior, Nutrition Focused Physical Findings, Skin, Weight, GI Status, Nausea or Vomiting    Discharge Planning:     Too soon to determine     Marianna Roaamento, [address] [place] [address],   Contact: [phone]

## 2022-12-05 NOTE — PROGRESS NOTES
Pharmacy Note - Extended Infusion Beta-Lactam Adjustment    Cefepime 2000mg Q12h for treatment of Sepsis of unknown etiology. Per [facility] Extended Infusion Beta-Lactam Policy, cefepime will be changed to 2000mg load followed by 2000mg Q8h extended infusion    Estimated Creatinine Clearance: Estimated Creatinine Clearance: 113 mL/min (based on SCr of 0.8 mg/dL). BMI: Body mass index is 31.12 kg/m². Please call with any questions.     Thank you,    Flavio Kumar, [facility]

## 2022-12-05 NOTE — CARE COORDINATION
Discharge Planning:  Chart reviewed. Per MD, \"Worsening elevation of LFTs, GI consulted, work-up pending\". Pt is from [id number] [place] [address] 18. Pt will need a COVID test within 48 prior to d/c. PLAN:  [facility] [facility] upon d/c. Awaiting GI clearance.   Pt will need a COVID test within 48hrs of d/c.  Tiffanie Rosario, MSW LSW  [phone]  Electronically signed by Naomy Quinn on 12/5/2022 at 1:36 PM

## 2022-12-05 NOTE — PLAN OF CARE
Problem: Skin/Tissue Integrity  Goal: Absence of new skin breakdown  Description: 1. Monitor for areas of redness and/or skin breakdown  2. Assess vascular access sites hourly  3. Every 4-6 hours minimum:  Change oxygen saturation probe site  4. Every 4-6 hours:  If on nasal continuous positive airway pressure, respiratory therapy assess nares and determine need for appliance change or resting period.   12/5/2022 0929 by Eli Coughlin RN  Outcome: Progressing  12/5/2022 0223 by Winston Brink RN  Outcome: Progressing     Problem: Safety - Adult  Goal: Free from fall injury  12/5/2022 0929 by Eli Coughlin RN  Outcome: Progressing  12/5/2022 0223 by Winston Brink RN  Outcome: Progressing

## 2022-12-06 ENCOUNTER — APPOINTMENT (OUTPATIENT)
Dept: GENERAL RADIOLOGY | Age: 62
DRG: 720 | End: 2022-12-06
Payer: MEDICAID

## 2022-12-06 LAB
A/G RATIO: 0.8 (ref 1.1–2.2)
ALBUMIN SERPL-MCNC: 2.6 G/DL (ref 3.4–5)
ALP BLD-CCNC: 156 U/L (ref 40–129)
ALT SERPL-CCNC: 175 U/L (ref 10–40)
ANION GAP SERPL CALCULATED.3IONS-SCNC: 10 MMOL/L (ref 3–16)
ANTI-NUCLEAR ANTIBODY (ANA): NEGATIVE
AST SERPL-CCNC: 109 U/L (ref 15–37)
BASOPHILS ABSOLUTE: 0 K/UL (ref 0–0.2)
BASOPHILS RELATIVE PERCENT: 0.2 %
BILIRUB SERPL-MCNC: <0.2 MG/DL (ref 0–1)
BLOOD CULTURE, ROUTINE: NORMAL
BUN BLDV-MCNC: 21 MG/DL (ref 7–20)
CALCIUM SERPL-MCNC: 8.9 MG/DL (ref 8.3–10.6)
CHLORIDE BLD-SCNC: 104 MMOL/L (ref 99–110)
CO2: 29 MMOL/L (ref 21–32)
CREAT SERPL-MCNC: 0.7 MG/DL (ref 0.8–1.3)
EOSINOPHILS ABSOLUTE: 0.1 K/UL (ref 0–0.6)
EOSINOPHILS RELATIVE PERCENT: 0.8 %
GFR SERPL CREATININE-BSD FRML MDRD: >60 ML/MIN/{1.73_M2}
GLUCOSE BLD-MCNC: 113 MG/DL (ref 70–99)
HCT VFR BLD CALC: 34.4 % (ref 40.5–52.5)
HCT VFR BLD CALC: 34.7 % (ref 40.5–52.5)
HEMOGLOBIN: 10.9 G/DL (ref 13.5–17.5)
HEMOGLOBIN: 11 G/DL (ref 13.5–17.5)
INR BLD: 1.14 (ref 0.87–1.14)
LYMPHOCYTES ABSOLUTE: 1.2 K/UL (ref 1–5.1)
LYMPHOCYTES RELATIVE PERCENT: 8.9 %
MCH RBC QN AUTO: 26.9 PG (ref 26–34)
MCH RBC QN AUTO: 27.2 PG (ref 26–34)
MCHC RBC AUTO-ENTMCNC: 31.4 G/DL (ref 31–36)
MCHC RBC AUTO-ENTMCNC: 32.1 G/DL (ref 31–36)
MCV RBC AUTO: 84.8 FL (ref 80–100)
MCV RBC AUTO: 85.8 FL (ref 80–100)
MONOCYTES ABSOLUTE: 0.9 K/UL (ref 0–1.3)
MONOCYTES RELATIVE PERCENT: 6.8 %
NEUTROPHILS ABSOLUTE: 11.5 K/UL (ref 1.7–7.7)
NEUTROPHILS RELATIVE PERCENT: 83.3 %
PDW BLD-RTO: 15.4 % (ref 12.4–15.4)
PDW BLD-RTO: 15.4 % (ref 12.4–15.4)
PLATELET # BLD: 404 K/UL (ref 135–450)
PLATELET # BLD: 410 K/UL (ref 135–450)
PMV BLD AUTO: 8.4 FL (ref 5–10.5)
PMV BLD AUTO: 8.9 FL (ref 5–10.5)
POTASSIUM SERPL-SCNC: 4.3 MMOL/L (ref 3.5–5.1)
PROSTATE SPECIFIC ANTIGEN: 0.7 NG/ML (ref 0–4)
PROTHROMBIN TIME: 14.5 SEC (ref 11.7–14.5)
RBC # BLD: 4.05 M/UL (ref 4.2–5.9)
RBC # BLD: 4.06 M/UL (ref 4.2–5.9)
SODIUM BLD-SCNC: 143 MMOL/L (ref 136–145)
TISSUE TRANSGLUTAMINASE IGA: <0.5 U/ML (ref 0–14)
TOTAL PROTEIN: 6 G/DL (ref 6.4–8.2)
WBC # BLD: 13.8 K/UL (ref 4–11)
WBC # BLD: 16.4 K/UL (ref 4–11)

## 2022-12-06 PROCEDURE — C9113 INJ PANTOPRAZOLE SODIUM, VIA: HCPCS | Performed by: INTERNAL MEDICINE

## 2022-12-06 PROCEDURE — 85027 COMPLETE CBC AUTOMATED: CPT

## 2022-12-06 PROCEDURE — 2580000003 HC RX 258: Performed by: INTERNAL MEDICINE

## 2022-12-06 PROCEDURE — 94760 N-INVAS EAR/PLS OXIMETRY 1: CPT

## 2022-12-06 PROCEDURE — 6360000002 HC RX W HCPCS: Performed by: INTERNAL MEDICINE

## 2022-12-06 PROCEDURE — 6370000000 HC RX 637 (ALT 250 FOR IP): Performed by: INTERNAL MEDICINE

## 2022-12-06 PROCEDURE — 36415 COLL VENOUS BLD VENIPUNCTURE: CPT

## 2022-12-06 PROCEDURE — 1200000000 HC SEMI PRIVATE

## 2022-12-06 PROCEDURE — 2700000000 HC OXYGEN THERAPY PER DAY

## 2022-12-06 PROCEDURE — 73000 X-RAY EXAM OF COLLAR BONE: CPT

## 2022-12-06 PROCEDURE — 85025 COMPLETE CBC W/AUTO DIFF WBC: CPT

## 2022-12-06 PROCEDURE — 84153 ASSAY OF PSA TOTAL: CPT

## 2022-12-06 PROCEDURE — 99233 SBSQ HOSP IP/OBS HIGH 50: CPT | Performed by: INTERNAL MEDICINE

## 2022-12-06 PROCEDURE — 85610 PROTHROMBIN TIME: CPT

## 2022-12-06 PROCEDURE — 80053 COMPREHEN METABOLIC PANEL: CPT

## 2022-12-06 RX ADMIN — PANTOPRAZOLE SODIUM 40 MG: 40 INJECTION, POWDER, FOR SOLUTION INTRAVENOUS at 22:30

## 2022-12-06 RX ADMIN — FLUOXETINE HYDROCHLORIDE 10 MG: 10 TABLET ORAL at 08:24

## 2022-12-06 RX ADMIN — ENOXAPARIN SODIUM 30 MG: 100 INJECTION SUBCUTANEOUS at 08:24

## 2022-12-06 RX ADMIN — MIRTAZAPINE 7.5 MG: 15 TABLET, FILM COATED ORAL at 22:29

## 2022-12-06 RX ADMIN — SODIUM CHLORIDE, PRESERVATIVE FREE 10 ML: 5 INJECTION INTRAVENOUS at 08:25

## 2022-12-06 RX ADMIN — PANTOPRAZOLE SODIUM 40 MG: 40 INJECTION, POWDER, FOR SOLUTION INTRAVENOUS at 08:24

## 2022-12-06 RX ADMIN — CEFEPIME 2000 MG: 2 INJECTION, POWDER, FOR SOLUTION INTRAVENOUS at 01:56

## 2022-12-06 RX ADMIN — RISPERIDONE 1 MG: 1 TABLET ORAL at 08:36

## 2022-12-06 RX ADMIN — CYANOCOBALAMIN TAB 1000 MCG 1000 MCG: 1000 TAB at 08:24

## 2022-12-06 RX ADMIN — ENOXAPARIN SODIUM 30 MG: 100 INJECTION SUBCUTANEOUS at 22:31

## 2022-12-06 RX ADMIN — CEFEPIME 2000 MG: 2 INJECTION, POWDER, FOR SOLUTION INTRAVENOUS at 08:23

## 2022-12-06 RX ADMIN — CEFEPIME 2000 MG: 2 INJECTION, POWDER, FOR SOLUTION INTRAVENOUS at 15:54

## 2022-12-06 RX ADMIN — POLYETHYLENE GLYCOL 3350 17 G: 17 POWDER, FOR SOLUTION ORAL at 11:23

## 2022-12-06 RX ADMIN — Medication 100 MG: at 08:24

## 2022-12-06 RX ADMIN — CEFEPIME 2000 MG: 2 INJECTION, POWDER, FOR SOLUTION INTRAVENOUS at 23:56

## 2022-12-06 RX ADMIN — SODIUM CHLORIDE, PRESERVATIVE FREE 10 ML: 5 INJECTION INTRAVENOUS at 22:30

## 2022-12-06 RX ADMIN — TAMSULOSIN HYDROCHLORIDE 0.4 MG: 0.4 CAPSULE ORAL at 08:24

## 2022-12-06 ASSESSMENT — PAIN SCALES - GENERAL: PAINLEVEL_OUTOF10: 0

## 2022-12-06 NOTE — PROGRESS NOTES
Patient alert to person and place, disoriented to time ans situation, delayed responses at times. Patient c/o SOB, denies n/v, diarrhea, pain. ABD rounded, distended, refused glycolax. Assisted patient with breakfast, patient denies further needs. Bed in lowest and locked position, bed alarm in place.   Non-slip socks on, call light in reach

## 2022-12-06 NOTE — PLAN OF CARE
Problem: Discharge Planning  Goal: Discharge to home or other facility with appropriate resources  12/6/2022 1057 by Louise Bay RN  Outcome: Progressing  12/6/2022 0650 by Ray Hughes RN  Outcome: Progressing     Problem: Pain  Goal: Verbalizes/displays adequate comfort level or baseline comfort level  12/6/2022 1057 by Louise Bay RN  Outcome: Progressing  12/6/2022 0650 by Ray Hughes RN  Outcome: Progressing     Problem: Skin/Tissue Integrity  Goal: Absence of new skin breakdown  Description: 1. Monitor for areas of redness and/or skin breakdown  2. Assess vascular access sites hourly  3. Every 4-6 hours minimum:  Change oxygen saturation probe site  4. Every 4-6 hours:  If on nasal continuous positive airway pressure, respiratory therapy assess nares and determine need for appliance change or resting period.   12/6/2022 1057 by Louise Bay RN  Outcome: Progressing  12/6/2022 0650 by Ray Hughes RN  Outcome: Progressing     Problem: Safety - Adult  Goal: Free from fall injury  12/6/2022 1057 by Louise Bay RN  Outcome: Progressing  12/6/2022 0650 by Ray Hughes RN  Outcome: Progressing     Problem: ABCDS Injury Assessment  Goal: Absence of physical injury  12/6/2022 1057 by Louise Bay RN  Outcome: Progressing  12/6/2022 0650 by Ray Hughes RN  Outcome: Progressing     Problem: Gastrointestinal - Adult  Goal: Minimal or absence of nausea and vomiting  12/6/2022 1057 by Louise Bay RN  Outcome: Progressing  12/6/2022 0650 by Ray Hughes RN  Outcome: Progressing  Goal: Maintains or returns to baseline bowel function  12/6/2022 1057 by Louise Bay RN  Outcome: Progressing  12/6/2022 0650 by Ray Hughes RN  Outcome: Progressing     Problem: Nutrition Deficit:  Goal: Optimize nutritional status  12/6/2022 1057 by Louise Bay RN  Outcome: Progressing  12/6/2022 0650 by Ray Hughes RN  Outcome: Progressing

## 2022-12-06 NOTE — PROGRESS NOTES
Infectious Diseases Inpatient Progress notes     CHIEF COMPLAINT:     Sepsis  Fevers  WBC elevation  CK elevation   RSV   PNA      HISTORY OF PRESENT ILLNESS:  57 y.o. man nursing home resident history of major depressive disorder, seizure affective disorder history of substance abuse in the past, antisocial personality disorder, drug-induced subacute dyskinesia, admitted to the hospital secondary to not feeling well fevers and change in mental status. Patient is unable to provide any history admission labs indicate WBC elevation T-max 100.9 blood cultures are obtained urinalysis negative, CRP elevated 143.2 ALT 58 , COVID-19 negative rapid flu negative urinalysis negative given the concern for sepsis we are consulted for recommendations. Interval :  Fevers now trend down on IV abx and will repeat CBC with diff, he has swelling of the Left clavicle could be Old trauma - he was a Boxer per care every where - and he is not able to given any history -     Past Medical History:    Past Medical History:   Diagnosis Date    Antisocial personality disorder ([address]     Blindness right eye category 5, normal vision left eye     Drug induced subacute dyskinesia     Insomnia     Major depressive disorder, recurrent, unspecified ([address]     Melena     Schizoaffective disorder ([address]     Sub[facility]        Past Surgical History:    History reviewed. No pertinent surgical history.     Current Medications:    Outpatient Medications Marked as Taking for the 11/27/22 encounter [facility] Encounter)   Medication Sig Dispense Refill    pantoprazole (PROTONIX) 40 MG tablet Take 1 tablet by mouth 2 times daily (before meals) 60 tablet 0    thiamine mononitrate (THIAMINE) 100 MG tablet Take 1 tablet by mouth daily 30 tablet 0    tamsulosin (FLOMAX) 0.4 MG capsule Take 0.4 mg by mouth daily      cyanocobalamin 1000 MCG tablet Take 1,000 mcg by mouth daily      FLUoxetine (PROZAC) 10 MG tablet Take 10 mg by mouth daily melatonin 3 MG TABS tablet Take 3 mg by mouth nightly as needed      mirtazapine (REMERON) 7.5 MG tablet Take 7.5 mg by mouth nightly      risperiDONE (RISPERDAL) 1 MG tablet Take 1 mg by mouth every morning      risperiDONE (RISPERDAL) 1 MG tablet Take 2 mg by mouth nightly         Allergies:  Codeine, Thorazine [chlorpromazine], and Tylenol [acetaminophen]    Immunizations :   Immunization History   Administered Date(s) Administered    COVID-19, J&J, (age 18y+), IM, 0.5 mL 05/13/2021         Social History:    Social History     Tobacco Use    Smoking status: Unknown    Smokeless tobacco: Never    Tobacco comments:     Quit since in [facility]    Vaping Use    Vaping Use: Unknown   Substance Use Topics    Alcohol use: Not Currently     Comment: unknown    Drug use: Not Currently     Types: Cocaine     Comment: unknown     Social History     Tobacco Use   Smoking Status Unknown   Smokeless Tobacco Never   Tobacco Comments    Quit since in F       Family History : not available       REVIEW OF SYSTEMS:     Not possible due to change in mentation   PHYSICAL EXAM:      Vitals:    /73   Pulse 56   Temp 97 °F (36.1 °C) (Axillary)   Resp 18   Ht 5' 11\" (1.803 m)   Wt 220 lb 7.4 oz (100 kg)   SpO2 94%   BMI 30.75 kg/m²     General Appearance: awake not following commands and has Dyskinesia++ acute distress, no pallor, no icterus on nasal cannula++ POOR DENTITION+   Skin: warm and dry, no rash or erythema  Head: normocephalic and atraumatic  Eyes: Rt eye visual impairment  , round, and reactive to light, conjunctivae normal  ENT: tympanic membrane, external ear and ear canal normal bilaterally, nose without deformity, nasal mucosa and turbinates normal without polyps  Neck: supple and non-tender without mass, no thyromegaly  no cervical lymphadenopathy  Pulmonary/Chest: rt base crepts+ - no wheezes, rales or rhonchi, normal air movement, no respiratory distress  Cardiovascular: normal rate, regular rhythm, normal S1 and S2, no murmurs, rubs, clicks, or gallops, no carotid bruits  Abdomen: soft, non-tender, non-distended, normal bowel sounds, no masses or organomegaly  Extremities: no cyanosis, clubbing or edema  Musculoskeletal: normal range of motion, no joint swelling, deformity or tenderness  Integumentary: No rashes, no abnormal skin lesions, no petechiae  Neurologic: reflexes normal and symmetric, no cranial nerve deficit Dyskinesia+ lynda praxial ++     Lines: IV    DATA:    CBC:   Lab Results   Component Value Date    WBC 16.4 (H) 12/06/2022    HGB 11.0 (L) 12/06/2022    HCT 34.4 (L) 12/06/2022    MCV 84.8 12/06/2022     12/06/2022     RENAL:   Lab Results   Component Value Date    CREATININE 0.7 (L) 12/06/2022    BUN 21 (H) 12/06/2022     12/06/2022    K 4.3 12/06/2022     12/06/2022    CO2 29 12/06/2022     SED RATE:   Lab Results   Component Value Date/Time    SEDRATE >130 12/05/2022 09:18 AM     CK:   Lab Results   Component Value Date/Time    CKTOTAL 270 12/05/2022 09:18 AM     CRP:   Lab Results   Component Value Date/Time    .1 12/05/2022 09:18 AM     Hepatic Function Panel:   Lab Results   Component Value Date/Time    ALKPHOS 156 12/06/2022 05:27 AM     12/06/2022 05:27 AM     12/06/2022 05:27 AM    PROT 6.0 12/06/2022 05:27 AM    PROT 7.1 11/01/2011 07:50 AM    BILITOT <0.2 12/06/2022 05:27 AM    BILIDIR 0.0 11/01/2011 07:50 AM    LABALBU 2.6 12/06/2022 05:27 AM     UA:  Lab Results   Component Value Date/Time    COLORU Yellow 11/27/2022 07:18 PM    CLARITYU Clear 11/27/2022 07:18 PM    GLUCOSEU Negative 11/27/2022 07:18 PM    BILIRUBINUR Negative 11/27/2022 07:18 PM    KETUA Negative 11/27/2022 07:18 PM    SPECGRAV 1.039 11/27/2022 07:18 PM    BLOODU Negative 11/27/2022 07:18 PM    PHUR 5.0 11/27/2022 07:18 PM    PROTEINU TRACE 11/27/2022 07:18 PM    UROBILINOGEN 1.0 11/27/2022 07:18 PM    NITRU Negative 11/27/2022 07:18 PM    LEUKOCYTESUR Negative 11/27/2022 07:18 PM    LABMICR YES 11/27/2022 07:18 PM    Santos Reid NotGiven 11/27/2022 07:18 PM      Urine Microscopic:   Lab Results   Component Value Date/Time    BACTERIA None Seen 11/27/2022 07:18 PM    COMU see below 07/15/2022 12:00 PM    HYALCAST 1 11/27/2022 07:18 PM    WBCUA 1 11/27/2022 07:18 PM    RBCUA 1 11/27/2022 07:18 PM    EPIU 0 11/27/2022 07:18 PM     Urine Reflex to Culture:   Lab Results   Component Value Date/Time    URRFLXCULT Not Indicated 11/27/2022 07:18 PM         MICRO: cultures reviewed and updated by me        Micro results (current encounter only)    Procedure Component Value Units Date/Time   Culture, Blood 2 [[id number]] Collected: 11/28/22 1037   Order Status: Sent Specimen: Blood Updated: 11/28/22 1048   Culture, Urine [[id number]]    Order Status: Sent Specimen: Urine, clean catch    COVID-19, Rapid [[id number]] Collected: 11/27/22 1919   Order Status: Completed Specimen: Nasopharyngeal Swab Updated: 11/27/22 2003    SARS-CoV-2, NAAT Not Detected    Comment: Rapid NAAT:   Negative results should be treated as presumptive and,   if inconsistent with clinical signs and symptoms or necessary for   patient management, should be tested with an alternative molecular   assay. Negative results do not preclude SARS-CoV-2 infection and   should not be used as the sole basis for patient management decisions. This test has been authorized by the FDA under an Emergency Use   Authorization (EUA) for use by authorized laboratories.      Fact sheet for Healthcare Providers:   FindDrives.pl   Fact sheet for Patients: FindDrives.pl     METHODOLOGY: Isothermal Nucleic Acid Amplification       Rapid influenza A/B antigens [[id number]] Collected: 11/27/22 1919   Order Status: Completed Specimen: Nasopharyngeal Updated: 11/27/22 1949    Rapid Influenza A Ag Negative    Rapid Influenza B Ag Negative   Culture, Blood 1 [[id number]] Collected: 11/27/22 1905 Order Status: Sent Specimen: Blood Updated       Blood Culture:   Lab Results   Component Value Date/Time    BC No Growth after 4 days of incubation. 12/02/2022 09:40 AM    BLOODCULT2 No Growth after 4 days of incubation. 11/28/2022 10:37 AM       Viral Culture:    Lab Results   Component Value Date/Time    COVID19 Not Detected 11/27/2022 07:19 PM     Urine Culture: No results for input(s): LABURIN in the last 72 hours. Scheduled Meds:   cefepime  2,000 mg IntraVENous Q8H    pantoprazole  40 mg IntraVENous BID    cyanocobalamin  1,000 mcg Oral Daily    FLUoxetine  10 mg Oral Daily    mirtazapine  7.5 mg Oral Nightly    risperiDONE  1 mg Oral QAM    risperiDONE  2 mg Oral Nightly    tamsulosin  0.4 mg Oral Daily    sodium chloride flush  5-40 mL IntraVENous 2 times per day    enoxaparin  30 mg SubCUTAneous BID    thiamine mononitrate  100 mg Oral Daily       Continuous Infusions:   sodium chloride Stopped (12/02/22 0539)     Procedure Component Value Units Date/Time   Culture, Blood 1 [[id number]] Collected: 12/02/22 0940   Order Status: Completed Specimen: Blood Updated: 12/03/22 1115    Blood Culture, Routine No Growth to date. Any change in status will be called. Narrative:     ORDER#: [id number]                          ORDERED BY: Zainab Aparicio   SOURCE: Blood                              COLLECTED:  12/02/22 09:40   ANTIBIOTICS AT GUS.:                      RECEIVED :  12/02/22 09:57   If child <=2 yrs old please draw pediatric bottle. ~Blood Culture 1   Respiratory Panel Film Array Report [[id number]] Collected: 12/02/22 1145   Order Status: Completed Updated: 12/02/22 1308    Report SEE IMAGE   Respiratory Panel, Molecular, with COVID-19 (Restricted: peds pts or suitable admitted adults) [[id number]] (Abnormal) Collected: 12/02/22 1145   Order Status: Completed Specimen: Nasopharyngeal Updated: 12/02/22 1306    Organism Respiratory syncytial virus by PCR Abnormal     Respiratory Panel PCR -- POSITIVE FOR   See additional report for complete Respiratory Pathogen Panel    Narrative:     ORDER#: [id number]                          ORDERED BY: Yonny Valero      PRN Meds:  sodium chloride flush, sodium chloride, ondansetron **OR** ondansetron, polyethylene glycol    Imaging:   CT CHEST ABDOMEN PELVIS W CONTRAST Additional Contrast? None   Final Result   1. Bilateral lower lobe atelectatic changes worse on the right. Trace left   effusion. No evidence of pneumothorax. 2. Calcified mediastinal nodes but no evidence of significant lymphadenopathy. 3. No evidence of suspicious nodules or masses. 4. No evidence of intra-abdominal infection or tumor. 5. Constipation stool impaction in the rectum. XR CHEST 1 VIEW   Final Result   Right basilar opacity for which pneumonia could be considered in the   appropriate clinical setting. Subtle interface at the left apex, which could reflect artifact or tiny left   apical pneumothorax; continued attention on follow-up suggested. US ABDOMEN COMPLETE   Final Result   1. Nonvisualization of the pancreas and abdominal aorta due to bowel gas. 2. Top-normal liver size. No focal lesions in the liver. 3. Normal gallbladder, common bile duct, spleen and kidneys. 4. Trace ascites right upper quadrant. CT ABDOMEN PELVIS W IV CONTRAST Additional Contrast? None   Final Result   1. Limited exam due to patient motion with no acute findings identified   within the abdomen or pelvis. 2. Trace left pleural effusion. XR CHEST PORTABLE   Final Result   Stable appearance of the chest without acute cardiopulmonary process   identified. All pertinent images and reports for the current Hospitalization were reviewed by me.     IMPRESSION:    Patient Active Problem List   Diagnosis    Acute metabolic encephalopathy    Hypernatremia    E. coli UTI    Sepsis (HCC)    Fever and chills    Neutrophilia    CRP elevated Disorientation    LFT elevation    Septicemia (HCC)     Sepsis   Source not clear  WBC elevation   Fevers on admit  Change in mentation  CT abd/pelvis -ve  CXR  -VE  UA  -ve  LFT elevation   CRP elevation     Wbc NOW trending down on IV abx and and he has Dyskinesia from his Psychiatric medications no history from patient - will be able to complete IV abx course soon      USG of the LIver noted and no obstruction and CRP still elevated and will have to cont IV abx pending clinical improvement     AST/ALT elevation could be from Muscle  than from the Liver  as gamma GT normal     He has h/o substance abuse and Alcohol abuse with elevated Ammonia and LFT ELEVATIOn  in the past       Influenza and RSV can lead to CK elevation - Resp virus panel +Ve for RSV      WBC up but has Monocytosis on the  differential ,ESR, CRP elevation     Will check CT chest/abd/pelvis with contrast thus far negative    There is concern for Rt base PNA given the exam and his oral secretions  and with IV ABX Hopefully fever free we can choose oral abx for planing              Labs, Microbiology, Radiology and pertinent results from current hospitalization and care every where were reviewed by me as a part of the consultation.     PLAN :  cONT  IV  Cefepime  X 2 GM Q 12 hrs for HAP pending Results  Check CT chest/abd/pelvis with contrast   no abscess noted   CK  improved   Esr,crp still marked elevation not clear if this from infection vs other process     HIV and Hepatitis panel -ve   ARTHUR  -ve   bLOOD cx in process NGTD  Check CK 1960 concern if the AST/ALT from the muscle check Gamma GT NORMAL now trend down   USG of the liver  -VE   Procal not much elevation    If fevers continue DDX could be from Drug vs Auto immune given the marked elevation in ESR, CRP   Check PSA and Left Clavicle X ray given the palpable swelling   If no fevers can choose oral Levofloxacin and Flagyl for d/c planning   I ordered WBC scan to check for any bone activity given that we do not have clear cut diagnosis for the elevated ESR, CRP unless its from resp tract infection itself -          Discussed with patient/Family and Nursing   Risk of Complications/Morbidity: High      Illness(es)/ Infection present that pose threat to bodily function. There is potential for severe exacerbation of infection/side effects of treatment. Therapy requires intensive monitoring for antimicrobial agent toxicity. Thanks for allowing me to participate in your patient's care please call me with any questions or concerns.     Dr. Geena Beltre MD  [address] [place] [address] Physician  Phone: [phone]   Fax : [phone]

## 2022-12-06 NOTE — PROGRESS NOTES
Hospitalist Progress Note      PCP: No primary care provider on file. Date of Admission: 11/27/2022      Hospital Course: 60-year male admitted to the hospital with generalized weakness, fever sepsis, had possible UTI, ID consulted, also had rhabdomyolysis which is improving, molecular viral test positive for RSV clinically was improving then starting to spike fever again last night, restarted on antibiotics, procalcitonin ordered, will follow on ID recommendations. To note that GI were consulted for elevated LFTs, which is increasing and work-up pending    Subjective: No new complaints denies fever or chills. Medications:  Reviewed    Infusion Medications    sodium chloride Stopped (12/02/22 0539)     Scheduled Medications    cefepime  2,000 mg IntraVENous Q8H    pantoprazole  40 mg IntraVENous BID    cyanocobalamin  1,000 mcg Oral Daily    FLUoxetine  10 mg Oral Daily    mirtazapine  7.5 mg Oral Nightly    risperiDONE  1 mg Oral QAM    risperiDONE  2 mg Oral Nightly    tamsulosin  0.4 mg Oral Daily    sodium chloride flush  5-40 mL IntraVENous 2 times per day    enoxaparin  30 mg SubCUTAneous BID    thiamine mononitrate  100 mg Oral Daily     PRN Meds: sodium chloride flush, sodium chloride, ondansetron **OR** ondansetron, polyethylene glycol      Intake/Output Summary (Last 24 hours) at 12/6/2022 1726  Last data filed at 12/6/2022 1649  Gross per 24 hour   Intake 1705.38 ml   Output 2200 ml   Net -494.62 ml         Physical Exam Performed:    /67   Pulse 75   Temp 98.1 °F (36.7 °C) (Axillary)   Resp 24   Ht 5' 11\" (1.803 m)   Wt 220 lb 7.4 oz (100 kg)   SpO2 97%   BMI 30.75 kg/m²     General appearance: No apparent distress  Neck: Supple  Respiratory:  Normal respiratory effort. Clear to auscultation, bilaterally without Rales/Wheezes/Rhonchi. Cardiovascular: Regular rate and rhythm with normal S1/S2 without murmurs, rubs or gallops.   Abdomen: Soft, non-tender  Musculoskeletal: No clubbing  Skin: Skin color, texture, turgor normal.  No rashes or lesions. Neurologic: No focal weak  Psychiatric: Awake  Capillary Refill: Brisk, 3 seconds, normal   Peripheral Pulses: +2 palpable, equal bilaterally       Labs:   Recent Labs     12/04/22 1344 12/06/22 0527   WBC 14.1* 16.4*   HGB 11.2* 11.0*   HCT 34.7* 34.4*    410       Recent Labs     12/04/22  1344 12/06/22 0527    143   K 4.7 4.3    104   CO2 25 29   BUN 14 21*   CREATININE 0.8 0.7*   CALCIUM 9.2 8.9       Recent Labs     12/04/22  1344 12/06/22 0527   * 109*   * 175*   BILITOT 0.5 <0.2   ALKPHOS 192* 156*       Recent Labs     12/06/22 0527   INR 1.14     Recent Labs     12/05/22  0918   CKTOTAL 270       Urinalysis:      Lab Results   Component Value Date/Time    NITRU Negative 11/27/2022 07:18 PM    WBCUA 1 11/27/2022 07:18 PM    BACTERIA None Seen 11/27/2022 07:18 PM    RBCUA 1 11/27/2022 07:18 PM    BLOODU Negative 11/27/2022 07:18 PM    SPECGRAV 1.039 11/27/2022 07:18 PM    GLUCOSEU Negative 11/27/2022 07:18 PM       Radiology:  CT CHEST ABDOMEN PELVIS W CONTRAST Additional Contrast? None   Final Result   1. Bilateral lower lobe atelectatic changes worse on the right. Trace left   effusion. No evidence of pneumothorax. 2. Calcified mediastinal nodes but no evidence of significant lymphadenopathy. 3. No evidence of suspicious nodules or masses. 4. No evidence of intra-abdominal infection or tumor. 5. Constipation stool impaction in the rectum. XR CHEST 1 VIEW   Final Result   Right basilar opacity for which pneumonia could be considered in the   appropriate clinical setting. Subtle interface at the left apex, which could reflect artifact or tiny left   apical pneumothorax; continued attention on follow-up suggested. US ABDOMEN COMPLETE   Final Result   1. Nonvisualization of the pancreas and abdominal aorta due to bowel gas. 2. Top-normal liver size.   No focal lesions in the liver. 3. Normal gallbladder, common bile duct, spleen and kidneys. 4. Trace ascites right upper quadrant. CT ABDOMEN PELVIS W IV CONTRAST Additional Contrast? None   Final Result   1. Limited exam due to patient motion with no acute findings identified   within the abdomen or pelvis. 2. Trace left pleural effusion. XR CHEST PORTABLE   Final Result   Stable appearance of the chest without acute cardiopulmonary process   identified. XR CLAVICLE LEFT    (Results Pending)   NM INFLAMMATORY WBC LIMITED W CERETEC    (Results Pending)       PHARMACY TO DOSE VANCOMYCIN  IP CONSULT TO HOSPITALIST  IP CONSULT TO INFECTIOUS DISEASES  IP CONSULT TO GI    Assessment/Plan:    Active Hospital Problems    Diagnosis     Fever and chills [R50.9]      Priority: Medium    Neutrophilia [D72.9]      Priority: Medium    CRP elevated [R79.82]      Priority: Medium    Disorientation [R41.0]      Priority: Medium    LFT elevation [R79.89]      Priority: Medium    Septicemia ([address] [A41.9]      Priority: Medium    Sepsis ([address] [A41.9]      Priority: Medium     Sepsis of unknown origin on admission, suspecting urine, with urine infection on admission, patient refused to give urine admission patient started on cefepime and vancomycin, ID consulted completed course. Cefipime restarted. Fever could be due to sepsis of unknown origin but also patient tested positive for RSV, improved for some time and then started to spike again fever yesterday, restarted on antibiotics will follow on ID recommendations  Worsening elevation of LFTs, GI consulted, work-up pending  Nausea and vomiting was dark content, GI consulted on admission patient was started on pantoprazole drip, changed to scheduled twice daily per GI.   No further recommendation per GI relatively stable, started Lovenox for DVT prophylaxis  Anemia, per GI with chronic disease  Generalized weakness, PT OT  Acute likely on chronic metabolic encephalopathy likely due to infection, at baseline. History of antisocial personality disorder  Elevated CK, possibly rhabdomyolysis, nontraumatic, CK improved and IV fluid discontinued      Diet: ADULT DIET;  Regular  ADULT ORAL NUTRITION SUPPLEMENT; Breakfast, Dinner; Standard High Calorie/High Protein Oral Supplement  Code Status: Full Code      Swathi Hawk MD

## 2022-12-06 NOTE — PLAN OF CARE
Problem: Discharge Planning  Goal: Discharge to home or other facility with appropriate resources  Outcome: Progressing     Problem: Pain  Goal: Verbalizes/displays adequate comfort level or baseline comfort level  Outcome: Progressing     Problem: Skin/Tissue Integrity  Goal: Absence of new skin breakdown  Description: 1. Monitor for areas of redness and/or skin breakdown  2. Assess vascular access sites hourly  3. Every 4-6 hours minimum:  Change oxygen saturation probe site  4. Every 4-6 hours:  If on nasal continuous positive airway pressure, respiratory therapy assess nares and determine need for appliance change or resting period.   Outcome: Progressing     Problem: Safety - Adult  Goal: Free from fall injury  Outcome: Progressing     Problem: ABCDS Injury Assessment  Goal: Absence of physical injury  Outcome: Progressing     Problem: Gastrointestinal - Adult  Goal: Minimal or absence of nausea and vomiting  Outcome: Progressing  Goal: Maintains or returns to baseline bowel function  Outcome: Progressing     Problem: Nutrition Deficit:  Goal: Optimize nutritional status  Outcome: Progressing

## 2022-12-07 VITALS
WEIGHT: 221.12 LBS | BODY MASS INDEX: 30.96 KG/M2 | TEMPERATURE: 97.7 F | HEIGHT: 71 IN | HEART RATE: 77 BPM | DIASTOLIC BLOOD PRESSURE: 66 MMHG | OXYGEN SATURATION: 96 % | SYSTOLIC BLOOD PRESSURE: 104 MMHG | RESPIRATION RATE: 18 BRPM

## 2022-12-07 LAB
ALPHA-1 ANTITRYPSIN: 309 MG/DL (ref 90–200)
CERULOPLASMIN: 40 MG/DL (ref 15–30)
HCT VFR BLD CALC: 33.5 % (ref 40.5–52.5)
HEMOGLOBIN: 10.6 G/DL (ref 13.5–17.5)
MCH RBC QN AUTO: 26.9 PG (ref 26–34)
MCHC RBC AUTO-ENTMCNC: 31.7 G/DL (ref 31–36)
MCV RBC AUTO: 84.9 FL (ref 80–100)
PDW BLD-RTO: 15.2 % (ref 12.4–15.4)
PLATELET # BLD: 409 K/UL (ref 135–450)
PMV BLD AUTO: 8.5 FL (ref 5–10.5)
RBC # BLD: 3.94 M/UL (ref 4.2–5.9)
SARS-COV-2, NAAT: NOT DETECTED
WBC # BLD: 12.6 K/UL (ref 4–11)

## 2022-12-07 PROCEDURE — 6360000002 HC RX W HCPCS: Performed by: INTERNAL MEDICINE

## 2022-12-07 PROCEDURE — 36415 COLL VENOUS BLD VENIPUNCTURE: CPT

## 2022-12-07 PROCEDURE — 94760 N-INVAS EAR/PLS OXIMETRY 1: CPT

## 2022-12-07 PROCEDURE — 2580000003 HC RX 258: Performed by: INTERNAL MEDICINE

## 2022-12-07 PROCEDURE — 6370000000 HC RX 637 (ALT 250 FOR IP): Performed by: INTERNAL MEDICINE

## 2022-12-07 PROCEDURE — 87635 SARS-COV-2 COVID-19 AMP PRB: CPT

## 2022-12-07 PROCEDURE — 85027 COMPLETE CBC AUTOMATED: CPT

## 2022-12-07 PROCEDURE — 2700000000 HC OXYGEN THERAPY PER DAY

## 2022-12-07 PROCEDURE — 99233 SBSQ HOSP IP/OBS HIGH 50: CPT | Performed by: INTERNAL MEDICINE

## 2022-12-07 PROCEDURE — C9113 INJ PANTOPRAZOLE SODIUM, VIA: HCPCS | Performed by: INTERNAL MEDICINE

## 2022-12-07 RX ORDER — LEVOFLOXACIN 500 MG/1
500 TABLET, FILM COATED ORAL DAILY
Qty: 7 TABLET | Refills: 0 | Status: SHIPPED | OUTPATIENT
Start: 2022-12-07 | End: 2022-12-14

## 2022-12-07 RX ORDER — METRONIDAZOLE 500 MG/1
500 TABLET ORAL 3 TIMES DAILY
Qty: 21 TABLET | Refills: 0 | Status: SHIPPED | OUTPATIENT
Start: 2022-12-07 | End: 2022-12-14

## 2022-12-07 RX ADMIN — SODIUM CHLORIDE, PRESERVATIVE FREE 10 ML: 5 INJECTION INTRAVENOUS at 09:12

## 2022-12-07 RX ADMIN — PANTOPRAZOLE SODIUM 40 MG: 40 INJECTION, POWDER, FOR SOLUTION INTRAVENOUS at 09:03

## 2022-12-07 RX ADMIN — Medication 100 MG: at 09:03

## 2022-12-07 RX ADMIN — TAMSULOSIN HYDROCHLORIDE 0.4 MG: 0.4 CAPSULE ORAL at 09:02

## 2022-12-07 RX ADMIN — POLYETHYLENE GLYCOL 3350 17 G: 17 POWDER, FOR SOLUTION ORAL at 08:17

## 2022-12-07 RX ADMIN — CEFEPIME 2000 MG: 2 INJECTION, POWDER, FOR SOLUTION INTRAVENOUS at 08:20

## 2022-12-07 RX ADMIN — FLUOXETINE HYDROCHLORIDE 10 MG: 10 TABLET ORAL at 09:22

## 2022-12-07 RX ADMIN — ENOXAPARIN SODIUM 30 MG: 100 INJECTION SUBCUTANEOUS at 09:03

## 2022-12-07 RX ADMIN — CYANOCOBALAMIN TAB 1000 MCG 1000 MCG: 1000 TAB at 09:02

## 2022-12-07 RX ADMIN — CEFEPIME 2000 MG: 2 INJECTION, POWDER, FOR SOLUTION INTRAVENOUS at 16:26

## 2022-12-07 RX ADMIN — RISPERIDONE 1 MG: 1 TABLET ORAL at 09:02

## 2022-12-07 ASSESSMENT — PAIN SCALES - WONG BAKER: WONGBAKER_NUMERICALRESPONSE: 0

## 2022-12-07 ASSESSMENT — PAIN SCALES - GENERAL
PAINLEVEL_OUTOF10: 0

## 2022-12-07 NOTE — PROGRESS NOTES
Physician Progress Note      PATIENT:               Jason Berry  CSN #:                  [id number]  :                       1960  ADMIT DATE:       2022 6:43 PM  [address] [facility] DATE:  RESPONDING  PROVIDER #:        Kike Correa MD          QUERY TEXT:    Patient admitted with sepsis of undetermined etiology. Noted to test positive   for RSV on . If possible, please document in progress notes and   discharge summary the source of sepsis:    The medical record reflects the following:  Risk Factors: resident of [facility]  Clinical Indicators: sepsis of unknown origin on admission; Procalcitonin   0.09, temp 100.9 on arrival up to 102.3, tested positive for RSV on   Treatment: IV fluids, ID consult, GI consult, blood and respiratory cultures    Thank you,  Poncho Villalobos RN, BSN, CCDS  [email or web address]  Options provided:  -- Sepsis, present on admission, due to RSV  -- Sepsis, present on admission, due to, Please document source. -- Other - I will add my own diagnosis  -- Disagree - Not applicable / Not valid  -- Disagree - Clinically unable to determine / Unknown  -- Refer to Clinical Documentation Reviewer    PROVIDER RESPONSE TEXT:    This patient has sepsis which was present on admission due to RSV. Query created by: Tamera Santizo on 2022 7:11 AM      QUERY TEXT:    Patient admitted with sepsis of undetermined etiology. Per progress notes,   \"Sepsis of unknown origin, suspecting urine, with urine infection on   admission\" documented. UA normal on admission and culture not indicated per   lab protocol. In order to support the diagnosis of UTI, please include   additional clinical indicators in your documentation. Or please document if   the diagnosis of UTI has been ruled out after further study.     The medical record reflects the following:  Risk Factors: none identified  Clinical Indicators: UA on admission normal; urine culture not indicated per   lab protocol  Treatment: ID consult, IV fluids    Thank you,  Gabrielle Ramesh RN, BSN, [email or web address]  Options provided:  -- UTI present as evidenced by, Please document evidence. -- UTI was ruled out  -- Other - I will add my own diagnosis  -- Disagree - Not applicable / Not valid  -- Disagree - Clinically unable to determine / Unknown  -- Refer to Clinical Documentation Reviewer    PROVIDER RESPONSE TEXT:    UTI was ruled out after study. Query created by: Glenda Parker on 12/6/2022 7:11 AM      QUERY TEXT:    Patient admitted with sepsis of undetermined etiology. Per dietary consult on   12/5, meets criteria for mild malnutrition. If possible, please document in   progress notes and discharge summary:    The medical record reflects the following:  Risk Factors: acute on chronic illness  Clinical Indicators: Per dietician, meets criteria for mild malnutrition based   on:  Energy Intake:  Mild decrease in energy intake (Comment)  Body Fat Loss:  Mild body fat loss Orbital  Muscle Mass Loss:  Mild muscle mass loss Temples (temporalis)  Fluid Accumulation:  Mild Generalized  Treatment: dietary consult, nutritional supplements    Thank you,  Gabrielle Ramesh RN, BSN, [email or web address]. [address]  Options provided:  -- Mild malnutrition  -- Other - I will add my own diagnosis  -- Disagree - Not applicable / Not valid  -- Disagree - Clinically unable to determine / Unknown  -- Refer to Clinical Documentation Reviewer    PROVIDER RESPONSE TEXT:    This patient has mild malnutrition.     Query created by: Glenda Parker on 12/6/2022 7:11 AM      Electronically signed by:  Tam Ramon MD 12/7/2022 12:28 PM

## 2022-12-07 NOTE — PLAN OF CARE
Problem: Discharge Planning  Goal: Discharge to home or other facility with appropriate resources  12/7/2022 1124 by Feliz Lockhart RN  Outcome: Progressing  12/7/2022 0341 by Chino Chun RN  Outcome: Progressing  Flowsheets (Taken 12/6/2022 2236)  Discharge to home or other facility with appropriate resources:   Identify barriers to discharge with patient and caregiver   Arrange for needed discharge resources and transportation as appropriate   Identify discharge learning needs (meds, wound care, etc)   Refer to discharge planning if patient needs post-hospital services based on physician order or complex needs related to functional status, cognitive ability or social support system     Problem: Pain  Goal: Verbalizes/displays adequate comfort level or baseline comfort level  12/7/2022 1124 by Feliz Lockhart RN  Outcome: Progressing  12/7/2022 0341 by Chino Chun RN  Outcome: Progressing     Problem: Skin/Tissue Integrity  Goal: Absence of new skin breakdown  Description: 1. Monitor for areas of redness and/or skin breakdown  2. Assess vascular access sites hourly  3. Every 4-6 hours minimum:  Change oxygen saturation probe site  4. Every 4-6 hours:  If on nasal continuous positive airway pressure, respiratory therapy assess nares and determine need for appliance change or resting period.   12/7/2022 1124 by Feliz Lockhart RN  Outcome: Progressing  12/7/2022 0341 by Chino Chun RN  Outcome: Progressing     Problem: Safety - Adult  Goal: Free from fall injury  12/7/2022 1124 by Feliz Lockhart RN  Outcome: Progressing  12/7/2022 0341 by Chino Chun RN  Outcome: Progressing  Flowsheets (Taken 12/7/2022 0341)  Free From Fall Injury: Instruct family/caregiver on patient safety     Problem: ABCDS Injury Assessment  Goal: Absence of physical injury  12/7/2022 1124 by Feliz Lockhart RN  Outcome: Progressing  12/7/2022 0341 by Krista Moore Richie Trujillo RN  Outcome: Progressing  Flowsheets (Taken 12/7/2022 0341)  Absence of Physical Injury: Implement safety measures based on patient assessment     Problem: Gastrointestinal - Adult  Goal: Minimal or absence of nausea and vomiting  12/7/2022 1124 by Emma Garcia RN  Outcome: Progressing  12/7/2022 0341 by Bryan Crawford RN  Outcome: Progressing  Goal: Maintains or returns to baseline bowel function  12/7/2022 1124 by Emma Garcia RN  Outcome: Progressing  12/7/2022 0341 by Bryan Crawford RN  Outcome: Progressing     Problem: Nutrition Deficit:  Goal: Optimize nutritional status  12/7/2022 1124 by Emma Garcia RN  Outcome: Completed  Note: Patient have a good appetite, consistently eat 100% of his meal   12/7/2022 0341 by Bryan Crawford RN  Outcome: Progressing

## 2022-12-07 NOTE — CARE COORDINATION
CASE MANAGEMENT DISCHARGE SUMMARY:    DISCHARGE DATE: 12/7/2022    DISCHARGED TO: LTC:  Discharging to Facility/ Agency   Name:  [facility]  Address:  [address] [place], [place] [address] [address]   Phone:  [phone]  Fax:  [phone]               REPORT NUMBER: [phone]               FAX NUMBER: [phone]    TRANSPORTATION: [facility]             TIME: 845 - 9 pm     PREFERRED PHARMACY: at facility    INSURANCE PRECERT OBTAINED: N/A    RAYMUNDO/VANGIE COMPLETED: N/A    Nurse will update patient of transport time. Crystal at [facility] updated [phone]. Brett Estes (parent) updated of D/C.       Minh Joy RN, BSN, Case Management  [phone]  Electronically signed by Minh Joy RN on 12/7/2022 at 5:07 PM

## 2022-12-07 NOTE — PLAN OF CARE
Problem: Discharge Planning  Goal: Discharge to home or other facility with appropriate resources  12/7/2022 1735 by Amber Berry RN  Outcome: Completed  12/7/2022 1124 by Amber Berry RN  Outcome: Progressing  12/7/2022 0341 by Camelia Arenas RN  Outcome: Progressing  Flowsheets (Taken 12/6/2022 2236)  Discharge to home or other facility with appropriate resources:   Identify barriers to discharge with patient and caregiver   Arrange for needed discharge resources and transportation as appropriate   Identify discharge learning needs (meds, wound care, etc)   Refer to discharge planning if patient needs post-hospital services based on physician order or complex needs related to functional status, cognitive ability or social support system     Problem: Pain  Goal: Verbalizes/displays adequate comfort level or baseline comfort level  12/7/2022 1735 by Amber Berry RN  Outcome: Completed  12/7/2022 1124 by Amber Berry RN  Outcome: Progressing  12/7/2022 0341 by Camelia Arenas RN  Outcome: Progressing     Problem: Skin/Tissue Integrity  Goal: Absence of new skin breakdown  Description: 1. Monitor for areas of redness and/or skin breakdown  2. Assess vascular access sites hourly  3. Every 4-6 hours minimum:  Change oxygen saturation probe site  4. Every 4-6 hours:  If on nasal continuous positive airway pressure, respiratory therapy assess nares and determine need for appliance change or resting period.   12/7/2022 1735 by Amber Berry RN  Outcome: Completed  12/7/2022 1124 by Amber Berry RN  Outcome: Progressing  12/7/2022 0341 by Camelia Arenas RN  Outcome: Progressing     Problem: Safety - Adult  Goal: Free from fall injury  12/7/2022 1735 by Amber Berry RN  Outcome: Completed  12/7/2022 1124 by Amber Berry RN  Outcome: Progressing  12/7/2022 0341 by Camelia Arenas RN  Outcome: Progressing  Flowsheets (Taken 12/7/2022 0341)  Free From Fall Injury: Instruct family/caregiver on patient safety     Problem: ABCDS Injury Assessment  Goal: Absence of physical injury  12/7/2022 1735 by Lorena Stockton RN  Outcome: Completed  12/7/2022 1124 by Lorena Stockton RN  Outcome: Progressing  12/7/2022 0341 by Bevelyn Paget, RN  Outcome: Progressing  Flowsheets (Taken 12/7/2022 0341)  Absence of Physical Injury: Implement safety measures based on patient assessment     Problem: Gastrointestinal - Adult  Goal: Minimal or absence of nausea and vomiting  12/7/2022 1735 by Lorena Stockton RN  Outcome: Completed  12/7/2022 1124 by Lorena Stockton RN  Outcome: Progressing  12/7/2022 0341 by Bevelyn Paget, RN  Outcome: Progressing  Goal: Maintains or returns to baseline bowel function  12/7/2022 1735 by Lorena Stockton RN  Outcome: Completed  12/7/2022 1124 by Lorena Stockton RN  Outcome: Progressing  12/7/2022 0341 by Bevelyn Paget, RN  Outcome: Progressing     Problem: Nutrition Deficit:  Goal: Optimize nutritional status  12/7/2022 1124 by Lorena Stockton RN  Outcome: Completed  Note: Patient have a good appetite, consistently eat 100% of his meal   12/7/2022 0341 by Bevelyn Paget, RN  Outcome: Progressing     Problem: Discharge Planning  Goal: Discharge to home or other facility with appropriate resources  12/7/2022 1735 by Lorena Stockton RN  Outcome: Completed  12/7/2022 1124 by Lorena Stockton RN  Outcome: Progressing  12/7/2022 0341 by Bevelyn Paget, RN  Outcome: Progressing  Flowsheets (Taken 12/6/2022 2236)  Discharge to home or other facility with appropriate resources:   Identify barriers to discharge with patient and caregiver   Arrange for needed discharge resources and transportation as appropriate   Identify discharge learning needs (meds, wound care, etc)   Refer to discharge planning if patient needs post-hospital services based on physician order or complex needs related to functional status, cognitive ability or social support system     Problem: Pain  Goal: Verbalizes/displays adequate comfort level or baseline comfort level  12/7/2022 1735 by Emma Garcia RN  Outcome: Completed  12/7/2022 1124 by Emma Garcia RN  Outcome: Progressing  12/7/2022 0341 by Bryan Crawford RN  Outcome: Progressing     Problem: Skin/Tissue Integrity  Goal: Absence of new skin breakdown  Description: 1. Monitor for areas of redness and/or skin breakdown  2. Assess vascular access sites hourly  3. Every 4-6 hours minimum:  Change oxygen saturation probe site  4. Every 4-6 hours:  If on nasal continuous positive airway pressure, respiratory therapy assess nares and determine need for appliance change or resting period.   12/7/2022 1735 by Emma Garcia RN  Outcome: Completed  12/7/2022 1124 by Emma Garcia RN  Outcome: Progressing  12/7/2022 0341 by Bryan Crawford RN  Outcome: Progressing     Problem: Safety - Adult  Goal: Free from fall injury  12/7/2022 1735 by Emma Garcia RN  Outcome: Completed  12/7/2022 1124 by Emma Garcia RN  Outcome: Progressing  12/7/2022 0341 by Bryan Crawford RN  Outcome: Progressing  Flowsheets (Taken 12/7/2022 0341)  Free From Fall Injury: Instruct family/caregiver on patient safety     Problem: ABCDS Injury Assessment  Goal: Absence of physical injury  12/7/2022 1735 by Emma Garcia RN  Outcome: Completed  12/7/2022 1124 by Emma Garcia RN  Outcome: Progressing  12/7/2022 0341 by Bryan Crawford RN  Outcome: Progressing  Flowsheets (Taken 12/7/2022 0341)  Absence of Physical Injury: Implement safety measures based on patient assessment     Problem: Gastrointestinal - Adult  Goal: Minimal or absence of nausea and vomiting  12/7/2022 1735 by Emma Garcia RN  Outcome: Completed  12/7/2022 1124 by Rosette Fernandez RN  Outcome: Progressing  12/7/2022 0341 by Emilia López RN  Outcome: Progressing  Goal: Maintains or returns to baseline bowel function  12/7/2022 1735 by Rosette Fernandez, RN  Outcome: Completed  12/7/2022 1124 by Rosette Fernandez RN  Outcome: Progressing  12/7/2022 0341 by Emilia López RN  Outcome: Progressing     Problem: Nutrition Deficit:  Goal: Optimize nutritional status  12/7/2022 1124 by Rosette Fernandez RN  Outcome: Completed  Note: Patient have a good appetite, consistently eat 100% of his meal   12/7/2022 0341 by Emilia López RN  Outcome: Progressing

## 2022-12-07 NOTE — PROGRESS NOTES
Infectious Diseases Inpatient Progress notes     CHIEF COMPLAINT:     Sepsis  Fevers  WBC elevation  CK elevation   RSV   PNA      HISTORY OF PRESENT ILLNESS:  57 y.o. man nursing home resident history of major depressive disorder, seizure affective disorder history of substance abuse in the past, antisocial personality disorder, drug-induced subacute dyskinesia, admitted to the hospital secondary to not feeling well fevers and change in mental status. Patient is unable to provide any history admission labs indicate WBC elevation T-max 100.9 blood cultures are obtained urinalysis negative, CRP elevated 143.2 ALT 58 , COVID-19 negative rapid flu negative urinalysis negative given the concern for sepsis we are consulted for recommendations. Interval :  Fevers now trend down on IV abx CBC with diff improving and cough + mentation is better and X ray clavicle no mass lesion aND PSA normal     Past Medical History:    Past Medical History:   Diagnosis Date    Antisocial personality disorder ([address]     Blindness right eye category 5, normal vision left eye     Drug induced subacute dyskinesia     Insomnia     Major depressive disorder, recurrent, unspecified ([address]     Melena     Schizoaffective disorder ([address]     Substance abuse ([facility] [facility]        Past Surgical History:    History reviewed. No pertinent surgical history.     Current Medications:    Outpatient Medications Marked as Taking for the 11/27/22 encounter [facility] Encounter)   Medication Sig Dispense Refill    pantoprazole (PROTONIX) 40 MG tablet Take 1 tablet by mouth 2 times daily (before meals) 60 tablet 0    thiamine mononitrate (THIAMINE) 100 MG tablet Take 1 tablet by mouth daily 30 tablet 0    tamsulosin (FLOMAX) 0.4 MG capsule Take 0.4 mg by mouth daily      cyanocobalamin 1000 MCG tablet Take 1,000 mcg by mouth daily      FLUoxetine (PROZAC) 10 MG tablet Take 10 mg by mouth daily      melatonin 3 MG TABS tablet Take 3 mg by mouth nightly as needed      mirtazapine (REMERON) 7.5 MG tablet Take 7.5 mg by mouth nightly      risperiDONE (RISPERDAL) 1 MG tablet Take 1 mg by mouth every morning      risperiDONE (RISPERDAL) 1 MG tablet Take 2 mg by mouth nightly         Allergies:  Codeine, Thorazine [chlorpromazine], and Tylenol [acetaminophen]    Immunizations :   Immunization History   Administered Date(s) Administered    COVID-19, J&J, (age 18y+), IM, 0.5 mL 05/13/2021         Social History:    Social History     Tobacco Use    Smoking status: Unknown    Smokeless tobacco: Never    Tobacco comments:     Quit since in [facility]    Vaping Use    Vaping Use: Unknown   Substance Use Topics    Alcohol use: Not Currently     Comment: unknown    Drug use: Not Currently     Types: Cocaine     Comment: unknown     Social History     Tobacco Use   Smoking Status Unknown   Smokeless Tobacco Never   Tobacco Comments    Quit since in [facility]       Family History : not available       REVIEW OF SYSTEMS:     Not possible due to change in mentation   PHYSICAL EXAM:      Vitals:    /69   Pulse 66   Temp 98.1 °F (36.7 °C) (Oral)   Resp 18   Ht 5' 11\" (1.803 m)   Wt 221 lb 1.9 oz (100.3 kg)   SpO2 98%   BMI 30.84 kg/m²     General Appearance: awake not following commands and has Dyskinesia++ acute distress, no pallor, no icterus on nasal cannula++ POOR DENTITION+   Skin: warm and dry, no rash or erythema  Head: normocephalic and atraumatic  Eyes: Rt eye visual impairment  , round, and reactive to light, conjunctivae normal  ENT: tympanic membrane, external ear and ear canal normal bilaterally, nose without deformity, nasal mucosa and turbinates normal without polyps  Neck: supple and non-tender without mass, no thyromegaly  no cervical lymphadenopathy  Pulmonary/Chest: rt base crepts+ - no wheezes, rales or rhonchi, normal air movement, no respiratory distress  Cardiovascular: normal rate, regular rhythm, normal S1 and S2, no murmurs, rubs, clicks, or gallops, no carotid bruits  Abdomen: soft, non-tender, non-distended, normal bowel sounds, no masses or organomegaly  Extremities: no cyanosis, clubbing or edema  Musculoskeletal: normal range of motion, no joint swelling, deformity or tenderness  Integumentary: No rashes, no abnormal skin lesions, no petechiae  Neurologic: reflexes normal and symmetric, no cranial nerve deficit Dyskinesia+ lynda praxial ++     Lines: IV    DATA:    CBC:   Lab Results   Component Value Date    WBC 12.6 (H) 12/07/2022    HGB 10.6 (L) 12/07/2022    HCT 33.5 (L) 12/07/2022    MCV 84.9 12/07/2022     12/07/2022     RENAL:   Lab Results   Component Value Date    CREATININE 0.7 (L) 12/06/2022    BUN 21 (H) 12/06/2022     12/06/2022    K 4.3 12/06/2022     12/06/2022    CO2 29 12/06/2022     SED RATE:   Lab Results   Component Value Date/Time    SEDRATE >130 12/05/2022 09:18 AM     CK:   Lab Results   Component Value Date/Time    CKTOTAL 270 12/05/2022 09:18 AM     CRP:   Lab Results   Component Value Date/Time    .1 12/05/2022 09:18 AM     Hepatic Function Panel:   Lab Results   Component Value Date/Time    ALKPHOS 156 12/06/2022 05:27 AM     12/06/2022 05:27 AM     12/06/2022 05:27 AM    PROT 6.0 12/06/2022 05:27 AM    PROT 7.1 11/01/2011 07:50 AM    BILITOT <0.2 12/06/2022 05:27 AM    BILIDIR 0.0 11/01/2011 07:50 AM    LABALBU 2.6 12/06/2022 05:27 AM     UA:  Lab Results   Component Value Date/Time    COLORU Yellow 11/27/2022 07:18 PM    CLARITYU Clear 11/27/2022 07:18 PM    GLUCOSEU Negative 11/27/2022 07:18 PM    BILIRUBINUR Negative 11/27/2022 07:18 PM    KETUA Negative 11/27/2022 07:18 PM    SPECGRAV 1.039 11/27/2022 07:18 PM    BLOODU Negative 11/27/2022 07:18 PM    PHUR 5.0 11/27/2022 07:18 PM    PROTEINU TRACE 11/27/2022 07:18 PM    UROBILINOGEN 1.0 11/27/2022 07:18 PM    NITRU Negative 11/27/2022 07:18 PM    LEUKOCYTESUR Negative 11/27/2022 07:18 PM    LABMICR YES 11/27/2022 07:18 PM    Manuelito Lloyd NotGiven 11/27/2022 07:18 PM      Urine Microscopic:   Lab Results   Component Value Date/Time    BACTERIA None Seen 11/27/2022 07:18 PM    COMU see below 07/15/2022 12:00 PM    HYALCAST 1 11/27/2022 07:18 PM    WBCUA 1 11/27/2022 07:18 PM    RBCUA 1 11/27/2022 07:18 PM    EPIU 0 11/27/2022 07:18 PM     Urine Reflex to Culture:   Lab Results   Component Value Date/Time    URRFLXCULT Not Indicated 11/27/2022 07:18 PM         MICRO: cultures reviewed and updated by me        Micro results (current encounter only)    Procedure Component Value Units Date/Time   Culture, Blood 2 [[id number]] Collected: 11/28/22 1037   Order Status: Sent Specimen: Blood Updated: 11/28/22 1048   Culture, Urine [[id number]]    Order Status: Sent Specimen: Urine, clean catch    COVID-19, Rapid [[id number]] Collected: 11/27/22 1919   Order Status: Completed Specimen: Nasopharyngeal Swab Updated: 11/27/22 2003    SARS-CoV-2, NAAT Not Detected    Comment: Rapid NAAT:   Negative results should be treated as presumptive and,   if inconsistent with clinical signs and symptoms or necessary for   patient management, should be tested with an alternative molecular   assay. Negative results do not preclude SARS-CoV-2 infection and   should not be used as the sole basis for patient management decisions. This test has been authorized by the FDA under an Emergency Use   Authorization (EUA) for use by authorized laboratories.      Fact sheet for Healthcare Providers:   WriteLatex.kaity   Fact sheet for Patients: WriteLatex.za     METHODOLOGY: Isothermal Nucleic Acid Amplification       Rapid influenza A/B antigens [[id number]] Collected: 11/27/22 1919   Order Status: Completed Specimen: Nasopharyngeal Updated: 11/27/22 1949    Rapid Influenza A Ag Negative    Rapid Influenza B Ag Negative   Culture, Blood 1 [[id number]] Collected: 11/27/22 1905   Order Status: Sent Specimen: Blood Updated       Blood Culture:   Lab Results   Component Value Date/Time    BC No Growth after 4 days of incubation. 12/02/2022 09:40 AM    BLOODCULT2 No Growth after 4 days of incubation. 11/28/2022 10:37 AM       Viral Culture:    Lab Results   Component Value Date/Time    COVID19 Not Detected 11/27/2022 07:19 PM     Urine Culture: No results for input(s): LABURIN in the last 72 hours. Scheduled Meds:   cefepime  2,000 mg IntraVENous Q8H    pantoprazole  40 mg IntraVENous BID    cyanocobalamin  1,000 mcg Oral Daily    FLUoxetine  10 mg Oral Daily    mirtazapine  7.5 mg Oral Nightly    risperiDONE  1 mg Oral QAM    risperiDONE  2 mg Oral Nightly    tamsulosin  0.4 mg Oral Daily    sodium chloride flush  5-40 mL IntraVENous 2 times per day    enoxaparin  30 mg SubCUTAneous BID    thiamine mononitrate  100 mg Oral Daily       Continuous Infusions:   sodium chloride Stopped (12/02/22 0539)     Procedure Component Value Units Date/Time   Culture, Blood 1 [[id number]] Collected: 12/02/22 0940   Order Status: Completed Specimen: Blood Updated: 12/03/22 1115    Blood Culture, Routine No Growth to date. Any change in status will be called. Narrative:     ORDER#: [id number]                          ORDERED BY: Yas Zhang   SOURCE: Blood                              COLLECTED:  12/02/22 09:40   ANTIBIOTICS AT GUS.:                      RECEIVED :  12/02/22 09:57   If child <=2 yrs old please draw pediatric bottle. ~Blood Culture 1   Respiratory Panel Film Array Report [[id number]] Collected: 12/02/22 1145   Order Status: Completed Updated: 12/02/22 1308    Report SEE IMAGE   Respiratory Panel, Molecular, with COVID-19 (Restricted: peds pts or suitable admitted adults) [[id number]] (Abnormal) Collected: 12/02/22 1145   Order Status: Completed Specimen: Nasopharyngeal Updated: 12/02/22 1306    Organism Respiratory syncytial virus by PCR Abnormal     Respiratory Panel PCR --    POSITIVE FOR   See additional report for complete Respiratory Pathogen Panel    Narrative:     ORDER#: [id number]                          ORDERED BY: Mariela Whalen      PRN Meds:  sodium chloride flush, sodium chloride, ondansetron **OR** ondansetron, polyethylene glycol    Imaging:   XR CLAVICLE LEFT   Final Result   Mildly widened acromioclavicular joint and ossicle seen just above the joint   which appears well corticated and benign. CT CHEST ABDOMEN PELVIS W CONTRAST Additional Contrast? None   Final Result   1. Bilateral lower lobe atelectatic changes worse on the right. Trace left   effusion. No evidence of pneumothorax. 2. Calcified mediastinal nodes but no evidence of significant lymphadenopathy. 3. No evidence of suspicious nodules or masses. 4. No evidence of intra-abdominal infection or tumor. 5. Constipation stool impaction in the rectum. XR CHEST 1 VIEW   Final Result   Right basilar opacity for which pneumonia could be considered in the   appropriate clinical setting. Subtle interface at the left apex, which could reflect artifact or tiny left   apical pneumothorax; continued attention on follow-up suggested. US ABDOMEN COMPLETE   Final Result   1. Nonvisualization of the pancreas and abdominal aorta due to bowel gas. 2. Top-normal liver size. No focal lesions in the liver. 3. Normal gallbladder, common bile duct, spleen and kidneys. 4. Trace ascites right upper quadrant. CT ABDOMEN PELVIS W IV CONTRAST Additional Contrast? None   Final Result   1. Limited exam due to patient motion with no acute findings identified   within the abdomen or pelvis. 2. Trace left pleural effusion. XR CHEST PORTABLE   Final Result   Stable appearance of the chest without acute cardiopulmonary process   identified. NM INFLAMMATORY WBC LIMITED W CERETEC    (Results Pending)       All pertinent images and reports for the current Hospitalization were reviewed by me.     IMPRESSION:    Patient Active Problem List Diagnosis    Acute metabolic encephalopathy    Hypernatremia    E. coli UTI    Sepsis (HCC)    Fever and chills    Neutrophilia    CRP elevated    Disorientation    LFT elevation    Septicemia (HCC)     Sepsis   Source not clear  WBC elevation   Fevers on admit  Change in mentation  CT abd/pelvis -ve  CXR  -VE  UA  -ve  LFT elevation   CRP elevation     Wbc NOW trending down on IV abx and and he has Dyskinesia from his Psychiatric medications no history from patient - will be able to complete IV abx course soon      USG of the LIver noted and no obstruction and CRP still elevated and will have to cont IV abx pending clinical improvement     AST/ALT elevation could be from Muscle  than from the Liver  as gamma GT normal     He has h/o substance abuse and Alcohol abuse with elevated Ammonia and LFT ELEVATIOn  in the past       Influenza and RSV can lead to CK elevation - Resp virus panel +Ve for RSV      WBC up but has Monocytosis on the  differential ,ESR, CRP elevation     Will check CT chest/abd/pelvis with contrast thus far negative    There is concern for Rt base PNA given the exam and his oral secretions  and with IV ABX Hopefully fever free we can choose oral abx for planing     Now that fevers resolved mukul be ok for Oral levofloxacin and flagyl for now              Labs, Microbiology, Radiology and pertinent results from current hospitalization and care every where were reviewed by me as a part of the consultation.     PLAN :  Cont   IV  Cefepime  X 2 GM Q 8 hrs for HAP  Check CT chest/abd/pelvis with contrast   no abscess noted   CK  improved   Esr,crp still marked elevation not clear if this from infection vs other process     HIV and Hepatitis panel -ve   ARTHUR  -ve   bLOOD cx in process NGTD  Check CK normalized    USG of the liver  -VE   Procal not much elevation    If fevers continue DDX could be from Drug vs Auto immune given the marked elevation in ESR, CRP    k PSA  -vee and Left Clavicle X ray no mass lesions  If no fevers can choose oral Levofloxacin and Flagyl for d/c planning   Can cancel WBC scan as he clinically now improving           Discussed with patient/Family and Nursing d/w   Risk of Complications/Morbidity: High      Illness(es)/ Infection present that pose threat to bodily function. There is potential for severe exacerbation of infection/side effects of treatment. Therapy requires intensive monitoring for antimicrobial agent toxicity. Thanks for allowing me to participate in your patient's care please call me with any questions or concerns.     Dr. Claire Lynne MD  [address] [place] [address] Physician  Phone: [phone]   Fax : [phone]

## 2022-12-07 NOTE — PROGRESS NOTES
WBC test ordered for today. We have to preorder the kit and will draw his blood tomorrow and scan him on Friday 12/09.

## 2022-12-07 NOTE — PROGRESS NOTES
Patient alert and oriented to person and place, denies any SOB, pain and N/V, bed lowest position, call light in patient hand. Yes

## 2022-12-07 NOTE — PROGRESS NOTES
Hospitalist Progress Note      PCP: No primary care provider on file. Date of Admission: 11/27/2022      Hospital Course: 60-year male admitted to the hospital with generalized weakness, fever sepsis, had possible UTI, ID consulted, also had rhabdomyolysis which is improving, molecular viral test positive for RSV clinically was improving then starting to spike fever again last night, restarted on antibiotics, procalcitonin ordered, will follow on ID recommendations. To note that GI were consulted for elevated LFTs, which is increasing and work-up pending    Subjective: Feels better. Improved spirits. Much more conversant and less disoriented    Medications:  Reviewed    Infusion Medications    sodium chloride Stopped (12/02/22 0539)     Scheduled Medications    cefepime  2,000 mg IntraVENous Q8H    pantoprazole  40 mg IntraVENous BID    cyanocobalamin  1,000 mcg Oral Daily    FLUoxetine  10 mg Oral Daily    mirtazapine  7.5 mg Oral Nightly    risperiDONE  1 mg Oral QAM    risperiDONE  2 mg Oral Nightly    tamsulosin  0.4 mg Oral Daily    sodium chloride flush  5-40 mL IntraVENous 2 times per day    enoxaparin  30 mg SubCUTAneous BID    thiamine mononitrate  100 mg Oral Daily     PRN Meds: sodium chloride flush, sodium chloride, ondansetron **OR** ondansetron, polyethylene glycol      Intake/Output Summary (Last 24 hours) at 12/7/2022 1552  Last data filed at 12/7/2022 1324  Gross per 24 hour   Intake 2268.5 ml   Output 1900 ml   Net 368.5 ml         Physical Exam Performed:    /62   Pulse 70   Temp 98.1 °F (36.7 °C) (Oral)   Resp 18   Ht 5' 11\" (1.803 m)   Wt 221 lb 1.9 oz (100.3 kg)   SpO2 96%   BMI 30.84 kg/m²     General appearance: No apparent distress  Neck: Supple  Respiratory:  Normal respiratory effort. Clear to auscultation, bilaterally without Rales/Wheezes/Rhonchi. Cardiovascular: Regular rate and rhythm with normal S1/S2 without murmurs, rubs or gallops.   Abdomen: Soft, non-tender  Musculoskeletal: No clubbing  Skin: Skin color, texture, turgor normal.  No rashes or lesions. Neurologic: No focal weak  Psychiatric: Awake  Capillary Refill: Brisk, 3 seconds, normal   Peripheral Pulses: +2 palpable, equal bilaterally       Labs:   Recent Labs     12/06/22 0527 12/06/22  1854 12/07/22 0529   WBC 16.4* 13.8* 12.6*   HGB 11.0* 10.9* 10.6*   HCT 34.4* 34.7* 33.5*    404 409       Recent Labs     12/06/22 0527      K 4.3      CO2 29   BUN 21*   CREATININE 0.7*   CALCIUM 8.9       Recent Labs     12/06/22 0527   *   *   BILITOT <0.2   ALKPHOS 156*       Recent Labs     12/06/22 0527   INR 1.14       Recent Labs     12/05/22  0918   CKTOTAL 270         Urinalysis:      Lab Results   Component Value Date/Time    NITRU Negative 11/27/2022 07:18 PM    WBCUA 1 11/27/2022 07:18 PM    BACTERIA None Seen 11/27/2022 07:18 PM    RBCUA 1 11/27/2022 07:18 PM    BLOODU Negative 11/27/2022 07:18 PM    SPECGRAV 1.039 11/27/2022 07:18 PM    GLUCOSEU Negative 11/27/2022 07:18 PM       Radiology:  XR CLAVICLE LEFT   Final Result   Mildly widened acromioclavicular joint and ossicle seen just above the joint   which appears well corticated and benign. CT CHEST ABDOMEN PELVIS W CONTRAST Additional Contrast? None   Final Result   1. Bilateral lower lobe atelectatic changes worse on the right. Trace left   effusion. No evidence of pneumothorax. 2. Calcified mediastinal nodes but no evidence of significant lymphadenopathy. 3. No evidence of suspicious nodules or masses. 4. No evidence of intra-abdominal infection or tumor. 5. Constipation stool impaction in the rectum. XR CHEST 1 VIEW   Final Result   Right basilar opacity for which pneumonia could be considered in the   appropriate clinical setting. Subtle interface at the left apex, which could reflect artifact or tiny left   apical pneumothorax; continued attention on follow-up suggested. US ABDOMEN COMPLETE   Final Result   1. Nonvisualization of the pancreas and abdominal aorta due to bowel gas. 2. Top-normal liver size. No focal lesions in the liver. 3. Normal gallbladder, common bile duct, spleen and kidneys. 4. Trace ascites right upper quadrant. CT ABDOMEN PELVIS W IV CONTRAST Additional Contrast? None   Final Result   1. Limited exam due to patient motion with no acute findings identified   within the abdomen or pelvis. 2. Trace left pleural effusion. XR CHEST PORTABLE   Final Result   Stable appearance of the chest without acute cardiopulmonary process   identified. NM INFLAMMATORY WBC LIMITED W CERETEC    (Results Pending)       PHARMACY TO DOSE VANCOMYCIN  IP CONSULT TO HOSPITALIST  IP CONSULT TO INFECTIOUS DISEASES  IP CONSULT TO GI    Assessment/Plan:    Active Hospital Problems    Diagnosis     Fever and chills [R50.9]      Priority: Medium    Neutrophilia [D72.9]      Priority: Medium    CRP elevated [R79.82]      Priority: Medium    Disorientation [R41.0]      Priority: Medium    LFT elevation [R79.89]      Priority: Medium    Septicemia ([address] [A41.9]      Priority: Medium    Sepsis ([address] [A41.9]      Priority: Medium     Sepsis of unknown origin on admission,  Etiology unclear right now. Questionable urine versus pneumonia  Tagged WBC scan ordered  Continue present antibiotics. Clinically improving  WBC trending down    Transaminitis  Improved LFTs lower  Likely related to infection    Acute encephalopathy  Likely septic. Mental status is improved. He is much more lucid and conversant  He is asking me if he will have continued smoking breaks at the nursing home as I mentioned that he is hopefully going to be discharged soon  Patient has been restarted back on his psychotropic medications    Anemia  Stable likely of chronic disease  No indication for transfusion presently      Diet: ADULT DIET;  Regular  ADULT ORAL NUTRITION SUPPLEMENT; Breakfast, Dinner; Standard High Calorie/High Protein Oral Supplement  Code Status: Full Code      Delia Chi MD

## 2022-12-08 LAB — MITOCHONDRIAL M2 AB, IGG: 1.9 U/ML (ref 0–4)

## 2022-12-09 LAB
F-ACTIN AB IGG: 21 UNITS (ref 0–19)
SMOOTH MUSCLE AB IGG TITER: ABNORMAL

## 2023-06-29 ENCOUNTER — APPOINTMENT (OUTPATIENT)
Dept: GENERAL RADIOLOGY | Age: 63
DRG: 752 | End: 2023-06-29
Payer: MEDICAID

## 2023-06-29 ENCOUNTER — APPOINTMENT (OUTPATIENT)
Dept: CT IMAGING | Age: 63
DRG: 752 | End: 2023-06-29
Payer: MEDICAID

## 2023-06-29 ENCOUNTER — HOSPITAL ENCOUNTER (INPATIENT)
Age: 63
LOS: 1 days | Discharge: NURSING, REHAB OR LONG-TERM CARE | DRG: 752 | End: 2023-07-06
Attending: EMERGENCY MEDICINE | Admitting: STUDENT IN AN ORGANIZED HEALTH CARE EDUCATION/TRAINING PROGRAM
Payer: MEDICAID

## 2023-06-29 DIAGNOSIS — R41.82 ALTERED MENTAL STATUS, UNSPECIFIED ALTERED MENTAL STATUS TYPE: Primary | ICD-10-CM

## 2023-06-29 DIAGNOSIS — R47.9 SPEECH DISTURBANCE, UNSPECIFIED TYPE: ICD-10-CM

## 2023-06-29 LAB
ALBUMIN SERPL-MCNC: 4 G/DL (ref 3.4–5)
ALBUMIN/GLOB SERPL: 1.1 {RATIO} (ref 1.1–2.2)
ALP SERPL-CCNC: 112 U/L (ref 40–129)
ALT SERPL-CCNC: 20 U/L (ref 10–40)
ANION GAP SERPL CALCULATED.3IONS-SCNC: 12 MMOL/L (ref 3–16)
AST SERPL-CCNC: 19 U/L (ref 15–37)
BASE EXCESS BLDV CALC-SCNC: 3.5 MMOL/L
BASOPHILS # BLD: 0 K/UL (ref 0–0.2)
BASOPHILS NFR BLD: 0.3 %
BILIRUB SERPL-MCNC: 0.6 MG/DL (ref 0–1)
BILIRUB UR QL STRIP.AUTO: NEGATIVE
BUN SERPL-MCNC: 14 MG/DL (ref 7–20)
CALCIUM SERPL-MCNC: 9.4 MG/DL (ref 8.3–10.6)
CHLORIDE SERPL-SCNC: 101 MMOL/L (ref 99–110)
CHP ED QC CHECK: YES
CLARITY UR: CLEAR
CO2 BLDV-SCNC: 32 MMOL/L
CO2 SERPL-SCNC: 25 MMOL/L (ref 21–32)
COHGB MFR BLDV: 2.3 %
COLOR UR: YELLOW
CREAT SERPL-MCNC: 0.7 MG/DL (ref 0.8–1.3)
DEPRECATED RDW RBC AUTO: 15 % (ref 12.4–15.4)
EOSINOPHIL # BLD: 0.1 K/UL (ref 0–0.6)
EOSINOPHIL NFR BLD: 0.7 %
GFR SERPLBLD CREATININE-BSD FMLA CKD-EPI: >60 ML/MIN/{1.73_M2}
GLUCOSE BLD-MCNC: 78 MG/DL (ref 70–99)
GLUCOSE BLD-MCNC: 81 MG/DL (ref 70–99)
GLUCOSE BLD-MCNC: 94 MG/DL
GLUCOSE BLD-MCNC: 94 MG/DL (ref 70–99)
GLUCOSE SERPL-MCNC: 95 MG/DL (ref 70–99)
GLUCOSE UR STRIP.AUTO-MCNC: NEGATIVE MG/DL
HCO3 BLDV-SCNC: 30 MMOL/L (ref 23–29)
HCT VFR BLD AUTO: 41.7 % (ref 40.5–52.5)
HGB BLD-MCNC: 13.9 G/DL (ref 13.5–17.5)
HGB UR QL STRIP.AUTO: NEGATIVE
INR PPP: 0.95 (ref 0.84–1.16)
KETONES UR STRIP.AUTO-MCNC: 80 MG/DL
LACTATE BLDV-SCNC: 1 MMOL/L (ref 0.4–2)
LEUKOCYTE ESTERASE UR QL STRIP.AUTO: NEGATIVE
LYMPHOCYTES # BLD: 1 K/UL (ref 1–5.1)
LYMPHOCYTES NFR BLD: 11.3 %
MCH RBC QN AUTO: 28.2 PG (ref 26–34)
MCHC RBC AUTO-ENTMCNC: 33.5 G/DL (ref 31–36)
MCV RBC AUTO: 84.3 FL (ref 80–100)
METHGB MFR BLDV: 0.1 %
MONOCYTES # BLD: 1.1 K/UL (ref 0–1.3)
MONOCYTES NFR BLD: 12.2 %
NEUTROPHILS # BLD: 6.6 K/UL (ref 1.7–7.7)
NEUTROPHILS NFR BLD: 75.5 %
NITRITE UR QL STRIP.AUTO: NEGATIVE
O2 THERAPY: ABNORMAL
PCO2 BLDV: 51.2 MMHG (ref 40–50)
PERFORMED ON: NORMAL
PH BLDV: 7.38 [PH] (ref 7.35–7.45)
PH UR STRIP.AUTO: 5.5 [PH] (ref 5–8)
PLATELET # BLD AUTO: 205 K/UL (ref 135–450)
PMV BLD AUTO: 8.9 FL (ref 5–10.5)
PO2 BLDV: 56 MMHG
POTASSIUM SERPL-SCNC: 4.1 MMOL/L (ref 3.5–5.1)
PROT SERPL-MCNC: 7.6 G/DL (ref 6.4–8.2)
PROT UR STRIP.AUTO-MCNC: NEGATIVE MG/DL
PROTHROMBIN TIME: 12.7 SEC (ref 11.5–14.8)
RBC # BLD AUTO: 4.94 M/UL (ref 4.2–5.9)
SAO2 % BLDV: 88 %
SODIUM SERPL-SCNC: 138 MMOL/L (ref 136–145)
SP GR UR STRIP.AUTO: 1.05 (ref 1–1.03)
TROPONIN, HIGH SENSITIVITY: 17 NG/L (ref 0–22)
UA COMPLETE W REFLEX CULTURE PNL UR: ABNORMAL
UA DIPSTICK W REFLEX MICRO PNL UR: ABNORMAL
URN SPEC COLLECT METH UR: ABNORMAL
UROBILINOGEN UR STRIP-ACNC: 1 E.U./DL
WBC # BLD AUTO: 8.8 K/UL (ref 4–11)

## 2023-06-29 PROCEDURE — 81003 URINALYSIS AUTO W/O SCOPE: CPT

## 2023-06-29 PROCEDURE — 80053 COMPREHEN METABOLIC PANEL: CPT

## 2023-06-29 PROCEDURE — 82803 BLOOD GASES ANY COMBINATION: CPT

## 2023-06-29 PROCEDURE — 70450 CT HEAD/BRAIN W/O DYE: CPT

## 2023-06-29 PROCEDURE — 83605 ASSAY OF LACTIC ACID: CPT

## 2023-06-29 PROCEDURE — 70496 CT ANGIOGRAPHY HEAD: CPT

## 2023-06-29 PROCEDURE — 71045 X-RAY EXAM CHEST 1 VIEW: CPT

## 2023-06-29 PROCEDURE — 85025 COMPLETE CBC W/AUTO DIFF WBC: CPT

## 2023-06-29 PROCEDURE — 6360000004 HC RX CONTRAST MEDICATION: Performed by: EMERGENCY MEDICINE

## 2023-06-29 PROCEDURE — 85610 PROTHROMBIN TIME: CPT

## 2023-06-29 PROCEDURE — G0378 HOSPITAL OBSERVATION PER HR: HCPCS

## 2023-06-29 PROCEDURE — 2580000003 HC RX 258: Performed by: STUDENT IN AN ORGANIZED HEALTH CARE EDUCATION/TRAINING PROGRAM

## 2023-06-29 PROCEDURE — 93005 ELECTROCARDIOGRAM TRACING: CPT | Performed by: EMERGENCY MEDICINE

## 2023-06-29 PROCEDURE — 84484 ASSAY OF TROPONIN QUANT: CPT

## 2023-06-29 PROCEDURE — 99285 EMERGENCY DEPT VISIT HI MDM: CPT

## 2023-06-29 RX ORDER — TAMSULOSIN HYDROCHLORIDE 0.4 MG/1
0.4 CAPSULE ORAL NIGHTLY
Status: DISCONTINUED | OUTPATIENT
Start: 2023-06-29 | End: 2023-07-06 | Stop reason: HOSPADM

## 2023-06-29 RX ORDER — ASPIRIN 81 MG/1
81 TABLET, CHEWABLE ORAL DAILY
Status: DISCONTINUED | OUTPATIENT
Start: 2023-06-29 | End: 2023-07-06 | Stop reason: HOSPADM

## 2023-06-29 RX ORDER — FLUOXETINE 10 MG/1
10 TABLET, FILM COATED ORAL DAILY
Status: DISCONTINUED | OUTPATIENT
Start: 2023-06-30 | End: 2023-07-06 | Stop reason: HOSPADM

## 2023-06-29 RX ORDER — FERROUS SULFATE 325(65) MG
325 TABLET ORAL
COMMUNITY

## 2023-06-29 RX ORDER — ACETAMINOPHEN 325 MG/1
650 TABLET ORAL EVERY 6 HOURS PRN
Status: DISCONTINUED | OUTPATIENT
Start: 2023-06-29 | End: 2023-06-29

## 2023-06-29 RX ORDER — BUSPIRONE HYDROCHLORIDE 10 MG/1
10 TABLET ORAL 2 TIMES DAILY
COMMUNITY

## 2023-06-29 RX ORDER — LATANOPROST 50 UG/ML
1 SOLUTION/ DROPS OPHTHALMIC NIGHTLY
COMMUNITY

## 2023-06-29 RX ORDER — CLOPIDOGREL BISULFATE 75 MG/1
75 TABLET ORAL DAILY
Status: DISCONTINUED | OUTPATIENT
Start: 2023-06-30 | End: 2023-07-06 | Stop reason: HOSPADM

## 2023-06-29 RX ORDER — SODIUM CHLORIDE 9 MG/ML
INJECTION, SOLUTION INTRAVENOUS PRN
Status: DISCONTINUED | OUTPATIENT
Start: 2023-06-29 | End: 2023-07-06 | Stop reason: HOSPADM

## 2023-06-29 RX ORDER — SODIUM CHLORIDE 0.9 % (FLUSH) 0.9 %
5-40 SYRINGE (ML) INJECTION PRN
Status: DISCONTINUED | OUTPATIENT
Start: 2023-06-29 | End: 2023-07-06 | Stop reason: HOSPADM

## 2023-06-29 RX ORDER — ONDANSETRON 2 MG/ML
4 INJECTION INTRAMUSCULAR; INTRAVENOUS EVERY 6 HOURS PRN
Status: DISCONTINUED | OUTPATIENT
Start: 2023-06-29 | End: 2023-07-06 | Stop reason: HOSPADM

## 2023-06-29 RX ORDER — ACETAMINOPHEN 650 MG/1
650 SUPPOSITORY RECTAL EVERY 6 HOURS PRN
Status: DISCONTINUED | OUTPATIENT
Start: 2023-06-29 | End: 2023-06-29

## 2023-06-29 RX ORDER — BUSPIRONE HYDROCHLORIDE 10 MG/1
10 TABLET ORAL 2 TIMES DAILY
Status: DISCONTINUED | OUTPATIENT
Start: 2023-06-29 | End: 2023-07-06 | Stop reason: HOSPADM

## 2023-06-29 RX ORDER — ONDANSETRON 4 MG/1
4 TABLET, ORALLY DISINTEGRATING ORAL EVERY 8 HOURS PRN
Status: DISCONTINUED | OUTPATIENT
Start: 2023-06-29 | End: 2023-07-06 | Stop reason: HOSPADM

## 2023-06-29 RX ORDER — RISPERIDONE 1 MG/1
1 TABLET ORAL DAILY
Status: DISCONTINUED | OUTPATIENT
Start: 2023-06-30 | End: 2023-07-06 | Stop reason: HOSPADM

## 2023-06-29 RX ORDER — FERROUS SULFATE TAB EC 324 MG (65 MG FE EQUIVALENT) 324 (65 FE) MG
324 TABLET DELAYED RESPONSE ORAL
Status: DISCONTINUED | OUTPATIENT
Start: 2023-06-30 | End: 2023-07-06 | Stop reason: HOSPADM

## 2023-06-29 RX ORDER — ATORVASTATIN CALCIUM 40 MG/1
40 TABLET, FILM COATED ORAL NIGHTLY
Status: DISCONTINUED | OUTPATIENT
Start: 2023-06-29 | End: 2023-07-06 | Stop reason: HOSPADM

## 2023-06-29 RX ORDER — POLYETHYLENE GLYCOL 3350 17 G/17G
17 POWDER, FOR SOLUTION ORAL DAILY PRN
Status: DISCONTINUED | OUTPATIENT
Start: 2023-06-29 | End: 2023-07-06 | Stop reason: HOSPADM

## 2023-06-29 RX ORDER — SODIUM CHLORIDE 0.9 % (FLUSH) 0.9 %
5-40 SYRINGE (ML) INJECTION EVERY 12 HOURS SCHEDULED
Status: DISCONTINUED | OUTPATIENT
Start: 2023-06-29 | End: 2023-07-06 | Stop reason: HOSPADM

## 2023-06-29 RX ADMIN — SODIUM CHLORIDE, PRESERVATIVE FREE 10 ML: 5 INJECTION INTRAVENOUS at 23:10

## 2023-06-29 RX ADMIN — IOPAMIDOL 75 ML: 755 INJECTION, SOLUTION INTRAVENOUS at 15:47

## 2023-06-29 ASSESSMENT — LIFESTYLE VARIABLES
HOW OFTEN DO YOU HAVE A DRINK CONTAINING ALCOHOL: NEVER
HOW MANY STANDARD DRINKS CONTAINING ALCOHOL DO YOU HAVE ON A TYPICAL DAY: PATIENT DOES NOT DRINK

## 2023-06-29 ASSESSMENT — PAIN - FUNCTIONAL ASSESSMENT: PAIN_FUNCTIONAL_ASSESSMENT: ADULT NONVERBAL PAIN SCALE (NPVS)

## 2023-06-30 ENCOUNTER — APPOINTMENT (OUTPATIENT)
Dept: MRI IMAGING | Age: 63
DRG: 752 | End: 2023-06-30
Payer: MEDICAID

## 2023-06-30 ENCOUNTER — APPOINTMENT (OUTPATIENT)
Dept: GENERAL RADIOLOGY | Age: 63
DRG: 752 | End: 2023-06-30
Payer: MEDICAID

## 2023-06-30 LAB
ANION GAP SERPL CALCULATED.3IONS-SCNC: 13 MMOL/L (ref 3–16)
BUN SERPL-MCNC: 13 MG/DL (ref 7–20)
CALCIUM SERPL-MCNC: 9.4 MG/DL (ref 8.3–10.6)
CHLORIDE SERPL-SCNC: 100 MMOL/L (ref 99–110)
CO2 SERPL-SCNC: 25 MMOL/L (ref 21–32)
CREAT SERPL-MCNC: 0.7 MG/DL (ref 0.8–1.3)
DEPRECATED RDW RBC AUTO: 15.3 % (ref 12.4–15.4)
EKG ATRIAL RATE: 65 BPM
EKG DIAGNOSIS: NORMAL
EKG P AXIS: 50 DEGREES
EKG P-R INTERVAL: 178 MS
EKG Q-T INTERVAL: 364 MS
EKG QRS DURATION: 72 MS
EKG QTC CALCULATION (BAZETT): 378 MS
EKG R AXIS: 44 DEGREES
EKG T AXIS: 58 DEGREES
EKG VENTRICULAR RATE: 65 BPM
GFR SERPLBLD CREATININE-BSD FMLA CKD-EPI: >60 ML/MIN/{1.73_M2}
GLUCOSE BLD-MCNC: 100 MG/DL (ref 70–99)
GLUCOSE BLD-MCNC: 75 MG/DL (ref 70–99)
GLUCOSE BLD-MCNC: 79 MG/DL (ref 70–99)
GLUCOSE BLD-MCNC: 94 MG/DL (ref 70–99)
GLUCOSE SERPL-MCNC: 73 MG/DL (ref 70–99)
HCT VFR BLD AUTO: 42 % (ref 40.5–52.5)
HGB BLD-MCNC: 13.7 G/DL (ref 13.5–17.5)
MAGNESIUM SERPL-MCNC: 1.7 MG/DL (ref 1.8–2.4)
MCH RBC QN AUTO: 28 PG (ref 26–34)
MCHC RBC AUTO-ENTMCNC: 32.7 G/DL (ref 31–36)
MCV RBC AUTO: 85.5 FL (ref 80–100)
PERFORMED ON: ABNORMAL
PERFORMED ON: NORMAL
PHOSPHATE SERPL-MCNC: 3.6 MG/DL (ref 2.5–4.9)
PLATELET # BLD AUTO: 195 K/UL (ref 135–450)
PMV BLD AUTO: 9.1 FL (ref 5–10.5)
POTASSIUM SERPL-SCNC: 3.9 MMOL/L (ref 3.5–5.1)
RBC # BLD AUTO: 4.91 M/UL (ref 4.2–5.9)
SODIUM SERPL-SCNC: 138 MMOL/L (ref 136–145)
WBC # BLD AUTO: 9.9 K/UL (ref 4–11)

## 2023-06-30 PROCEDURE — 85027 COMPLETE CBC AUTOMATED: CPT

## 2023-06-30 PROCEDURE — 83735 ASSAY OF MAGNESIUM: CPT

## 2023-06-30 PROCEDURE — 6370000000 HC RX 637 (ALT 250 FOR IP): Performed by: STUDENT IN AN ORGANIZED HEALTH CARE EDUCATION/TRAINING PROGRAM

## 2023-06-30 PROCEDURE — G0378 HOSPITAL OBSERVATION PER HR: HCPCS

## 2023-06-30 PROCEDURE — 36415 COLL VENOUS BLD VENIPUNCTURE: CPT

## 2023-06-30 PROCEDURE — 74018 RADEX ABDOMEN 1 VIEW: CPT

## 2023-06-30 PROCEDURE — 92610 EVALUATE SWALLOWING FUNCTION: CPT

## 2023-06-30 PROCEDURE — 84100 ASSAY OF PHOSPHORUS: CPT

## 2023-06-30 PROCEDURE — 93010 ELECTROCARDIOGRAM REPORT: CPT | Performed by: INTERNAL MEDICINE

## 2023-06-30 PROCEDURE — 94760 N-INVAS EAR/PLS OXIMETRY 1: CPT

## 2023-06-30 PROCEDURE — 2580000003 HC RX 258: Performed by: STUDENT IN AN ORGANIZED HEALTH CARE EDUCATION/TRAINING PROGRAM

## 2023-06-30 PROCEDURE — 80048 BASIC METABOLIC PNL TOTAL CA: CPT

## 2023-06-30 PROCEDURE — 70551 MRI BRAIN STEM W/O DYE: CPT

## 2023-06-30 RX ADMIN — TAMSULOSIN HYDROCHLORIDE 0.4 MG: 0.4 CAPSULE ORAL at 20:04

## 2023-06-30 RX ADMIN — SODIUM CHLORIDE, PRESERVATIVE FREE 10 ML: 5 INJECTION INTRAVENOUS at 20:04

## 2023-06-30 RX ADMIN — ATORVASTATIN CALCIUM 40 MG: 40 TABLET, FILM COATED ORAL at 20:04

## 2023-06-30 RX ADMIN — BUSPIRONE HYDROCHLORIDE 10 MG: 10 TABLET ORAL at 20:04

## 2023-07-01 LAB
ANION GAP SERPL CALCULATED.3IONS-SCNC: 9 MMOL/L (ref 3–16)
BUN SERPL-MCNC: 16 MG/DL (ref 7–20)
CALCIUM SERPL-MCNC: 9.4 MG/DL (ref 8.3–10.6)
CHLORIDE SERPL-SCNC: 104 MMOL/L (ref 99–110)
CO2 SERPL-SCNC: 29 MMOL/L (ref 21–32)
CREAT SERPL-MCNC: 0.8 MG/DL (ref 0.8–1.3)
DEPRECATED RDW RBC AUTO: 15.1 % (ref 12.4–15.4)
GFR SERPLBLD CREATININE-BSD FMLA CKD-EPI: >60 ML/MIN/{1.73_M2}
GLUCOSE SERPL-MCNC: 88 MG/DL (ref 70–99)
HCT VFR BLD AUTO: 41.1 % (ref 40.5–52.5)
HGB BLD-MCNC: 13.7 G/DL (ref 13.5–17.5)
MAGNESIUM SERPL-MCNC: 1.8 MG/DL (ref 1.8–2.4)
MCH RBC QN AUTO: 28.4 PG (ref 26–34)
MCHC RBC AUTO-ENTMCNC: 33.3 G/DL (ref 31–36)
MCV RBC AUTO: 85.1 FL (ref 80–100)
PHOSPHATE SERPL-MCNC: 3.9 MG/DL (ref 2.5–4.9)
PLATELET # BLD AUTO: 198 K/UL (ref 135–450)
PMV BLD AUTO: 8.8 FL (ref 5–10.5)
POTASSIUM SERPL-SCNC: 3.9 MMOL/L (ref 3.5–5.1)
RBC # BLD AUTO: 4.82 M/UL (ref 4.2–5.9)
SODIUM SERPL-SCNC: 142 MMOL/L (ref 136–145)
WBC # BLD AUTO: 10.2 K/UL (ref 4–11)

## 2023-07-01 PROCEDURE — 2580000003 HC RX 258: Performed by: STUDENT IN AN ORGANIZED HEALTH CARE EDUCATION/TRAINING PROGRAM

## 2023-07-01 PROCEDURE — 80048 BASIC METABOLIC PNL TOTAL CA: CPT

## 2023-07-01 PROCEDURE — 6370000000 HC RX 637 (ALT 250 FOR IP): Performed by: STUDENT IN AN ORGANIZED HEALTH CARE EDUCATION/TRAINING PROGRAM

## 2023-07-01 PROCEDURE — 84100 ASSAY OF PHOSPHORUS: CPT

## 2023-07-01 PROCEDURE — 83735 ASSAY OF MAGNESIUM: CPT

## 2023-07-01 PROCEDURE — G0378 HOSPITAL OBSERVATION PER HR: HCPCS

## 2023-07-01 PROCEDURE — 85027 COMPLETE CBC AUTOMATED: CPT

## 2023-07-01 PROCEDURE — 36415 COLL VENOUS BLD VENIPUNCTURE: CPT

## 2023-07-01 RX ADMIN — BUSPIRONE HYDROCHLORIDE 10 MG: 10 TABLET ORAL at 09:24

## 2023-07-01 RX ADMIN — ASPIRIN 81 MG 81 MG: 81 TABLET ORAL at 09:24

## 2023-07-01 RX ADMIN — ATORVASTATIN CALCIUM 40 MG: 40 TABLET, FILM COATED ORAL at 21:16

## 2023-07-01 RX ADMIN — FERROUS SULFATE TAB EC 324 MG (65 MG FE EQUIVALENT) 324 MG: 324 (65 FE) TABLET DELAYED RESPONSE at 09:24

## 2023-07-01 RX ADMIN — SODIUM CHLORIDE, PRESERVATIVE FREE 10 ML: 5 INJECTION INTRAVENOUS at 09:24

## 2023-07-01 RX ADMIN — FLUOXETINE 10 MG: 10 TABLET, FILM COATED ORAL at 10:09

## 2023-07-01 RX ADMIN — RISPERIDONE 1 MG: 1 TABLET ORAL at 09:24

## 2023-07-01 RX ADMIN — SODIUM CHLORIDE, PRESERVATIVE FREE 10 ML: 5 INJECTION INTRAVENOUS at 21:16

## 2023-07-01 RX ADMIN — TAMSULOSIN HYDROCHLORIDE 0.4 MG: 0.4 CAPSULE ORAL at 21:16

## 2023-07-01 RX ADMIN — CLOPIDOGREL BISULFATE 75 MG: 75 TABLET ORAL at 09:24

## 2023-07-01 RX ADMIN — BUSPIRONE HYDROCHLORIDE 10 MG: 10 TABLET ORAL at 21:16

## 2023-07-02 ENCOUNTER — APPOINTMENT (OUTPATIENT)
Dept: GENERAL RADIOLOGY | Age: 63
DRG: 752 | End: 2023-07-02
Payer: MEDICAID

## 2023-07-02 LAB
ANION GAP SERPL CALCULATED.3IONS-SCNC: 10 MMOL/L (ref 3–16)
BUN SERPL-MCNC: 15 MG/DL (ref 7–20)
CALCIUM SERPL-MCNC: 9.7 MG/DL (ref 8.3–10.6)
CHLORIDE SERPL-SCNC: 101 MMOL/L (ref 99–110)
CO2 SERPL-SCNC: 27 MMOL/L (ref 21–32)
CREAT SERPL-MCNC: 0.7 MG/DL (ref 0.8–1.3)
DEPRECATED RDW RBC AUTO: 15.1 % (ref 12.4–15.4)
GFR SERPLBLD CREATININE-BSD FMLA CKD-EPI: >60 ML/MIN/{1.73_M2}
GLUCOSE SERPL-MCNC: 98 MG/DL (ref 70–99)
HCT VFR BLD AUTO: 43.6 % (ref 40.5–52.5)
HGB BLD-MCNC: 14.4 G/DL (ref 13.5–17.5)
MAGNESIUM SERPL-MCNC: 2 MG/DL (ref 1.8–2.4)
MCH RBC QN AUTO: 28.1 PG (ref 26–34)
MCHC RBC AUTO-ENTMCNC: 33.1 G/DL (ref 31–36)
MCV RBC AUTO: 85 FL (ref 80–100)
PHOSPHATE SERPL-MCNC: 3.7 MG/DL (ref 2.5–4.9)
PLATELET # BLD AUTO: 195 K/UL (ref 135–450)
PMV BLD AUTO: 8.8 FL (ref 5–10.5)
POTASSIUM SERPL-SCNC: 4.6 MMOL/L (ref 3.5–5.1)
RBC # BLD AUTO: 5.13 M/UL (ref 4.2–5.9)
SODIUM SERPL-SCNC: 138 MMOL/L (ref 136–145)
WBC # BLD AUTO: 10.5 K/UL (ref 4–11)

## 2023-07-02 PROCEDURE — G0378 HOSPITAL OBSERVATION PER HR: HCPCS

## 2023-07-02 PROCEDURE — 85027 COMPLETE CBC AUTOMATED: CPT

## 2023-07-02 PROCEDURE — 80048 BASIC METABOLIC PNL TOTAL CA: CPT

## 2023-07-02 PROCEDURE — 6370000000 HC RX 637 (ALT 250 FOR IP): Performed by: STUDENT IN AN ORGANIZED HEALTH CARE EDUCATION/TRAINING PROGRAM

## 2023-07-02 PROCEDURE — 83735 ASSAY OF MAGNESIUM: CPT

## 2023-07-02 PROCEDURE — 36415 COLL VENOUS BLD VENIPUNCTURE: CPT

## 2023-07-02 PROCEDURE — 73030 X-RAY EXAM OF SHOULDER: CPT

## 2023-07-02 PROCEDURE — 84100 ASSAY OF PHOSPHORUS: CPT

## 2023-07-02 PROCEDURE — 2580000003 HC RX 258: Performed by: STUDENT IN AN ORGANIZED HEALTH CARE EDUCATION/TRAINING PROGRAM

## 2023-07-02 RX ADMIN — SODIUM CHLORIDE, PRESERVATIVE FREE 10 ML: 5 INJECTION INTRAVENOUS at 10:37

## 2023-07-02 RX ADMIN — SODIUM CHLORIDE, PRESERVATIVE FREE 10 ML: 5 INJECTION INTRAVENOUS at 20:56

## 2023-07-02 RX ADMIN — ASPIRIN 81 MG 81 MG: 81 TABLET ORAL at 10:37

## 2023-07-02 RX ADMIN — BUSPIRONE HYDROCHLORIDE 10 MG: 10 TABLET ORAL at 20:55

## 2023-07-02 RX ADMIN — FERROUS SULFATE TAB EC 324 MG (65 MG FE EQUIVALENT) 324 MG: 324 (65 FE) TABLET DELAYED RESPONSE at 10:37

## 2023-07-02 RX ADMIN — FLUOXETINE 10 MG: 10 TABLET, FILM COATED ORAL at 10:37

## 2023-07-02 RX ADMIN — RISPERIDONE 1 MG: 1 TABLET ORAL at 10:37

## 2023-07-02 RX ADMIN — BUSPIRONE HYDROCHLORIDE 10 MG: 10 TABLET ORAL at 10:37

## 2023-07-02 RX ADMIN — CLOPIDOGREL BISULFATE 75 MG: 75 TABLET ORAL at 10:37

## 2023-07-02 RX ADMIN — TAMSULOSIN HYDROCHLORIDE 0.4 MG: 0.4 CAPSULE ORAL at 20:55

## 2023-07-02 RX ADMIN — ATORVASTATIN CALCIUM 40 MG: 40 TABLET, FILM COATED ORAL at 20:56

## 2023-07-03 ENCOUNTER — APPOINTMENT (OUTPATIENT)
Dept: MRI IMAGING | Age: 63
DRG: 752 | End: 2023-07-03
Payer: MEDICAID

## 2023-07-03 ENCOUNTER — APPOINTMENT (OUTPATIENT)
Dept: CT IMAGING | Age: 63
DRG: 752 | End: 2023-07-03
Payer: MEDICAID

## 2023-07-03 PROCEDURE — 9990000010 HC NO CHARGE VISIT

## 2023-07-03 PROCEDURE — 94760 N-INVAS EAR/PLS OXIMETRY 1: CPT

## 2023-07-03 PROCEDURE — 2580000003 HC RX 258: Performed by: STUDENT IN AN ORGANIZED HEALTH CARE EDUCATION/TRAINING PROGRAM

## 2023-07-03 PROCEDURE — G0378 HOSPITAL OBSERVATION PER HR: HCPCS

## 2023-07-03 PROCEDURE — 92526 ORAL FUNCTION THERAPY: CPT

## 2023-07-03 PROCEDURE — 72141 MRI NECK SPINE W/O DYE: CPT

## 2023-07-03 PROCEDURE — 72125 CT NECK SPINE W/O DYE: CPT

## 2023-07-03 PROCEDURE — 6370000000 HC RX 637 (ALT 250 FOR IP): Performed by: STUDENT IN AN ORGANIZED HEALTH CARE EDUCATION/TRAINING PROGRAM

## 2023-07-03 RX ADMIN — SODIUM CHLORIDE, PRESERVATIVE FREE 10 ML: 5 INJECTION INTRAVENOUS at 09:51

## 2023-07-03 RX ADMIN — BUSPIRONE HYDROCHLORIDE 10 MG: 10 TABLET ORAL at 09:50

## 2023-07-03 RX ADMIN — BUSPIRONE HYDROCHLORIDE 10 MG: 10 TABLET ORAL at 20:21

## 2023-07-03 RX ADMIN — ASPIRIN 81 MG 81 MG: 81 TABLET ORAL at 09:50

## 2023-07-03 RX ADMIN — FLUOXETINE 10 MG: 10 TABLET, FILM COATED ORAL at 09:50

## 2023-07-03 RX ADMIN — CLOPIDOGREL BISULFATE 75 MG: 75 TABLET ORAL at 09:50

## 2023-07-03 RX ADMIN — ATORVASTATIN CALCIUM 40 MG: 40 TABLET, FILM COATED ORAL at 20:20

## 2023-07-03 RX ADMIN — SODIUM CHLORIDE, PRESERVATIVE FREE 10 ML: 5 INJECTION INTRAVENOUS at 20:21

## 2023-07-03 RX ADMIN — TAMSULOSIN HYDROCHLORIDE 0.4 MG: 0.4 CAPSULE ORAL at 20:21

## 2023-07-03 RX ADMIN — FERROUS SULFATE TAB EC 324 MG (65 MG FE EQUIVALENT) 324 MG: 324 (65 FE) TABLET DELAYED RESPONSE at 09:50

## 2023-07-03 RX ADMIN — RISPERIDONE 1 MG: 1 TABLET ORAL at 09:50

## 2023-07-03 NOTE — PLAN OF CARE
Problem: Pain  Goal: Verbalizes/displays adequate comfort level or baseline comfort level  Outcome: Progressing     Problem: Discharge Planning  Goal: Discharge to home or other facility with appropriate resources  Outcome: Progressing     Problem: Skin/Tissue Integrity  Goal: Absence of new skin breakdown  Description: 1. Monitor for areas of redness and/or skin breakdown  2. Assess vascular access sites hourly  3. Every 4-6 hours minimum:  Change oxygen saturation probe site  4. Every 4-6 hours:  If on nasal continuous positive airway pressure, respiratory therapy assess nares and determine need for appliance change or resting period.   Outcome: Progressing     Problem: Safety - Adult  Goal: Free from fall injury  Outcome: Progressing     Problem: Neurosensory - Adult  Goal: Achieves stable or improved neurological status  Outcome: Progressing     Problem: Neurosensory - Adult  Goal: Achieves maximal functionality and self care  Outcome: Progressing     Problem: Cardiovascular - Adult  Goal: Maintains optimal cardiac output and hemodynamic stability  Outcome: Progressing     Problem: Cardiovascular - Adult  Goal: Absence of cardiac dysrhythmias or at baseline  Outcome: Progressing     Problem: Musculoskeletal - Adult  Goal: Return mobility to safest level of function  Outcome: Progressing

## 2023-07-03 NOTE — PLAN OF CARE
Problem: Pain  Goal: Verbalizes/displays adequate comfort level or baseline comfort level  7/2/2023 2036 by Dieudonne Reno RN  Outcome: Progressing  Flowsheets (Taken 7/2/2023 1937)  Verbalizes/displays adequate comfort level or baseline comfort level:   Encourage patient to monitor pain and request assistance   Assess pain using appropriate pain scale   Administer analgesics based on type and severity of pain and evaluate response   Implement non-pharmacological measures as appropriate and evaluate response     Problem: Discharge Planning  Goal: Discharge to home or other facility with appropriate resources  7/2/2023 2036 by Dieudonne Reno RN  Outcome: Progressing  Flowsheets (Taken 7/2/2023 1937)  Discharge to home or other facility with appropriate resources: Identify barriers to discharge with patient and caregiver     Problem: Skin/Tissue Integrity  Goal: Absence of new skin breakdown  Description: 1. Monitor for areas of redness and/or skin breakdown  2. Assess vascular access sites hourly  3. Every 4-6 hours minimum:  Change oxygen saturation probe site  4. Every 4-6 hours:  If on nasal continuous positive airway pressure, respiratory therapy assess nares and determine need for appliance change or resting period. 7/2/2023 2036 by Dieudonne Reno RN  Outcome: Progressing  Note: Skin assessment completed every shift. Pt assessed for incontinence, appropriate barrier cream applied prn. Pt encouraged to turn/rotate every 2 hours. Assistance provided if pt unable to do so themselves. Problem: Safety - Adult  Goal: Free from fall injury  7/2/2023 2036 by Dieudonne Reno RN  Outcome: Progressing  Note: Patient assessed for fall risk; fall precautions initiated. Patient instructed about safety devices. Environment kept free of clutter and adequate lighting provided. Bed locked and in lowest position. Call light within reach. Will continue to monitor.        Problem: Neurosensory - Adult  Goal:

## 2023-07-04 LAB
AMORPHOUS: PRESENT
AMPHETAMINES UR QL SCN>1000 NG/ML: NORMAL
BACTERIA URNS QL MICRO: ABNORMAL /HPF
BARBITURATES UR QL SCN>200 NG/ML: NORMAL
BENZODIAZ UR QL SCN>200 NG/ML: NORMAL
BILIRUB UR QL STRIP.AUTO: NEGATIVE
CANNABINOIDS UR QL SCN>50 NG/ML: NORMAL
CLARITY UR: ABNORMAL
COCAINE UR QL SCN: NORMAL
COLOR UR: ABNORMAL
DRUG SCREEN COMMENT UR-IMP: NORMAL
EPI CELLS #/AREA URNS AUTO: 0 /HPF (ref 0–5)
FENTANYL SCREEN, URINE: NORMAL
FOLATE SERPL-MCNC: 16.53 NG/ML (ref 4.78–24.2)
GLUCOSE UR STRIP.AUTO-MCNC: NEGATIVE MG/DL
HGB UR QL STRIP.AUTO: NEGATIVE
HYALINE CASTS #/AREA URNS AUTO: 0 /LPF (ref 0–8)
KETONES UR STRIP.AUTO-MCNC: NEGATIVE MG/DL
LEUKOCYTE ESTERASE UR QL STRIP.AUTO: NEGATIVE
METHADONE UR QL SCN>300 NG/ML: NORMAL
NITRITE UR QL STRIP.AUTO: NEGATIVE
OPIATES UR QL SCN>300 NG/ML: NORMAL
OXYCODONE UR QL SCN: NORMAL
PCP UR QL SCN>25 NG/ML: NORMAL
PH UR STRIP.AUTO: 8 [PH] (ref 5–8)
PH UR STRIP: 8 [PH]
PROT UR STRIP.AUTO-MCNC: ABNORMAL MG/DL
RBC CLUMPS #/AREA URNS AUTO: 2 /HPF (ref 0–4)
SP GR UR STRIP.AUTO: 1.03 (ref 1–1.03)
TRIPLE PHOSPHATE CRYSTALS: PRESENT
TSH SERPL DL<=0.005 MIU/L-ACNC: 1.67 UIU/ML (ref 0.27–4.2)
UA COMPLETE W REFLEX CULTURE PNL UR: ABNORMAL
UA DIPSTICK W REFLEX MICRO PNL UR: YES
URN SPEC COLLECT METH UR: ABNORMAL
UROBILINOGEN UR STRIP-ACNC: 1 E.U./DL
VIT B12 SERPL-MCNC: 1642 PG/ML (ref 211–911)
WBC #/AREA URNS AUTO: 1 /HPF (ref 0–5)

## 2023-07-04 PROCEDURE — 6370000000 HC RX 637 (ALT 250 FOR IP): Performed by: STUDENT IN AN ORGANIZED HEALTH CARE EDUCATION/TRAINING PROGRAM

## 2023-07-04 PROCEDURE — 92526 ORAL FUNCTION THERAPY: CPT

## 2023-07-04 PROCEDURE — 2580000003 HC RX 258: Performed by: STUDENT IN AN ORGANIZED HEALTH CARE EDUCATION/TRAINING PROGRAM

## 2023-07-04 PROCEDURE — G0378 HOSPITAL OBSERVATION PER HR: HCPCS

## 2023-07-04 PROCEDURE — 81001 URINALYSIS AUTO W/SCOPE: CPT

## 2023-07-04 PROCEDURE — 82607 VITAMIN B-12: CPT

## 2023-07-04 PROCEDURE — 82746 ASSAY OF FOLIC ACID SERUM: CPT

## 2023-07-04 PROCEDURE — 84443 ASSAY THYROID STIM HORMONE: CPT

## 2023-07-04 PROCEDURE — 80307 DRUG TEST PRSMV CHEM ANLYZR: CPT

## 2023-07-04 PROCEDURE — 36415 COLL VENOUS BLD VENIPUNCTURE: CPT

## 2023-07-04 PROCEDURE — 94760 N-INVAS EAR/PLS OXIMETRY 1: CPT

## 2023-07-04 RX ADMIN — ATORVASTATIN CALCIUM 40 MG: 40 TABLET, FILM COATED ORAL at 21:42

## 2023-07-04 RX ADMIN — SODIUM CHLORIDE, PRESERVATIVE FREE 10 ML: 5 INJECTION INTRAVENOUS at 21:49

## 2023-07-04 RX ADMIN — SODIUM CHLORIDE, PRESERVATIVE FREE 10 ML: 5 INJECTION INTRAVENOUS at 08:10

## 2023-07-04 RX ADMIN — RISPERIDONE 1 MG: 1 TABLET ORAL at 08:10

## 2023-07-04 RX ADMIN — FERROUS SULFATE TAB EC 324 MG (65 MG FE EQUIVALENT) 324 MG: 324 (65 FE) TABLET DELAYED RESPONSE at 08:10

## 2023-07-04 RX ADMIN — CLOPIDOGREL BISULFATE 75 MG: 75 TABLET ORAL at 08:10

## 2023-07-04 RX ADMIN — BUSPIRONE HYDROCHLORIDE 10 MG: 10 TABLET ORAL at 08:10

## 2023-07-04 RX ADMIN — BUSPIRONE HYDROCHLORIDE 10 MG: 10 TABLET ORAL at 21:42

## 2023-07-04 RX ADMIN — TAMSULOSIN HYDROCHLORIDE 0.4 MG: 0.4 CAPSULE ORAL at 21:42

## 2023-07-04 RX ADMIN — FLUOXETINE 10 MG: 10 TABLET, FILM COATED ORAL at 08:10

## 2023-07-04 RX ADMIN — ASPIRIN 81 MG 81 MG: 81 TABLET ORAL at 08:10

## 2023-07-04 ASSESSMENT — PAIN SCALES - GENERAL
PAINLEVEL_OUTOF10: 0
PAINLEVEL_OUTOF10: 0

## 2023-07-04 NOTE — PLAN OF CARE
Problem: Pain  Goal: Verbalizes/displays adequate comfort level or baseline comfort level  7/3/2023 2156 by Sharon Lopez RN  Outcome: Progressing  Flowsheets (Taken 7/3/2023 2156)  Verbalizes/displays adequate comfort level or baseline comfort level:   Assess pain using appropriate pain scale   Encourage patient to monitor pain and request assistance   Administer analgesics based on type and severity of pain and evaluate response   Implement non-pharmacological measures as appropriate and evaluate response     Problem: Discharge Planning  Goal: Discharge to home or other facility with appropriate resources  7/3/2023 2156 by Sharon Lopez RN  Outcome: Progressing  Flowsheets (Taken 7/3/2023 2030)  Discharge to home or other facility with appropriate resources: Identify barriers to discharge with patient and caregiver     Problem: Safety - Adult  Goal: Free from fall injury  7/3/2023 2156 by Sharon Lopez RN  Outcome: Progressing  Flowsheets (Taken 7/1/2023 0028 by Vandana Chris RN)  Free From Fall Injury: Instruct family/caregiver on patient safety     Problem: Neurosensory - Adult  Goal: Achieves stable or improved neurological status  Recent Flowsheet Documentation  Taken 7/3/2023 2030 by Sharon Lopez RN  Achieves stable or improved neurological status: Assess for and report changes in neurological status    Problem: Cardiovascular - Adult  Goal: Maintains optimal cardiac output and hemodynamic stability  7/3/2023 2156 by Sharon Lopez RN  Outcome: Progressing  Flowsheets  Taken 7/3/2023 2156  Maintains optimal cardiac output and hemodynamic stability:   Monitor blood pressure and heart rate   Assess for signs of decreased cardiac output    Problem: Musculoskeletal - Adult  Goal: Return mobility to safest level of function  7/3/2023 2156 by Sharon Lopez RN  Outcome: Progressing  Flowsheets (Taken 7/3/2023 2030)  Return Mobility to Safest Level of Function: Assess patient stability and activity tolerance

## 2023-07-05 PROCEDURE — 2580000003 HC RX 258: Performed by: STUDENT IN AN ORGANIZED HEALTH CARE EDUCATION/TRAINING PROGRAM

## 2023-07-05 PROCEDURE — 92526 ORAL FUNCTION THERAPY: CPT

## 2023-07-05 PROCEDURE — 1200000000 HC SEMI PRIVATE

## 2023-07-05 PROCEDURE — 6370000000 HC RX 637 (ALT 250 FOR IP): Performed by: STUDENT IN AN ORGANIZED HEALTH CARE EDUCATION/TRAINING PROGRAM

## 2023-07-05 RX ADMIN — ATORVASTATIN CALCIUM 40 MG: 40 TABLET, FILM COATED ORAL at 21:43

## 2023-07-05 RX ADMIN — FLUOXETINE 10 MG: 10 TABLET, FILM COATED ORAL at 09:01

## 2023-07-05 RX ADMIN — TAMSULOSIN HYDROCHLORIDE 0.4 MG: 0.4 CAPSULE ORAL at 21:44

## 2023-07-05 RX ADMIN — SODIUM CHLORIDE, PRESERVATIVE FREE 10 ML: 5 INJECTION INTRAVENOUS at 21:44

## 2023-07-05 RX ADMIN — BUSPIRONE HYDROCHLORIDE 10 MG: 10 TABLET ORAL at 21:44

## 2023-07-05 RX ADMIN — SODIUM CHLORIDE, PRESERVATIVE FREE 10 ML: 5 INJECTION INTRAVENOUS at 09:01

## 2023-07-05 RX ADMIN — RISPERIDONE 1 MG: 1 TABLET ORAL at 09:01

## 2023-07-05 RX ADMIN — ASPIRIN 81 MG 81 MG: 81 TABLET ORAL at 09:01

## 2023-07-05 RX ADMIN — FERROUS SULFATE TAB EC 324 MG (65 MG FE EQUIVALENT) 324 MG: 324 (65 FE) TABLET DELAYED RESPONSE at 09:01

## 2023-07-05 RX ADMIN — CLOPIDOGREL BISULFATE 75 MG: 75 TABLET ORAL at 09:01

## 2023-07-05 RX ADMIN — BUSPIRONE HYDROCHLORIDE 10 MG: 10 TABLET ORAL at 09:01

## 2023-07-05 ASSESSMENT — PAIN SCALES - GENERAL: PAINLEVEL_OUTOF10: 0

## 2023-07-05 NOTE — PLAN OF CARE
Problem: Pain  Goal: Verbalizes/displays adequate comfort level or baseline comfort level  7/5/2023 1555 by Dena Starkey RN  Outcome: Progressing  7/5/2023 0217 by Juma Smiley RN  Flowsheets (Taken 7/5/2023 0217)  Verbalizes/displays adequate comfort level or baseline comfort level:   Encourage patient to monitor pain and request assistance   Assess pain using appropriate pain scale   Administer analgesics based on type and severity of pain and evaluate response   Implement non-pharmacological measures as appropriate and evaluate response   Notify Licensed Independent Practitioner if interventions unsuccessful or patient reports new pain     Problem: Discharge Planning  Goal: Discharge to home or other facility with appropriate resources  7/5/2023 1555 by Dena Starkey RN  Outcome: Progressing  7/5/2023 0217 by Juma Smiley RN  Flowsheets (Taken 7/5/2023 0217)  Discharge to home or other facility with appropriate resources:   Identify barriers to discharge with patient and caregiver   Arrange for needed discharge resources and transportation as appropriate   Identify discharge learning needs (meds, wound care, etc)   Arrange for interpreters to assist at discharge as needed   Refer to discharge planning if patient needs post-hospital services based on physician order or complex needs related to functional status, cognitive ability or social support system     Problem: Skin/Tissue Integrity  Goal: Absence of new skin breakdown  Description: 1. Monitor for areas of redness and/or skin breakdown  2. Assess vascular access sites hourly  3. Every 4-6 hours minimum:  Change oxygen saturation probe site  4. Every 4-6 hours:  If on nasal continuous positive airway pressure, respiratory therapy assess nares and determine need for appliance change or resting period.   Outcome: Progressing     Problem: Safety - Adult  Goal: Free from fall injury  7/5/2023 1555 by Dena Starkey RN  Outcome: Progressing  7/5/2023

## 2023-07-05 NOTE — CARE COORDINATION
Patient with confusion/agitation. Psych following but no needs for IP psych. Neurosurgery following, no anticipated intervention. Discharge plan remains the same, for patient to return to [facility]. No pre cert needed. Will require medical transport.     Electronically signed by Sujit Ponce RN Case Management on 7/5/2023 at 12:15 PM

## 2023-07-05 NOTE — PLAN OF CARE
Problem: Pain  Goal: Verbalizes/displays adequate comfort level or baseline comfort level  Flowsheets (Taken 7/5/2023 0217)  Verbalizes/displays adequate comfort level or baseline comfort level:   Encourage patient to monitor pain and request assistance   Assess pain using appropriate pain scale   Administer analgesics based on type and severity of pain and evaluate response   Implement non-pharmacological measures as appropriate and evaluate response   Notify Licensed Independent Practitioner if interventions unsuccessful or patient reports new pain     Problem: Discharge Planning  Goal: Discharge to home or other facility with appropriate resources  Flowsheets (Taken 7/5/2023 0217)  Discharge to home or other facility with appropriate resources:   Identify barriers to discharge with patient and caregiver   Arrange for needed discharge resources and transportation as appropriate   Identify discharge learning needs (meds, wound care, etc)   Arrange for interpreters to assist at discharge as needed   Refer to discharge planning if patient needs post-hospital services based on physician order or complex needs related to functional status, cognitive ability or social support system     Problem: Safety - Adult  Goal: Free from fall injury  Flowsheets (Taken 7/5/2023 0217)  Free From Fall Injury:   Instruct family/caregiver on patient safety   Based on caregiver fall risk screen, instruct family/caregiver to ask for assistance with transferring infant if caregiver noted to have fall risk factors     Problem: Neurosensory - Adult  Goal: Achieves stable or improved neurological status  Flowsheets (Taken 7/5/2023 0217)  Achieves stable or improved neurological status:   Assess for and report changes in neurological status   Initiate measures to prevent increased intracranial pressure   Maintain blood pressure and fluid volume within ordered parameters to optimize cerebral perfusion and minimize risk of hemorrhage

## 2023-07-06 VITALS
WEIGHT: 238.54 LBS | OXYGEN SATURATION: 94 % | RESPIRATION RATE: 12 BRPM | SYSTOLIC BLOOD PRESSURE: 111 MMHG | HEIGHT: 71 IN | BODY MASS INDEX: 33.4 KG/M2 | DIASTOLIC BLOOD PRESSURE: 77 MMHG | TEMPERATURE: 98.3 F | HEART RATE: 77 BPM

## 2023-07-06 PROCEDURE — 2580000003 HC RX 258: Performed by: STUDENT IN AN ORGANIZED HEALTH CARE EDUCATION/TRAINING PROGRAM

## 2023-07-06 PROCEDURE — 92526 ORAL FUNCTION THERAPY: CPT

## 2023-07-06 PROCEDURE — 6370000000 HC RX 637 (ALT 250 FOR IP): Performed by: STUDENT IN AN ORGANIZED HEALTH CARE EDUCATION/TRAINING PROGRAM

## 2023-07-06 RX ORDER — CLOPIDOGREL BISULFATE 75 MG/1
75 TABLET ORAL DAILY
Qty: 30 TABLET | Refills: 3 | Status: SHIPPED | OUTPATIENT
Start: 2023-07-07

## 2023-07-06 RX ORDER — ATORVASTATIN CALCIUM 40 MG/1
40 TABLET, FILM COATED ORAL NIGHTLY
Qty: 30 TABLET | Refills: 3 | Status: SHIPPED | OUTPATIENT
Start: 2023-07-06

## 2023-07-06 RX ADMIN — FLUOXETINE 10 MG: 10 TABLET, FILM COATED ORAL at 10:51

## 2023-07-06 RX ADMIN — SODIUM CHLORIDE, PRESERVATIVE FREE 10 ML: 5 INJECTION INTRAVENOUS at 10:49

## 2023-07-06 RX ADMIN — FERROUS SULFATE TAB EC 324 MG (65 MG FE EQUIVALENT) 324 MG: 324 (65 FE) TABLET DELAYED RESPONSE at 10:49

## 2023-07-06 RX ADMIN — CLOPIDOGREL BISULFATE 75 MG: 75 TABLET ORAL at 10:49

## 2023-07-06 RX ADMIN — ASPIRIN 81 MG 81 MG: 81 TABLET ORAL at 10:49

## 2023-07-06 RX ADMIN — RISPERIDONE 1 MG: 1 TABLET ORAL at 10:49

## 2023-07-06 RX ADMIN — BUSPIRONE HYDROCHLORIDE 10 MG: 10 TABLET ORAL at 10:49

## 2023-07-06 ASSESSMENT — PAIN SCALES - GENERAL: PAINLEVEL_OUTOF10: 0

## 2023-07-06 NOTE — DISCHARGE SUMMARY
CAROTID ARTERIES: No dissection, arterial injury, or hemodynamically significant stenosis by NASCET criteria. VERTEBRAL ARTERIES: No dissection, arterial injury, or significant stenosis. SOFT TISSUES: The lung apices are clear. No cervical or superior mediastinal lymphadenopathy. The larynx and pharynx are unremarkable. No acute abnormality of the salivary and thyroid glands. BONES: No acute osseous abnormality. There are moderate to severe degenerative changes in the cervical spine. CTA HEAD: Limited by motion artifact. ANTERIOR CIRCULATION: No significant stenosis of the intracranial internal carotid, anterior cerebral, or middle cerebral arteries. No aneurysm. POSTERIOR CIRCULATION: There is right dominance of the vertebral arteries with hypoplastic intracranial left vertebral artery. There is fetal origin of the right PCA, normal variant. No significant stenosis of the vertebral, basilar, or posterior cerebral arteries. No aneurysm. OTHER: No dural venous sinus thrombosis on this non-dedicated study. BRAIN: No mass effect or midline shift. No extra-axial fluid collection. The gray-white differentiation is maintained. Limited by motion artifacts and suboptimal phase of contrast. Otherwise, no acute abnormality or flow-limiting stenosis of the major arteries of the head and neck. MRI BRAIN WO CONTRAST    Result Date: 6/30/2023  EXAMINATION: MRI OF THE BRAIN WITHOUT CONTRAST  6/30/2023 5:40 pm TECHNIQUE: Multiplanar multisequence MRI of the brain was performed without the administration of intravenous contrast. COMPARISON: None. HISTORY: ORDERING SYSTEM PROVIDED HISTORY: TIA TECHNOLOGIST PROVIDED HISTORY: Reason for exam:->TIA FINDINGS: INTRACRANIAL STRUCTURES/VENTRICLES: The brain parenchyma has normal signal and morphology. There is no acute infarct or mass. No mass effect or midline shift. No evidence of an acute intracranial hemorrhage.   The ventricles and sulci are normal in size and

## 2023-07-06 NOTE — PLAN OF CARE
Problem: Pain  Goal: Verbalizes/displays adequate comfort level or baseline comfort level  7/6/2023 0043 by Jaylyn Ray RN  Outcome: Progressing  Flowsheets (Taken 7/6/2023 0043)  Verbalizes/displays adequate comfort level or baseline comfort level:   Encourage patient to monitor pain and request assistance   Assess pain using appropriate pain scale   Administer analgesics based on type and severity of pain and evaluate response   Implement non-pharmacological measures as appropriate and evaluate response   Consider cultural and social influences on pain and pain management   Notify Licensed Independent Practitioner if interventions unsuccessful or patient reports new pain  7/5/2023 1555 by Adrianne Mcneil RN  Outcome: Progressing     Problem: Discharge Planning  Goal: Discharge to home or other facility with appropriate resources  7/6/2023 0043 by Jaylyn Ray RN  Outcome: Progressing  Flowsheets (Taken 7/6/2023 0043)  Discharge to home or other facility with appropriate resources:   Identify barriers to discharge with patient and caregiver   Arrange for needed discharge resources and transportation as appropriate   Identify discharge learning needs (meds, wound care, etc)   Arrange for interpreters to assist at discharge as needed   Refer to discharge planning if patient needs post-hospital services based on physician order or complex needs related to functional status, cognitive ability or social support system  7/5/2023 1555 by Adrianne Mcneil RN  Outcome: Progressing     Problem: Skin/Tissue Integrity  Goal: Absence of new skin breakdown  Description: 1. Monitor for areas of redness and/or skin breakdown  2. Assess vascular access sites hourly  3. Every 4-6 hours minimum:  Change oxygen saturation probe site  4. Every 4-6 hours:  If on nasal continuous positive airway pressure, respiratory therapy assess nares and determine need for appliance change or resting period.   7/6/2023 0043 by Jaylyn Ray

## 2023-07-06 NOTE — PROGRESS NOTES
[facility]   Speech Therapy  Daily Dysphagia Treatment Note    Daphnie Fernandezlist  AGE: 57 y.o. GENDER: male  : 1960  [id number]  EPISODE DATE:  2023    Patient Active Problem List   Diagnosis    Acute metabolic encephalopathy    Hypernatremia    E. coli UTI    Sepsis (HCC)    Fever and chills    Neutrophilia    CRP elevated    Disorientation    LFT elevation    Septicemia (HCC)    AMS (altered mental status)     Allergies   Allergen Reactions    Codeine     Thorazine [Chlorpromazine]     Tylenol [Acetaminophen]      Treatment Diagnosis: Dysphagia     CHART REVIEW:  2023 admitted with c/o AMS  MD ADMISSION H&P HPI:  58-year-old male with past medical history of cognitive deficits schizoaffective disorder, insomnia, MDD, substance use disorder, antisocial personality disorder presented to the hospital because of symptoms of altered mental status from skilled nursing facility. According to the nurse over there patient was talking around 11 AM when the aide went to see the patient at that time the patient was not talking at all again. Patient is keeping eyes closed moving his mouth, talking inconsistently. Conversational at baseline. Normal prehospital blood glucose. Labs are pretty much unremarkable. Patient will be admitted to Cva r/o due to new onset of changes, less likely though. Chart reviewed and notes noted       IMAGING:  CXR: 2023  IMPRESSION:  No acute cardiopulmonary findings     CT HEAD: 2023  IMPRESSION:  No acute intracranial abnormality. CTA HEAD/NECK: 2023  IMPRESSION:  Limited by motion artifacts and suboptimal phase of contrast.  Otherwise, no acute abnormality or flow-limiting stenosis of the major arteries of the head and neck. BRAIN MRI: 2023  IMPRESSION:  Unremarkable MRI of the brain.      History/Prior Level of Function:   Living Status: admitted from [facility]  Baseline diet: regular/thin at [facility]  Prior Dysphagia History: no prior notes
[facility]   Speech Therapy  Daily Dysphagia Treatment Note    Patel Harley  AGE: 57 y.o. GENDER: male  : 1960  [id number]  EPISODE DATE:  2023    Patient Active Problem List   Diagnosis    Acute metabolic encephalopathy    Hypernatremia    E. coli UTI    Sepsis (HCC)    Fever and chills    Neutrophilia    CRP elevated    Disorientation    LFT elevation    Septicemia (HCC)    AMS (altered mental status)     Allergies   Allergen Reactions    Codeine     Thorazine [Chlorpromazine]     Tylenol [Acetaminophen]      Treatment Diagnosis: Dysphagia     CHART REVIEW:  2023 admitted with c/o AMS  MD ADMISSION H&P HPI:  27-year-old male with past medical history of cognitive deficits schizoaffective disorder, insomnia, MDD, substance use disorder, antisocial personality disorder presented to the hospital because of symptoms of altered mental status from skilled nursing facility. According to the nurse over there patient was talking around 11 AM when the aide went to see the patient at that time the patient was not talking at all again. Patient is keeping eyes closed moving his mouth, talking inconsistently. Conversational at baseline. Normal prehospital blood glucose. Labs are pretty much unremarkable. Patient will be admitted to Cva r/o due to new onset of changes, less likely though. Chart reviewed and notes noted       IMAGING:  CXR: 2023  IMPRESSION:  No acute cardiopulmonary findings     CT HEAD: 2023  IMPRESSION:  No acute intracranial abnormality. CTA HEAD/NECK: 2023  IMPRESSION:  Limited by motion artifacts and suboptimal phase of contrast.  Otherwise, no acute abnormality or flow-limiting stenosis of the major arteries of the head and neck. BRAIN MRI: 2023  IMPRESSION:  Unremarkable MRI of the brain.      History/Prior Level of Function:   Living Status: admitted from [facility]  Baseline diet: regular/thin at [facility]  Prior Dysphagia History: no prior notes
NAME:  Arcadio Arenas  YOB: 1960  MEDICAL RECORD NUMBER:  [id number]  TODAYS DATE:  7/3/2023    Discussed personal risk factors for Stroke/TIA with patient/family, and ways to reduce the risk for a recurrent stroke. Patient's personal risk factors which were identified are:     [] Alcohol Abuse: check with your physician before any alcohol consumption. [] Atrial fibrillation: may cause blood clots. [] Drug Abuse: Seek help, talk with your doctor  [] Clotting Disorder  [] Diabetes  [] Family history of stroke or heart disease  [x] High Blood Pressure/Hypertension: work with your physician. [x] High cholesterol: monitor cholesterol levels with your physician. [x] Overweight/Obesity: work with your physician for your ideal body weight. [x] Physical Inactivity: get regular exercise as directed by your physician. [] Personal history of previous TIA or stroke  [x] Poor Diet; decrease salt (sodium) in your diet, follow diet directed by physician. [] Smoking: Cigarette/Cigar: stop smoking. Advised pt. that you can reduce your risk for stroke/TIA by modifying/controlling the risk factors that you have. Pt.advised to take the medications as prescribed, which will be detailed in the discharge instructions, and to not stop taking them without consulting their physician. In addition, pt. advised to maintain a healthy diet, exercise regularly and to not smoke. [facility]'s Stroke treatment and prevention, Managing your recovery  notebook  provided and/or reviewed  with patient/family. The notebook includes, but not limited to, sections addressing warning signs & symptoms of a stroke, which are: sudden numbness or weakness especially on one side of the body, sudden confusion, difficulty speaking or understanding, sudden changes in vision, sudden dizziness or loss of balance/ coordination, sudden severe headache, syncope and seizure.   The need to call EMS (911) immediately if signs & symptoms
Occupational Therapy  Per previous admit, pt is dependent for care and LTC at facility.   Defer OT eval. Yue Resides, OTR/L #[id number] 7/3/2023 7:12 AM
PCA attempted to feed patient this morning and patient started swinging his arms trying to hit PCA and proceeded to hit everything off of his tray and spill everything all over himself and started pushing bedside table away and hit PCA. PCA took tray away and cleaned up the food everywhere.
Patient notified of plan to return to [facility]. Peripheral IV and heart monitor removed. Report given to [facility]. All questions answered. RN to call [facility] to give report to receiving personnel. Patient was transported off of unit via stretcher by [facility].
Patient slept on and off this shift. Patient alert to self only. Patient with intermittent periods of agitation, mostly with patient care. When first attempting to get patient's BP, patient became aggressive, trying to hit and spit at this RN. Throughout the shift patient became more willing to allow care. Patient was compliant with night medications. Tolerated well crushed in applesauce. Patients speech remains slurred and incomprehensible at times. However, patient did form a few sentences throughout the shift. Patient is resting in bed at this time. Fall precautions in place, bed alarm is on and call light is within reach.      Electronically signed by Britni Sidhu RN on 7/4/2023 at 6:38 AM
Physical Therapy  PT referral received and chart reviewed. Prior PT Encounter 11/2022, pt LTC and dependent. Will sign off at this time.   Tami Brown, PT
Report given to [facility]. All questions answered.
[facility]   Speech Therapy  Daily Dysphagia Treatment Note    Stew Myles  AGE: 57 y.o. GENDER: male  : 1960  [id number]  EPISODE DATE:  2023  Patient Active Problem List   Diagnosis    Acute metabolic encephalopathy    Hypernatremia    E. coli UTI    Sepsis (HCC)    Fever and chills    Neutrophilia    CRP elevated    Disorientation    LFT elevation    Septicemia (HCC)    AMS (altered mental status)     Allergies   Allergen Reactions    Codeine     Thorazine [Chlorpromazine]     Tylenol [Acetaminophen]      Treatment Diagnosis: Dysphagia     CHART REVIEW:  2023 admitted with c/o AMS  MD ADMISSION H&P HPI:  20-year-old male with past medical history of cognitive deficits schizoaffective disorder, insomnia, MDD, substance use disorder, antisocial personality disorder presented to the hospital because of symptoms of altered mental status from skilled nursing facility. According to the nurse over there patient was talking around 11 AM when the aide went to see the patient at that time the patient was not talking at all again. Patient is keeping eyes closed moving his mouth, talking inconsistently. Conversational at baseline. Normal prehospital blood glucose. Labs are pretty much unremarkable. Patient will be admitted to Cva r/o due to new onset of changes, less likely though. Chart reviewed and weekend events/notes noted     IMAGING:  CXR: 2023  IMPRESSION:  No acute cardiopulmonary findings     CT HEAD: 2023  IMPRESSION:  No acute intracranial abnormality. CTA HEAD/NECK: 2023  IMPRESSION:  Limited by motion artifacts and suboptimal phase of contrast.  Otherwise, no acute abnormality or flow-limiting stenosis of the major arteries of the head and neck. BRAIN MRI: 2023  IMPRESSION:  Unremarkable MRI of the brain.      History/Prior Level of Function:   Living Status: admitted from [facility]  Baseline diet: regular/thin at [facility]  Prior Dysphagia History: no
V2.0    [facility] [place] [facility] Progress Note      Name:  Cordell Montesinos /Age/Sex: 1960  (57 y.o. male)   MRN & CSN:  [id number] & [id number] Encounter Date/Time: 2023 4:15 PM EDT   Location:  [id number] PCP: No primary care provider on file. Attending:Fernando Chen MD       Hospital Day: 7    Assessment and Recommendations        Patient is a 58-year-old male past medical history of cognitive deficits, schizoaffective disorder, insomnia, MDD, substance use disorder, antisocial personality disorder who was admitted for altered mental status. #. Altered mental status: Improving  Suspect metabolic encephalopathy: Patient is awake and alert and tries to engage in conversation now. MRI negative for acute stroke  Consult PT OT for eval  Neurology following     #. Inability to speak: Improving  Upon admission patient was unable to engage in conversation: This is now improving  At baseline patient is able to talk clearly but is wheelchair-bound  MRI brain without acute finding  Neurology following  Psychiatry consulted and states that it is probably due to habitual for this patient. they did not recommend any change in medications nor recommended inpatient psych admission. #.  Right upper extremity weakness likely related to frozen left shoulder. Patient is able to squeeze my fingers and is able to bend his elbow  Appears that he is unable to lift/flex his left shoulder  Extremity left shoulder shows degenerative joint disease  MRI cervical spine reviewed:neurosurgery following  Neurology following    #. Frozen left shoulder  Ortho consulted and do not recommend any acute surgical intevention/ ptn not interested. Chronic medical problems. 2.         Major depressive disorder  3. Schizoaffective disorder on risperidone  4. Cognitive deficits mild to moderate  5. Substance abuse disorder history  6. Antisocial personality disorder        DVT Prophylaxis: Lovenox.    Code
V2.0    [facility] [place]      Name:  Phillip July /Age/Sex: 1960  (57 y.o. male)   MRN & CSN:  [id number] & [id number] Encounter Date/Time: 7/3/2023 4:15 PM EDT   Location:  [id number] PCP: No primary care provider on file. Attending:Fernando Burns MD       Hospital Day: 5    Assessment and Recommendations        Patient is a 28-year-old male past medical history of cognitive deficits, schizoaffective disorder, insomnia, MDD, substance use disorder, antisocial personality disorder who was admitted for altered mental status. #. Altered mental status: Improving  Suspect metabolic encephalopathy: Patient is awake and alert and tries to engage in conversation now. MRI negative for acute stroke  Consult PT OT for eval  Neurology following     #. Inability to speak: Improving  Upon admission patient was unable to engage in conversation: This is now improving  At baseline patient is able to talk clearly but is wheelchair-bound  MRI brain without acute finding  Neurology following  Psychiatry consulted due to history of schizoaffective disorder and being on multiple psychiatric medications. #.  Right upper extremity weakness  Patient is able to squeeze my fingers and is able to bend his elbow  Appears that he is unable to lift/flex his left shoulder  Extremity left shoulder shows degenerative joint disease  MRI cervical spine reviewed: Consult neurosurgery  Neurology following     2. Major depressive disorder  3. Schizoaffective disorder on risperidone  4. Cognitive deficits mild to moderate  5. Substance abuse disorder history  6. Antisocial personality disorder        DVT Prophylaxis: Lovenox. Code Status: Total Support. Barrier to DC: PT/OT eval pending, neurosurgery consultation pending, pending clinical improvement      Subjective:     Patient was seen and examined today. No acute events overnight.   Patient ritika afebrile with stable vital
[facility]   Speech Therapy  Daily Dysphagia Treatment Note    Elton Valdez  AGE: 57 y.o. GENDER: male  : 1960  [id number]  EPISODE DATE:  2023  Patient Active Problem List   Diagnosis    Acute metabolic encephalopathy    Hypernatremia    E. coli UTI    Sepsis (HCC)    Fever and chills    Neutrophilia    CRP elevated    Disorientation    LFT elevation    Septicemia (HCC)    AMS (altered mental status)     Allergies   Allergen Reactions    Codeine     Thorazine [Chlorpromazine]     Tylenol [Acetaminophen]      Treatment Diagnosis: Dysphagia     CHART REVIEW:  2023 admitted with c/o AMS  MD ADMISSION H&P HPI:  57-year-old male with past medical history of cognitive deficits schizoaffective disorder, insomnia, MDD, substance use disorder, antisocial personality disorder presented to the hospital because of symptoms of altered mental status from skilled nursing facility. According to the nurse over there patient was talking around 11 AM when the aide went to see the patient at that time the patient was not talking at all again. Patient is keeping eyes closed moving his mouth, talking inconsistently. Conversational at baseline. Normal prehospital blood glucose. Labs are pretty much unremarkable. Patient will be admitted to Cva r/o due to new onset of changes, less likely though. Chart reviewed and weekend events/notes noted     IMAGING:  CXR: 2023  IMPRESSION:  No acute cardiopulmonary findings     CT HEAD: 2023  IMPRESSION:  No acute intracranial abnormality. CTA HEAD/NECK: 2023  IMPRESSION:  Limited by motion artifacts and suboptimal phase of contrast.  Otherwise, no acute abnormality or flow-limiting stenosis of the major arteries of the head and neck. BRAIN MRI: 2023  IMPRESSION:  Unremarkable MRI of the brain.      History/Prior Level of Function:   Living Status: admitted from [facility]  Baseline diet: regular/thin at [facility]  Prior Dysphagia History: no
06/29/2023 10:00 PM    UROBILINOGEN 1.0 06/29/2023 10:00 PM    BILIRUBINUR Negative 06/29/2023 10:00 PM    BLOODU Negative 06/29/2023 10:00 PM    GLUCOSEU Negative 06/29/2023 10:00 PM    KETUA 80 06/29/2023 10:00 PM     Urine Cultures:   Lab Results   Component Value Date/Time    LABURIN >100,000 CFU/ml 07/15/2022 12:00 PM     Blood Cultures:   Lab Results   Component Value Date/Time    BC No Growth after 4 days of incubation. 12/02/2022 09:40 AM     Lab Results   Component Value Date/Time    BLOODCULT2 No Growth after 4 days of incubation.  11/28/2022 10:37 AM     Organism:   Lab Results   Component Value Date/Time    ORG Respiratory syncytial virus by PCR 12/02/2022 11:45 AM         Electronically signed by Andrew Morgan MD on 7/4/2023 at 12:23 PM

## 2023-07-06 NOTE — PLAN OF CARE
Problem: Pain  Goal: Verbalizes/displays adequate comfort level or baseline comfort level  7/6/2023 1336 by Renea Chavez RN  Outcome: Completed     Problem: Discharge Planning  Goal: Discharge to home or other facility with appropriate resources  7/6/2023 1336 by Renea Chavez RN  Outcome: Completed  Flowsheets (Taken 7/6/2023 1015)  Discharge to home or other facility with appropriate resources:   Identify barriers to discharge with patient and caregiver   Arrange for needed discharge resources and transportation as appropriate   Identify discharge learning needs (meds, wound care, etc)     Problem: Skin/Tissue Integrity  Goal: Absence of new skin breakdown  Description: 1. Monitor for areas of redness and/or skin breakdown  2. Assess vascular access sites hourly  3. Every 4-6 hours minimum:  Change oxygen saturation probe site  4. Every 4-6 hours:  If on nasal continuous positive airway pressure, respiratory therapy assess nares and determine need for appliance change or resting period.   7/6/2023 1336 by Renea Chavez RN  Outcome: Completed     Problem: Safety - Adult  Goal: Free from fall injury  7/6/2023 1336 by Renea Chavez RN  Outcome: Completed     Problem: Neurosensory - Adult  Goal: Achieves stable or improved neurological status  7/6/2023 1336 by Renea Chavez RN  Outcome: Completed  Flowsheets (Taken 7/6/2023 1015)  Achieves stable or improved neurological status: Assess for and report changes in neurological status  Goal: Achieves maximal functionality and self care  7/6/2023 1336 by Renea Chavez RN  Outcome: Completed  Flowsheets (Taken 7/6/2023 1015)  Achieves maximal functionality and self care: Monitor swallowing and airway patency with patient fatigue and changes in neurological status     Problem: Cardiovascular - Adult  Goal: Maintains optimal cardiac output and hemodynamic stability  7/6/2023 1336 by Renea Chavez RN  Outcome: Completed  Flowsheets (Taken 7/6/2023 1015)  Maintains optimal

## 2023-07-06 NOTE — CARE COORDINATION
CASE MANAGEMENT DISCHARGE SUMMARY:    DISCHARGE DATE: 7/6/23    DISCHARGED TO:  returning long term care  Name:  [facility]  Address:  [address]   Report Number:  [phone] ask for [address] station  Fax:  [phone]     TRANSPORTATION: [facility]             TIME: 3:15pm    INSURANCE PRECERT OBTAINED: n/a    RAYMUNDO/PASAMBROSIO COMPLETED: n/a, return long term care    COMMENTS: Patient confused--lm w/ father/mother in law to notify of dc. Bhanu at facility, & SABAS Leon aware of dc plan.     Electronically signed by Saqib Bolden RN Case Management on 7/6/2023 at 10:41 AM

## 2024-04-07 ENCOUNTER — HOSPITAL ENCOUNTER (INPATIENT)
Age: 64
LOS: 7 days | Discharge: NURSING, REHAB OR LONG-TERM CARE | DRG: 861 | End: 2024-04-14
Attending: EMERGENCY MEDICINE | Admitting: HOSPITALIST
Payer: MEDICAID

## 2024-04-07 ENCOUNTER — APPOINTMENT (OUTPATIENT)
Dept: CT IMAGING | Age: 64
DRG: 861 | End: 2024-04-07
Payer: MEDICAID

## 2024-04-07 ENCOUNTER — APPOINTMENT (OUTPATIENT)
Dept: GENERAL RADIOLOGY | Age: 64
DRG: 861 | End: 2024-04-07
Payer: MEDICAID

## 2024-04-07 DIAGNOSIS — R41.82 ALTERED MENTAL STATUS, UNSPECIFIED ALTERED MENTAL STATUS TYPE: Primary | ICD-10-CM

## 2024-04-07 LAB
ALBUMIN SERPL-MCNC: 3.9 G/DL (ref 3.4–5)
ALBUMIN/GLOB SERPL: 1.1 {RATIO} (ref 1.1–2.2)
ALP SERPL-CCNC: 122 U/L (ref 40–129)
ALT SERPL-CCNC: 23 U/L (ref 10–40)
ANION GAP SERPL CALCULATED.3IONS-SCNC: 11 MMOL/L (ref 3–16)
AST SERPL-CCNC: 19 U/L (ref 15–37)
BASOPHILS # BLD: 0 K/UL (ref 0–0.2)
BASOPHILS NFR BLD: 0.3 %
BILIRUB SERPL-MCNC: 0.5 MG/DL (ref 0–1)
BILIRUB UR QL STRIP.AUTO: NEGATIVE
BUN SERPL-MCNC: 15 MG/DL (ref 7–20)
CALCIUM SERPL-MCNC: 9.3 MG/DL (ref 8.3–10.6)
CHLORIDE SERPL-SCNC: 102 MMOL/L (ref 99–110)
CLARITY UR: CLEAR
CO2 SERPL-SCNC: 26 MMOL/L (ref 21–32)
COLOR UR: YELLOW
CREAT SERPL-MCNC: 0.6 MG/DL (ref 0.8–1.3)
DEPRECATED RDW RBC AUTO: 16.5 % (ref 12.4–15.4)
EOSINOPHIL # BLD: 0.1 K/UL (ref 0–0.6)
EOSINOPHIL NFR BLD: 0.7 %
GFR SERPLBLD CREATININE-BSD FMLA CKD-EPI: >90 ML/MIN/{1.73_M2}
GLUCOSE SERPL-MCNC: 81 MG/DL (ref 70–99)
GLUCOSE UR STRIP.AUTO-MCNC: NEGATIVE MG/DL
HCT VFR BLD AUTO: 38.9 % (ref 40.5–52.5)
HGB BLD-MCNC: 12.8 G/DL (ref 13.5–17.5)
HGB UR QL STRIP.AUTO: NEGATIVE
INR PPP: 1.02 (ref 0.85–1.15)
KETONES UR STRIP.AUTO-MCNC: NEGATIVE MG/DL
LACTATE BLDV-SCNC: 0.9 MMOL/L (ref 0.4–2)
LEUKOCYTE ESTERASE UR QL STRIP.AUTO: NEGATIVE
LYMPHOCYTES # BLD: 1 K/UL (ref 1–5.1)
LYMPHOCYTES NFR BLD: 11.5 %
MCH RBC QN AUTO: 27.5 PG (ref 26–34)
MCHC RBC AUTO-ENTMCNC: 32.9 G/DL (ref 31–36)
MCV RBC AUTO: 83.5 FL (ref 80–100)
MONOCYTES # BLD: 1 K/UL (ref 0–1.3)
MONOCYTES NFR BLD: 11.2 %
NEUTROPHILS # BLD: 6.9 K/UL (ref 1.7–7.7)
NEUTROPHILS NFR BLD: 76.3 %
NITRITE UR QL STRIP.AUTO: NEGATIVE
PH UR STRIP.AUTO: 6 [PH] (ref 5–8)
PLATELET # BLD AUTO: 202 K/UL (ref 135–450)
PMV BLD AUTO: 8.9 FL (ref 5–10.5)
POTASSIUM SERPL-SCNC: 4 MMOL/L (ref 3.5–5.1)
PROT SERPL-MCNC: 7.5 G/DL (ref 6.4–8.2)
PROT UR STRIP.AUTO-MCNC: NEGATIVE MG/DL
PROTHROMBIN TIME: 13.6 SEC (ref 11.9–14.9)
RBC # BLD AUTO: 4.65 M/UL (ref 4.2–5.9)
SARS-COV-2 RDRP RESP QL NAA+PROBE: NOT DETECTED
SODIUM SERPL-SCNC: 139 MMOL/L (ref 136–145)
SP GR UR STRIP.AUTO: 1.02 (ref 1–1.03)
TROPONIN, HIGH SENSITIVITY: 17 NG/L (ref 0–22)
TROPONIN, HIGH SENSITIVITY: 18 NG/L (ref 0–22)
UA COMPLETE W REFLEX CULTURE PNL UR: NORMAL
UA DIPSTICK W REFLEX MICRO PNL UR: NORMAL
URN SPEC COLLECT METH UR: NORMAL
UROBILINOGEN UR STRIP-ACNC: 1 E.U./DL
WBC # BLD AUTO: 9.1 K/UL (ref 4–11)

## 2024-04-07 PROCEDURE — 84484 ASSAY OF TROPONIN QUANT: CPT

## 2024-04-07 PROCEDURE — 85025 COMPLETE CBC W/AUTO DIFF WBC: CPT

## 2024-04-07 PROCEDURE — 2580000003 HC RX 258

## 2024-04-07 PROCEDURE — 81003 URINALYSIS AUTO W/O SCOPE: CPT

## 2024-04-07 PROCEDURE — 87635 SARS-COV-2 COVID-19 AMP PRB: CPT

## 2024-04-07 PROCEDURE — 83605 ASSAY OF LACTIC ACID: CPT

## 2024-04-07 PROCEDURE — 74177 CT ABD & PELVIS W/CONTRAST: CPT

## 2024-04-07 PROCEDURE — 96374 THER/PROPH/DIAG INJ IV PUSH: CPT

## 2024-04-07 PROCEDURE — 71045 X-RAY EXAM CHEST 1 VIEW: CPT

## 2024-04-07 PROCEDURE — 1200000000 HC SEMI PRIVATE

## 2024-04-07 PROCEDURE — 70450 CT HEAD/BRAIN W/O DYE: CPT

## 2024-04-07 PROCEDURE — 2580000003 HC RX 258: Performed by: HOSPITALIST

## 2024-04-07 PROCEDURE — 70498 CT ANGIOGRAPHY NECK: CPT

## 2024-04-07 PROCEDURE — 87040 BLOOD CULTURE FOR BACTERIA: CPT

## 2024-04-07 PROCEDURE — 6360000004 HC RX CONTRAST MEDICATION

## 2024-04-07 PROCEDURE — 93005 ELECTROCARDIOGRAM TRACING: CPT

## 2024-04-07 PROCEDURE — 99285 EMERGENCY DEPT VISIT HI MDM: CPT

## 2024-04-07 PROCEDURE — 80053 COMPREHEN METABOLIC PANEL: CPT

## 2024-04-07 PROCEDURE — 6360000002 HC RX W HCPCS

## 2024-04-07 PROCEDURE — 85610 PROTHROMBIN TIME: CPT

## 2024-04-07 RX ORDER — POTASSIUM CHLORIDE 7.45 MG/ML
10 INJECTION INTRAVENOUS PRN
Status: DISCONTINUED | OUTPATIENT
Start: 2024-04-07 | End: 2024-04-14 | Stop reason: HOSPADM

## 2024-04-07 RX ORDER — SODIUM CHLORIDE 0.9 % (FLUSH) 0.9 %
5-40 SYRINGE (ML) INJECTION EVERY 12 HOURS SCHEDULED
Status: DISCONTINUED | OUTPATIENT
Start: 2024-04-07 | End: 2024-04-14 | Stop reason: HOSPADM

## 2024-04-07 RX ORDER — SODIUM CHLORIDE 0.9 % (FLUSH) 0.9 %
5-40 SYRINGE (ML) INJECTION PRN
Status: DISCONTINUED | OUTPATIENT
Start: 2024-04-07 | End: 2024-04-14 | Stop reason: HOSPADM

## 2024-04-07 RX ORDER — ONDANSETRON 4 MG/1
4 TABLET, ORALLY DISINTEGRATING ORAL EVERY 8 HOURS PRN
Status: DISCONTINUED | OUTPATIENT
Start: 2024-04-07 | End: 2024-04-14 | Stop reason: HOSPADM

## 2024-04-07 RX ORDER — ACETAMINOPHEN 325 MG/1
650 TABLET ORAL EVERY 6 HOURS PRN
COMMUNITY

## 2024-04-07 RX ORDER — ACETAMINOPHEN 650 MG
TABLET, EXTENDED RELEASE ORAL 2 TIMES DAILY
COMMUNITY

## 2024-04-07 RX ORDER — SENNOSIDES A AND B 8.6 MG/1
2 TABLET, FILM COATED ORAL 2 TIMES DAILY
COMMUNITY

## 2024-04-07 RX ORDER — MAGNESIUM SULFATE IN WATER 40 MG/ML
2000 INJECTION, SOLUTION INTRAVENOUS PRN
Status: DISCONTINUED | OUTPATIENT
Start: 2024-04-07 | End: 2024-04-14 | Stop reason: HOSPADM

## 2024-04-07 RX ORDER — ENOXAPARIN SODIUM 100 MG/ML
40 INJECTION SUBCUTANEOUS DAILY
Status: DISCONTINUED | OUTPATIENT
Start: 2024-04-08 | End: 2024-04-07 | Stop reason: DRUGHIGH

## 2024-04-07 RX ORDER — SODIUM CHLORIDE 9 MG/ML
INJECTION, SOLUTION INTRAVENOUS PRN
Status: DISCONTINUED | OUTPATIENT
Start: 2024-04-07 | End: 2024-04-14 | Stop reason: HOSPADM

## 2024-04-07 RX ORDER — LACTULOSE 10 G/15ML
10 SOLUTION ORAL 3 TIMES DAILY
COMMUNITY

## 2024-04-07 RX ORDER — MULTIVITAMIN
1 TABLET ORAL DAILY
COMMUNITY

## 2024-04-07 RX ORDER — CEFTRIAXONE 1 G/1
1000 INJECTION, POWDER, FOR SOLUTION INTRAMUSCULAR; INTRAVENOUS EVERY 24 HOURS
Status: ON HOLD | COMMUNITY
Start: 2024-04-05 | End: 2024-04-11 | Stop reason: HOSPADM

## 2024-04-07 RX ORDER — CASTOR OIL AND BALSAM, PERU 788; 87 MG/G; MG/G
OINTMENT TOPICAL 2 TIMES DAILY
COMMUNITY

## 2024-04-07 RX ORDER — POLYETHYLENE GLYCOL 3350 17 G/17G
17 POWDER, FOR SOLUTION ORAL 2 TIMES DAILY
COMMUNITY

## 2024-04-07 RX ORDER — ONDANSETRON 2 MG/ML
4 INJECTION INTRAMUSCULAR; INTRAVENOUS EVERY 6 HOURS PRN
Status: DISCONTINUED | OUTPATIENT
Start: 2024-04-07 | End: 2024-04-14 | Stop reason: HOSPADM

## 2024-04-07 RX ORDER — ENOXAPARIN SODIUM 100 MG/ML
30 INJECTION SUBCUTANEOUS 2 TIMES DAILY
Status: DISCONTINUED | OUTPATIENT
Start: 2024-04-08 | End: 2024-04-14 | Stop reason: HOSPADM

## 2024-04-07 RX ORDER — POLYETHYLENE GLYCOL 3350 17 G/17G
17 POWDER, FOR SOLUTION ORAL DAILY PRN
Status: DISCONTINUED | OUTPATIENT
Start: 2024-04-07 | End: 2024-04-14 | Stop reason: HOSPADM

## 2024-04-07 RX ORDER — DORZOLAMIDE HCL 20 MG/ML
1 SOLUTION/ DROPS OPHTHALMIC 2 TIMES DAILY
COMMUNITY

## 2024-04-07 RX ORDER — POTASSIUM CHLORIDE 20 MEQ/1
40 TABLET, EXTENDED RELEASE ORAL PRN
Status: DISCONTINUED | OUTPATIENT
Start: 2024-04-07 | End: 2024-04-14 | Stop reason: HOSPADM

## 2024-04-07 RX ADMIN — SODIUM CHLORIDE, PRESERVATIVE FREE 10 ML: 5 INJECTION INTRAVENOUS at 22:53

## 2024-04-07 RX ADMIN — WATER 1000 MG: 1 INJECTION INTRAMUSCULAR; INTRAVENOUS; SUBCUTANEOUS at 17:17

## 2024-04-07 RX ADMIN — IOPAMIDOL 75 ML: 755 INJECTION, SOLUTION INTRAVENOUS at 15:12

## 2024-04-07 ASSESSMENT — PAIN SCALES - WONG BAKER: WONGBAKER_NUMERICALRESPONSE: NO HURT

## 2024-04-07 ASSESSMENT — PAIN - FUNCTIONAL ASSESSMENT: PAIN_FUNCTIONAL_ASSESSMENT: WONG-BAKER FACES

## 2024-04-07 NOTE — ED NOTES
Pt still not following commands. Has not spoke besides \"hey\" every so often, does not further speak or elaborate.

## 2024-04-07 NOTE — PROGRESS NOTES
Medication Reconciliation     List of medications patient is currently taking is complete.     Source of information: 1. Med list sent by Dalton Samuels                           Allergies  Codeine, Thorazine [chlorpromazine], and Tylenol [acetaminophen]     Notes regarding home medications:  1.. Medication list is up to date according to paperwork sent with patient.    Reina Perera, Pharmacy Intern  4/7/2024 6:00 PM     [facility]

## 2024-04-07 NOTE — H&P
Hospital Medicine History & Physical      PCP: No primary care provider on file.    Date of Admission: 4/7/2024    Date of Service: Pt seen/examined on 4/7 and Admitted to Inpatient with expected LOS greater than two midnights due to medical therapy.     Chief Complaint: Altered mental status      History Of Present Illness:      63 y.o. male with history of antisocial personality disorder, schizoaffective disorder, major depressive disorder, tardive dyskinesia, polysubstance abuse, chronic constipation presented from a skilled nursing facility with altered mental status.  History is obtained from chart review as patient is mute and unable to provide any history.       The patient was started on IM Rocephin on April 4 for suspected UTI with dysuria, urine studies available..  His mental status has been more altered than his baseline today  In the ED, he is mute and not able to provide any history  On chart review, he was last admitted to  neurology in 8/23 with altered mental status..  He had document repeated episodes of mutism and right-sided weakness with negative neuroimaging, EEG...  Episodes were suspected to be behavior related to primary psychiatric illness  In the ED, BP was 114/83, pulse 77, respirations 30, temperature 97.5  CT head, CTA head and neck showed no acute issues, CT abdomen showed a large stool volume in the rectum        Past Medical History:          Diagnosis Date    Antisocial personality disorder (HCC)     Blindness right eye category 5, normal vision left eye     Drug induced subacute dyskinesia     Insomnia     Major depressive disorder, recurrent, unspecified (HCC)     Melena     Schizoaffective disorder (HCC)     Substance abuse (HCC)        Past Surgical History:      No past surgical history on file.    Medications Prior to Admission:      Prior to Admission medications    Medication Sig Start Date End Date Taking? Authorizing Provider   acetaminophen (TYLENOL) 325 MG tablet

## 2024-04-07 NOTE — ED PROVIDER NOTES
[facility]  EMERGENCY DEPARTMENT ENCOUNTER        Pt Name: Armando Lewis  MRN: [id number]  Birthdate 1960  Date of evaluation: 4/7/2024  Provider: Aurea Bella PA-C  PCP: No primary care provider on file.  Note Started: 2:22 PM EDT 4/7/24       I have seen and evaluated this patient with my supervising physician Hussein Pruitt.      CHIEF COMPLAINT       Chief Complaint   Patient presents with    Altered Mental Status     Sent from [facility] for change in mental status, concern for infection. Right foot wound reportedly was hypoxic at facility, increased work of breathing since yesterday. Pt not currently verbal or giving any additional history.       HISTORY OF PRESENT ILLNESS: 1 or more Elements     History From: Nursing home            Chief Complaint: Altered mental status    Armando Lewis is a 63 y.o. male who presents via EMS from [facility] with a concern of altered mental status.  I spoke to the nurse taking care of the patient, Emelina, she mentioned that she is only the weekend nurse and she noticed the patient pain and altered mental status this morning when she arrived around 8 AM.  She mentioned last time she saw the patient at his regular baseline was last week and, she was no able to tell me if others have has seen the patient last well-known.  The nurse mentioned that the patient is usually verbal he is wheelchair-bound, no witnessed falls  It was also noted that the patient had increased work of breathing started yesterday.  Patient does not require oxygen at home.    Nursing Notes were all reviewed and agreed with or any disagreements were addressed in the HPI.    REVIEW OF SYSTEMS :      Review of Systems    Positives and Pertinent negatives as per HPI.     SURGICAL HISTORY   No past surgical history on file.    CURRENTMEDICATIONS       Current Discharge Medication List        CONTINUE these medications which have NOT CHANGED    Details   acetaminophen (TYLENOL) 325 MG  and disoriented.      GCS: GCS eye subscore is 3. GCS verbal subscore is 2. GCS motor subscore is 4.      Deep Tendon Reflexes: Babinski sign absent on the right side. Babinski sign absent on the left side.   Psychiatric:         Mood and Affect: Mood normal.         Behavior: Behavior normal.           DIAGNOSTIC RESULTS   LABS:    Labs Reviewed   CBC WITH AUTO DIFFERENTIAL - Abnormal; Notable for the following components:       Result Value    Hemoglobin 12.8 (*)     Hematocrit 38.9 (*)     RDW 16.5 (*)     All other components within normal limits   COMPREHENSIVE METABOLIC PANEL W/ REFLEX TO MG FOR LOW K - Abnormal; Notable for the following components:    Creatinine 0.6 (*)     All other components within normal limits   COVID-19, RAPID   CULTURE, BLOOD 1   CULTURE, BLOOD 2   LACTIC ACID   URINALYSIS WITH REFLEX TO CULTURE   TROPONIN   PROTIME-INR   TROPONIN   BASIC METABOLIC PANEL W/ REFLEX TO MG FOR LOW K   CBC WITH AUTO DIFFERENTIAL   AMMONIA   TSH WITH REFLEX TO FT4   URINE DRUG SCREEN   POCT GLUCOSE       When ordered only abnormal lab results are displayed. All other labs were within normal range or not returned as of this dictation.    EKG: When ordered, EKG's are interpreted by the Emergency Department Physician in the absence of a cardiologist.  Please see their note for interpretation of EKG.    RADIOLOGY:   Non-plain film images such as CT, Ultrasound and MRI are read by the radiologist. Plain radiographic images are visualized and preliminarily interpreted by the ED Provider with the below findings:      Interpretation per the Radiologist below, if available at the time of this note:    CT ABDOMEN PELVIS W IV CONTRAST Additional Contrast? None   Final Result   1. No acute process.   2. Large volume stool in the rectum.         CT HEAD WO CONTRAST   Final Result   No acute intracranial abnormality.         CTA HEAD NECK W CONTRAST   Final Result   No flow limiting stenosis or occlusion within the head

## 2024-04-07 NOTE — ED PROVIDER NOTES
Attending Note:    The patient was seen and examined by the mid-level provider.  I also completed a face-to-face examination.      HPI: Armando Lewis is a 63 y.o. male who presents to the emergency department with a report of altered mental status.  The patient came from [facility].  He is full code.  There are no family numbers present.  He is unable to provide any historical information.  Staff at the [facility] care facility reported altered mental status today at 8 AM when the nurse arrived.  She reports that she last saw him 1 week ago and he was not like this.  Last time seen normal is unknown but is thought to be last night.    The patient was recently started on Rocephin 3 days ago on April 4 for suspected urinary tract infection and dysuria.    There is no report of any fall trauma or injury.  No report of any chest pain or shortness of breath.  No report of any abdominal pain vomiting or diarrhea.  No report of any focal motor or sensory deficits.    Medical history significant for metabolic encephalopathy, hypernatremia, urinary tract infection, sepsis, elevated liver enzymes, antisocial personality disorder, schizoaffective disorder,    Medications include Plavix 75 mg daily.      Physical Exam:     Constitutional: No apparent distress.  Chronically ill-appearing.  Head: No visible evidence of trauma.  Normocephalic.  Eyes: Pupils equal and reactive.  Pupils 2 mm bilaterally and weakly reactive.  No photophobia.  Mild right-sided conjunctival erythema.  Anterior chambers are clear.  Lash margins are clear.  No periorbital edema.  HENT: Oral mucosa moist.  Airway patent.  Neck:  Soft and supple.   Nontender.  No meningeal signs.  Heart: Sinus rhythm on the monitor.  Rate 75.  Lungs:  Clear to auscultation.  No wheezes, rales, or ronchi.  No conversational dyspnea or accessory muscle use.  Abdomen:  Soft, non-distended.  Nontender. No guarding rigidity or rebound.  No apparent abdominal  chest x-ray reveals no active disease.  No focal infiltrate.    CT head without contrast, CTA head and neck, CT abdomen and pelvis with IV contrast were reviewed.  CTA head and neck are negative.  CT abdomen and pelvis with IV contrast is unremarkable.  Large amount of stool in the rectum.  CT head without contrast is unremarkable.    The patient will be admitted for further treatment and evaluation.    I personally saw and made/approved the management plan and take responsibility for the patient management.      CRITICAL CARE TIME     I personally saw the patient and independently provided 0 minutes of non-concurrent critical care out of the total shared critical care time provided. This excludes separately reportable procedures.    There was a high probability of clinically significant/life threatening deterioration in the patient's condition which required my urgent intervention.      CONSULTS:  None    PROCEDURES:  Unless otherwise noted below, none     Procedures        FINAL IMPRESSION      1. Altered mental status, unspecified altered mental status type          DISPOSITION/PLAN   DISPOSITION        PATIENT REFERRED TO:  No follow-up provider specified.    DISCHARGE MEDICATIONS:  New Prescriptions    No medications on file     Controlled Substances Monitoring:          No data to display                (Please note that portions of this note were completed with a voice recognition program.  Efforts were made to edit the dictations but occasionally words are mis-transcribed.)    HUSSEIN CARLISLE DO (electronically signed)  Attending Emergency Physician       Hussein Carlisle DO  04/08/24 [id number]

## 2024-04-08 ENCOUNTER — APPOINTMENT (OUTPATIENT)
Dept: MRI IMAGING | Age: 64
DRG: 861 | End: 2024-04-08
Payer: MEDICAID

## 2024-04-08 LAB
AMMONIA PLAS-SCNC: 27 UMOL/L (ref 16–60)
AMPHETAMINES UR QL SCN>1000 NG/ML: NORMAL
ANION GAP SERPL CALCULATED.3IONS-SCNC: 9 MMOL/L (ref 3–16)
BARBITURATES UR QL SCN>200 NG/ML: NORMAL
BASOPHILS # BLD: 0 K/UL (ref 0–0.2)
BASOPHILS NFR BLD: 0.3 %
BENZODIAZ UR QL SCN>200 NG/ML: NORMAL
BUN SERPL-MCNC: 16 MG/DL (ref 7–20)
CALCIUM SERPL-MCNC: 9.6 MG/DL (ref 8.3–10.6)
CANNABINOIDS UR QL SCN>50 NG/ML: NORMAL
CHLORIDE SERPL-SCNC: 105 MMOL/L (ref 99–110)
CO2 SERPL-SCNC: 28 MMOL/L (ref 21–32)
COCAINE UR QL SCN: NORMAL
CREAT SERPL-MCNC: 0.6 MG/DL (ref 0.8–1.3)
DEPRECATED RDW RBC AUTO: 16.8 % (ref 12.4–15.4)
DRUG SCREEN COMMENT UR-IMP: NORMAL
EKG ATRIAL RATE: 80 BPM
EKG DIAGNOSIS: NORMAL
EKG P AXIS: 54 DEGREES
EKG P-R INTERVAL: 168 MS
EKG Q-T INTERVAL: 358 MS
EKG QRS DURATION: 76 MS
EKG QTC CALCULATION (BAZETT): 412 MS
EKG R AXIS: 50 DEGREES
EKG T AXIS: 83 DEGREES
EKG VENTRICULAR RATE: 80 BPM
EOSINOPHIL # BLD: 0 K/UL (ref 0–0.6)
EOSINOPHIL NFR BLD: 0.4 %
FENTANYL SCREEN, URINE: NORMAL
GFR SERPLBLD CREATININE-BSD FMLA CKD-EPI: >90 ML/MIN/{1.73_M2}
GLUCOSE SERPL-MCNC: 82 MG/DL (ref 70–99)
HCT VFR BLD AUTO: 40 % (ref 40.5–52.5)
HGB BLD-MCNC: 12.9 G/DL (ref 13.5–17.5)
LYMPHOCYTES # BLD: 1 K/UL (ref 1–5.1)
LYMPHOCYTES NFR BLD: 10.5 %
MCH RBC QN AUTO: 27 PG (ref 26–34)
MCHC RBC AUTO-ENTMCNC: 32.2 G/DL (ref 31–36)
MCV RBC AUTO: 84.1 FL (ref 80–100)
METHADONE UR QL SCN>300 NG/ML: NORMAL
MONOCYTES # BLD: 0.9 K/UL (ref 0–1.3)
MONOCYTES NFR BLD: 10.1 %
NEUTROPHILS # BLD: 7.4 K/UL (ref 1.7–7.7)
NEUTROPHILS NFR BLD: 78.7 %
OPIATES UR QL SCN>300 NG/ML: NORMAL
OXYCODONE UR QL SCN: NORMAL
PCP UR QL SCN>25 NG/ML: NORMAL
PH UR STRIP: 5 [PH]
PLATELET # BLD AUTO: 202 K/UL (ref 135–450)
PLATELET BLD QL SMEAR: ADEQUATE
PMV BLD AUTO: 9 FL (ref 5–10.5)
POTASSIUM SERPL-SCNC: 4 MMOL/L (ref 3.5–5.1)
RBC # BLD AUTO: 4.75 M/UL (ref 4.2–5.9)
SLIDE REVIEW: ABNORMAL
SODIUM SERPL-SCNC: 142 MMOL/L (ref 136–145)
TSH SERPL DL<=0.005 MIU/L-ACNC: 1.44 UIU/ML (ref 0.27–4.2)
WBC # BLD AUTO: 9.4 K/UL (ref 4–11)

## 2024-04-08 PROCEDURE — 95819 EEG AWAKE AND ASLEEP: CPT

## 2024-04-08 PROCEDURE — 2580000003 HC RX 258: Performed by: HOSPITALIST

## 2024-04-08 PROCEDURE — 9990000010 HC NO CHARGE VISIT

## 2024-04-08 PROCEDURE — 93010 ELECTROCARDIOGRAM REPORT: CPT | Performed by: INTERNAL MEDICINE

## 2024-04-08 PROCEDURE — 70551 MRI BRAIN STEM W/O DYE: CPT

## 2024-04-08 PROCEDURE — 92610 EVALUATE SWALLOWING FUNCTION: CPT

## 2024-04-08 PROCEDURE — 84443 ASSAY THYROID STIM HORMONE: CPT

## 2024-04-08 PROCEDURE — 99254 IP/OBS CNSLTJ NEW/EST MOD 60: CPT | Performed by: REGISTERED NURSE

## 2024-04-08 PROCEDURE — 80048 BASIC METABOLIC PNL TOTAL CA: CPT

## 2024-04-08 PROCEDURE — 80307 DRUG TEST PRSMV CHEM ANLYZR: CPT

## 2024-04-08 PROCEDURE — 1200000000 HC SEMI PRIVATE

## 2024-04-08 PROCEDURE — 36415 COLL VENOUS BLD VENIPUNCTURE: CPT

## 2024-04-08 PROCEDURE — 94760 N-INVAS EAR/PLS OXIMETRY 1: CPT

## 2024-04-08 PROCEDURE — 6360000002 HC RX W HCPCS: Performed by: HOSPITALIST

## 2024-04-08 PROCEDURE — 2500000003 HC RX 250 WO HCPCS: Performed by: HOSPITALIST

## 2024-04-08 PROCEDURE — 85025 COMPLETE CBC W/AUTO DIFF WBC: CPT

## 2024-04-08 PROCEDURE — 82140 ASSAY OF AMMONIA: CPT

## 2024-04-08 RX ORDER — SODIUM CHLORIDE 9 MG/ML
INJECTION, SOLUTION INTRAVENOUS CONTINUOUS
Status: DISCONTINUED | OUTPATIENT
Start: 2024-04-08 | End: 2024-04-10

## 2024-04-08 RX ADMIN — ENOXAPARIN SODIUM 30 MG: 100 INJECTION SUBCUTANEOUS at 21:21

## 2024-04-08 RX ADMIN — Medication 330 ML: at 10:09

## 2024-04-08 RX ADMIN — SODIUM CHLORIDE, PRESERVATIVE FREE 10 ML: 5 INJECTION INTRAVENOUS at 09:52

## 2024-04-08 RX ADMIN — CEFTRIAXONE SODIUM 2000 MG: 2 INJECTION, POWDER, FOR SOLUTION INTRAMUSCULAR; INTRAVENOUS at 05:56

## 2024-04-08 RX ADMIN — SODIUM CHLORIDE: 9 INJECTION, SOLUTION INTRAVENOUS at 15:16

## 2024-04-08 RX ADMIN — ENOXAPARIN SODIUM 30 MG: 100 INJECTION SUBCUTANEOUS at 09:52

## 2024-04-08 NOTE — PROGRESS NOTES
Patient's primary health care decision maker was called to ask questions for MRI questionnaire. No response at this time. Message was left. Will call again in the AM.

## 2024-04-08 NOTE — PROCEDURES
Name: Armando Lewis  MRN: [id number]  : 1960  Interpreting Physician: Cb Ho MD   Referring Physician: Nicholas Cooper MD  Date of EE24    Clinical History:  Armando Lewis is a 63 y.o. male with a reported history of AMS    Current Antiepileptic Medications:    sodium chloride flush  5-40 mL IntraVENous 2 times per day    enoxaparin  30 mg SubCUTAneous BID    cefTRIAXone (ROCEPHIN) IV  2,000 mg IntraVENous Q24H       Indication:Altered mental status      Technical Summary:  20 channels of EEG were recorded in a digital format on a patient who was reported to be awake and drowsy state  during the recording. The patient was not sleep deprived prior to the EEG.    The recording was done essentially exclusively during a state of drowsiness. No significant PDR was seen. There were no epileptiform discharges or focal abnormalities.    Photic stimulation was performed and showed no response    Hyperventilation was not performed due to medical condition.  During the recording stage II sleep was not seen, but brief stage I sleep was seen with vertex waves.    The EKG lead revealed no rhythm abnormalities.    Video was reviewed and no clinical signs of epileptiform activity.     EEG Interpretation:   Normal EEG completed during the drowsy state. No epileptiform discharges seen and no other focal abnormalities seen.    Cb Ho MD  Neurology    Electronically signed by Cb Ho MD on 2024 at 4:17 PM

## 2024-04-08 NOTE — CONSULTS
PSYCHIATRY CONSULT, INITIAL EVALUATION      ReReferring Provider:  Nicholas Cooper MD    CC/Reason for Consult: AMS     HPI:   context: Armando Lewis is a 63 y.o. male  with  has a past medical history of Antisocial personality disorder (HCC), Blindness right eye category 5, Schizoaffective disorder, normal vision left eye, Drug induced subacute dyskinesia, Insomnia, Major depressive disorder, recurrent, unspecified (HCC), Melena, Schizoaffective disorder ([facility]), and Substance abuse ([facility]) who presented with AMS from [facility]     associated symptoms: Patient has been taking Rocephin for suspected UTI in a community for 4 days. He was totally disoriented up on admission to the hospital. He is wheel chair abound at base line and no witnessed falls prior to arriving to hospital. Care staff at NH reported patient was not at his base line before send out to hospital. Noted he was also seen by  psychiatry in Aug 2023 for AMS along with episodes of agitation. Treated with Risperidone, Prozac and Buspar and not admitted to  psych at that time. Patient has hx selective mutism.   During my visit: patient seen in room. Awake and tracks around with eyes. Does not say any word or able to engage in conversation at this time.     modifying factors: none  Timing: acute   duration: few days   severity: Severe     Collateral information: Called and talked to patient's mother and sister ( Nuzhat- is RN by profession) who live out of state. Patient's mother reports patient was talking to family last weekend. Family reports he had episodes like these in the past. Patient sister thinks it could be due to medications. Discussed POC with patient- hold off antipsychotics for few days to see his improvements. Transferred phone call from family to patient so he can communicate but patient unable to carry conversation though he did make more effort to communicate with his mother over the phone.     ROS: JOSE DANIEL   Past Psychiatric  Brain for further diagnostic work up. CT head negative. EEG ordered per Neuro.    2. Hold off Risperidone, Prozac and Buspar for now. Patient has hx selective mutism per chart review. .     3. Medical records, labs, diagnostic tests reviewed> UDS negative. Noted Ammonia and TSH results. Will order B12 and Folate for AM.     4. Will follow along with the patient.     Dispo: Pending       Thank you for this consult, please call the psychiatry consult line for further questions.    Chava Romo, JEFFREY, PMHNP-BC, CNP  [facility]/ psychiatry

## 2024-04-08 NOTE — PROGRESS NOTES
Occupational Therapy Attempt  Armando Lewis    OT order noted. Patient is dependent with mobility, using armin lift for any oob mobility. OT signing off.     Electronically signed by Beatriz Hawkins OT on 4/8/24 at 11:40 AM EDT

## 2024-04-08 NOTE — PROGRESS NOTES
4 Eyes Skin Assessment     NAME:  Armando Lewis  YOB: 1960  MEDICAL RECORD NUMBER:  [id number]    The patient is being assessed for  Admission    I agree that at least one RN has performed a thorough Head to Toe Skin Assessment on the patient. ALL assessment sites listed below have been assessed.      Areas assessed by both nurses:    Head, Face, Ears, Shoulders, Back, Chest, Arms, Elbows, Hands, Sacrum. Buttock, Coccyx, Ischium, Legs. Feet and Heels, and Under Medical Devices         Does the Patient have a Wound? No noted wound(s)       Bhanu Prevention initiated by RN: Yes  Wound Care Orders initiated by RN: No    Pressure Injury (Stage 3,4, Unstageable, DTI, NWPT, and Complex wounds) if present, place Wound referral order by RN under : No    New Ostomies, if present place, Ostomy referral order under : No     Nurse 1 eSignature: Electronically signed by Marilin Donovan RN on 4/7/24 at 11:23 PM EDT    **SHARE this note so that the co-signing nurse can place an eSignature**    Nurse 2 eSignature: Electronically signed by Kev Brooke RN on 4/7/24 at 11:55 PM EDT

## 2024-04-08 NOTE — PROGRESS NOTES
Enema given to pt. Pt had multiple large bm's following the enema being given. Electronically signed by Anival Oakes RN on 4/8/2024 at 11:33 AM

## 2024-04-08 NOTE — PROGRESS NOTES
V2.0    [facility] Progress Note      Name:  Armando Lewis /Age/Sex: 1960  (63 y.o. male)   MRN & CSN:  [id number] & [id number] Encounter Date/Time: 2024 8:57 AM EDT   Location:  [id number] PCP: No primary care provider on file.     Attending:Nicholas Cooper MD       Hospital Day: 2      HPI :   Chief Complaint: Altered mental status    Armando Lewis is a 63 y.o. male who presents with altered mental status      Subjective:   Patient remains nonverbal today, no fevers or chills.  No acute events reported overnight.      Review of Systems:      Pertinent positives and negatives discussed in HPI    Objective:     Intake/Output Summary (Last 24 hours) at 2024 0812  Last data filed at 2024 0559  Gross per 24 hour   Intake --   Output 350 ml   Net -350 ml      Vitals:   Vitals:    24 2352 24 0158 24 0401 24 0512   BP: 113/61  133/73    Pulse: 70  75    Resp: 16  16    Temp: 97.9 °F (36.6 °C)  97.6 °F (36.4 °C)    TempSrc: Axillary  Axillary    SpO2: 95%  92%    Weight:  116.7 kg (257 lb 4.4 oz)  116.7 kg (257 lb 4.4 oz)   Height:  1.778 m (5' 10\")           Physical Exam:      Physical Exam Performed:    /73   Pulse 75   Temp 97.6 °F (36.4 °C) (Axillary)   Resp 16   Ht 1.778 m (5' 10\")   Wt 116.7 kg (257 lb 4.4 oz)   SpO2 92%   BMI 36.92 kg/m²     General appearance:  No apparent distress, nonverbal  HEENT:  Normal cephalic, atraumatic without obvious deformity. . Conjunctivae/corneas clear.  Respiratory:  Normal respiratory effort. Clear to auscultation, bilaterally without Rales/Wheezes/Rhonchi.  Cardiovascular:  Regular rate and rhythm with normal S1/S2 without murmurs, rubs or gallops.  Abdomen: Soft, non-tender, non-distended with normal bowel sounds.    Neurologic: Nonverbal, tremors of the face, bilateral lower extremities no weak and rigid  Psychiatric:  Alert nonverbal        Medications:   Medications:    sodium chloride flush  5-40 mL IntraVENous 2 times  per day    SMOG enema  330 mL Rectal Once    enoxaparin  30 mg SubCUTAneous BID    cefTRIAXone (ROCEPHIN) IV  2,000 mg IntraVENous Q24H      Infusions:    sodium chloride       PRN Meds: sodium chloride flush, 5-40 mL, PRN  sodium chloride, , PRN  potassium chloride, 40 mEq, PRN   Or  potassium alternative oral replacement, 40 mEq, PRN   Or  potassium chloride, 10 mEq, PRN  magnesium sulfate, 2,000 mg, PRN  ondansetron, 4 mg, Q8H PRN   Or  ondansetron, 4 mg, Q6H PRN  polyethylene glycol, 17 g, Daily PRN          Labs and Imaging   CT ABDOMEN PELVIS W IV CONTRAST Additional Contrast? None    Result Date: 6/8/2023  EXAMINATION: CT OF THE ABDOMEN AND PELVIS WITH CONTRAST 6/8/2023 7:52 pm TECHNIQUE: CT of the abdomen and pelvis was performed with the administration of intravenous contrast. Multiplanar reformatted images are provided for review. Automated exposure control, iterative reconstruction, and/or weight based adjustment of the mA/kV was utilized to reduce the radiation dose to as low as reasonably achievable. COMPARISON: 09/11/2022 HISTORY: ORDERING SYSTEM PROVIDED HISTORY: abd pain, diarrhea TECHNOLOGIST PROVIDED HISTORY: Reason for exam:->abd pain, diarrhea Additional Contrast?->None Decision Support Exception - unselect if not a suspected or confirmed emergency medical condition->Emergency Medical Condition (MA) Reason for Exam: abd pain, diarrhea FINDINGS: Lower Chest: Coarse interstitial findings in the lung bases are again demonstrated compatible with fibrotic lung process.  Overall this appears similar compared to the prior exam. Organs: The liver, pancreas, spleen, kidneys, and adrenals reveal no acute findings. No inflammatory change identified in the gallbladder fossa. GI/Bowel: Liquid stool is present throughout the colon.  The sigmoid colon takes an unusual course towards the right abdomen and there is mild swirling of the mesentery noted without twisting to suggest volvulus.  Loops of colon are

## 2024-04-08 NOTE — PLAN OF CARE
Problem: Discharge Planning  Goal: Discharge to home or other facility with appropriate resources  Outcome: Progressing  Flowsheets (Taken 4/8/2024 0011 by Marilin Loya, RN)  Discharge to home or other facility with appropriate resources:   Identify barriers to discharge with patient and caregiver   Identify discharge learning needs (meds, wound care, etc)   Refer to discharge planning if patient needs post-hospital services based on physician order or complex needs related to functional status, cognitive ability or social support system   Arrange for needed discharge resources and transportation as appropriate     Problem: Pain  Goal: Verbalizes/displays adequate comfort level or baseline comfort level  Outcome: Progressing     Problem: Safety - Adult  Goal: Free from fall injury  Outcome: Progressing     Problem: ABCDS Injury Assessment  Goal: Absence of physical injury  Outcome: Progressing     Problem: Skin/Tissue Integrity  Goal: Absence of new skin breakdown  Description: 1.  Monitor for areas of redness and/or skin breakdown  2.  Assess vascular access sites hourly  3.  Every 4-6 hours minimum:  Change oxygen saturation probe site  4.  Every 4-6 hours:  If on nasal continuous positive airway pressure, respiratory therapy assess nares and determine need for appliance change or resting period.  Outcome: Progressing

## 2024-04-08 NOTE — PROGRESS NOTES
Facility/Department: [address] MED SURG  Initial Assessment  DYSPHAGIA BEDSIDE SWALLOW EVALUATION     Patient: Armando Lewis   : 1960   MRN: [id number]      Evaluation Date: 2024   Admitting Diagnosis:   Altered mental status [R41.82]  Altered mental status, unspecified altered mental status type [R41.82]    Pain: Did not state                                                       CHART REVIEW:  2024 admitted with c/o AMS  MD ADMISSION H&P HPI:  63 y.o. male with history of antisocial personality disorder, schizoaffective disorder, major depressive disorder, tardive dyskinesia, polysubstance abuse, chronic constipation presented from a skilled nursing facility with altered mental status.  History is obtained from chart review as patient is mute and unable to provide any history.  The patient was started on IM Rocephin on  for suspected UTI with dysuria, urine studies available..  His mental status has been more altered than his baseline today  In the ED, he is mute and not able to provide any history  On chart review, he was last admitted to  neurology in  with altered mental status..  He had document repeated episodes of mutism and right-sided weakness with negative neuroimaging, EEG...  Episodes were suspected to be behavior related to primary psychiatric illness  In the ED, BP was 114/83, pulse 77, respirations 30, temperature 97.5  CT head, CTA head and neck showed no acute issues, CT abdomen showed a large stool volume in the rectum    Neurology, Psychiatry consults and notes reviewed    IMAGING:  CXR: 2024  IMPRESSION:  No acute cardiopulmonary process.  Low lung volumes.    CT ABDOMEN/PELVIS: 2024  IMPRESSION:  1. No acute process.  2. Large volume stool in the rectum.    BRAIN MRI: 2024  IMPRESSION:  1. No acute intracranial abnormality.  No acute infarct.  2. Minimal global parenchymal volume loss with minimal chronic microvascular ischemic changes.    Current Diet Level

## 2024-04-08 NOTE — PROGRESS NOTES
Physical Therapy  Initial Evaluation Attempt and Discharge     24    Name: Armando Lewis   : 1960    MRN: [id number]    PT order noted. Patient is dependent with mobility, using armin lift for any oob mobility. PT signing off.    Electronically signed by Long Guevara PT on 2024 at 11:18 AM

## 2024-04-08 NOTE — PROGRESS NOTES
SLP NOTE    10:21 AM:  Swallow evaluation order received. Patient receiving nursing care (enema) with anticipated AM unavailability d/t bowels. ST to hold re-attempt until later this date as schedule permits unless otherwise notified.    2:11 PM:  Swallow evaluation re-attempted. Patient unavailable out of room at diagnostics. ST to continue to re-attempt as schedule permits.    Of note, ECF transfer records reviewed and patient on mechanical soft diet texture with thin liquids at ECF.    Thank you.  Nan Leung MA, CCC-SLP, #[id number]  Speech-Language Pathologist  Portable phone: [phone]

## 2024-04-08 NOTE — CONSULTS
NEUROLOGY CONSULT NOTE  Reason for Consult: Dr Cooper, Nicholas Savage MD asked me to see Armando Lewis in consultation for evaluation of:  altered mental status    Chief complaint: Unable to obtain.    HISTORY OF PRESENT ILLNESS:  HPI was gathered from the patient's stepmother via telephone as well as EMR review.    Armando Lewis is a 63 y.o. male with a PMH that includes antisocial personality  disorder, R eye blindness, drug induced dyskinesia, major depressive disorder, schizoaffective disorder, and substance abuse.    Patient presented to the ED yesterday from [facility] for a change in mental status and concern for infection.  Patient has a R foot wound.  He was reportedly hypoxic at the facility with increased work of breathing.  BP in /83.  HR 77.  Afebrile.  93% on room air. Today the patient is mostly non-verbal. When he does speak he is hypophonic and unintelligible.  Per his stepmother (who last spoke to him a month ago) he is usually able to hold a conversation.  She is not sure about his knowledge of his whereabouts or the year, etc.    HCT and CTAs were non-acute.  MRI remains pending; there is some difficulty with finding a family member to fill out safety questionnaire.    Review of the records shows an admission to [facility] in August 2023. He was admitted for episodes of mutism with right-sided weakness. He had an extensive neurological work up that was unrevealing.  He had cvEEG with symptoms that were not correlated on EEG tracings. He gradually improved. Apparently he has had several admissions with similar symptoms.  It was ultimately felt that his symptoms were behavioral.    There was no family at the bedside at the time of encounter.    MEDICAL HISTORY:  Past Medical History:   Diagnosis Date    Antisocial personality disorder (HCC)     Blindness right eye category 5, normal vision left eye     Drug induced subacute dyskinesia     Insomnia     Major depressive disorder, recurrent, unspecified  nightly  tamsulosin (FLOMAX) 0.4 MG capsule, Take 1 capsule by mouth at bedtime  cyanocobalamin 1000 MCG tablet, Take 1 tablet by mouth daily  FLUoxetine (PROZAC) 10 MG tablet, Take 1 tablet by mouth daily  melatonin 3 MG TABS tablet, Take 1 tablet by mouth nightly  risperiDONE (RISPERDAL) 1 MG tablet, Take 1 tablet by mouth 2 times daily    Allergies   Allergen Reactions    Codeine     Thorazine [Chlorpromazine]     Tylenol [Acetaminophen]        No family history on file.    Social History     Tobacco Use   Smoking Status Unknown   Smokeless Tobacco Never   Tobacco Comments    Quit since in F      Social History     Substance and Sexual Activity   Drug Use Not Currently    Types: Cocaine    Comment: unknown     Social History     Substance and Sexual Activity   Alcohol Use Not Currently    Comment: unknown       ROS:  Unable to obtain d/t AMS.    EXAM:  Vitals:    04/08/24 0158 04/08/24 0401 04/08/24 0512 04/08/24 0836   BP:  133/73  107/64   Pulse:  75  75   Resp:  16  16   Temp:  97.6 °F (36.4 °C)  97.9 °F (36.6 °C)   TempSrc:  Axillary  Axillary   SpO2:  92%  93%   Weight: 116.7 kg (257 lb 4.4 oz)  116.7 kg (257 lb 4.4 oz)    Height: 1.778 m (5' 10\")         Constitutional   Vital signs: BP, HR, temperature, and RR reviewed   General: Awake and alert.  In no apparent distress.  Eyes: Unable to visualize the fundi.  Cardiovascular: Pulses symmetric in all 4 extremities.  No peripheral edema.  Psychiatric: Cooperates some with exam.  Neurologic  Mental status:   Orientation: Cannot assess.   General fund of knowledge:  Grossly impaired.   Memory: Cannot assess.   Attention: Attends poorly to the exam.    Language: Hypophonic with unintelligible speech.   Comprehension:  Follows a few simple commands with visual cues.  Cranial nerves:   CN2: Cannot assess.  CN 3,4,6: Blind R eye.  R pupil fixed.  L pupil pinpoint.  No ptosis. Disconjugate gaze.  CN5: V1: V2: V3: Cannot assess.  CN7: Decreased R NL fold.  CN8:

## 2024-04-08 NOTE — PROGRESS NOTES
Patient was admitted to room 4266 with AMS. Patient is disoriented x4. Patient has call light and bedside table within reach. VSS and patient on RA. Bed is in lowest position and alarm is activated.

## 2024-04-08 NOTE — PROGRESS NOTES
Sister and mom were called to help with MRI Martin. Spoke to sister who did not know much information. She stated that her and the mom live in [place] and dont see the patient much. Mom still has not returned my previous phone call.

## 2024-04-09 LAB
ANION GAP SERPL CALCULATED.3IONS-SCNC: 13 MMOL/L (ref 3–16)
BASOPHILS # BLD: 0 K/UL (ref 0–0.2)
BASOPHILS NFR BLD: 0.3 %
BUN SERPL-MCNC: 14 MG/DL (ref 7–20)
CALCIUM SERPL-MCNC: 9.1 MG/DL (ref 8.3–10.6)
CHLORIDE SERPL-SCNC: 104 MMOL/L (ref 99–110)
CO2 SERPL-SCNC: 24 MMOL/L (ref 21–32)
CREAT SERPL-MCNC: 0.5 MG/DL (ref 0.8–1.3)
DEPRECATED RDW RBC AUTO: 16.7 % (ref 12.4–15.4)
EOSINOPHIL # BLD: 0.1 K/UL (ref 0–0.6)
EOSINOPHIL NFR BLD: 0.6 %
FOLATE SERPL-MCNC: >20 NG/ML (ref 4.78–24.2)
GFR SERPLBLD CREATININE-BSD FMLA CKD-EPI: >90 ML/MIN/{1.73_M2}
GLUCOSE SERPL-MCNC: 72 MG/DL (ref 70–99)
HCT VFR BLD AUTO: 39.2 % (ref 40.5–52.5)
HGB BLD-MCNC: 12.6 G/DL (ref 13.5–17.5)
LYMPHOCYTES # BLD: 1.2 K/UL (ref 1–5.1)
LYMPHOCYTES NFR BLD: 10.1 %
MCH RBC QN AUTO: 27.2 PG (ref 26–34)
MCHC RBC AUTO-ENTMCNC: 32.2 G/DL (ref 31–36)
MCV RBC AUTO: 84.3 FL (ref 80–100)
MONOCYTES # BLD: 1.1 K/UL (ref 0–1.3)
MONOCYTES NFR BLD: 9.6 %
NEUTROPHILS # BLD: 9.3 K/UL (ref 1.7–7.7)
NEUTROPHILS NFR BLD: 79.4 %
PLATELET # BLD AUTO: 235 K/UL (ref 135–450)
PMV BLD AUTO: 9.7 FL (ref 5–10.5)
POTASSIUM SERPL-SCNC: 4 MMOL/L (ref 3.5–5.1)
RBC # BLD AUTO: 4.65 M/UL (ref 4.2–5.9)
SODIUM SERPL-SCNC: 141 MMOL/L (ref 136–145)
VIT B12 SERPL-MCNC: 1720 PG/ML (ref 211–911)
WBC # BLD AUTO: 11.7 K/UL (ref 4–11)

## 2024-04-09 PROCEDURE — 82746 ASSAY OF FOLIC ACID SERUM: CPT

## 2024-04-09 PROCEDURE — 82607 VITAMIN B-12: CPT

## 2024-04-09 PROCEDURE — 6360000002 HC RX W HCPCS: Performed by: HOSPITALIST

## 2024-04-09 PROCEDURE — 94760 N-INVAS EAR/PLS OXIMETRY 1: CPT

## 2024-04-09 PROCEDURE — 2580000003 HC RX 258: Performed by: HOSPITALIST

## 2024-04-09 PROCEDURE — 1200000000 HC SEMI PRIVATE

## 2024-04-09 PROCEDURE — 80048 BASIC METABOLIC PNL TOTAL CA: CPT

## 2024-04-09 PROCEDURE — 6370000000 HC RX 637 (ALT 250 FOR IP): Performed by: HOSPITALIST

## 2024-04-09 PROCEDURE — 36415 COLL VENOUS BLD VENIPUNCTURE: CPT

## 2024-04-09 PROCEDURE — 85025 COMPLETE CBC W/AUTO DIFF WBC: CPT

## 2024-04-09 PROCEDURE — 99232 SBSQ HOSP IP/OBS MODERATE 35: CPT | Performed by: NURSE PRACTITIONER

## 2024-04-09 RX ORDER — LATANOPROST 50 UG/ML
1 SOLUTION/ DROPS OPHTHALMIC NIGHTLY
Status: DISCONTINUED | OUTPATIENT
Start: 2024-04-09 | End: 2024-04-14 | Stop reason: HOSPADM

## 2024-04-09 RX ORDER — TAMSULOSIN HYDROCHLORIDE 0.4 MG/1
0.4 CAPSULE ORAL NIGHTLY
Status: DISCONTINUED | OUTPATIENT
Start: 2024-04-09 | End: 2024-04-14 | Stop reason: HOSPADM

## 2024-04-09 RX ORDER — ACETAMINOPHEN 650 MG
TABLET, EXTENDED RELEASE ORAL 2 TIMES DAILY
Status: DISCONTINUED | OUTPATIENT
Start: 2024-04-09 | End: 2024-04-14 | Stop reason: HOSPADM

## 2024-04-09 RX ORDER — LACTULOSE 10 G/15ML
10 SOLUTION ORAL 3 TIMES DAILY
Status: DISCONTINUED | OUTPATIENT
Start: 2024-04-09 | End: 2024-04-11

## 2024-04-09 RX ORDER — BRIMONIDINE TARTRATE 2 MG/ML
1 SOLUTION/ DROPS OPHTHALMIC 2 TIMES DAILY
Status: DISCONTINUED | OUTPATIENT
Start: 2024-04-09 | End: 2024-04-14 | Stop reason: HOSPADM

## 2024-04-09 RX ORDER — SENNOSIDES A AND B 8.6 MG/1
2 TABLET, FILM COATED ORAL 2 TIMES DAILY
Status: DISCONTINUED | OUTPATIENT
Start: 2024-04-09 | End: 2024-04-14 | Stop reason: HOSPADM

## 2024-04-09 RX ORDER — CLOPIDOGREL BISULFATE 75 MG/1
75 TABLET ORAL DAILY
Status: DISCONTINUED | OUTPATIENT
Start: 2024-04-09 | End: 2024-04-14 | Stop reason: HOSPADM

## 2024-04-09 RX ORDER — FERROUS SULFATE 325(65) MG
325 TABLET ORAL
Status: DISCONTINUED | OUTPATIENT
Start: 2024-04-10 | End: 2024-04-14 | Stop reason: HOSPADM

## 2024-04-09 RX ORDER — POLYETHYLENE GLYCOL 3350 17 G/17G
17 POWDER, FOR SOLUTION ORAL 2 TIMES DAILY
Status: DISCONTINUED | OUTPATIENT
Start: 2024-04-09 | End: 2024-04-14 | Stop reason: HOSPADM

## 2024-04-09 RX ORDER — LANOLIN ALCOHOL/MO/W.PET/CERES
1000 CREAM (GRAM) TOPICAL DAILY
Status: DISCONTINUED | OUTPATIENT
Start: 2024-04-09 | End: 2024-04-12

## 2024-04-09 RX ORDER — DORZOLAMIDE HCL 20 MG/ML
1 SOLUTION/ DROPS OPHTHALMIC 2 TIMES DAILY
Status: DISCONTINUED | OUTPATIENT
Start: 2024-04-09 | End: 2024-04-14 | Stop reason: HOSPADM

## 2024-04-09 RX ADMIN — BRIMONIDINE TARTRATE 1 DROP: 2 SOLUTION/ DROPS OPHTHALMIC at 13:40

## 2024-04-09 RX ADMIN — Medication: at 21:39

## 2024-04-09 RX ADMIN — SENNOSIDES 17.2 MG: 8.6 TABLET, FILM COATED ORAL at 13:40

## 2024-04-09 RX ADMIN — DORZOLAMIDE HCL 1 DROP: 20 SOLUTION/ DROPS OPHTHALMIC at 13:39

## 2024-04-09 RX ADMIN — Medication: at 13:53

## 2024-04-09 RX ADMIN — TAMSULOSIN HYDROCHLORIDE 0.4 MG: 0.4 CAPSULE ORAL at 21:34

## 2024-04-09 RX ADMIN — LACTULOSE 10 G: 20 SOLUTION ORAL at 21:35

## 2024-04-09 RX ADMIN — DORZOLAMIDE HCL 1 DROP: 20 SOLUTION/ DROPS OPHTHALMIC at 21:31

## 2024-04-09 RX ADMIN — POLYETHYLENE GLYCOL 3350 17 G: 17 POWDER, FOR SOLUTION ORAL at 13:40

## 2024-04-09 RX ADMIN — LATANOPROST 1 DROP: 50 SOLUTION OPHTHALMIC at 21:32

## 2024-04-09 RX ADMIN — SENNOSIDES 17.2 MG: 8.6 TABLET, FILM COATED ORAL at 21:34

## 2024-04-09 RX ADMIN — ENOXAPARIN SODIUM 30 MG: 100 INJECTION SUBCUTANEOUS at 10:36

## 2024-04-09 RX ADMIN — CEFTRIAXONE SODIUM 2000 MG: 2 INJECTION, POWDER, FOR SOLUTION INTRAMUSCULAR; INTRAVENOUS at 05:17

## 2024-04-09 RX ADMIN — CLOPIDOGREL BISULFATE 75 MG: 75 TABLET ORAL at 13:40

## 2024-04-09 RX ADMIN — BRIMONIDINE TARTRATE 1 DROP: 2 SOLUTION/ DROPS OPHTHALMIC at 21:29

## 2024-04-09 RX ADMIN — CYANOCOBALAMIN TAB 1000 MCG 1000 MCG: 1000 TAB at 13:40

## 2024-04-09 RX ADMIN — SODIUM CHLORIDE: 9 INJECTION, SOLUTION INTRAVENOUS at 01:26

## 2024-04-09 ASSESSMENT — PAIN SCALES - WONG BAKER
WONGBAKER_NUMERICALRESPONSE: NO HURT

## 2024-04-09 NOTE — PROGRESS NOTES
V2.0    [facility] [place]      Name:  Armando Lewis /Age/Sex: 1960  (63 y.o. male)   MRN & CSN:  [id number] & [id number] Encounter Date/Time: 2024 8:57 AM EDT   Location:  N2X-0017/4266-01 PCP: No primary care provider on file.     Attending:Nicholas Cooper MD       Hospital Day: 3      HPI :   Chief Complaint: Altered mental status    Armando Lewis is a 63 y.o. male who presents with altered mental status      Subjective:     Remains mute this morning, but rest morning to questioning by nodding his head    Review of Systems:      Pertinent positives and negatives discussed in HPI    Objective:     Intake/Output Summary (Last 24 hours) at 2024 0853  Last data filed at 2024 0746  Gross per 24 hour   Intake 2166.53 ml   Output 300 ml   Net 1866.53 ml        Vitals:   Vitals:    24 0105 24 0514 24 0716 24 0852   BP: 119/82 128/79  125/80   Pulse: 68 69  79   Resp: 17 17  18   Temp: 97.8 °F (36.6 °C) 98.2 °F (36.8 °C)  97.6 °F (36.4 °C)   TempSrc: Oral Oral  Oral   SpO2: 96% 95%  91%   Weight:   116.5 kg (256 lb 13.4 oz)    Height:             Physical Exam:      Physical Exam Performed:    /80   Pulse 79   Temp 97.6 °F (36.4 °C) (Oral)   Resp 18   Ht 1.778 m (5' 10\")   Wt 116.5 kg (256 lb 13.4 oz)   SpO2 91%   BMI 36.85 kg/m²     General appearance:  No apparent distress, nonverbal  HEENT:  Normal cephalic, atraumatic without obvious deformity. . Conjunctivae/corneas clear.  Respiratory:  Normal respiratory effort. Clear to auscultation, bilaterally without Rales/Wheezes/Rhonchi.  Cardiovascular:  Regular rate and rhythm with normal S1/S2 without murmurs, rubs or gallops.  Abdomen: Soft, non-tender, non-distended with normal bowel sounds.    Neurologic: Nonverbal, tremors of the face,  Psychiatric:  Alert nonverbal        Medications:   Medications:    sodium chloride flush  5-40 mL IntraVENous 2 times per day    enoxaparin  30 mg SubCUTAneous BID    cefTRIAXone

## 2024-04-09 NOTE — CARE COORDINATION
Case Management Assessment  Initial Evaluation    Date/Time of Evaluation: 4/9/2024 4:09 PM  Assessment Completed by: LISA Christie    If patient is discharged prior to next notation, then this note serves as note for discharge by case management.    Patient Name: Armando Lewis                   YOB: 1960  Diagnosis: Altered mental status [R41.82]  Altered mental status, unspecified altered mental status type [R41.82]                   Date / Time: 4/7/2024 12:54 PM    Patient Admission Status: Inpatient   Readmission Risk (Low < 19, Mod (19-27), High > 27): Readmission Risk Score: 13.6    Current PCP: No primary care provider on file.  PCP verified by CM?  Long term care at [facility]     Chart Reviewed: Yes      History Provided by:  Mother Shirley   Patient Orientation:    per nursing patient nonverbal   Patient Cognition:  unable to assess     Hospitalization in the last 30 days (Readmission):  No    If yes, Readmission Assessment in CM Navigator will be completed.    Advance Directives:      Code Status: Full Code   Patient's Primary Decision Maker is:      Primary Decision Maker: Shirley Lewis - [facility] - [phone]    Discharge Planning:    Patient expects to discharge to: Skilled nursing facility Long term care at [facility]   Plan for transportation at discharge:  unknown at present but will likely need BLS     Current Nursing Home Information:  Patient admitted from:  [facility]     Message left with Bhanu at [facility] regarding level of care and bed hold.     Patient Covid vaccination status:    Internal Administration   First Dose COVID-19, J&J, (age 18y+), IM, 0.5 mL  05/13/2021   Second Dose           Last COVID Lab SARS-CoV-2, NAAT (no units)   Date Value   04/07/2024 Not Detected     POC Glucose (mg/dl)   Date Value   06/30/2023 79            Covid Test requirement for return: Other requirements:awaiting return call from Bhanu     Financial    Payor: MEDICAID [place] /  Plan: MEDICAID [facility] DEPT OF JOB / Product Type: *No Product type* /     Does insurance require precert for SNF: No patient from long term skilled        Notes:    Factors facilitating achievement of predicted outcomes: Family support    Barriers to discharge: Limited family support, Confusion, and Communication deficit    Additional Case Management Notes: Spoke with patient's mother Shirley who lives with [place] with patient sister. Per Shirley patient has been at [facility] for about 2 year and prior to that Shirley states that patient was in and out of shelters. Shirley states that patient is normally oriented and speaks to him over the phone a couple times a week. Per Shirley patient has a  however she is unable to give me a name or agency. Will speak with patient if his cognition clears or will speak with nursing facility. Plan is for patient to return to [facility].     The Plan for Transition of Care is related to the following treatment goals of Altered mental status [R41.82]  Altered mental status, unspecified altered mental status type [R41.82]    LISA Hwang  Case Management Department  Ph: [phone] Fax:

## 2024-04-09 NOTE — PROGRESS NOTES
NEUROLOGY PROGRESS NOTE  Reason for Consult: Dr Cooper, Nicholas Savage MD asked me to see Armando Lewis in consultation for evaluation of:  altered mental status    Chief complaint: Unable to obtain.    UPDATE 24:  Chart reviewed including progress notes, labs, vitals, imaging, and other diagnostic studies.  Labs updated where appropriate.  No acute events overnight.  MRI and EEG unrevealing.  Seen by psychiatry who recommended holding antipsychotics for now.    Patient seen an examined at the bedside.  Exam is stable from yesterday.  Still hypophonic.  Follows a few simple commands.    There was no family at the bedside at the time of encounter.    MEDICAL HISTORY:  Past Medical History:   Diagnosis Date    Antisocial personality disorder (HCC)     Blindness right eye category 5, normal vision left eye     Drug induced subacute dyskinesia     Insomnia     Major depressive disorder, recurrent, unspecified (HCC)     Melena     Schizoaffective disorder (HCC)     Substance abuse (HCC)      No past surgical history on file.  Scheduled Meds:   sodium chloride flush  5-40 mL IntraVENous 2 times per day    enoxaparin  30 mg SubCUTAneous BID    cefTRIAXone (ROCEPHIN) IV  2,000 mg IntraVENous Q24H     Continuous Infusions:   sodium chloride 100 mL/hr at 24 0746    sodium chloride       PRN Meds:.sodium chloride flush, sodium chloride, potassium chloride **OR** potassium alternative oral replacement **OR** potassium chloride, magnesium sulfate, ondansetron **OR** ondansetron, polyethylene glycol    Medications Prior to Admission: acetaminophen (TYLENOL) 325 MG tablet, Take 2 tablets by mouth every 6 hours as needed for Pain or Fever  povidone-iodine (BETADINE) 10 % external solution, Apply topically 2 times daily Apply topically to left heel twice daily  [] cefTRIAXone (ROCEPHIN) 1 g injection, Inject 1,000 mg into the muscle every 24 hours For 3 days starting 24  dorzolamide (TRUSOPT) 2 % ophthalmic solution,

## 2024-04-09 NOTE — PROGRESS NOTES
Pt awake and disoriented, unable to give his name or birthday. Pt is able to nod yes or no appropriately and will say a few words at times. Pt given water to drink and was able to drink without coughing. Call light and needs in reach.

## 2024-04-09 NOTE — PROGRESS NOTES
PSYCHIATRY CONSULT PROGRESS NOTE    4/9/2024  Armando Lewis  [id number]    CC/Reason for Consult: AMS    S: Patient continuing to not speak this morning. He is nodding to answer some questions.     Behavior issues in last 24hours: No issues  Reviewed staff/RN notes.     ROS: JOSE DANIEL    PMH/SH/FH reviewed and no changes     clopidogrel  75 mg Oral Daily    cyanocobalamin  1,000 mcg Oral Daily    dorzolamide  1 drop Both Eyes BID    [START ON 4/10/2024] ferrous sulfate  325 mg Oral Daily with breakfast    lactulose  10 g Oral TID    latanoprost  1 drop Both Eyes Nightly    polyethylene glycol  17 g Oral BID    povidone-iodine   Topical BID    senna  2 tablet Oral BID    tamsulosin  0.4 mg Oral Nightly    brimonidine  1 drop Both Eyes BID    sodium chloride flush  5-40 mL IntraVENous 2 times per day    enoxaparin  30 mg SubCUTAneous BID    cefTRIAXone (ROCEPHIN) IV  2,000 mg IntraVENous Q24H     sodium chloride flush, sodium chloride, potassium chloride **OR** potassium alternative oral replacement **OR** potassium chloride, magnesium sulfate, ondansetron **OR** ondansetron, polyethylene glycol    O:  .  Vitals:    04/09/24 0514 04/09/24 0716 04/09/24 0852 04/09/24 0942   BP: 128/79  125/80    Pulse: 69  79    Resp: 17  18    Temp: 98.2 °F (36.8 °C)  97.6 °F (36.4 °C)    TempSrc: Oral  Oral    SpO2: 95%  91% 94%   Weight:  116.5 kg (256 lb 13.4 oz)     Height:           Mental Status Exam:   Appearance    alert, in hospital gown, appears older than his age   Motor: lying in bed   Speech    JOSE DANIEL  Language: JOSE DANIEL  Mood/Affect   JOSE DANIEL   Thought Process    JOSE DANIEL   Thought Content    JOSE DANIEL   Associations    JOSE DANIEL   Attention/Concentration    impaired   Orientation   JOSE DANIEL as patient can not communicate   Memory    JOSE DANIEL   Fund of Knowledge    JOSE DANIEL   Insight/Judgement    Unable to Assess        Recent Results (from the past 168 hour(s))   CBC with Auto Differential    Collection Time: 04/07/24  1:51 PM   Result Value Ref Range    WBC 9.1 4.0 -  RDW 16.7 (H) 12.4 - 15.4 %    Platelets 235 135 - 450 K/uL    MPV 9.7 5.0 - 10.5 fL    Neutrophils % 79.4 %    Lymphocytes % 10.1 %    Monocytes % 9.6 %    Eosinophils % 0.6 %    Basophils % 0.3 %    Neutrophils Absolute 9.3 (H) 1.7 - 7.7 K/uL    Lymphocytes Absolute 1.2 1.0 - 5.1 K/uL    Monocytes Absolute 1.1 0.0 - 1.3 K/uL    Eosinophils Absolute 0.1 0.0 - 0.6 K/uL    Basophils Absolute 0.0 0.0 - 0.2 K/uL   Basic Metabolic Panel    Collection Time: 04/09/24  4:38 AM   Result Value Ref Range    Sodium 141 136 - 145 mmol/L    Potassium 4.0 3.5 - 5.1 mmol/L    Chloride 104 99 - 110 mmol/L    CO2 24 21 - 32 mmol/L    Anion Gap 13 3 - 16    Glucose 72 70 - 99 mg/dL    BUN 14 7 - 20 mg/dL    Creatinine 0.5 (L) 0.8 - 1.3 mg/dL    Est, Glom Filt Rate >90 >60    Calcium 9.1 8.3 - 10.6 mg/dL   Vitamin B12 & Folate    Collection Time: 04/09/24  8:31 AM   Result Value Ref Range    Vitamin B-12 1720 (H) 211 - 911 pg/mL    Folate >20.00 4.78 - 24.20 ng/mL       Imaging:   Narrative & Impression  EXAMINATION:  MRI OF THE BRAIN WITHOUT CONTRAST  4/8/2024 12:04 pm     TECHNIQUE:  Multiplanar multisequence MRI of the brain was performed without the  administration of intravenous contrast.     COMPARISON:  06/30/2023.     HISTORY:  ORDERING SYSTEM PROVIDED HISTORY: Altered mental status  TECHNOLOGIST PROVIDED HISTORY:  Reason for exam:->Altered mental status  What is the sedation requirement?->None  Decision Support Exception - unselect if not a suspected or confirmed  emergency medical condition->Emergency Medical Condition (MA)  Reason for Exam: Altered mental status     Initial evaluation.     FINDINGS:  INTRACRANIAL STRUCTURES/VENTRICLES: There is no acute infarct. No mass effect  or midline shift. No evidence of an acute intracranial hemorrhage.  Scattered  foci of T2 FLAIR hyperintensity are seen in the periventricular and  subcortical white matter, which are nonspecific, but may represent chronic  microvascular ischemic

## 2024-04-09 NOTE — PLAN OF CARE
Problem: Discharge Planning  Goal: Discharge to home or other facility with appropriate resources  4/9/2024 0104 by Mandi Carlos, RN  Outcome: Progressing  Flowsheets (Taken 4/8/2024 2118)  Discharge to home or other facility with appropriate resources: Identify barriers to discharge with patient and caregiver  4/8/2024 1106 by Anival Oakes RN  Outcome: Progressing  Flowsheets (Taken 4/8/2024 0011 by Marilin Loya, RN)  Discharge to home or other facility with appropriate resources:   Identify barriers to discharge with patient and caregiver   Identify discharge learning needs (meds, wound care, etc)   Refer to discharge planning if patient needs post-hospital services based on physician order or complex needs related to functional status, cognitive ability or social support system   Arrange for needed discharge resources and transportation as appropriate     Problem: Pain  Goal: Verbalizes/displays adequate comfort level or baseline comfort level  4/9/2024 0104 by Mandi Carlos, RN  Outcome: Progressing  4/8/2024 1106 by Anival Oakes RN  Outcome: Progressing     Problem: Safety - Adult  Goal: Free from fall injury  4/9/2024 0104 by Mandi Carlos, RN  Outcome: Progressing  4/8/2024 1106 by Anival Oakes RN  Outcome: Progressing     Problem: ABCDS Injury Assessment  Goal: Absence of physical injury  4/9/2024 0104 by Mandi Carlos RN  Outcome: Progressing  4/8/2024 1106 by Anival Oakes RN  Outcome: Progressing     Problem: Skin/Tissue Integrity  Goal: Absence of new skin breakdown  Description: 1.  Monitor for areas of redness and/or skin breakdown  2.  Assess vascular access sites hourly  3.  Every 4-6 hours minimum:  Change oxygen saturation probe site  4.  Every 4-6 hours:  If on nasal continuous positive airway pressure, respiratory therapy assess nares and determine need for appliance change or resting period.  4/9/2024 0104 by Mandi Carlos, RN  Outcome: Progressing  4/8/2024

## 2024-04-10 PROCEDURE — 99231 SBSQ HOSP IP/OBS SF/LOW 25: CPT | Performed by: NURSE PRACTITIONER

## 2024-04-10 PROCEDURE — 2580000003 HC RX 258: Performed by: HOSPITALIST

## 2024-04-10 PROCEDURE — 6360000002 HC RX W HCPCS: Performed by: HOSPITALIST

## 2024-04-10 PROCEDURE — 6370000000 HC RX 637 (ALT 250 FOR IP): Performed by: HOSPITALIST

## 2024-04-10 PROCEDURE — 94760 N-INVAS EAR/PLS OXIMETRY 1: CPT

## 2024-04-10 PROCEDURE — 1200000000 HC SEMI PRIVATE

## 2024-04-10 RX ADMIN — LATANOPROST 1 DROP: 50 SOLUTION OPHTHALMIC at 22:00

## 2024-04-10 RX ADMIN — SODIUM CHLORIDE, PRESERVATIVE FREE 10 ML: 5 INJECTION INTRAVENOUS at 22:02

## 2024-04-10 RX ADMIN — Medication: at 08:37

## 2024-04-10 RX ADMIN — LACTULOSE 10 G: 20 SOLUTION ORAL at 21:57

## 2024-04-10 RX ADMIN — DORZOLAMIDE HCL 1 DROP: 20 SOLUTION/ DROPS OPHTHALMIC at 21:58

## 2024-04-10 RX ADMIN — POLYETHYLENE GLYCOL 3350 17 G: 17 POWDER, FOR SOLUTION ORAL at 21:57

## 2024-04-10 RX ADMIN — Medication: at 22:02

## 2024-04-10 RX ADMIN — SODIUM CHLORIDE: 9 INJECTION, SOLUTION INTRAVENOUS at 05:07

## 2024-04-10 RX ADMIN — CEFTRIAXONE SODIUM 2000 MG: 2 INJECTION, POWDER, FOR SOLUTION INTRAMUSCULAR; INTRAVENOUS at 05:10

## 2024-04-10 RX ADMIN — ENOXAPARIN SODIUM 30 MG: 100 INJECTION SUBCUTANEOUS at 08:36

## 2024-04-10 RX ADMIN — DORZOLAMIDE HCL 1 DROP: 20 SOLUTION/ DROPS OPHTHALMIC at 08:36

## 2024-04-10 RX ADMIN — TAMSULOSIN HYDROCHLORIDE 0.4 MG: 0.4 CAPSULE ORAL at 21:56

## 2024-04-10 RX ADMIN — LACTULOSE 10 G: 20 SOLUTION ORAL at 14:24

## 2024-04-10 RX ADMIN — SENNOSIDES 17.2 MG: 8.6 TABLET, FILM COATED ORAL at 21:56

## 2024-04-10 RX ADMIN — BRIMONIDINE TARTRATE 1 DROP: 2 SOLUTION/ DROPS OPHTHALMIC at 08:35

## 2024-04-10 RX ADMIN — ENOXAPARIN SODIUM 30 MG: 100 INJECTION SUBCUTANEOUS at 21:59

## 2024-04-10 RX ADMIN — BRIMONIDINE TARTRATE 1 DROP: 2 SOLUTION/ DROPS OPHTHALMIC at 22:00

## 2024-04-10 ASSESSMENT — PAIN SCALES - WONG BAKER

## 2024-04-10 NOTE — PLAN OF CARE
Problem: Discharge Planning  Goal: Discharge to home or other facility with appropriate resources  4/10/2024 1106 by Skylar Moore RN  Outcome: Progressing  4/10/2024 0442 by Celia Smith RN  Outcome: Progressing     Problem: Pain  Goal: Verbalizes/displays adequate comfort level or baseline comfort level  4/10/2024 1106 by Skylar Moore RN  Outcome: Progressing  4/10/2024 0442 by Celia Smith RN  Outcome: Progressing     Problem: Safety - Adult  Goal: Free from fall injury  4/10/2024 1106 by Skylar Moore RN  Outcome: Progressing  4/10/2024 0442 by Celia Smith RN  Outcome: Progressing     Problem: ABCDS Injury Assessment  Goal: Absence of physical injury  4/10/2024 1106 by Skylar Moore RN  Outcome: Progressing  4/10/2024 0442 by Celia Smith RN  Outcome: Progressing     Problem: Skin/Tissue Integrity  Goal: Absence of new skin breakdown  Description: 1.  Monitor for areas of redness and/or skin breakdown  2.  Assess vascular access sites hourly  3.  Every 4-6 hours minimum:  Change oxygen saturation probe site  4.  Every 4-6 hours:  If on nasal continuous positive airway pressure, respiratory therapy assess nares and determine need for appliance change or resting period.  4/10/2024 1106 by Skylar Moore RN  Outcome: Progressing  4/10/2024 0442 by Celia Smith RN  Outcome: Progressing

## 2024-04-10 NOTE — PLAN OF CARE
Problem: Discharge Planning  Goal: Discharge to home or other facility with appropriate resources  Outcome: Progressing     Problem: Pain Medication per Mar.   Goal: Verbalizes/displays adequate comfort level or baseline comfort level  Outcome: Progressing     Problem: Safety - Adult  Goal: Free from fall injury  Outcome: Progressing     Problem: ABCDS Injury Assessment  Goal: Absence of physical injury  Outcome: Progressing     Problem: Skin/Tissue Integrity  Goal: Absence of new skin breakdown  Description: 1.  Monitor for areas of redness and/or skin breakdown  2.  Assess vascular access sites hourly  3.  Every 4-6 hours minimum:  Change oxygen saturation probe site  4.  Every 4-6 hours:  If on nasal continuous positive airway pressure, respiratory therapy assess nares and determine need for appliance change or resting period.  Outcome: Progressing

## 2024-04-10 NOTE — PROGRESS NOTES
PSYCHIATRY CONSULT PROGRESS NOTE    4/10/2024  Armando Lewis  [id number]    CC/Reason for Consult: AMS    S: Continuing to not speak on evaluation this morning. Closing eyes more, nodding here and there to answer some questions.     Behavior issues in last 24hours: No issues  Reviewed staff/RN notes.     ROS: JOSE DANIEL    PMH/SH/FH reviewed and no changes     clopidogrel  75 mg Oral Daily    cyanocobalamin  1,000 mcg Oral Daily    dorzolamide  1 drop Both Eyes BID    ferrous sulfate  325 mg Oral Daily with breakfast    lactulose  10 g Oral TID    latanoprost  1 drop Both Eyes Nightly    polyethylene glycol  17 g Oral BID    povidone-iodine   Topical BID    senna  2 tablet Oral BID    tamsulosin  0.4 mg Oral Nightly    brimonidine  1 drop Both Eyes BID    sodium chloride flush  5-40 mL IntraVENous 2 times per day    enoxaparin  30 mg SubCUTAneous BID     sodium chloride flush, sodium chloride, potassium chloride **OR** potassium alternative oral replacement **OR** potassium chloride, magnesium sulfate, ondansetron **OR** ondansetron, polyethylene glycol    O:  .  Vitals:    04/10/24 0003 04/10/24 0448 04/10/24 0737 04/10/24 0743   BP: (!) 153/68 139/89 132/77    Pulse: 72 79 75    Resp: 17 17 16    Temp: 98.5 °F (36.9 °C) 98.2 °F (36.8 °C) 98.6 °F (37 °C)    TempSrc: Axillary Axillary Axillary    SpO2: 92% 94%  94%   Weight:  117.3 kg (258 lb 9.6 oz)     Height:           Mental Status Exam:   Appearance    alert, in hospital gown, appears older than his age   Motor: lying in bed   Speech    JOSE DANIEL  Language: JOSE DANIEL  Mood/Affect   JOSE DANIEL   Thought Process    JOSE DANIEL   Thought Content    JOSE DANIEL   Associations    JOSE DANIEL   Attention/Concentration    impaired   Orientation   JOSE DANIEL as patient can not communicate   Memory    JOSE DANIEL   Fund of Knowledge    JOSE DANIEL   Insight/Judgement    Unable to Assess     Recent Results (from the past 168 hour(s))   CBC with Auto Differential    Collection Time: 04/07/24  1:51 PM   Result Value Ref Range    WBC 9.1 4.0 -

## 2024-04-10 NOTE — PROGRESS NOTES
V2.0    [facility] [place]      Name:  Armando Lewis /Age/Sex: 1960  (63 y.o. male)   MRN & CSN:  [id number] & [id number] Encounter Date/Time: 4/10/2024 8:57 AM EDT   Location:  C0A-7304/4266-01 PCP: No primary care provider on file.     Attending:Nicholas Cooper MD       Hospital Day: 4      HPI :   Chief Complaint: Altered mental status    Armando Lewis is a 63 y.o. male who presents with altered mental status      Subjective:     Patient is keeping his eyes closed but I can tell that he is awake, remains mute    Review of Systems:      Pertinent positives and negatives discussed in HPI    Objective:     Intake/Output Summary (Last 24 hours) at 4/10/2024 1227  Last data filed at 4/10/2024 0508  Gross per 24 hour   Intake 2107.29 ml   Output 600 ml   Net 1507.29 ml        Vitals:   Vitals:    04/10/24 0448 04/10/24 0737 04/10/24 0743 04/10/24 1130   BP: 139/89 132/77  (!) 159/95   Pulse: 79 75  74   Resp: 17 16  15   Temp: 98.2 °F (36.8 °C) 98.6 °F (37 °C)  97.4 °F (36.3 °C)   TempSrc: Axillary Axillary  Oral   SpO2: 94%  94% 98%   Weight: 117.3 kg (258 lb 9.6 oz)      Height:             Physical Exam:      Physical Exam Performed:    BP (!) 159/95 Comment: wouldnt hold his arm still  Pulse 74   Temp 97.4 °F (36.3 °C) (Oral)   Resp 15   Ht 1.778 m (5' 10\")   Wt 117.3 kg (258 lb 9.6 oz)   SpO2 98%   BMI 37.11 kg/m²     General appearance:  No apparent distress, nonverbal  HEENT:  Normal cephalic, atraumatic without obvious deformity. . Conjunctivae/corneas clear.  Respiratory:  Normal respiratory effort. Clear to auscultation, bilaterally without Rales/Wheezes/Rhonchi.  Cardiovascular:  Regular rate and rhythm with normal S1/S2 without murmurs, rubs or gallops.  Abdomen: Soft, non-tender, non-distended with normal bowel sounds.    Neurologic: Nonverbal, tremors of the face,  Psychiatric:  Alert nonverbal        Medications:   Medications:    clopidogrel  75 mg Oral Daily    cyanocobalamin  1,000 mcg  of Rocephin possible UTI started at the [facility] nursing facility -stop date 4/10  -urine drug screen negative, ammonia level within normal limits       Possible UTI..  Patient apparently did have dysuria and was started on IM Rocephin 4/5 at the [facility]--no urine studies available..  -stop date 4/10        Schizoaffective disorder  Tardive dyskinesia  Antisocial personality disorder  History of polysubstance use disorder     Management by psychiatry..  Recommending to hold psych meds until discharge     Right eye blindness/glaucoma--resume chronic medication     Chronic constipation  CT abdomen shows a large stool volume in the rectum  S/p smog enema  Continue chronic bowel regimen    Wheelchair-bound at baseline      Diet ADULT DIET; Dysphagia - Soft and Bite Sized     DVT Prophylaxis [x] Lovenox, []  Heparin, [] SCDs, [] Ambulation,  [] Eliquis, [] Xarelto  [] Coumadin   Code Status Full Code   Disposition From: Long-term care  Expected Disposition: Long-term care  Estimated Date of Discharge:   Patient requires continued admission due to altered mental status   Surrogate Decision Maker/ POA       Personally reviewed Lab Studies and Imaging        Medical Decision Making:  The following items were considered in medical decision making:  Discussion of patient care with other providers  Reviewed clinical lab tests  Reviewed radiology tests  Reviewed other diagnostic tests/interventions  Independent review of radiologic images  Microbiology cultures and other micro tests reviewed        Electronically signed by Nicholas Cooper MD on 4/10/2024 at 12:27 PM  Comment: Please note this report has been produced using speech recognition software and may contain errors related to that system including errors in grammar, punctuation, and spelling, as well as words and phrases that may be inappropriate. If there are any questions or concerns, please feel free to contact the dictating provider for clarification.

## 2024-04-11 LAB
BACTERIA BLD CULT ORG #2: NORMAL
BACTERIA BLD CULT: NORMAL

## 2024-04-11 PROCEDURE — 6370000000 HC RX 637 (ALT 250 FOR IP): Performed by: NURSE PRACTITIONER

## 2024-04-11 PROCEDURE — 99232 SBSQ HOSP IP/OBS MODERATE 35: CPT | Performed by: NURSE PRACTITIONER

## 2024-04-11 PROCEDURE — 1200000000 HC SEMI PRIVATE

## 2024-04-11 PROCEDURE — 6370000000 HC RX 637 (ALT 250 FOR IP): Performed by: HOSPITALIST

## 2024-04-11 PROCEDURE — 6360000002 HC RX W HCPCS: Performed by: HOSPITALIST

## 2024-04-11 PROCEDURE — 2580000003 HC RX 258: Performed by: HOSPITALIST

## 2024-04-11 PROCEDURE — 94760 N-INVAS EAR/PLS OXIMETRY 1: CPT

## 2024-04-11 RX ORDER — NICOTINE 21 MG/24HR
1 PATCH, TRANSDERMAL 24 HOURS TRANSDERMAL DAILY
Status: DISCONTINUED | OUTPATIENT
Start: 2024-04-11 | End: 2024-04-14 | Stop reason: HOSPADM

## 2024-04-11 RX ORDER — LACTULOSE 10 G/15ML
30 SOLUTION ORAL 3 TIMES DAILY
Status: DISCONTINUED | OUTPATIENT
Start: 2024-04-11 | End: 2024-04-14 | Stop reason: HOSPADM

## 2024-04-11 RX ADMIN — BRIMONIDINE TARTRATE 1 DROP: 2 SOLUTION/ DROPS OPHTHALMIC at 21:40

## 2024-04-11 RX ADMIN — SODIUM CHLORIDE, PRESERVATIVE FREE 10 ML: 5 INJECTION INTRAVENOUS at 22:21

## 2024-04-11 RX ADMIN — DORZOLAMIDE HCL 1 DROP: 20 SOLUTION/ DROPS OPHTHALMIC at 21:41

## 2024-04-11 RX ADMIN — TAMSULOSIN HYDROCHLORIDE 0.4 MG: 0.4 CAPSULE ORAL at 21:42

## 2024-04-11 RX ADMIN — CYANOCOBALAMIN TAB 1000 MCG 1000 MCG: 1000 TAB at 08:02

## 2024-04-11 RX ADMIN — POLYETHYLENE GLYCOL 3350 17 G: 17 POWDER, FOR SOLUTION ORAL at 21:42

## 2024-04-11 RX ADMIN — SENNOSIDES 17.2 MG: 8.6 TABLET, FILM COATED ORAL at 21:42

## 2024-04-11 RX ADMIN — Medication: at 22:22

## 2024-04-11 RX ADMIN — FERROUS SULFATE TAB 325 MG (65 MG ELEMENTAL FE) 325 MG: 325 (65 FE) TAB at 09:48

## 2024-04-11 RX ADMIN — BRIMONIDINE TARTRATE 1 DROP: 2 SOLUTION/ DROPS OPHTHALMIC at 09:50

## 2024-04-11 RX ADMIN — CLOPIDOGREL BISULFATE 75 MG: 75 TABLET ORAL at 09:47

## 2024-04-11 RX ADMIN — ENOXAPARIN SODIUM 30 MG: 100 INJECTION SUBCUTANEOUS at 09:48

## 2024-04-11 RX ADMIN — LACTULOSE 30 G: 10 SOLUTION ORAL at 17:46

## 2024-04-11 RX ADMIN — SODIUM CHLORIDE, PRESERVATIVE FREE 10 ML: 5 INJECTION INTRAVENOUS at 09:50

## 2024-04-11 RX ADMIN — Medication: at 09:50

## 2024-04-11 RX ADMIN — LATANOPROST 1 DROP: 50 SOLUTION OPHTHALMIC at 21:38

## 2024-04-11 RX ADMIN — DORZOLAMIDE HCL 1 DROP: 20 SOLUTION/ DROPS OPHTHALMIC at 09:50

## 2024-04-11 RX ADMIN — LACTULOSE 30 G: 10 SOLUTION ORAL at 21:42

## 2024-04-11 RX ADMIN — POLYETHYLENE GLYCOL 3350 17 G: 17 POWDER, FOR SOLUTION ORAL at 09:48

## 2024-04-11 RX ADMIN — SENNOSIDES 17.2 MG: 8.6 TABLET, FILM COATED ORAL at 09:47

## 2024-04-11 RX ADMIN — MAGNESIUM CITRATE 296 ML: 1.75 LIQUID ORAL at 17:21

## 2024-04-11 RX ADMIN — LACTULOSE 10 G: 20 SOLUTION ORAL at 09:47

## 2024-04-11 ASSESSMENT — PAIN SCALES - WONG BAKER

## 2024-04-11 NOTE — PROGRESS NOTES
PSYCHIATRY CONSULT PROGRESS NOTE    4/11/2024  Armando Lewis  [id number]    CC/Reason for Consult: AMS    S: Patient more talkative today. Answering some questions, nodding to answer as well.     Behavior issues in last 24hours: Notes of agitation  Reviewed staff/RN notes.     ROS: JOSE DANIEL    PMH/SH/FH reviewed and no changes     lactulose  30 g Oral TID    magnesium citrate  296 mL Oral Once    SMOG enema  330 mL Rectal Once    clopidogrel  75 mg Oral Daily    cyanocobalamin  1,000 mcg Oral Daily    dorzolamide  1 drop Both Eyes BID    ferrous sulfate  325 mg Oral Daily with breakfast    latanoprost  1 drop Both Eyes Nightly    polyethylene glycol  17 g Oral BID    povidone-iodine   Topical BID    senna  2 tablet Oral BID    tamsulosin  0.4 mg Oral Nightly    brimonidine  1 drop Both Eyes BID    sodium chloride flush  5-40 mL IntraVENous 2 times per day    enoxaparin  30 mg SubCUTAneous BID     sodium chloride flush, sodium chloride, potassium chloride **OR** potassium alternative oral replacement **OR** potassium chloride, magnesium sulfate, ondansetron **OR** ondansetron, polyethylene glycol    O:  .  Vitals:    04/11/24 0416 04/11/24 0628 04/11/24 0757 04/11/24 1205   BP: 132/74  127/88    Pulse: 67  68    Resp: 17  24    Temp: 98.7 °F (37.1 °C)  98.3 °F (36.8 °C)    TempSrc: Axillary  Temporal    SpO2: 94%  95% 95%   Weight:  118.5 kg (261 lb 3.9 oz)     Height:           Mental Status Exam:   Appearance    alert, in hospital gown, appears older than his age   Motor: lying in bed   Speech    JOSE DANIEL  Language: JOSE DANIEL  Mood/Affect   \"okay\"/JOSE DANIEL  Thought Process    JOSE DANIEL   Thought Content    JOSE DANIEL   Associations    JOSE DANIEL   Attention/Concentration    impaired   Orientation   JOSE DANIEL   Memory    JOSE DANIEL   Fund of Knowledge    JOSE DANIEL   Insight/Judgement    Unable to Assess     Recent Results (from the past 168 hour(s))   CBC with Auto Differential    Collection Time: 04/07/24  1:51 PM   Result Value Ref Range    WBC 9.1 4.0 - 11.0 K/uL    RBC

## 2024-04-11 NOTE — PLAN OF CARE
Problem: Discharge Planning  Goal: Discharge to home or other facility with appropriate resources  4/11/2024 0140 by Celia Smith, RN  Outcome: Progressing  4/11/2024 0140 by Celia Smith, RN  Outcome: Progressing     Problem: Pain medication per Mar  Goal: Verbalizes/displays adequate comfort level or baseline comfort level  Outcome: Progressing     Problem: Safety - Adult Bed alarm in place and call light within reach.   Goal: Free from fall injury  Outcome: Progressing     Problem: ABCDS Injury Assessment  Goal: Absence of physical injury  Outcome: Progressing     Problem: Skin/Tissue Integrity  Goal: Absence of new skin breakdown  Description: 1.  Monitor for areas of redness and/or skin breakdown  2.  Assess vascular access sites hourly  3.  Every 4-6 hours minimum:  Change oxygen saturation probe site  4.  Every 4-6 hours:  If on nasal continuous positive airway pressure, respiratory therapy assess nares and determine need for appliance change or resting period.  Outcome: Progressing

## 2024-04-11 NOTE — PROGRESS NOTES
grammar, punctuation, and spelling, as well as words and phrases that may be inappropriate. If there are any questions or concerns, please feel free to contact the dictating provider for clarification.

## 2024-04-11 NOTE — PROGRESS NOTES
Pt is alert so self. He was able to state is age and name but not so much the year and date of his birthday. Patient was able to communicate and have a conversation with with this RN and even dancing a bit. RN assessed skin, no issues except for ashen on face and healing wound on his heel with betadine. Pt was agitated. RN and PCA changed external catheter for the shift. Patient was a combative and calmed down after the change on his bed and clothes. Call light is within reach. RN continually will check on patient. Bed low and locked. No need at this time.

## 2024-04-11 NOTE — PROGRESS NOTES
RN attempted to start magnesium citrate and patient started to take a pause and wants to try later. Rn will try again.

## 2024-04-11 NOTE — DISCHARGE INSTR - COC
Continuity of Care Form    Patient Name: Armando Lewis   :  1960  MRN:  [id number]    Admit date:  2024  Discharge date:  2024    Code Status Order: Full Code   Advance Directives:     Admitting Physician:  Nicholas Cooper MD  PCP: No primary care provider on file.    Discharging Nurse: Jackie HERNANDES RN  Discharging Hospital Unit/Room#: S2T-1046/4258-01  Discharging Unit Phone Number: [phone]    Emergency Contact:   Extended Emergency Contact Information  Primary Emergency Contact: Shirley Lewis  Home Phone: [phone]  Relation: Parent  Secondary Emergency Contact: Armando Lewis  Home Phone: [phone]  Relation: Parent    Past Surgical History:  No past surgical history on file.    Immunization History:   Immunization History   Administered Date(s) Administered    COVID-19, J&J, (age 18y+), IM, 0.5 mL 2021       Active Problems:  Patient Active Problem List   Diagnosis Code    Acute metabolic encephalopathy G93.41    Hypernatremia E87.0    E. coli UTI N39.0, B96.20    Sepsis (HCC) A41.9    Fever and chills R50.9    Neutrophilia D72.9    CRP elevated R79.82    Disorientation R41.0    LFT elevation R79.89    Septicemia (HCC) A41.9    AMS (altered mental status) R41.82    Altered mental status R41.82       Isolation/Infection:   Isolation            No Isolation          Patient Infection Status       None to display                     Nurse Assessment:  Last Vital Signs: /88   Pulse 68   Temp 98.3 °F (36.8 °C) (Temporal)   Resp 24   Ht 1.778 m (5' 10\")   Wt 118.5 kg (261 lb 3.9 oz)   SpO2 95%   BMI 37.48 kg/m²     Last documented pain score (0-10 scale):    Last Weight:   Wt Readings from Last 1 Encounters:   24 118.5 kg (261 lb 3.9 oz)     Mental Status:  disoriented    IV Access:  - None    Nursing Mobility/ADLs:  Walking   Dependent  Transfer  Dependent  Bathing  Dependent  Dressing  Dependent  Toileting  Dependent  Feeding  Dependent  Med Admin  Dependent  Med Delivery

## 2024-04-11 NOTE — PLAN OF CARE
Problem: Discharge Planning  Goal: Discharge to home or other facility with appropriate resources  4/11/2024 1034 by Master Guidry RN  Outcome: Progressing  4/11/2024 0140 by Celia Smith RN  Outcome: Progressing  4/11/2024 0140 by Celia Smith RN  Outcome: Progressing     Problem: Pain  Goal: Verbalizes/displays adequate comfort level or baseline comfort level  4/11/2024 1034 by Master Guidry RN  Outcome: Progressing  4/11/2024 0140 by Celia Smith RN  Outcome: Progressing     Problem: Safety - Adult  Goal: Free from fall injury  4/11/2024 1034 by Master Guidry RN  Outcome: Progressing  4/11/2024 0140 by Celia Smith RN  Outcome: Progressing     Problem: ABCDS Injury Assessment  Goal: Absence of physical injury  4/11/2024 1034 by Master Guidry RN  Outcome: Progressing  4/11/2024 0140 by Celia Smith RN  Outcome: Progressing     Problem: Skin/Tissue Integrity  Goal: Absence of new skin breakdown  Description: 1.  Monitor for areas of redness and/or skin breakdown  2.  Assess vascular access sites hourly  3.  Every 4-6 hours minimum:  Change oxygen saturation probe site  4.  Every 4-6 hours:  If on nasal continuous positive airway pressure, respiratory therapy assess nares and determine need for appliance change or resting period.  4/11/2024 1034 by Master Guidry RN  Outcome: Progressing  4/11/2024 0140 by Celia Smith RN  Outcome: Progressing

## 2024-04-11 NOTE — PROGRESS NOTES
04/11/24 1559   Encounter Summary   Encounter Overview/Reason  Spiritual/Emotional Needs;Initial Encounter   Service Provided For: Patient   Referral/Consult From: Rounding  ( visited with and prayed with pt)   Last Encounter  04/11/24  ( visited with and prayed with pt)   Complexity of Encounter Moderate   Begin Time 1318   End Time  1340   Total Time Calculated 22 min   Spiritual/Emotional needs   Type Spiritual Support   Assessment/Intervention/Outcome   Assessment Anxious;Coping   Intervention Active listening;Prayer (assurance of)/[place]   Outcome Comfort;Expressed Gratitude

## 2024-04-11 NOTE — PROGRESS NOTES
RN encouraged patient to try and have BM but pt was agitated. RN administered scheduled lactulose. RN encouraged patient to try deep breath in and out. Pt seemed to have calmed down. Patient refused to allow RN to turn him and starts yelling, \"get out of here\". RN will attempt again.    Spoke to patient, she is having lower left abdominal pain when she has a bowel movement, she is having regular bowel movements, not constipated, no blood in her stool. Offered her an appointment today to be seen. She declined. Advised her that Melinda is out of the office thurs this week. She verbalized understanding and would like to see how things go

## 2024-04-12 ENCOUNTER — APPOINTMENT (OUTPATIENT)
Dept: GENERAL RADIOLOGY | Age: 64
DRG: 861 | End: 2024-04-12
Payer: MEDICAID

## 2024-04-12 LAB
AMMONIA PLAS-SCNC: 22 UMOL/L (ref 16–60)
ANION GAP SERPL CALCULATED.3IONS-SCNC: 9 MMOL/L (ref 3–16)
BASOPHILS # BLD: 0 K/UL (ref 0–0.2)
BASOPHILS NFR BLD: 0.3 %
BUN SERPL-MCNC: 10 MG/DL (ref 7–20)
CALCIUM SERPL-MCNC: 7.4 MG/DL (ref 8.3–10.6)
CHLORIDE SERPL-SCNC: 103 MMOL/L (ref 99–110)
CO2 SERPL-SCNC: 28 MMOL/L (ref 21–32)
CREAT SERPL-MCNC: 0.6 MG/DL (ref 0.8–1.3)
DEPRECATED RDW RBC AUTO: 15.9 % (ref 12.4–15.4)
EOSINOPHIL # BLD: 0.2 K/UL (ref 0–0.6)
EOSINOPHIL NFR BLD: 1.5 %
GFR SERPLBLD CREATININE-BSD FMLA CKD-EPI: >90 ML/MIN/{1.73_M2}
GLUCOSE SERPL-MCNC: 91 MG/DL (ref 70–99)
HCT VFR BLD AUTO: 38.2 % (ref 40.5–52.5)
HGB BLD-MCNC: 12.8 G/DL (ref 13.5–17.5)
LYMPHOCYTES # BLD: 1.1 K/UL (ref 1–5.1)
LYMPHOCYTES NFR BLD: 10.7 %
MCH RBC QN AUTO: 28 PG (ref 26–34)
MCHC RBC AUTO-ENTMCNC: 33.6 G/DL (ref 31–36)
MCV RBC AUTO: 83.4 FL (ref 80–100)
MONOCYTES # BLD: 1.3 K/UL (ref 0–1.3)
MONOCYTES NFR BLD: 12.4 %
NEUTROPHILS # BLD: 7.8 K/UL (ref 1.7–7.7)
NEUTROPHILS NFR BLD: 75.1 %
PLATELET # BLD AUTO: 215 K/UL (ref 135–450)
PMV BLD AUTO: 8.9 FL (ref 5–10.5)
POTASSIUM SERPL-SCNC: 4 MMOL/L (ref 3.5–5.1)
RBC # BLD AUTO: 4.58 M/UL (ref 4.2–5.9)
SODIUM SERPL-SCNC: 140 MMOL/L (ref 136–145)
WBC # BLD AUTO: 10.3 K/UL (ref 4–11)

## 2024-04-12 PROCEDURE — 6370000000 HC RX 637 (ALT 250 FOR IP): Performed by: NURSE PRACTITIONER

## 2024-04-12 PROCEDURE — 92526 ORAL FUNCTION THERAPY: CPT

## 2024-04-12 PROCEDURE — 1200000000 HC SEMI PRIVATE

## 2024-04-12 PROCEDURE — 6370000000 HC RX 637 (ALT 250 FOR IP): Performed by: HOSPITALIST

## 2024-04-12 PROCEDURE — 80048 BASIC METABOLIC PNL TOTAL CA: CPT

## 2024-04-12 PROCEDURE — 85025 COMPLETE CBC W/AUTO DIFF WBC: CPT

## 2024-04-12 PROCEDURE — 82140 ASSAY OF AMMONIA: CPT

## 2024-04-12 PROCEDURE — 6360000002 HC RX W HCPCS: Performed by: HOSPITALIST

## 2024-04-12 PROCEDURE — 36415 COLL VENOUS BLD VENIPUNCTURE: CPT

## 2024-04-12 PROCEDURE — 2580000003 HC RX 258: Performed by: HOSPITALIST

## 2024-04-12 PROCEDURE — 74018 RADEX ABDOMEN 1 VIEW: CPT

## 2024-04-12 PROCEDURE — 94760 N-INVAS EAR/PLS OXIMETRY 1: CPT

## 2024-04-12 RX ADMIN — ENOXAPARIN SODIUM 30 MG: 100 INJECTION SUBCUTANEOUS at 10:52

## 2024-04-12 RX ADMIN — SENNOSIDES 17.2 MG: 8.6 TABLET, FILM COATED ORAL at 12:40

## 2024-04-12 RX ADMIN — DORZOLAMIDE HCL 1 DROP: 20 SOLUTION/ DROPS OPHTHALMIC at 21:12

## 2024-04-12 RX ADMIN — BRIMONIDINE TARTRATE 1 DROP: 2 SOLUTION/ DROPS OPHTHALMIC at 12:31

## 2024-04-12 RX ADMIN — LATANOPROST 1 DROP: 50 SOLUTION OPHTHALMIC at 21:12

## 2024-04-12 RX ADMIN — FERROUS SULFATE TAB 325 MG (65 MG ELEMENTAL FE) 325 MG: 325 (65 FE) TAB at 12:40

## 2024-04-12 RX ADMIN — BRIMONIDINE TARTRATE 1 DROP: 2 SOLUTION/ DROPS OPHTHALMIC at 21:12

## 2024-04-12 RX ADMIN — SODIUM CHLORIDE, PRESERVATIVE FREE 10 ML: 5 INJECTION INTRAVENOUS at 21:13

## 2024-04-12 RX ADMIN — Medication: at 10:59

## 2024-04-12 RX ADMIN — CLOPIDOGREL BISULFATE 75 MG: 75 TABLET ORAL at 12:40

## 2024-04-12 RX ADMIN — POLYETHYLENE GLYCOL 3350 17 G: 17 POWDER, FOR SOLUTION ORAL at 12:41

## 2024-04-12 RX ADMIN — ENOXAPARIN SODIUM 30 MG: 100 INJECTION SUBCUTANEOUS at 21:12

## 2024-04-12 RX ADMIN — LACTULOSE 30 G: 10 SOLUTION ORAL at 12:40

## 2024-04-12 RX ADMIN — Medication: at 21:14

## 2024-04-12 RX ADMIN — DORZOLAMIDE HCL 1 DROP: 20 SOLUTION/ DROPS OPHTHALMIC at 12:31

## 2024-04-12 RX ADMIN — POLYETHYLENE GLYCOL 3350 17 G: 17 POWDER, FOR SOLUTION ORAL at 21:12

## 2024-04-12 RX ADMIN — LACTULOSE 30 G: 10 SOLUTION ORAL at 21:21

## 2024-04-12 RX ADMIN — SENNOSIDES 17.2 MG: 8.6 TABLET, FILM COATED ORAL at 21:12

## 2024-04-12 RX ADMIN — SODIUM CHLORIDE, PRESERVATIVE FREE 10 ML: 5 INJECTION INTRAVENOUS at 10:57

## 2024-04-12 RX ADMIN — TAMSULOSIN HYDROCHLORIDE 0.4 MG: 0.4 CAPSULE ORAL at 21:12

## 2024-04-12 NOTE — PROGRESS NOTES
No BM noted. Pt acceptable to enema this time. Smog enema administered and is effective.  Pt noted with large soft BM at this time.

## 2024-04-12 NOTE — PROGRESS NOTES
Pt declines Lovenox at this time. Per Patient, \" I am tired of all them shots\". Pt also request cigar to smoke. Provider notified. Per Provider N.O. Nicotine  Patch 21 mg QD

## 2024-04-12 NOTE — PROGRESS NOTES
constipation  CT abdomen shows a large stool volume in the rectum  S/p smog enema  Continue chronic bowel regimen     1 bowel movement after enema early this morning, abdomen still distended.  Continue increased dose of lactulose, continue MiraLAX plus Senokot.  Will order KUB     Wheelchair-bound at baseline     Subjective:     Patient seen and examined at bedside.  Less awake and lethargic.  Moving all 4 extremities but does not follow commands, utters few words    Objective:     Intake/Output Summary (Last 24 hours) at 4/12/2024 0928  Last data filed at 4/12/2024 0649  Gross per 24 hour   Intake 330 ml   Output 500 ml   Net -170 ml      Vitals:   Vitals:    04/12/24 0850   BP: 130/78   Pulse: 70   Resp: 20   Temp: 98.3 °F (36.8 °C)   SpO2: 96%       Ten point ROS reviewed negative, unless as noted above    Physical Exam:     GENERAL APPEARANCE: not in any acute distress,  appears comfortable.   NECK: Supple, no JVD.  CARDIOVASCULAR: Regular rate and rhythm, no murmurs, rubs or gallops  LUNGS: clear to auscultation bilaterally   ABDOMEN: normoactive bowel sounds, soft non-tender and mildly distended  NEUROLOGIC:  awake, non focal      Electronically signed by Ehsan Shabbir, MD on 4/12/2024 at 9:28 AM               Medications:   Medications:    lactulose  30 g Oral TID    SMOG enema  330 mL Rectal Once    nicotine  1 patch TransDERmal Daily    clopidogrel  75 mg Oral Daily    cyanocobalamin  1,000 mcg Oral Daily    dorzolamide  1 drop Both Eyes BID    ferrous sulfate  325 mg Oral Daily with breakfast    latanoprost  1 drop Both Eyes Nightly    polyethylene glycol  17 g Oral BID    povidone-iodine   Topical BID    senna  2 tablet Oral BID    tamsulosin  0.4 mg Oral Nightly    brimonidine  1 drop Both Eyes BID    sodium chloride flush  5-40 mL IntraVENous 2 times per day    enoxaparin  30 mg SubCUTAneous BID      Infusions:    sodium chloride       PRN Meds: sodium chloride flush, 5-40 mL, PRN  sodium chloride, ,  PRN  potassium chloride, 40 mEq, PRN   Or  potassium alternative oral replacement, 40 mEq, PRN   Or  potassium chloride, 10 mEq, PRN  magnesium sulfate, 2,000 mg, PRN  ondansetron, 4 mg, Q8H PRN   Or  ondansetron, 4 mg, Q6H PRN  polyethylene glycol, 17 g, Daily PRN

## 2024-04-12 NOTE — PLAN OF CARE
Problem: Discharge Planning  Goal: Discharge to home or other facility with appropriate resources  4/12/2024 1234 by Mariaa Morgan RN  Outcome: Progressing     Problem: Pain  Goal: Verbalizes/displays adequate comfort level or baseline comfort level  4/12/2024 1234 by Mariaa Morgan RN  Outcome: Progressing     Problem: Safety - Adult  Goal: Free from fall injury  4/12/2024 1234 by Mariaa Morgan RN  Outcome: Progressing     Problem: ABCDS Injury Assessment  Goal: Absence of physical injury  4/12/2024 1234 by Mariaa Morgan RN  Outcome: Progressing     Problem: Skin/Tissue Integrity  Goal: Absence of new skin breakdown  Description: 1.  Monitor for areas of redness and/or skin breakdown  2.  Assess vascular access sites hourly  3.  Every 4-6 hours minimum:  Change oxygen saturation probe site  4.  Every 4-6 hours:  If on nasal continuous positive airway pressure, respiratory therapy assess nares and determine need for appliance change or resting period.  Outcome: Progressing

## 2024-04-12 NOTE — PROGRESS NOTES
[facility]   Speech Therapy  Daily Dysphagia Treatment Note    Armando Lewis  AGE: 63 y.o.   GENDER: male  : 1960  [id number]  EPISODE DATE:  2024  Patient Active Problem List   Diagnosis    Acute metabolic encephalopathy    Hypernatremia    E. coli UTI    Sepsis (HCC)    Fever and chills    Neutrophilia    CRP elevated    Disorientation    LFT elevation    Septicemia (HCC)    AMS (altered mental status)    Altered mental status     Allergies   Allergen Reactions    Codeine     Thorazine [Chlorpromazine]     Tylenol [Acetaminophen]        Treatment Diagnosis: Dysphagia     CHART REVIEW:  2024 admitted with c/o AMS  MD ADMISSION H&P HPI:  63 y.o. male with history of antisocial personality disorder, schizoaffective disorder, major depressive disorder, tardive dyskinesia, polysubstance abuse, chronic constipation presented from a skilled nursing facility with altered mental status.  History is obtained from chart review as patient is mute and unable to provide any history.  The patient was started on IM Rocephin on  for suspected UTI with dysuria, urine studies available..  His mental status has been more altered than his baseline today  In the ED, he is mute and not able to provide any history  On chart review, he was last admitted to  neurology in  with altered mental status..  He had document repeated episodes of mutism and right-sided weakness with negative neuroimaging, EEG...  Episodes were suspected to be behavior related to primary psychiatric illness  In the ED, BP was 114/83, pulse 77, respirations 30, temperature 97.5  CT head, CTA head and neck showed no acute issues, CT abdomen showed a large stool volume in the rectum     Neurology, Psychiatry consults and notes reviewed     IMAGING:  CXR: 2024  IMPRESSION:  No acute cardiopulmonary process.  Low lung volumes.     CT ABDOMEN/PELVIS: 2024  IMPRESSION:  1. No acute process.  2. Large volume stool in the

## 2024-04-13 PROCEDURE — 6370000000 HC RX 637 (ALT 250 FOR IP): Performed by: HOSPITALIST

## 2024-04-13 PROCEDURE — 1200000000 HC SEMI PRIVATE

## 2024-04-13 PROCEDURE — 94760 N-INVAS EAR/PLS OXIMETRY 1: CPT

## 2024-04-13 PROCEDURE — 6360000002 HC RX W HCPCS: Performed by: HOSPITALIST

## 2024-04-13 PROCEDURE — 6370000000 HC RX 637 (ALT 250 FOR IP): Performed by: NURSE PRACTITIONER

## 2024-04-13 PROCEDURE — 2580000003 HC RX 258: Performed by: HOSPITALIST

## 2024-04-13 RX ADMIN — Medication: at 10:24

## 2024-04-13 RX ADMIN — SODIUM CHLORIDE, PRESERVATIVE FREE 10 ML: 5 INJECTION INTRAVENOUS at 21:02

## 2024-04-13 RX ADMIN — SENNOSIDES 17.2 MG: 8.6 TABLET, FILM COATED ORAL at 21:01

## 2024-04-13 RX ADMIN — LACTULOSE 30 G: 10 SOLUTION ORAL at 16:34

## 2024-04-13 RX ADMIN — DORZOLAMIDE HCL 1 DROP: 20 SOLUTION/ DROPS OPHTHALMIC at 21:06

## 2024-04-13 RX ADMIN — LACTULOSE 30 G: 10 SOLUTION ORAL at 21:02

## 2024-04-13 RX ADMIN — CLOPIDOGREL BISULFATE 75 MG: 75 TABLET ORAL at 10:22

## 2024-04-13 RX ADMIN — Medication: at 21:20

## 2024-04-13 RX ADMIN — LACTULOSE 30 G: 10 SOLUTION ORAL at 10:18

## 2024-04-13 RX ADMIN — POLYETHYLENE GLYCOL 3350 17 G: 17 POWDER, FOR SOLUTION ORAL at 10:15

## 2024-04-13 RX ADMIN — BRIMONIDINE TARTRATE 1 DROP: 2 SOLUTION/ DROPS OPHTHALMIC at 21:06

## 2024-04-13 RX ADMIN — DORZOLAMIDE HCL 1 DROP: 20 SOLUTION/ DROPS OPHTHALMIC at 10:24

## 2024-04-13 RX ADMIN — BRIMONIDINE TARTRATE 1 DROP: 2 SOLUTION/ DROPS OPHTHALMIC at 10:24

## 2024-04-13 RX ADMIN — LATANOPROST 1 DROP: 50 SOLUTION OPHTHALMIC at 21:20

## 2024-04-13 RX ADMIN — TAMSULOSIN HYDROCHLORIDE 0.4 MG: 0.4 CAPSULE ORAL at 21:02

## 2024-04-13 RX ADMIN — FERROUS SULFATE TAB 325 MG (65 MG ELEMENTAL FE) 325 MG: 325 (65 FE) TAB at 10:22

## 2024-04-13 RX ADMIN — SENNOSIDES 17.2 MG: 8.6 TABLET, FILM COATED ORAL at 10:22

## 2024-04-13 RX ADMIN — SODIUM CHLORIDE, PRESERVATIVE FREE 10 ML: 5 INJECTION INTRAVENOUS at 10:14

## 2024-04-13 RX ADMIN — ENOXAPARIN SODIUM 30 MG: 100 INJECTION SUBCUTANEOUS at 10:22

## 2024-04-13 RX ADMIN — POLYETHYLENE GLYCOL 3350 17 G: 17 POWDER, FOR SOLUTION ORAL at 21:02

## 2024-04-13 RX ADMIN — ENOXAPARIN SODIUM 30 MG: 100 INJECTION SUBCUTANEOUS at 21:02

## 2024-04-13 ASSESSMENT — PAIN SCALES - WONG BAKER

## 2024-04-13 NOTE — PROGRESS NOTES
Hospitalist Progress Note  4/13/2024 10:56 AM  Subjective:   Admit Date: 4/7/2024  PCP: No primary care provider on file. Status: Inpatient   Interval History: Hospital Day: 7, admitted from skilled nursing facility with altered mental status of unclear etiology.  Patient has been awake but mute for several days, saying a few words and answering questions but dose not swallow tablets and not following commands.  Empiric treatment with ceftriaxone for UTI started at SNF, completed course (4/10).  Constipation treated with SMOG enema.  Psychiatry consult consistent with schizoaffective disorder, tardive dyskinesia, antisocial personality disorder and history of polysubstance use disorder.  Recommended holding meds on admission and resume at discharge.     Hospitalized (8/24 - 8/29 / 2023) with document repeated episodes of mutism with negative neuroimaging.  Episodes were suspected to be behavior related to primary psychiatric illness.     Diet: dysphagia, minced and moist  Right hand peripheral IV (4/7, day #7)  External urinary catheter (4/8, day #6)    Medications:     lactulose  30 g Oral TID   nicotine  21 mg patch TransDERmal Daily   clopidogrel  75 mg Oral Daily   dorzolamide  1 drop Both Eyes BID   ferrous sulfate  325 mg Oral Daily with breakfast   latanoprost  1 drop Both Eyes Nightly   polyethylene glycol  17 g Oral BID   senna  2 tablet Oral BID   tamsulosin  0.4 mg Oral Nightly   brimonidine  1 drop Both Eyes BID   enoxaparin  30 mg SubCUTAneous BID     Recent Labs     04/12/24  0702   WBC 10.3   HGB 12.8*      MCV 83.4     Recent Labs     04/12/24  0702      K 4.0      CO2 28   BUN 10   CREATININE 0.6*   GLUCOSE 91     Ammonia (4/12) 22 umol/L  B12 (4/9) 1720 pg/mL  TSH (4/8) 1.44 uIU/mL  hsTnT (4/7) 17 / 18 ng/L  INR (4/7) 1.02   Lactate (4/7) 0.9 mmol/L    Urine drug screen (4/8) negative  SARS-CoV-2 NAAT (4/7) not detected   Blood culture x 2 (4/7) no growth    EEG (4/8) Normal EEG  Medications/Reviewed with EMS

## 2024-04-13 NOTE — PLAN OF CARE
Problem: Discharge Planning  Goal: Discharge to home or other facility with appropriate resources  4/13/2024 1310 by Jackie Mackay RN  Outcome: Progressing  4/12/2024 2331 by Diana Jara RN  Outcome: Progressing     Problem: Pain  Goal: Verbalizes/displays adequate comfort level or baseline comfort level  4/13/2024 1310 by Jackie Mackay RN  Outcome: Progressing  4/12/2024 2331 by Diana Jara RN  Outcome: Progressing     Problem: Safety - Adult  Goal: Free from fall injury  4/13/2024 1310 by Jackie Mackay RN  Outcome: Progressing  4/12/2024 2331 by Diana Jara RN  Outcome: Progressing     Problem: ABCDS Injury Assessment  Goal: Absence of physical injury  4/13/2024 1310 by Jackie Mackay RN  Outcome: Progressing  4/12/2024 2331 by Diana Jara RN  Outcome: Progressing     Problem: Skin/Tissue Integrity  Goal: Absence of new skin breakdown  Description: 1.  Monitor for areas of redness and/or skin breakdown  2.  Assess vascular access sites hourly  3.  Every 4-6 hours minimum:  Change oxygen saturation probe site  4.  Every 4-6 hours:  If on nasal continuous positive airway pressure, respiratory therapy assess nares and determine need for appliance change or resting period.  4/13/2024 1310 by Jackie Mackay RN  Outcome: Progressing  4/12/2024 2331 by Diana Jara RN  Outcome: Progressing

## 2024-04-13 NOTE — PLAN OF CARE
Problem: Discharge Planning  Goal: Discharge to home or other facility with appropriate resources  4/12/2024 2331 by Diana Jara RN  Outcome: Progressing     Problem: Pain  Goal: Verbalizes/displays adequate comfort level or baseline comfort level  4/12/2024 2331 by Diana Jara RN  Outcome: Progressing     Problem: Safety - Adult  Goal: Free from fall injury  4/12/2024 2331 by Diana Jara RN  Outcome: Progressing     Problem: ABCDS Injury Assessment  Goal: Absence of physical injury  4/12/2024 2331 by Diana Jara RN  Outcome: Progressing     Problem: Skin/Tissue Integrity  Goal: Absence of new skin breakdown  Description: 1.  Monitor for areas of redness and/or skin breakdown  2.  Assess vascular access sites hourly  3.  Every 4-6 hours minimum:  Change oxygen saturation probe site  4.  Every 4-6 hours:  If on nasal continuous positive airway pressure, respiratory therapy assess nares and determine need for appliance change or resting period.  4/12/2024 2331 by Diana Jara RN  Outcome: Progressing

## 2024-04-13 NOTE — PROGRESS NOTES
This RN was about to give Smog enema to pt. Pt positioned laterally (right side), pt started having bowel movement. Very soft yellow in color huge BM.. enema held. MATTHEW Damon made aware. Electronically signed by Diana Jara RN on 4/13/2024 at 12:37 AM

## 2024-04-13 NOTE — PROGRESS NOTES
Pt is non verbal - pt not presenting any signs of pain -   Bed in lowest and locked position with call light and bed side table within reach   Bed exit alarm on

## 2024-04-14 VITALS
DIASTOLIC BLOOD PRESSURE: 90 MMHG | TEMPERATURE: 98.2 F | WEIGHT: 253.53 LBS | HEIGHT: 70 IN | SYSTOLIC BLOOD PRESSURE: 133 MMHG | OXYGEN SATURATION: 97 % | RESPIRATION RATE: 16 BRPM | BODY MASS INDEX: 36.3 KG/M2 | HEART RATE: 68 BPM

## 2024-04-14 LAB
ALBUMIN SERPL-MCNC: 3.8 G/DL (ref 3.4–5)
ANION GAP SERPL CALCULATED.3IONS-SCNC: 11 MMOL/L (ref 3–16)
BASOPHILS # BLD: 0 K/UL (ref 0–0.2)
BASOPHILS NFR BLD: 0.5 %
BUN SERPL-MCNC: 10 MG/DL (ref 7–20)
CALCIUM SERPL-MCNC: 9.4 MG/DL (ref 8.3–10.6)
CHLORIDE SERPL-SCNC: 102 MMOL/L (ref 99–110)
CO2 SERPL-SCNC: 27 MMOL/L (ref 21–32)
CREAT SERPL-MCNC: 0.6 MG/DL (ref 0.8–1.3)
DEPRECATED RDW RBC AUTO: 16.1 % (ref 12.4–15.4)
EOSINOPHIL # BLD: 0.1 K/UL (ref 0–0.6)
EOSINOPHIL NFR BLD: 0.9 %
GFR SERPLBLD CREATININE-BSD FMLA CKD-EPI: >90 ML/MIN/{1.73_M2}
GLUCOSE SERPL-MCNC: 100 MG/DL (ref 70–99)
HCT VFR BLD AUTO: 38.7 % (ref 40.5–52.5)
HGB BLD-MCNC: 12.6 G/DL (ref 13.5–17.5)
LYMPHOCYTES # BLD: 1 K/UL (ref 1–5.1)
LYMPHOCYTES NFR BLD: 10.7 %
MAGNESIUM SERPL-MCNC: 1.9 MG/DL (ref 1.8–2.4)
MCH RBC QN AUTO: 27.3 PG (ref 26–34)
MCHC RBC AUTO-ENTMCNC: 32.5 G/DL (ref 31–36)
MCV RBC AUTO: 84 FL (ref 80–100)
MONOCYTES # BLD: 1 K/UL (ref 0–1.3)
MONOCYTES NFR BLD: 10.8 %
NEUTROPHILS # BLD: 7.2 K/UL (ref 1.7–7.7)
NEUTROPHILS NFR BLD: 77.1 %
PHOSPHATE SERPL-MCNC: 3.3 MG/DL (ref 2.5–4.9)
PLATELET # BLD AUTO: 225 K/UL (ref 135–450)
PMV BLD AUTO: 8.8 FL (ref 5–10.5)
POTASSIUM SERPL-SCNC: 3.4 MMOL/L (ref 3.5–5.1)
RBC # BLD AUTO: 4.61 M/UL (ref 4.2–5.9)
SODIUM SERPL-SCNC: 140 MMOL/L (ref 136–145)
WBC # BLD AUTO: 9.4 K/UL (ref 4–11)

## 2024-04-14 PROCEDURE — 85025 COMPLETE CBC W/AUTO DIFF WBC: CPT

## 2024-04-14 PROCEDURE — 2580000003 HC RX 258: Performed by: HOSPITALIST

## 2024-04-14 PROCEDURE — 80069 RENAL FUNCTION PANEL: CPT

## 2024-04-14 PROCEDURE — 36415 COLL VENOUS BLD VENIPUNCTURE: CPT

## 2024-04-14 PROCEDURE — 6370000000 HC RX 637 (ALT 250 FOR IP): Performed by: HOSPITALIST

## 2024-04-14 PROCEDURE — 6370000000 HC RX 637 (ALT 250 FOR IP): Performed by: NURSE PRACTITIONER

## 2024-04-14 PROCEDURE — 83735 ASSAY OF MAGNESIUM: CPT

## 2024-04-14 PROCEDURE — 6360000002 HC RX W HCPCS: Performed by: HOSPITALIST

## 2024-04-14 RX ORDER — POTASSIUM CHLORIDE 20 MEQ/1
40 TABLET, EXTENDED RELEASE ORAL ONCE
Status: DISCONTINUED | OUTPATIENT
Start: 2024-04-14 | End: 2024-04-14 | Stop reason: HOSPADM

## 2024-04-14 RX ADMIN — POLYETHYLENE GLYCOL 3350 17 G: 17 POWDER, FOR SOLUTION ORAL at 09:21

## 2024-04-14 RX ADMIN — FERROUS SULFATE TAB 325 MG (65 MG ELEMENTAL FE) 325 MG: 325 (65 FE) TAB at 09:25

## 2024-04-14 RX ADMIN — ENOXAPARIN SODIUM 30 MG: 100 INJECTION SUBCUTANEOUS at 09:25

## 2024-04-14 RX ADMIN — SODIUM CHLORIDE, PRESERVATIVE FREE 10 ML: 5 INJECTION INTRAVENOUS at 09:20

## 2024-04-14 RX ADMIN — CLOPIDOGREL BISULFATE 75 MG: 75 TABLET ORAL at 09:25

## 2024-04-14 RX ADMIN — DORZOLAMIDE HCL 1 DROP: 20 SOLUTION/ DROPS OPHTHALMIC at 11:03

## 2024-04-14 RX ADMIN — SENNOSIDES 17.2 MG: 8.6 TABLET, FILM COATED ORAL at 09:25

## 2024-04-14 RX ADMIN — LACTULOSE 30 G: 10 SOLUTION ORAL at 14:21

## 2024-04-14 RX ADMIN — Medication: at 09:23

## 2024-04-14 RX ADMIN — POTASSIUM BICARBONATE 40 MEQ: 782 TABLET, EFFERVESCENT ORAL at 09:48

## 2024-04-14 RX ADMIN — LACTULOSE 30 G: 10 SOLUTION ORAL at 09:23

## 2024-04-14 ASSESSMENT — PAIN SCALES - WONG BAKER
WONGBAKER_NUMERICALRESPONSE: NO HURT

## 2024-04-14 NOTE — CARE COORDINATION
Case Management Discharge Note          Date / Time of Note: 4/14/2024 2:18 PM                  Patient Name: Armando Lewis   YOB: 1960  Diagnosis: Altered mental status [R41.82]  Altered mental status, unspecified altered mental status type [R41.82]   Date / Time: 4/7/2024 12:54 PM    Financial:  Payor: MEDICAID [place] / Plan: MEDICAID [facility] DEPT OF JOB / Product Type: *No Product type* /      Pharmacy:    PPC - [facility] ([place], [place] - [address] [facility] - P [phone] - F [phone]  [address] [facility] [address]  Phone: [phone] Fax: [phone]      Assistance purchasing medications?: Potential Assistance Purchasing Medications: No  Assistance provided by Case Management: None at this time    DISCHARGE Disposition: Long Term Care Facility (LTC)    Nursing Facility:   Discharging to Facility/ Agency   Name:  [facility]  Address:  [address] [place] [address]   Phone:  [phone]  Fax:  [phone]      LOC at discharge: Long Term Care  SHANNAN Completed: Yes             NURSING REPORT NUMBER: [phone]               NURSING FAX NUMBER: [phone]    Notification completed in [facility]/PAS?:  Not Applicable    Transportation:  Transportation PLAN for discharge: EMS transportation   Mode of Transport: Ambulance stretcher - BLS  Reason for medical transport: Bed confined: Meets the following criteria 1) unable to get out of bed without assistance or ambulate, 2) unable to safely sit up in a wheelchair, 3) unable to maintain erect seating position in a chair for time needed for transport  Name of Transport Company: [employer]  Phone: [phone]  Time of Transport: 17:15    Transport form completed: Yes    IMM Completed:   Not Indicated    Additional CM Notes: Discharge order noted, patient returning to [facility] as Long Term.  Called Bhanu at [facility] and verified level of care and that patient can

## 2024-04-14 NOTE — PLAN OF CARE
Problem: Discharge Planning  Goal: Discharge to home or other facility with appropriate resources  4/14/2024 1752 by Jackie Mackay RN  Outcome: Completed  4/14/2024 1133 by Jackie Mackay RN  Outcome: Progressing     Problem: Pain  Goal: Verbalizes/displays adequate comfort level or baseline comfort level  4/14/2024 1752 by Jackie Mackay RN  Outcome: Completed  4/14/2024 1133 by Jackie Mackay RN  Outcome: Progressing     Problem: Safety - Adult  Goal: Free from fall injury  4/14/2024 1752 by Jackie Mackay RN  Outcome: Completed  4/14/2024 1133 by Jackie Mackay RN  Outcome: Progressing     Problem: ABCDS Injury Assessment  Goal: Absence of physical injury  4/14/2024 1752 by Jackie Mackay RN  Outcome: Completed  4/14/2024 1133 by Jackie Mackay RN  Outcome: Progressing     Problem: Skin/Tissue Integrity  Goal: Absence of new skin breakdown  Description: 1.  Monitor for areas of redness and/or skin breakdown  2.  Assess vascular access sites hourly  3.  Every 4-6 hours minimum:  Change oxygen saturation probe site  4.  Every 4-6 hours:  If on nasal continuous positive airway pressure, respiratory therapy assess nares and determine need for appliance change or resting period.  4/14/2024 1752 by Jackie Mackay RN  Outcome: Completed  4/14/2024 1133 by Jackie Mackay RN  Outcome: Progressing     Problem: Neurosensory - Adult  Goal: Achieves stable or improved neurological status  4/14/2024 1752 by Jackie Mackay RN  Outcome: Completed  4/14/2024 1133 by Jackie Mackay RN  Outcome: Progressing  Goal: Absence of seizures  4/14/2024 1752 by Jackie Mackay RN  Outcome: Completed  4/14/2024 1133 by Jackie Mackay RN  Outcome: Progressing  Goal: Remains free of injury related to seizures activity  4/14/2024 1752 by Jackie Mackay RN  Outcome: Completed  4/14/2024 1133 by Jackie Mackay RN  Outcome: Progressing  Goal: Achieves  maximal functionality and self care  4/14/2024 1752 by Jackie Mackay RN  Outcome: Completed  4/14/2024 1133 by Jackie Mackay RN  Outcome: Progressing     Problem: Respiratory - Adult  Goal: Achieves optimal ventilation and oxygenation  4/14/2024 1752 by Jackie Mackay RN  Outcome: Completed  4/14/2024 1133 by Jackie Mackay RN  Outcome: Progressing     Problem: Skin/Tissue Integrity - Adult  Goal: Skin integrity remains intact  4/14/2024 1752 by Jackie Mackay RN  Outcome: Completed  4/14/2024 1133 by Jackie Mackay RN  Outcome: Progressing  Flowsheets (Taken 4/13/2024 2224 by Shruthi Langston RN)  Skin Integrity Remains Intact:   Monitor for areas of redness and/or skin breakdown   Assess vascular access sites hourly  Goal: Incisions, wounds, or drain sites healing without S/S of infection  4/14/2024 1752 by Jackie Mackay RN  Outcome: Completed  4/14/2024 1133 by Jackie Mackay RN  Outcome: Progressing  Goal: Oral mucous membranes remain intact  4/14/2024 1752 by Jackie Mackay RN  Outcome: Completed  4/14/2024 1133 by Jackie Mackay RN  Outcome: Progressing     Problem: Musculoskeletal - Adult  Goal: Return mobility to safest level of function  4/14/2024 1752 by Jackie Mackay RN  Outcome: Completed  4/14/2024 1133 by Jackie Mackay RN  Outcome: Progressing  Goal: Maintain proper alignment of affected body part  4/14/2024 1752 by Jackie Mackay RN  Outcome: Completed  4/14/2024 1133 by Jackie Mackay RN  Outcome: Progressing  Goal: Return ADL status to a safe level of function  4/14/2024 1752 by Jackie Mackay RN  Outcome: Completed  4/14/2024 1133 by Jackie Mackay RN  Outcome: Progressing     Problem: Gastrointestinal - Adult  Goal: Minimal or absence of nausea and vomiting  4/14/2024 1752 by Jackie Mackay RN  Outcome: Completed  4/14/2024 1133 by Jackie Mackay, RN  Outcome: Progressing  Goal:

## 2024-04-14 NOTE — DISCHARGE SUMMARY
Hospital Medicine Discharge Summary    Armando Lewis  :  1960  MRN:  [id number]    Admit date:  2024  Discharge date:  2024    Admitting Physician:  Nicholas Cooper MD  Primary Care Physician:  [facility]  Code Status:   Full Code  Status: Inpatient  Discharged Condition: Stable  Activity: activity as tolerated  Diet:  ADULT DIET; Dysphagia - Minced and Moist      Discharge Diagnoses:      Altered mental status    Urinary tract infection    Acute on chronic constipation    Schizoaffective disorder    Dysphagia    Glaucoma    Hypokalemia    Hospital Course:   63 y.o. male admitted from with altered mental status of unclear etiology. Patient has been awake but mute for several days, saying a few words and answering questions but dose not swallow tablets and not following commands. Empiric treatment with ceftriaxone for UTI started at SNF, completed course (4/10). Constipation treated with SMOG enema. Psychiatry consult consistent with schizoaffective disorder, tardive dyskinesia, antisocial personality disorder and history of polysubstance use disorder. Recommended holding meds on admission and resume at discharge.  He was hospitalized ( - ) with repeated episodes of mutism with negative neuroimaging. Episodes were suspected to be behavior related to primary psychiatric illness.     Altered mental status of unclear etiology.  EEG () negative.   Patient has been awake but mute for several days, saying a few words.  Neurology evaluation complete.   Urinary tract infection, empiric treatment with ceftriaxone, started at SNF () and completed course (4/10).    Acute on chronic constipation treated with SMOG enema.    Psychiatry consult consistent with schizoaffective disorder, tardive dyskinesia, antisocial personality disorder and history of polysubstance use disorder.  Recommends holding meds on admission and resume at discharge.  Restarting fluoxetine 10 mg daily,  buspirone 10 mg BID, and risperidone 1 mg BID.  Dysphagia, followed by speech therapy.  Recommendations for dysphagia diet.   Glaucoma on brimonidine eye drops.   Hypokalemia with normal magnesium:  Replace with oral potassium chloride.       Discharge Medications:    Details   acetaminophen (TYLENOL) 325 MG tablet Take 2 tablets by mouth every 6 hours as needed for Pain or Fever      povidone-iodine (BETADINE) 10 % external solution Apply topically 2 times daily Apply topically to left heel twice daily      dorzolamide (TRUSOPT) 2 % ophthalmic solution Place 1 drop into both eyes 2 times daily      lactulose (CHRONULAC) 10 GM/15ML solution Take 15 mLs by mouth 3 times daily      Multiple Vitamin TABS Take 1 tablet by mouth daily      polyethylene glycol (GLYCOLAX) 17 g packet Take 1 packet by mouth 2 times daily      senna (SENOKOT) 8.6 MG tablet Take 2 tablets by mouth 2 times daily      brinzolamide-brimonidine 1-0.2 % SUSP Place 1 drop into both eyes 2 times daily      balsum peru-castor oil (VENELEX) OINT ointment Apply topically 2 times daily Apply to right and left inner thigh topically      clopidogrel (PLAVIX) 75 MG tablet Take 1 tablet by mouth daily  Qty: 30 tablet, Refills: 3      atorvastatin (LIPITOR) 40 MG tablet Take 1 tablet by mouth nightly  Qty: 30 tablet, Refills: 3      busPIRone (BUSPAR) 10 MG tablet Take 1 tablet by mouth in the morning and at bedtime      ferrous sulfate (IRON 325) 325 (65 Fe) MG tablet Take 1 tablet by mouth daily (with breakfast)      latanoprost (XALATAN) 0.005 % ophthalmic solution Place 1 drop into both eyes nightly      tamsulosin (FLOMAX) 0.4 MG capsule Take 1 capsule by mouth at bedtime      cyanocobalamin 1000 MCG tablet Take 1 tablet by mouth daily      FLUoxetine (PROZAC) 10 MG tablet Take 1 tablet by mouth daily      melatonin 3 MG TABS tablet Take 1 tablet by mouth nightly      risperiDONE (RISPERDAL) 1 MG tablet Take 1 tablet by mouth 2 times daily

## 2024-04-14 NOTE — CARE COORDINATION
Discharge Planning:     Discharging to Facility/ Agency   Name:  [facility]  Address:  [address] [place] [address]   Phone:  [phone]  Fax:  [phone]    Called Bhanu ([phone]) at [facility] who confirmed that patient is Long Term Care and can come back to facility today at discharge.  Copy of the SHANNAN faxed to [phone]    Electronically signed by Kati Aragon RN on 4/14/2024 at 2:08 PM

## 2024-04-14 NOTE — PLAN OF CARE
Achieves stable or improved neurological status  Outcome: Progressing  Goal: Absence of seizures  Outcome: Progressing  Goal: Remains free of injury related to seizures activity  Outcome: Progressing  Goal: Achieves maximal functionality and self care  Outcome: Progressing     Problem: Respiratory - Adult  Goal: Achieves optimal ventilation and oxygenation  Outcome: Progressing     Problem: Skin/Tissue Integrity - Adult  Goal: Skin integrity remains intact  Outcome: Progressing  Flowsheets (Taken 4/13/2024 2224 by Shruthi Langston RN)  Skin Integrity Remains Intact:   Monitor for areas of redness and/or skin breakdown   Assess vascular access sites hourly  Goal: Incisions, wounds, or drain sites healing without S/S of infection  Outcome: Progressing  Goal: Oral mucous membranes remain intact  Outcome: Progressing     Problem: Musculoskeletal - Adult  Goal: Return mobility to safest level of function  Outcome: Progressing  Goal: Maintain proper alignment of affected body part  Outcome: Progressing  Goal: Return ADL status to a safe level of function  Outcome: Progressing     Problem: Gastrointestinal - Adult  Goal: Minimal or absence of nausea and vomiting  Outcome: Progressing  Goal: Maintains or returns to baseline bowel function  Outcome: Progressing  Goal: Maintains adequate nutritional intake  Outcome: Progressing     Problem: Genitourinary - Adult  Goal: Absence of urinary retention  Outcome: Progressing     Problem: Infection - Adult  Goal: Absence of infection at discharge  Outcome: Progressing  Goal: Absence of infection during hospitalization  Outcome: Progressing  Goal: Absence of fever/infection during anticipated neutropenic period  Outcome: Progressing     Problem: Metabolic/Fluid and Electrolytes - Adult  Goal: Electrolytes maintained within normal limits  Outcome: Progressing  Goal: Hemodynamic stability and optimal renal function maintained  Outcome: Progressing  Goal: Glucose maintained within  prescribed range  Outcome: Progressing

## 2024-04-14 NOTE — PROGRESS NOTES
Hospitalist Progress Note  4/14/2024 9:29 AM  Subjective:   Admit Date: 4/7/2024  PCP: [facility]  Status: Inpatient   Interval History: Hospital Day: 8, unchanged.  Continues essentially mute with occasional words which appears to be his baseline.    History of present illness:  Admitted from skilled nursing facility with altered mental status of unclear etiology.  Patient has been awake but mute for several days, saying a few words and answering questions but dose not swallow tablets and not following commands.  Empiric treatment with ceftriaxone for UTI started at SNF, completed course (4/10).  Constipation treated with SMOG enema.  Psychiatry consult consistent with schizoaffective disorder, tardive dyskinesia, antisocial personality disorder and history of polysubstance use disorder.  Recommended holding meds on admission and resume at discharge.      Hospitalized (8/24 - 8/29 / 2023) with document repeated episodes of mutism with negative neuroimaging.  Episodes were suspected to be behavior related to primary psychiatric illness.      Diet: dysphagia, minced and moist  Right hand peripheral IV (4/7, day #8)  External urinary catheter (4/8, day #7)    Medications:     lactulose  30 g Oral TID   nicotine  21 mg patch TransDERmal Daily   clopidogrel  75 mg Oral Daily   dorzolamide  1 drop Both Eyes BID   ferrous sulfate  325 mg Oral Daily with breakfast   latanoprost  1 drop Both Eyes Nightly   polyethylene glycol  17 g Oral BID   senna  2 tablet Oral BID   tamsulosin  0.4 mg Oral Nightly   brimonidine  1 drop Both Eyes BID   enoxaparin  30 mg SubCUTAneous BID     Recent Labs     04/12/24  0702 04/14/24  0826   WBC 10.3 9.4   HGB 12.8* 12.6*    225   MCV 83.4 84.0     Recent Labs     04/12/24  0702 04/14/24  0826    140   K 4.0 3.4*    102   CO2 28 27   BUN 10 10   CREATININE 0.6* 0.6*   GLUCOSE 91 100*     Magnesium (4/14) 1.90 mg/dL  Ammonia (4/12) 22 umol/L  B12 (4/9) 1720

## 2024-05-06 NOTE — PROGRESS NOTES
Discharge order discontinued due to pt having a distended abdomen and no bowel movement since admission. Pt had meds given for that, see MAR report. Pt's BM is active as of now. Rn attempted to explain to patient. Unsure if patient understands fully. RN communicated with PCA.   [FreeTextEntry2] : Right shoulder. DOS 10/24/23

## 2024-11-02 ENCOUNTER — APPOINTMENT (OUTPATIENT)
Dept: GENERAL RADIOLOGY | Age: 64
End: 2024-11-02
Payer: MEDICAID

## 2024-11-02 ENCOUNTER — HOSPITAL ENCOUNTER (INPATIENT)
Age: 64
LOS: 6 days | Discharge: NURSING, REHAB OR LONG-TERM CARE | End: 2024-11-08
Attending: EMERGENCY MEDICINE | Admitting: INTERNAL MEDICINE
Payer: MEDICAID

## 2024-11-02 DIAGNOSIS — J96.02 ACUTE RESPIRATORY FAILURE WITH HYPOXIA AND HYPERCAPNIA: Primary | ICD-10-CM

## 2024-11-02 DIAGNOSIS — R06.02 SHORTNESS OF BREATH: ICD-10-CM

## 2024-11-02 DIAGNOSIS — J18.9 PNEUMONIA OF LEFT LOWER LOBE DUE TO INFECTIOUS ORGANISM: ICD-10-CM

## 2024-11-02 DIAGNOSIS — A41.9 SEPTICEMIA (HCC): ICD-10-CM

## 2024-11-02 DIAGNOSIS — J96.01 ACUTE RESPIRATORY FAILURE WITH HYPOXIA AND HYPERCAPNIA: Primary | ICD-10-CM

## 2024-11-02 LAB
ALBUMIN SERPL-MCNC: 4 G/DL (ref 3.4–5)
ALBUMIN/GLOB SERPL: 1.1 {RATIO} (ref 1.1–2.2)
ALP SERPL-CCNC: 108 U/L (ref 40–129)
ALT SERPL-CCNC: 15 U/L (ref 10–40)
ANION GAP SERPL CALCULATED.3IONS-SCNC: 10 MMOL/L (ref 3–16)
AST SERPL-CCNC: 23 U/L (ref 15–37)
BASE EXCESS BLDA CALC-SCNC: 8.5 MMOL/L (ref -3–3)
BASE EXCESS BLDV CALC-SCNC: 7.2 MMOL/L
BASE EXCESS BLDV CALC-SCNC: 8.2 MMOL/L
BASOPHILS # BLD: 0 K/UL (ref 0–0.2)
BASOPHILS NFR BLD: 0.5 %
BILIRUB SERPL-MCNC: 0.5 MG/DL (ref 0–1)
BILIRUB UR QL STRIP.AUTO: NEGATIVE
BUN SERPL-MCNC: 12 MG/DL (ref 7–20)
CALCIUM SERPL-MCNC: 9.6 MG/DL (ref 8.3–10.6)
CHLORIDE SERPL-SCNC: 97 MMOL/L (ref 99–110)
CLARITY UR: CLEAR
CO2 BLDA-SCNC: 40.4 MMOL/L
CO2 BLDV-SCNC: 42 MMOL/L
CO2 BLDV-SCNC: 42 MMOL/L
CO2 SERPL-SCNC: 34 MMOL/L (ref 21–32)
COHGB MFR BLDA: 1.1 % (ref 0–1.5)
COHGB MFR BLDV: 1.2 %
COHGB MFR BLDV: 2.4 %
COLOR UR: YELLOW
CREAT SERPL-MCNC: 0.7 MG/DL (ref 0.8–1.3)
DEPRECATED RDW RBC AUTO: 15.4 % (ref 12.4–15.4)
EKG ATRIAL RATE: 100 BPM
EKG DIAGNOSIS: NORMAL
EKG P AXIS: 45 DEGREES
EKG P-R INTERVAL: 176 MS
EKG Q-T INTERVAL: 326 MS
EKG QRS DURATION: 78 MS
EKG QTC CALCULATION (BAZETT): 420 MS
EKG R AXIS: 63 DEGREES
EKG T AXIS: 63 DEGREES
EKG VENTRICULAR RATE: 100 BPM
EOSINOPHIL # BLD: 0 K/UL (ref 0–0.6)
EOSINOPHIL NFR BLD: 0.3 %
FLUAV RNA UPPER RESP QL NAA+PROBE: NEGATIVE
FLUBV AG NPH QL: NEGATIVE
GFR SERPLBLD CREATININE-BSD FMLA CKD-EPI: >90 ML/MIN/{1.73_M2}
GLUCOSE SERPL-MCNC: 83 MG/DL (ref 70–99)
GLUCOSE UR STRIP.AUTO-MCNC: NEGATIVE MG/DL
HCO3 BLDA-SCNC: 38.1 MMOL/L (ref 21–29)
HCO3 BLDV-SCNC: 39 MMOL/L (ref 23–29)
HCO3 BLDV-SCNC: 39 MMOL/L (ref 23–29)
HCT VFR BLD AUTO: 47 % (ref 40.5–52.5)
HGB BLD-MCNC: 15.2 G/DL (ref 13.5–17.5)
HGB BLDA-MCNC: 15.4 G/DL (ref 13.5–17.5)
HGB UR QL STRIP.AUTO: NEGATIVE
KETONES UR STRIP.AUTO-MCNC: 15 MG/DL
LACTATE BLDV-SCNC: 0.7 MMOL/L (ref 0.4–1.9)
LACTATE BLDV-SCNC: 1.2 MMOL/L (ref 0.4–1.9)
LEUKOCYTE ESTERASE UR QL STRIP.AUTO: NEGATIVE
LIPASE SERPL-CCNC: 15 U/L (ref 13–60)
LYMPHOCYTES # BLD: 1.1 K/UL (ref 1–5.1)
LYMPHOCYTES NFR BLD: 10.4 %
MCH RBC QN AUTO: 28.5 PG (ref 26–34)
MCHC RBC AUTO-ENTMCNC: 32.4 G/DL (ref 31–36)
MCV RBC AUTO: 88 FL (ref 80–100)
METHGB MFR BLDA: 0.3 %
METHGB MFR BLDV: 0.4 %
METHGB MFR BLDV: 0.5 %
MONOCYTES # BLD: 1.5 K/UL (ref 0–1.3)
MONOCYTES NFR BLD: 13.6 %
NEUTROPHILS # BLD: 8.2 K/UL (ref 1.7–7.7)
NEUTROPHILS NFR BLD: 75.2 %
NITRITE UR QL STRIP.AUTO: NEGATIVE
NT-PROBNP SERPL-MCNC: 186 PG/ML (ref 0–124)
O2 THERAPY: ABNORMAL
PCO2 BLDA: 74.5 MMHG (ref 35–45)
PCO2 BLDV: 86.8 MMHG (ref 40–50)
PCO2 BLDV: 94.6 MMHG (ref 40–50)
PH BLDA: 7.32 [PH] (ref 7.35–7.45)
PH BLDV: 7.23 [PH] (ref 7.35–7.45)
PH BLDV: 7.26 [PH] (ref 7.35–7.45)
PH UR STRIP.AUTO: 5 [PH] (ref 5–8)
PLATELET # BLD AUTO: 183 K/UL (ref 135–450)
PMV BLD AUTO: 8.6 FL (ref 5–10.5)
PO2 BLDA: 58.1 MMHG (ref 75–108)
PO2 BLDV: 49 MMHG
PO2 BLDV: 57 MMHG
POTASSIUM SERPL-SCNC: 4.4 MMOL/L (ref 3.5–5.1)
PROCALCITONIN SERPL IA-MCNC: 0.04 NG/ML (ref 0–0.15)
PROT SERPL-MCNC: 7.8 G/DL (ref 6.4–8.2)
PROT UR STRIP.AUTO-MCNC: NEGATIVE MG/DL
RBC # BLD AUTO: 5.33 M/UL (ref 4.2–5.9)
REPORT: NORMAL
RESP PATH DNA+RNA PNL NPH NAA+NON-PROBE: NORMAL
SAO2 % BLDA: 88.2 %
SAO2 % BLDV: 76 %
SAO2 % BLDV: 84 %
SARS-COV-2 RDRP RESP QL NAA+PROBE: NOT DETECTED
SODIUM SERPL-SCNC: 141 MMOL/L (ref 136–145)
SP GR UR STRIP.AUTO: 1.02 (ref 1–1.03)
TROPONIN, HIGH SENSITIVITY: 31 NG/L (ref 0–22)
TROPONIN, HIGH SENSITIVITY: 39 NG/L (ref 0–22)
UA COMPLETE W REFLEX CULTURE PNL UR: ABNORMAL
UA DIPSTICK W REFLEX MICRO PNL UR: ABNORMAL
URN SPEC COLLECT METH UR: ABNORMAL
UROBILINOGEN UR STRIP-ACNC: 0.2 E.U./DL
WBC # BLD AUTO: 10.9 K/UL (ref 4–11)

## 2024-11-02 PROCEDURE — 87040 BLOOD CULTURE FOR BACTERIA: CPT

## 2024-11-02 PROCEDURE — 96367 TX/PROPH/DG ADDL SEQ IV INF: CPT

## 2024-11-02 PROCEDURE — 96375 TX/PRO/DX INJ NEW DRUG ADDON: CPT

## 2024-11-02 PROCEDURE — 36600 WITHDRAWAL OF ARTERIAL BLOOD: CPT

## 2024-11-02 PROCEDURE — 9990000010 HC NO CHARGE VISIT: Performed by: PHYSICAL THERAPIST

## 2024-11-02 PROCEDURE — 87641 MR-STAPH DNA AMP PROBE: CPT

## 2024-11-02 PROCEDURE — 6360000002 HC RX W HCPCS: Performed by: EMERGENCY MEDICINE

## 2024-11-02 PROCEDURE — 82803 BLOOD GASES ANY COMBINATION: CPT

## 2024-11-02 PROCEDURE — 0202U NFCT DS 22 TRGT SARS-COV-2: CPT

## 2024-11-02 PROCEDURE — 84145 PROCALCITONIN (PCT): CPT

## 2024-11-02 PROCEDURE — 2580000003 HC RX 258: Performed by: INTERNAL MEDICINE

## 2024-11-02 PROCEDURE — 84484 ASSAY OF TROPONIN QUANT: CPT

## 2024-11-02 PROCEDURE — 51702 INSERT TEMP BLADDER CATH: CPT

## 2024-11-02 PROCEDURE — 83605 ASSAY OF LACTIC ACID: CPT

## 2024-11-02 PROCEDURE — 94660 CPAP INITIATION&MGMT: CPT

## 2024-11-02 PROCEDURE — 81003 URINALYSIS AUTO W/O SCOPE: CPT

## 2024-11-02 PROCEDURE — 2580000003 HC RX 258

## 2024-11-02 PROCEDURE — 6370000000 HC RX 637 (ALT 250 FOR IP): Performed by: EMERGENCY MEDICINE

## 2024-11-02 PROCEDURE — 83690 ASSAY OF LIPASE: CPT

## 2024-11-02 PROCEDURE — 2700000000 HC OXYGEN THERAPY PER DAY

## 2024-11-02 PROCEDURE — 93010 ELECTROCARDIOGRAM REPORT: CPT | Performed by: INTERNAL MEDICINE

## 2024-11-02 PROCEDURE — 99285 EMERGENCY DEPT VISIT HI MDM: CPT

## 2024-11-02 PROCEDURE — 2100000000 HC CCU R&B

## 2024-11-02 PROCEDURE — 87635 SARS-COV-2 COVID-19 AMP PRB: CPT

## 2024-11-02 PROCEDURE — 2580000003 HC RX 258: Performed by: EMERGENCY MEDICINE

## 2024-11-02 PROCEDURE — 80053 COMPREHEN METABOLIC PANEL: CPT

## 2024-11-02 PROCEDURE — 94761 N-INVAS EAR/PLS OXIMETRY MLT: CPT

## 2024-11-02 PROCEDURE — 93005 ELECTROCARDIOGRAM TRACING: CPT | Performed by: EMERGENCY MEDICINE

## 2024-11-02 PROCEDURE — 6370000000 HC RX 637 (ALT 250 FOR IP): Performed by: INTERNAL MEDICINE

## 2024-11-02 PROCEDURE — 85025 COMPLETE CBC W/AUTO DIFF WBC: CPT

## 2024-11-02 PROCEDURE — 96365 THER/PROPH/DIAG IV INF INIT: CPT

## 2024-11-02 PROCEDURE — 83880 ASSAY OF NATRIURETIC PEPTIDE: CPT

## 2024-11-02 PROCEDURE — 87502 INFLUENZA DNA AMP PROBE: CPT

## 2024-11-02 PROCEDURE — 94640 AIRWAY INHALATION TREATMENT: CPT

## 2024-11-02 PROCEDURE — 5A09457 ASSISTANCE WITH RESPIRATORY VENTILATION, 24-96 CONSECUTIVE HOURS, CONTINUOUS POSITIVE AIRWAY PRESSURE: ICD-10-PCS | Performed by: HOSPITALIST

## 2024-11-02 PROCEDURE — 87804 INFLUENZA ASSAY W/OPTIC: CPT

## 2024-11-02 PROCEDURE — 6360000002 HC RX W HCPCS: Performed by: INTERNAL MEDICINE

## 2024-11-02 PROCEDURE — 71045 X-RAY EXAM CHEST 1 VIEW: CPT

## 2024-11-02 PROCEDURE — 36415 COLL VENOUS BLD VENIPUNCTURE: CPT

## 2024-11-02 RX ORDER — POLYETHYLENE GLYCOL 3350 17 G/17G
17 POWDER, FOR SOLUTION ORAL 2 TIMES DAILY
Status: DISCONTINUED | OUTPATIENT
Start: 2024-11-02 | End: 2024-11-08 | Stop reason: HOSPADM

## 2024-11-02 RX ORDER — WATER 10 ML/10ML
INJECTION INTRAMUSCULAR; INTRAVENOUS; SUBCUTANEOUS
Status: COMPLETED
Start: 2024-11-02 | End: 2024-11-02

## 2024-11-02 RX ORDER — LATANOPROST 50 UG/ML
1 SOLUTION/ DROPS OPHTHALMIC NIGHTLY
Status: DISCONTINUED | OUTPATIENT
Start: 2024-11-02 | End: 2024-11-08 | Stop reason: HOSPADM

## 2024-11-02 RX ORDER — ENOXAPARIN SODIUM 100 MG/ML
30 INJECTION SUBCUTANEOUS 2 TIMES DAILY
Status: DISCONTINUED | OUTPATIENT
Start: 2024-11-02 | End: 2024-11-08 | Stop reason: HOSPADM

## 2024-11-02 RX ORDER — EPINEPHRINE 1 MG/ML
1 INJECTION, SOLUTION, CONCENTRATE INTRAVENOUS DAILY PRN
COMMUNITY

## 2024-11-02 RX ORDER — GUAIFENESIN/DEXTROMETHORPHAN 100-10MG/5
5 SYRUP ORAL EVERY 4 HOURS PRN
Status: DISCONTINUED | OUTPATIENT
Start: 2024-11-02 | End: 2024-11-08 | Stop reason: HOSPADM

## 2024-11-02 RX ORDER — POLYETHYLENE GLYCOL 3350 17 G/17G
17 POWDER, FOR SOLUTION ORAL DAILY PRN
Status: DISCONTINUED | OUTPATIENT
Start: 2024-11-02 | End: 2024-11-02

## 2024-11-02 RX ORDER — MAGNESIUM SULFATE IN WATER 40 MG/ML
2000 INJECTION, SOLUTION INTRAVENOUS ONCE
Status: COMPLETED | OUTPATIENT
Start: 2024-11-02 | End: 2024-11-02

## 2024-11-02 RX ORDER — SODIUM CHLORIDE 9 MG/ML
INJECTION, SOLUTION INTRAVENOUS CONTINUOUS
Status: ACTIVE | OUTPATIENT
Start: 2024-11-02 | End: 2024-11-02

## 2024-11-02 RX ORDER — SODIUM CHLORIDE 9 MG/ML
INJECTION, SOLUTION INTRAVENOUS PRN
Status: DISCONTINUED | OUTPATIENT
Start: 2024-11-02 | End: 2024-11-08 | Stop reason: HOSPADM

## 2024-11-02 RX ORDER — RISPERIDONE 1 MG/1
2 TABLET ORAL 3 TIMES DAILY
Status: DISCONTINUED | OUTPATIENT
Start: 2024-11-02 | End: 2024-11-08 | Stop reason: HOSPADM

## 2024-11-02 RX ORDER — FOLIC ACID 1 MG/1
1 TABLET ORAL DAILY
COMMUNITY

## 2024-11-02 RX ORDER — VANCOMYCIN 2 G/400ML
2000 INJECTION, SOLUTION INTRAVENOUS ONCE
Status: COMPLETED | OUTPATIENT
Start: 2024-11-02 | End: 2024-11-02

## 2024-11-02 RX ORDER — SPIRONOLACTONE 25 MG/1
25 TABLET ORAL DAILY
COMMUNITY

## 2024-11-02 RX ORDER — CLOPIDOGREL BISULFATE 75 MG/1
75 TABLET ORAL DAILY
Status: DISCONTINUED | OUTPATIENT
Start: 2024-11-02 | End: 2024-11-08 | Stop reason: HOSPADM

## 2024-11-02 RX ORDER — LANOLIN ALCOHOL/MO/W.PET/CERES
1000 CREAM (GRAM) TOPICAL DAILY
Status: DISCONTINUED | OUTPATIENT
Start: 2024-11-02 | End: 2024-11-08 | Stop reason: HOSPADM

## 2024-11-02 RX ORDER — LANOLIN ALCOHOL/MO/W.PET/CERES
3 CREAM (GRAM) TOPICAL
Status: DISCONTINUED | OUTPATIENT
Start: 2024-11-02 | End: 2024-11-02

## 2024-11-02 RX ORDER — FERROUS SULFATE 325(65) MG
325 TABLET ORAL
Status: DISCONTINUED | OUTPATIENT
Start: 2024-11-03 | End: 2024-11-08 | Stop reason: HOSPADM

## 2024-11-02 RX ORDER — SODIUM CHLORIDE 0.9 % (FLUSH) 0.9 %
5-40 SYRINGE (ML) INJECTION EVERY 12 HOURS SCHEDULED
Status: DISCONTINUED | OUTPATIENT
Start: 2024-11-02 | End: 2024-11-08 | Stop reason: HOSPADM

## 2024-11-02 RX ORDER — ONDANSETRON 2 MG/ML
4 INJECTION INTRAMUSCULAR; INTRAVENOUS EVERY 6 HOURS PRN
Status: DISCONTINUED | OUTPATIENT
Start: 2024-11-02 | End: 2024-11-08 | Stop reason: HOSPADM

## 2024-11-02 RX ORDER — MAGNESIUM HYDROXIDE/ALUMINUM HYDROXICE/SIMETHICONE 120; 1200; 1200 MG/30ML; MG/30ML; MG/30ML
30 SUSPENSION ORAL EVERY 6 HOURS PRN
Status: DISCONTINUED | OUTPATIENT
Start: 2024-11-02 | End: 2024-11-08 | Stop reason: HOSPADM

## 2024-11-02 RX ORDER — OLANZAPINE 10 MG/2ML
10 INJECTION, POWDER, FOR SOLUTION INTRAMUSCULAR
Status: COMPLETED | OUTPATIENT
Start: 2024-11-02 | End: 2024-11-02

## 2024-11-02 RX ORDER — FUROSEMIDE 10 MG/ML
40 INJECTION INTRAMUSCULAR; INTRAVENOUS 2 TIMES DAILY
Status: DISCONTINUED | OUTPATIENT
Start: 2024-11-02 | End: 2024-11-03

## 2024-11-02 RX ORDER — LACTULOSE 10 G/15ML
10 SOLUTION ORAL 3 TIMES DAILY
Status: DISCONTINUED | OUTPATIENT
Start: 2024-11-02 | End: 2024-11-05

## 2024-11-02 RX ORDER — SPIRONOLACTONE 25 MG/1
25 TABLET ORAL DAILY
Status: DISCONTINUED | OUTPATIENT
Start: 2024-11-02 | End: 2024-11-05

## 2024-11-02 RX ORDER — FUROSEMIDE 20 MG/1
40 TABLET ORAL DAILY
COMMUNITY

## 2024-11-02 RX ORDER — BUSPIRONE HYDROCHLORIDE 10 MG/1
10 TABLET ORAL 2 TIMES DAILY
Status: DISCONTINUED | OUTPATIENT
Start: 2024-11-02 | End: 2024-11-02

## 2024-11-02 RX ORDER — TAMSULOSIN HYDROCHLORIDE 0.4 MG/1
0.4 CAPSULE ORAL NIGHTLY
Status: DISCONTINUED | OUTPATIENT
Start: 2024-11-02 | End: 2024-11-08 | Stop reason: HOSPADM

## 2024-11-02 RX ORDER — FLUOXETINE 10 MG/1
10 TABLET, FILM COATED ORAL DAILY
Status: DISCONTINUED | OUTPATIENT
Start: 2024-11-02 | End: 2024-11-02

## 2024-11-02 RX ORDER — SENNOSIDES A AND B 8.6 MG/1
2 TABLET, FILM COATED ORAL 2 TIMES DAILY
Status: DISCONTINUED | OUTPATIENT
Start: 2024-11-02 | End: 2024-11-08 | Stop reason: HOSPADM

## 2024-11-02 RX ORDER — MAGNESIUM HYDROXIDE/ALUMINUM HYDROXICE/SIMETHICONE 120; 1200; 1200 MG/30ML; MG/30ML; MG/30ML
5 SUSPENSION ORAL EVERY 4 HOURS PRN
COMMUNITY

## 2024-11-02 RX ORDER — BRIMONIDINE TARTRATE 2 MG/ML
1 SOLUTION/ DROPS OPHTHALMIC 2 TIMES DAILY
Status: DISCONTINUED | OUTPATIENT
Start: 2024-11-02 | End: 2024-11-08 | Stop reason: HOSPADM

## 2024-11-02 RX ORDER — IPRATROPIUM BROMIDE AND ALBUTEROL SULFATE 2.5; .5 MG/3ML; MG/3ML
1 SOLUTION RESPIRATORY (INHALATION) EVERY 4 HOURS PRN
Status: DISCONTINUED | OUTPATIENT
Start: 2024-11-02 | End: 2024-11-08 | Stop reason: HOSPADM

## 2024-11-02 RX ORDER — SODIUM CHLORIDE 0.9 % (FLUSH) 0.9 %
5-40 SYRINGE (ML) INJECTION PRN
Status: DISCONTINUED | OUTPATIENT
Start: 2024-11-02 | End: 2024-11-08 | Stop reason: HOSPADM

## 2024-11-02 RX ORDER — IPRATROPIUM BROMIDE AND ALBUTEROL SULFATE 2.5; .5 MG/3ML; MG/3ML
2 SOLUTION RESPIRATORY (INHALATION)
Status: DISCONTINUED | OUTPATIENT
Start: 2024-11-02 | End: 2024-11-02

## 2024-11-02 RX ORDER — DORZOLAMIDE HCL 20 MG/ML
1 SOLUTION/ DROPS OPHTHALMIC 2 TIMES DAILY
Status: DISCONTINUED | OUTPATIENT
Start: 2024-11-02 | End: 2024-11-08 | Stop reason: HOSPADM

## 2024-11-02 RX ORDER — METHYLPREDNISOLONE SODIUM SUCCINATE 125 MG/2ML
60 INJECTION, POWDER, LYOPHILIZED, FOR SOLUTION INTRAMUSCULAR; INTRAVENOUS ONCE
Status: COMPLETED | OUTPATIENT
Start: 2024-11-02 | End: 2024-11-02

## 2024-11-02 RX ORDER — AZITHROMYCIN 500 MG/1
500 TABLET, FILM COATED ORAL EVERY 24 HOURS
Status: DISCONTINUED | OUTPATIENT
Start: 2024-11-02 | End: 2024-11-02

## 2024-11-02 RX ORDER — ATORVASTATIN CALCIUM 40 MG/1
40 TABLET, FILM COATED ORAL NIGHTLY
Status: DISCONTINUED | OUTPATIENT
Start: 2024-11-02 | End: 2024-11-08 | Stop reason: HOSPADM

## 2024-11-02 RX ORDER — BRIMONIDINE TARTRATE 1 MG/ML
1 SOLUTION/ DROPS OPHTHALMIC 2 TIMES DAILY
COMMUNITY

## 2024-11-02 RX ORDER — FUROSEMIDE 40 MG/1
40 TABLET ORAL DAILY
Status: DISCONTINUED | OUTPATIENT
Start: 2024-11-02 | End: 2024-11-02

## 2024-11-02 RX ORDER — ONDANSETRON 4 MG/1
4 TABLET, ORALLY DISINTEGRATING ORAL EVERY 8 HOURS PRN
Status: DISCONTINUED | OUTPATIENT
Start: 2024-11-02 | End: 2024-11-08 | Stop reason: HOSPADM

## 2024-11-02 RX ORDER — ACETAMINOPHEN 325 MG/1
650 TABLET ORAL EVERY 6 HOURS PRN
Status: DISCONTINUED | OUTPATIENT
Start: 2024-11-02 | End: 2024-11-02

## 2024-11-02 RX ORDER — ALBUTEROL SULFATE 90 UG/1
2 INHALANT RESPIRATORY (INHALATION) EVERY 6 HOURS PRN
COMMUNITY

## 2024-11-02 RX ORDER — HALOPERIDOL DECANOATE 50 MG/ML
50 INJECTION INTRAMUSCULAR
COMMUNITY

## 2024-11-02 RX ORDER — RISPERIDONE 1 MG/1
1 TABLET ORAL 2 TIMES DAILY
Status: DISCONTINUED | OUTPATIENT
Start: 2024-11-02 | End: 2024-11-02

## 2024-11-02 RX ADMIN — FUROSEMIDE 40 MG: 10 INJECTION, SOLUTION INTRAMUSCULAR; INTRAVENOUS at 16:35

## 2024-11-02 RX ADMIN — SODIUM CHLORIDE 25 ML: 9 INJECTION, SOLUTION INTRAVENOUS at 23:49

## 2024-11-02 RX ADMIN — CEFEPIME 1000 MG: 1 INJECTION, POWDER, FOR SOLUTION INTRAMUSCULAR; INTRAVENOUS at 05:43

## 2024-11-02 RX ADMIN — VANCOMYCIN 2000 MG: 2 INJECTION, SOLUTION INTRAVENOUS at 06:48

## 2024-11-02 RX ADMIN — ENOXAPARIN SODIUM 30 MG: 100 INJECTION SUBCUTANEOUS at 16:35

## 2024-11-02 RX ADMIN — CEFEPIME 2000 MG: 2 INJECTION, POWDER, FOR SOLUTION INTRAVENOUS at 23:50

## 2024-11-02 RX ADMIN — VANCOMYCIN HYDROCHLORIDE 1500 MG: 1.5 INJECTION, POWDER, LYOPHILIZED, FOR SOLUTION INTRAVENOUS at 19:12

## 2024-11-02 RX ADMIN — SODIUM CHLORIDE: 9 INJECTION, SOLUTION INTRAVENOUS at 11:19

## 2024-11-02 RX ADMIN — DORZOLAMIDE HYDROCHLORIDE 1 DROP: 20 SOLUTION OPHTHALMIC at 21:35

## 2024-11-02 RX ADMIN — CEFEPIME 2000 MG: 2 INJECTION, POWDER, FOR SOLUTION INTRAVENOUS at 16:42

## 2024-11-02 RX ADMIN — ENOXAPARIN SODIUM 30 MG: 100 INJECTION SUBCUTANEOUS at 21:35

## 2024-11-02 RX ADMIN — MAGNESIUM SULFATE HEPTAHYDRATE 2000 MG: 40 INJECTION, SOLUTION INTRAVENOUS at 04:56

## 2024-11-02 RX ADMIN — SODIUM CHLORIDE, PRESERVATIVE FREE 10 ML: 5 INJECTION INTRAVENOUS at 21:37

## 2024-11-02 RX ADMIN — DORZOLAMIDE HYDROCHLORIDE 1 DROP: 20 SOLUTION OPHTHALMIC at 16:38

## 2024-11-02 RX ADMIN — WATER 10 ML: 1 INJECTION INTRAMUSCULAR; INTRAVENOUS; SUBCUTANEOUS at 06:16

## 2024-11-02 RX ADMIN — METHYLPREDNISOLONE SODIUM SUCCINATE 60 MG: 125 INJECTION INTRAMUSCULAR; INTRAVENOUS at 04:42

## 2024-11-02 RX ADMIN — SODIUM CHLORIDE, PRESERVATIVE FREE 10 ML: 5 INJECTION INTRAVENOUS at 11:21

## 2024-11-02 RX ADMIN — LATANOPROST 1 DROP: 50 SOLUTION OPHTHALMIC at 21:58

## 2024-11-02 RX ADMIN — IPRATROPIUM BROMIDE AND ALBUTEROL SULFATE 2 DOSE: .5; 2.5 SOLUTION RESPIRATORY (INHALATION) at 05:39

## 2024-11-02 RX ADMIN — OLANZAPINE 10 MG: 10 INJECTION, POWDER, FOR SOLUTION INTRAMUSCULAR at 06:10

## 2024-11-02 RX ADMIN — BRIMONIDINE TARTRATE 1 DROP: 2 SOLUTION/ DROPS OPHTHALMIC at 21:58

## 2024-11-02 ASSESSMENT — PAIN - FUNCTIONAL ASSESSMENT: PAIN_FUNCTIONAL_ASSESSMENT: NONE - DENIES PAIN

## 2024-11-02 NOTE — PROGRESS NOTES
Occupational Therapy  Received orders for OT evaluation.  Called placed to [facility] and the nursing staff there report that he is dependent for transfers with a Kylee lift and is not able to propel himself in a wheelchair.  Defer OT evaluation due to continued dependence on staff. Dorita Macedo, OTR/L #[id number] 11/2/2024 2:53 PM

## 2024-11-02 NOTE — CONSULTS
Clinical Pharmacy Note  Vancomycin Consult    Pharmacy consult received for one-time dose of vancomycin in the Emergency Department per Dr. Michael Rodriguez.    Ht Readings from Last 1 Encounters:   04/08/24 1.778 m (5' 10\")        Wt Readings from Last 1 Encounters:   11/02/24 127.6 kg (281 lb 3.2 oz)         Assessment/Plan:  Vancomycin 2000 mg x 1 in ED.  If vancomycin is to continue on admission and pharmacy is to manage dosing, please re-consult with admission orders.    Nikolas Tomlinson, PharmD

## 2024-11-02 NOTE — CONSULTS
Clinical Pharmacy Note  Vancomycin Consult    Armando Lewis is a 64 y.o. male ordered vancomycin for pneumonia; consult received from Dr. Alejandre to manage therapy. Also receiving cefepime.    Allergies:  Codeine, Thorazine [chlorpromazine], and Tylenol [acetaminophen]     Temp max:  Temp (24hrs), Av.1 °F (36.7 °C), Min:97.1 °F (36.2 °C), Max:98.7 °F (37.1 °C)      Recent Labs     24  0326   WBC 10.9       Recent Labs     24  0326   BUN 12   CREATININE 0.7*         Intake/Output Summary (Last 24 hours) at 2024 1551  Last data filed at 2024 0931  Gross per 24 hour   Intake 400.75 ml   Output --   Net 400.75 ml       Culture Results:  pending    Ht Readings from Last 1 Encounters:   24 1.778 m (5' 10\")        Wt Readings from Last 1 Encounters:   24 127.6 kg (281 lb 3.2 oz)         Estimated Creatinine Clearance: 139 mL/min (A) (based on SCr of 0.7 mg/dL (L)).    Assessment/Plan:  Day # 1 of vancomycin.  Vancomycin 1500 mg IV every 12 hours.    Goal -600  Predicted AUC 24hr-SS - 547      Thank you for the consult.

## 2024-11-02 NOTE — ED NOTES
Pt with anxiety and pulling off mask. MD aware and medication ordered per MAR.pt calm at this time

## 2024-11-02 NOTE — PROGRESS NOTES
4 Eyes Admission Assessment     I agree as the admission nurse that 2 RN's have performed a thorough Head to Toe Skin Assessment on the patient. ALL assessment sites listed below have been assessed on admission.       Areas assessed by both nurses:   [x]   Head, Face, and Ears   [x]   Shoulders, Back, and Chest  [x]   Arms, Elbows, and Hands   [x]   Coccyx, Sacrum, and Ischium  [x]   Legs, Feet, and Heels        Does the Patient have Skin Breakdown?  No         Bhanu Prevention initiated:  Yes   Wound Care Orders initiated:  NA      [facility] nurse consulted for Pressure Injury (Stage 3,4, Unstageable, DTI, NWPT, and Complex wounds) or Bhanu score 18 or lower:  No      Nurse 1 eSignature: Electronically signed by RENATA MCCABE RN on 11/2/24 at 6:07 PM EDT    **SHARE this note so that the co-signing nurse is able to place an eSignature**    Nurse 2 eSignature: Electronically signed by Diana Valera RN on 11/2/24 at 6:31 PM EDT

## 2024-11-02 NOTE — PROGRESS NOTES
NAME:  Armando Lewis  YOB: 1960  MEDICAL RECORD NUMBER:  [id number]    Shift Summary: Patient to CVICU from PCU for increased lethargy, placed on bipap, IV lasix, ABG improving    Family updated: No    Rhythm: Normal Sinus Rhythm     Most recent vitals:   Visit Vitals  /76   Pulse 72   Temp 97.3 °F (36.3 °C) (Bladder)   Resp 18   Wt 125.2 kg (276 lb 0.3 oz)   SpO2 96%   BMI 39.60 kg/m²           No data found.    No data found.      Respiratory support needed (if any):  - BiPAP   IPAP: 24 cmH20, CPAP/EPAP: 8 cmH2O continuous    Admission weight Weight - Scale: 127.6 kg (281 lb 3.2 oz) (11/02/24 0351)    Today's weight    Wt Readings from Last 1 Encounters:   11/02/24 125.2 kg (276 lb 0.3 oz)        Quiroz need assessed each shift: YES -  - continue quiroz r/t - Fluid monitor  UOP >30ml/hr: YES  Last documented BM (in last 48 hrs):  No data found.             Restraints (in use currently or dc'd in last 12 hrs): No    Order current and documentation up to date? No    Lines/Drains reviewed @ bedside.  Peripheral IV 11/02/24 Right Antecubital (Active)   Number of days: 0       Peripheral IV Posterior;Right Hand (Active)   Number of days:        Urinary Catheter 11/02/24 (Active)   Number of days: 0         Drip rates at handoff:    sodium chloride         Lab Data:   CBC:   Recent Labs     11/02/24  0326   WBC 10.9   HGB 15.2   HCT 47.0   MCV 88.0        BMP:    Recent Labs     11/02/24  0326      K 4.4   CO2 34*   BUN 12   CREATININE 0.7*     LIVR:   Recent Labs     11/02/24  0326   AST 23   ALT 15     PT/INR: No results for input(s): \"INR\" in the last 72 hours.    Invalid input(s): \"PROT\"  APTT: No results for input(s): \"APTT\" in the last 72 hours.  ABG:   Recent Labs     11/02/24  1700   PHART 7.317*   [id number] 74.5*   PO2ART 58.1*       Any consults during the shift? Yes: Consults received: : Pulm    Any signed and held orders to be released?  No        4 Eyes Skin Assessment       The

## 2024-11-02 NOTE — PROGRESS NOTES
Medication Reconciliation    List of medications patient is currently taking is complete.     Source of information: 1. [facility] med list                                      2. EPIC records        Notes regarding home medications:   1. Removed Tylenol, buspirone, fluoxetine, and melatonin  2. Adjusted risperidone to 2mg TID  3. Added albuterol nebs, Mylanta, folic acid, monthly Haldol inj, Lasix, milk of mag, and spironolactone      Renaldo Soto, [facility]   11/2/2024  12:07 PM

## 2024-11-02 NOTE — PROGRESS NOTES
Physical Therapy      Armando Lewis  [id number]  [id number]    PT orders received and chart reviewed.  Pt is a LTC pt at [facility].  Call placed to facility and verified pt is a armin lift for transfers and is propelled in w/c by staff.  Pt appears to be at his baseline and not appropriate for Skilled therapy.  Will sign off from PT at this time  Electronically signed by JIMY AGRAWAL PT on 11/2/2024 at 2:54 PM

## 2024-11-02 NOTE — H&P
shows the patient was becoming short of breath since last few days and having cough which is getting worse.      Review of Systems:        Pertinent positives and negatives discussed in HPI     Objective:     Intake/Output Summary (Last 24 hours) at 11/2/2024 1511  Last data filed at 11/2/2024 0931  Gross per 24 hour   Intake 400.75 ml   Output --   Net 400.75 ml      Vitals:   Vitals:    11/02/24 1000 11/02/24 1230 11/02/24 1232 11/02/24 1241   BP: 118/74   132/75   Pulse: 80 77 79 75   Resp: 16 16 12 16   Temp: 97.1 °F (36.2 °C)   97.5 °F (36.4 °C)   TempSrc: Axillary   Axillary   SpO2: 92% 95% 95% 95%   Weight:           Personally Reviewed Medications Prior to Admission     Prior to Admission medications    Medication Sig Start Date End Date Taking? Authorizing Provider   albuterol sulfate HFA (VENTOLIN HFA) 108 (90 Base) MCG/ACT inhaler Inhale 2 puffs into the lungs every 6 hours as needed for Wheezing or Shortness of Breath    Baldemar Quezada MD   aluminum & magnesium hydroxide-simethicone (MAALOX) 200-200-20 MG/5ML SUSP suspension Take 5 mLs by mouth every 4 hours as needed for Indigestion    Baldemar Quezada MD   brimonidine (ALPHAGAN P) 0.1 % SOLN Place 1 drop into both eyes 2 times daily    Baldemar Quezada MD   EPINEPHrine PF 1 MG/ML injection Inject 1 mL into the muscle daily as needed (Severe allergic reaction)    Baldemar Quezada MD   folic acid (FOLVITE) 1 MG tablet Take 1 tablet by mouth daily    Baldemar Quezada MD   haloperidol decanoate (HALDOL DECANOATE) 50 MG/ML injection Inject 1 mL into the muscle every 28 days    Baldemar Quezada MD   furosemide (LASIX) 20 MG tablet Take 2 tablets by mouth daily    Baldemar Quezada MD   magnesium hydroxide (MILK OF MAGNESIA) 400 MG/5ML suspension Take 30 mLs by mouth daily as needed for Constipation    Baldemar Quezada MD   spironolactone (ALDACTONE) 25 MG tablet Take 1 tablet by mouth daily    Baldemar Quezada MD  Results   Component Value Date/Time    ORG Respiratory syncytial virus by PCR 12/02/2022 11:45 AM       Imaging/Diagnostics Last 24 Hours   XR CHEST PORTABLE    Result Date: 11/2/2024  EXAMINATION: ONE XRAY VIEW OF THE CHEST 11/2/2024 3:28 am COMPARISON: 04/07/2024. HISTORY: ORDERING SYSTEM PROVIDED HISTORY: sob TECHNOLOGIST PROVIDED HISTORY: Reason for exam:->sob Reason for Exam: sob FINDINGS: The cardiac silhouette is enlarged.  Mediastinal contours are normal. There are new infiltrates in the left lung, concerning for pneumonia.  The right lung is clear. Old left-sided rib fractures are noted. No pneumothorax.     New infiltrates in the left lung, concerning for pneumonia.         Electronically signed by Edi Christiansen MD on 11/2/2024 at 3:11 PM

## 2024-11-02 NOTE — ED PROVIDER NOTES
98.7 °F (37.1 °C)   TempSrc:  Axillary     SpO2: 95% 95% 95% 93%   Weight:           Patient was given the following medications:  Medications   ipratropium 0.5 mg-albuterol 2.5 mg (DUONEB) nebulizer solution 2 Dose (2 Doses Inhalation Given 11/2/24 0539)   cefepime (MAXIPIME) 1,000 mg in sodium chloride 0.9 % 50 mL IVPB (mini-bag) (1,000 mg IntraVENous New Bag 11/2/24 0543)   vancomycin (VANCOCIN) 2000 mg in 400 mL IVPB (has no administration in time range)   methylPREDNISolone sodium succ (SOLU-MEDROL) injection 60 mg (60 mg IntraVENous Given 11/2/24 2481)   magnesium sulfate 2000 mg in 50 mL IVPB premix (0 mg IntraVENous Stopped 11/2/24 0527)             Is this patient to be included in the SEP-1 Core Measure due to severe sepsis or septic shock?   No   Exclusion criteria - the patient is NOT to be included for SEP-1 Core Measure due to:  May have criteria for sepsis, but does not meet criteria for severe sepsis or septic shock    CC/HPI Summary, DDx, ED Course, and Reassessment: Differential Diagnosis: Acute Coronary Syndrome, Congestive Heart Failure, Myocardial Infarction, Pulmonary Embolus, Pneumonia, Pneumothorax, other    64-year-old male presents ED for evaluation of shortness of breath.  On presentation patient appears to be in moderate respiratory distress and is hypoxic.  No report of previous oxygen requirement.     Patient initially placed on supplemental oxygen and BiPAP with improvement of oxygenation and respiratory effort he.  Become more awake and alert.     VBG does demonstrate hypercapnia with a respiratory acidosis.  BNP is slightly elevated.  Lactate within normal limits.     Patient's chest x-ray appears to show pneumonia patient was started on broad-spectrum antibiotics for treatment of sepsis.  Patient will be admitted for further medical management evaluation.     CONSULTS: (Who and What was discussed)  PHARMACY TO DOSE VANCOMYCIN          Chronic Conditions:   Past Medical History:    Diagnosis Date    Antisocial personality disorder (HCC)     Blindness right eye category 5, normal vision left eye     Drug induced subacute dyskinesia     Insomnia     Major depressive disorder, recurrent, unspecified (HCC)     Melena     Schizoaffective disorder (HCC)     Substance abuse (HCC)          Records Reviewed (External and source): Reviewed patient's most recent admission for altered mental state from 4/7/2020 form.     Disposition Considerations (include 1 Tests not done, Admit vs D/C, Shared Decision Making, Pt Expectation of Test or Tx.): Patient has a medical edition required mission to the hospital for further medical management evaluation.       I am the Primary Clinician of Record.    FINAL IMPRESSION      1. Acute respiratory failure with hypoxia and hypercapnia    2. Pneumonia of left lower lobe due to infectious organism    3. Septicemia (HCC)          DISPOSITION/PLAN     DISPOSITION             PATIENT REFERRED TO:  No follow-up provider specified.    DISCHARGE MEDICATIONS:  New Prescriptions    No medications on file       DISCONTINUED MEDICATIONS:  Discontinued Medications    No medications on file              (Please note that portions of this note were completed with a voice recognition program.  Efforts were made to edit the dictations but occasionally words are mis-transcribed.)    Michael Rodriguez MD (electronically signed)            Michael Rodriguez MD  11/03/24 5183

## 2024-11-02 NOTE — PROGRESS NOTES
----- Message from Yanet Auguste sent at 3/27/2019  2:04 PM CDT -----  Contact: Mom [phone]  Needs Advice    Reason for call: blood work        Communication Preference: [phone]    Additional Information:  Mom has an appt for the pt on 4/16 at 3:30 and wants to know if she can come in early that morning for blood work.   Pt admitted to unit @0955 in room 5268. Vital signs temp: 97.1 BP: 118/74 O2: 92% on continuous bipapp HR: 80 Resp: 16. Pt states 0/10 for pain. Call light within reach, bedside tele set up. Pt denies any further needs at this time.

## 2024-11-02 NOTE — ED NOTES
Report called Roshni OBREGON on [address]. EDSBAR discussed. RN and RT to transport d/t continuous bipap.

## 2024-11-03 ENCOUNTER — APPOINTMENT (OUTPATIENT)
Dept: GENERAL RADIOLOGY | Age: 64
End: 2024-11-03
Payer: MEDICAID

## 2024-11-03 LAB
ANION GAP SERPL CALCULATED.3IONS-SCNC: 8 MMOL/L (ref 3–16)
BASE EXCESS BLDV CALC-SCNC: 10.7 MMOL/L
BASE EXCESS BLDV CALC-SCNC: 9.8 MMOL/L
BASOPHILS # BLD: 0 K/UL (ref 0–0.2)
BASOPHILS NFR BLD: 0 %
BUN SERPL-MCNC: 21 MG/DL (ref 7–20)
CALCIUM SERPL-MCNC: 9.3 MG/DL (ref 8.3–10.6)
CHLORIDE SERPL-SCNC: 99 MMOL/L (ref 99–110)
CO2 BLDV-SCNC: 40 MMOL/L
CO2 BLDV-SCNC: 42 MMOL/L
CO2 SERPL-SCNC: 34 MMOL/L (ref 21–32)
COHGB MFR BLDV: 1.4 %
COHGB MFR BLDV: 1.5 %
CREAT SERPL-MCNC: 0.7 MG/DL (ref 0.8–1.3)
DEPRECATED RDW RBC AUTO: 14.9 % (ref 12.4–15.4)
EOSINOPHIL # BLD: 0 K/UL (ref 0–0.6)
EOSINOPHIL NFR BLD: 0 %
GFR SERPLBLD CREATININE-BSD FMLA CKD-EPI: >90 ML/MIN/{1.73_M2}
GLUCOSE SERPL-MCNC: 93 MG/DL (ref 70–99)
HCO3 BLDV-SCNC: 38 MMOL/L (ref 23–29)
HCO3 BLDV-SCNC: 40 MMOL/L (ref 23–29)
HCT VFR BLD AUTO: 43.2 % (ref 40.5–52.5)
HGB BLD-MCNC: 13.8 G/DL (ref 13.5–17.5)
LYMPHOCYTES # BLD: 1.2 K/UL (ref 1–5.1)
LYMPHOCYTES NFR BLD: 7 %
MCH RBC QN AUTO: 28.5 PG (ref 26–34)
MCHC RBC AUTO-ENTMCNC: 32 G/DL (ref 31–36)
MCV RBC AUTO: 89.1 FL (ref 80–100)
METHGB MFR BLDV: 0.5 %
METHGB MFR BLDV: 0.9 %
MONOCYTES # BLD: 1.5 K/UL (ref 0–1.3)
MONOCYTES NFR BLD: 11 %
MRSA DNA SPEC QL NAA+PROBE: NORMAL
NEUTROPHILS # BLD: 10.7 K/UL (ref 1.7–7.7)
NEUTROPHILS NFR BLD: 79 %
NEUTS BAND NFR BLD MANUAL: 1 % (ref 0–7)
O2 THERAPY: ABNORMAL
O2 THERAPY: ABNORMAL
PCO2 BLDV: 63.2 MMHG (ref 40–50)
PCO2 BLDV: 72 MMHG (ref 40–50)
PH BLDV: 7.35 [PH] (ref 7.35–7.45)
PH BLDV: 7.38 [PH] (ref 7.35–7.45)
PLATELET # BLD AUTO: 192 K/UL (ref 135–450)
PLATELET BLD QL SMEAR: ADEQUATE
PMV BLD AUTO: 9.6 FL (ref 5–10.5)
PO2 BLDV: 51 MMHG
PO2 BLDV: 52 MMHG
POTASSIUM SERPL-SCNC: 4.2 MMOL/L (ref 3.5–5.1)
RBC # BLD AUTO: 4.85 M/UL (ref 4.2–5.9)
SAO2 % BLDV: 85 %
SAO2 % BLDV: 86 %
SLIDE REVIEW: ABNORMAL
SODIUM SERPL-SCNC: 141 MMOL/L (ref 136–145)
VARIANT LYMPHS NFR BLD MANUAL: 2 % (ref 0–6)
WBC # BLD AUTO: 13.4 K/UL (ref 4–11)

## 2024-11-03 PROCEDURE — 6360000002 HC RX W HCPCS: Performed by: INTERNAL MEDICINE

## 2024-11-03 PROCEDURE — 2580000003 HC RX 258: Performed by: INTERNAL MEDICINE

## 2024-11-03 PROCEDURE — 94761 N-INVAS EAR/PLS OXIMETRY MLT: CPT

## 2024-11-03 PROCEDURE — 5A0945A ASSISTANCE WITH RESPIRATORY VENTILATION, 24-96 CONSECUTIVE HOURS, HIGH NASAL FLOW/VELOCITY: ICD-10-PCS | Performed by: HOSPITALIST

## 2024-11-03 PROCEDURE — 85025 COMPLETE CBC W/AUTO DIFF WBC: CPT

## 2024-11-03 PROCEDURE — 2100000000 HC CCU R&B

## 2024-11-03 PROCEDURE — 94640 AIRWAY INHALATION TREATMENT: CPT

## 2024-11-03 PROCEDURE — 94660 CPAP INITIATION&MGMT: CPT

## 2024-11-03 PROCEDURE — 99291 CRITICAL CARE FIRST HOUR: CPT | Performed by: INTERNAL MEDICINE

## 2024-11-03 PROCEDURE — 82803 BLOOD GASES ANY COMBINATION: CPT

## 2024-11-03 PROCEDURE — 6370000000 HC RX 637 (ALT 250 FOR IP): Performed by: INTERNAL MEDICINE

## 2024-11-03 PROCEDURE — 80048 BASIC METABOLIC PNL TOTAL CA: CPT

## 2024-11-03 PROCEDURE — 36415 COLL VENOUS BLD VENIPUNCTURE: CPT

## 2024-11-03 PROCEDURE — 71045 X-RAY EXAM CHEST 1 VIEW: CPT

## 2024-11-03 PROCEDURE — 2700000000 HC OXYGEN THERAPY PER DAY

## 2024-11-03 RX ORDER — FUROSEMIDE 10 MG/ML
40 INJECTION INTRAMUSCULAR; INTRAVENOUS DAILY
Status: DISCONTINUED | OUTPATIENT
Start: 2024-11-04 | End: 2024-11-05

## 2024-11-03 RX ORDER — SODIUM CHLORIDE FOR INHALATION 3 %
4 VIAL, NEBULIZER (ML) INHALATION PRN
Status: DISCONTINUED | OUTPATIENT
Start: 2024-11-03 | End: 2024-11-08 | Stop reason: HOSPADM

## 2024-11-03 RX ORDER — ALBUTEROL SULFATE 0.83 MG/ML
2.5 SOLUTION RESPIRATORY (INHALATION)
Status: DISCONTINUED | OUTPATIENT
Start: 2024-11-03 | End: 2024-11-08 | Stop reason: HOSPADM

## 2024-11-03 RX ORDER — SODIUM CHLORIDE FOR INHALATION 3 %
4 VIAL, NEBULIZER (ML) INHALATION PRN
Status: DISCONTINUED | OUTPATIENT
Start: 2024-11-03 | End: 2024-11-07

## 2024-11-03 RX ADMIN — ALBUTEROL SULFATE 2.5 MG: 2.5 SOLUTION RESPIRATORY (INHALATION) at 19:25

## 2024-11-03 RX ADMIN — DORZOLAMIDE HYDROCHLORIDE 1 DROP: 20 SOLUTION OPHTHALMIC at 20:20

## 2024-11-03 RX ADMIN — CEFEPIME 2000 MG: 2 INJECTION, POWDER, FOR SOLUTION INTRAVENOUS at 15:52

## 2024-11-03 RX ADMIN — SODIUM CHLORIDE, PRESERVATIVE FREE 10 ML: 5 INJECTION INTRAVENOUS at 20:22

## 2024-11-03 RX ADMIN — LACTULOSE 10 G: 10 SOLUTION ORAL at 08:25

## 2024-11-03 RX ADMIN — FUROSEMIDE 40 MG: 10 INJECTION, SOLUTION INTRAMUSCULAR; INTRAVENOUS at 08:23

## 2024-11-03 RX ADMIN — SPIRONOLACTONE 25 MG: 25 TABLET ORAL at 08:23

## 2024-11-03 RX ADMIN — FERROUS SULFATE TAB 325 MG (65 MG ELEMENTAL FE) 325 MG: 325 (65 FE) TAB at 08:23

## 2024-11-03 RX ADMIN — ENOXAPARIN SODIUM 30 MG: 100 INJECTION SUBCUTANEOUS at 20:20

## 2024-11-03 RX ADMIN — RISPERIDONE 2 MG: 1 TABLET, FILM COATED ORAL at 08:23

## 2024-11-03 RX ADMIN — BRIMONIDINE TARTRATE 1 DROP: 2 SOLUTION/ DROPS OPHTHALMIC at 08:35

## 2024-11-03 RX ADMIN — CLOPIDOGREL BISULFATE 75 MG: 75 TABLET ORAL at 08:23

## 2024-11-03 RX ADMIN — ALBUTEROL SULFATE 2.5 MG: 2.5 SOLUTION RESPIRATORY (INHALATION) at 16:26

## 2024-11-03 RX ADMIN — SODIUM CHLORIDE, PRESERVATIVE FREE 10 ML: 5 INJECTION INTRAVENOUS at 08:24

## 2024-11-03 RX ADMIN — BRIMONIDINE TARTRATE 1 DROP: 2 SOLUTION/ DROPS OPHTHALMIC at 20:20

## 2024-11-03 RX ADMIN — CYANOCOBALAMIN TAB 1000 MCG 1000 MCG: 1000 TAB at 08:23

## 2024-11-03 RX ADMIN — LATANOPROST 1 DROP: 50 SOLUTION OPHTHALMIC at 20:20

## 2024-11-03 RX ADMIN — SODIUM CHLORIDE SOLN NEBU 3% 4 ML: 3 NEBU SOLN at 16:27

## 2024-11-03 RX ADMIN — ENOXAPARIN SODIUM 30 MG: 100 INJECTION SUBCUTANEOUS at 08:24

## 2024-11-03 RX ADMIN — CEFEPIME 2000 MG: 2 INJECTION, POWDER, FOR SOLUTION INTRAVENOUS at 08:24

## 2024-11-03 RX ADMIN — ALBUTEROL SULFATE 2.5 MG: 2.5 SOLUTION RESPIRATORY (INHALATION) at 12:20

## 2024-11-03 RX ADMIN — SENNOSIDES 17.2 MG: 8.6 TABLET, FILM COATED ORAL at 08:23

## 2024-11-03 RX ADMIN — CEFEPIME 2000 MG: 2 INJECTION, POWDER, FOR SOLUTION INTRAVENOUS at 23:31

## 2024-11-03 RX ADMIN — DORZOLAMIDE HYDROCHLORIDE 1 DROP: 20 SOLUTION OPHTHALMIC at 08:35

## 2024-11-03 RX ADMIN — VANCOMYCIN HYDROCHLORIDE 1500 MG: 1.5 INJECTION, POWDER, LYOPHILIZED, FOR SOLUTION INTRAVENOUS at 06:33

## 2024-11-03 RX ADMIN — POLYETHYLENE GLYCOL 3350 17 G: 17 POWDER, FOR SOLUTION ORAL at 08:24

## 2024-11-03 NOTE — PROGRESS NOTES
Name:  Armando Lewis /Age/Sex: 1960  (64 y.o. male)   MRN & CSN:  [id number] & [id number] Encounter Date/Time: 11/3/2024 11:36 AM EST   Location:  [place] [address] PCP: No primary care provider on file.     Attending:Edi Christiansen MD       Armando Lewis is a 64 y.o. male with  has a past medical history of Antisocial personality disorder (HCC), Blindness right eye category 5, normal vision left eye, Drug induced subacute dyskinesia, Insomnia, Major depressive disorder, recurrent, unspecified (HCC), Melena, Schizoaffective disorder (HCC), and Substance abuse (HCC). who presents with Pneumonia of left lower lobe due to infectious organism    Hospital Day: 2      Interval history:     Seen and examined at bedside. Overnight became combative and had violent behavior towards nursing. Patient with unusual behavior due to his psychiatric history. Not answering any questions    Review of Systems:      10 point ROS negative except stated above    Objective:     Intake/Output Summary (Last 24 hours) at 11/3/2024 1136  Last data filed at 11/3/2024 0948  Gross per 24 hour   Intake 1133.19 ml   Output 2805 ml   Net -1671.81 ml      Vitals:   Vitals:    24 0815 24 0900 24 1000 24 1100   BP:  124/79 115/72 (!) 91/30   Pulse: 67 67 73 69   Resp: 18 21 25 21   Temp:       TempSrc:       SpO2: 96% 94% (!) 86% (!) 88%   Weight:       Height:             Physical Exam:        General: Mild distress  Eyes: EOMI  ENT: neck supple  Cardiovascular: Regular rate.  Respiratory: Decreased breath sounds  Gastrointestinal: Soft, non tender  Genitourinary: no suprapubic tenderness  Musculoskeletal: No edema  Neuro: Uncooperative, lethargic  Psych: Flat affect      Assessment and Plan     Pneumonia :  As seen on imaging. Started on empiric IV antibiotics with vancomycin and cefepime. Sputum culture, pneumonia panel and procal negative so far. VBG showing CO2 retention so was started on BiPaP for now weaned down to .  consolidation throughout the left hemithorax with amputation of the left mainstem bronchus.  A small focus of residual aerated lung is present within the apex.  The right lung is clear. Heart size and vascularity are stable.     Worsening left-sided atelectasis versus pneumonia with enlarging pleural effusion.     XR CHEST PORTABLE    Result Date: 11/2/2024  EXAMINATION: ONE XRAY VIEW OF THE CHEST 11/2/2024 3:28 am COMPARISON: 04/07/2024. HISTORY: ORDERING SYSTEM PROVIDED HISTORY: sob TECHNOLOGIST PROVIDED HISTORY: Reason for exam:->sob Reason for Exam: sob FINDINGS: The cardiac silhouette is enlarged.  Mediastinal contours are normal. There are new infiltrates in the left lung, concerning for pneumonia.  The right lung is clear. Old left-sided rib fractures are noted. No pneumothorax.     New infiltrates in the left lung, concerning for pneumonia.       CBC:   Recent Labs     11/02/24 0326 11/03/24  0715   WBC 10.9 13.4*   HGB 15.2 13.8    192     BMP:    Recent Labs     11/02/24  0326 11/03/24  0715    141   K 4.4 4.2   CL 97* 99   CO2 34* 34*   BUN 12 21*   CREATININE 0.7* 0.7*   GLUCOSE 83 93     Hepatic:   Recent Labs     11/02/24 0326   AST 23   ALT 15   BILITOT 0.5   ALKPHOS 108     Lipids: No results found for: \"CHOL\", \"HDL\", \"TRIG\"  Hemoglobin A1C:   Lab Results   Component Value Date/Time    LABA1C 6.3 07/16/2022 04:21 AM     TSH: No results found for: \"TSH\"  Troponin: No results found for: \"TROPONINT\"  Lactic Acid: No results for input(s): \"LACTA\" in the last 72 hours.  BNP:   Recent Labs     11/02/24 0326   PROBNP 186*     UA:  Lab Results   Component Value Date/Time    NITRU Negative 11/02/2024 03:40 PM    COLORU Yellow 11/02/2024 03:40 PM    PHUR 5.0 11/02/2024 03:40 PM    PHUR 5.0 04/08/2024 06:00 AM    WBCUA 1 07/04/2023 10:49 AM    RBCUA 2 07/04/2023 10:49 AM    BACTERIA 4+ 07/04/2023 10:49 AM    CLARITYU Clear 11/02/2024 03:40 PM    LEUKOCYTESUR Negative 11/02/2024 03:40 PM

## 2024-11-03 NOTE — PLAN OF CARE
Problem: Safety - Adult  Goal: Free from fall injury  11/3/2024 0851 by Ilda Wilkerson, RN  Outcome: Progressing     Problem: Pain  Goal: Verbalizes/displays adequate comfort level or baseline comfort level  11/3/2024 0851 by Ilda Wilkerson, RN  Outcome: Progressing

## 2024-11-03 NOTE — PLAN OF CARE
Problem: Safety - Adult  Goal: Free from fall injury  Outcome: Progressing  Flowsheets (Taken 11/2/2024 2212)  Free From Fall Injury: Instruct family/caregiver on patient safety     Problem: Discharge Planning  Goal: Discharge to home or other facility with appropriate resources  Outcome: Progressing  Flowsheets (Taken 11/2/2024 2000)  Discharge to home or other facility with appropriate resources:   Identify barriers to discharge with patient and caregiver   Arrange for needed discharge resources and transportation as appropriate   Refer to discharge planning if patient needs post-hospital services based on physician order or complex needs related to functional status, cognitive ability or social support system     Problem: Pain  Goal: Verbalizes/displays adequate comfort level or baseline comfort level  Outcome: Progressing  Flowsheets (Taken 11/2/2024 2000)  Verbalizes/displays adequate comfort level or baseline comfort level:   Encourage patient to monitor pain and request assistance   Assess pain using appropriate pain scale   Administer analgesics based on type and severity of pain and evaluate response   Implement non-pharmacological measures as appropriate and evaluate response   Consider cultural and social influences on pain and pain management   Notify Licensed Independent Practitioner if interventions unsuccessful or patient reports new pain     Problem: Skin/Tissue Integrity  Goal: Absence of new skin breakdown  Description: 1.  Monitor for areas of redness and/or skin breakdown  2.  Assess vascular access sites hourly  3.  Every 4-6 hours minimum:  Change oxygen saturation probe site  4.  Every 4-6 hours:  If on nasal continuous positive airway pressure, respiratory therapy assess nares and determine need for appliance change or resting period.  Outcome: Progressing     Problem: ABCDS Injury Assessment  Goal: Absence of physical injury  Outcome: Progressing  Flowsheets (Taken 11/3/2024 0134)  Absence  of Physical Injury: Implement safety measures based on patient assessment

## 2024-11-03 NOTE — PROGRESS NOTES
NAME:  Armando Lewis  YOB: 1960  MEDICAL RECORD NUMBER:  [id number]    Shift Summary: Waking up more as shift progressed. Attempting to hit at times. Incoherent with speech. Appears to be a frequent problem based on chart review with multiple admissions for AMS, unintelligible speech, aggressive behavior, etc. Placed on HFNC    Family updated: No    Rhythm: Normal Sinus Rhythm     Most recent vitals:   Visit Vitals  /68   Pulse 65   Temp 98.2 °F (36.8 °C) (Bladder)   Resp 16   Ht 1.778 m (5' 10\")   Wt 123.9 kg (273 lb 2.4 oz)   SpO2 93%   BMI 39.19 kg/m²           No data found.    No data found.      Respiratory support needed (if any):  - BiPAP   IPAP: 20 cmH20, CPAP/EPAP: 6 cmH2O continuous  10L HFNC    Admission weight Weight - Scale: 127.6 kg (281 lb 3.2 oz) (11/02/24 0351)    Today's weight    Wt Readings from Last 1 Encounters:   11/03/24 123.9 kg (273 lb 2.4 oz)        Quiroz need assessed each shift: YES -  - continue quiroz r/t - Hemodynamic monitoring  UOP >30ml/hr: YES  Last documented BM (in last 48 hrs):  No data found.             Restraints (in use currently or dc'd in last 12 hrs): No    Order current and documentation up to date? No    Lines/Drains reviewed @ bedside.  Peripheral IV 11/02/24 Right Antecubital (Active)   Number of days: 1       Peripheral IV Posterior;Right Hand (Active)   Number of days:        Urinary Catheter 11/02/24 (Active)   Number of days: 0         Drip rates at handoff:    sodium chloride Stopped (11/03/24 0306)       Lab Data:   CBC:   Recent Labs     11/02/24 0326   WBC 10.9   HGB 15.2   HCT 47.0   MCV 88.0        BMP:    Recent Labs     11/02/24 0326      K 4.4   CO2 34*   BUN 12   CREATININE 0.7*     LIVR:   Recent Labs     11/02/24 0326   AST 23   ALT 15     PT/INR: No results for input(s): \"INR\" in the last 72 hours.    Invalid input(s): \"PROT\"  APTT: No results for input(s): \"APTT\" in the last 72 hours.  ABG:   Recent Labs      11/02/24  1700   PHART 7.317*   RHF3CRS 74.5*   PO2ART 58.1*       Any consults during the shift? No    Any signed and held orders to be released?  No        4 Eyes Skin Assessment       The patient is being assessed for  Shift Handoff    I agree that at least one RN has performed a thorough Head to Toe Skin Assessment on the patient. ALL assessment sites listed below have been assessed.      Areas assessed by both nurses: Head, Face, Ears, Shoulders, Back, Chest, Arms, Elbows, Hands, Sacrum. Buttock, Coccyx, Ischium, and Legs. Feet and Heels        Does the Patient have a Wound? No noted wound(s)    Wound Care Orders initiated by RN: No       Bhanu Prevention initiated by RN: Yes    Pressure Injury (Stage 3,4, Unstageable, DTI, NWPT, and Complex wounds) if present, place Wound referral order by RN under : No    New Ostomies, if present place, Ostomy referral order under : No     Nurse 1 eSignature: Electronically signed by Etelvina Junior RN on 11/3/24 at 5:17 AM EST    **SHARE this note so that the co-signing nurse can place an eSignature**    Nurse 2 eSignature: Electronically signed by Ilda Wilkerson RN on 11/3/24 at 5:23 PM EST

## 2024-11-03 NOTE — PROGRESS NOTES
NAME:  Armando Leiws  YOB: 1960  MEDICAL RECORD NUMBER:  [id number]    Shift Summary: 11/3/2024    Family updated:     Rhythm: Normal Sinus Rhythm     Most recent vitals:   Visit Vitals  /60   Pulse 72   Temp 98.5 °F (36.9 °C) (Axillary)   Resp 16   Ht 1.778 m (5' 10\")   Wt 123.9 kg (273 lb 2.4 oz)   SpO2 93%   BMI 39.19 kg/m²           No data found.    No data found.      Respiratory support needed (if any):  - O2 - HFNC 15 lpm or - BiPAP   IPAP: 20 cmH20, CPAP/EPAP: 6 cmH2O continuous    Admission weight Weight - Scale: 127.6 kg (281 lb 3.2 oz) (11/02/24 0351)    Today's weight    Wt Readings from Last 1 Encounters:   11/03/24 123.9 kg (273 lb 2.4 oz)        Quiroz need assessed each shift: YES -  - continue quiroz r/t - accurate fluid volume management of critically ill.   UOP >30ml/hr: YES  Last documented BM (in last 48 hrs):  No data found.             Restraints (in use currently or dc'd in last 12 hrs): No    Order current and documentation up to date? No    Lines/Drains reviewed @ bedside.  Peripheral IV 11/02/24 Right Antecubital (Active)   Number of days: 1       Peripheral IV Posterior;Right Hand (Active)   Number of days:        Urinary Catheter 11/02/24 (Active)   Number of days: 1         Drip rates at handoff:    sodium chloride 166.7 mL/hr at 11/03/24 0632       Lab Data:   CBC:   Recent Labs     11/02/24  0326 11/03/24  0715   WBC 10.9 13.4*   HGB 15.2 13.8   HCT 47.0 43.2   MCV 88.0 89.1    192     BMP:    Recent Labs     11/02/24  0326 11/03/24  0715    141   K 4.4 4.2   CO2 34* 34*   BUN 12 21*   CREATININE 0.7* 0.7*     LIVR:   Recent Labs     11/02/24  0326   AST 23   ALT 15     PT/INR: No results for input(s): \"INR\" in the last 72 hours.    Invalid input(s): \"PROT\"  APTT: No results for input(s): \"APTT\" in the last 72 hours.  ABG:   Recent Labs     11/02/24  1700   PHART 7.317*   GGY1MDK 74.5*   PO2ART 58.1*       Any consults during the shift? No    Any signed

## 2024-11-03 NOTE — PROGRESS NOTES
Pt was on 11 liters o2 today. He began to require 15 liters at 1300, and bipap at 1400. RT notified. Pt currently 90% on bipap, for the moment he is tolerating bipap.

## 2024-11-03 NOTE — SIGNIFICANT EVENT
Chest x-ray reviewed significant airspace disease/effusion on the left worse compared to prior  Bedside ultrasound with no significant effusion in recumbent position patient cannot sit up for me  We will use nebulized 3% and albuterol as well as Acapella to mobilize secretions  Repeat chest x-ray in the morning if no improvement might consider bronchoscopy      Izzy Varela M.D.  Pulmonary Critical Care Department

## 2024-11-03 NOTE — CONSULTS
Pulmonary Consult Note     Patient's name:  Armando Lewis  Medical Record Number: [id number]  Patient's account/billing number: [id number]  Patient's YOB: 1960  Age: 64 y.o.  Date of Admission: 11/2/2024  3:04 AM  Date of Consult: 11/3/2024      Primary Care Physician: No primary care provider on file.      Code Status: Full Code    Reason for consult: Acute hypoxic and hypercapnic respiratory failure    Assessment and Plan     Acute on chronic hypoxic and hypercapnic respiratory failure  Multifocal gram-negative pneumonia with mucous plugging        Plan:  Cefepime  Repeat chest x-ray, consider CT chest to further evaluate  BiPAP intermittently during the day and continuous at night  Albuterol and 3% nebulized  Aspiration precautions  Check 2D echocardiogram  Gentle diuresis  Due to the immediate potential for life-threatening deterioration due to above , I spent 33 minutes providing critical care.  This time is excluding time spent performing procedures.  This note was transcribed using Dragon Dictation software. Please disregard any translational errors.        HISTORY OF PRESENT ILLNESS:   Mr./Ms. Armando Lewis is a 64 y.o. gentleman with past medical history stated below significant for schizoaffective disorder, major depressive disorder, was transferred to the ICU from the floor yesterday for hypoxic and hypercapnic respiratory failure.  History obtained from chart patient is poor historian.  Chest x-ray with multifocal left lung airspace disease with mucous plugging.            Past Medical History:        Diagnosis Date    Antisocial personality disorder (HCC)     Blindness right eye category 5, normal vision left eye     Drug induced subacute dyskinesia     Insomnia     Major depressive disorder, recurrent, unspecified (HCC)     Melena     Schizoaffective disorder (HCC)     Substance abuse (HCC)        Past Surgical History:  No past surgical history  on file.    Allergies:    Allergies   Allergen Reactions    Codeine     Thorazine [Chlorpromazine]     Tylenol [Acetaminophen]          Home Meds:   Prior to Admission medications    Medication Sig Start Date End Date Taking? Authorizing Provider   albuterol sulfate HFA (VENTOLIN HFA) 108 (90 Base) MCG/ACT inhaler Inhale 2 puffs into the lungs every 6 hours as needed for Wheezing or Shortness of Breath    Baldemar Quezada MD   aluminum & magnesium hydroxide-simethicone (MAALOX) 200-200-20 MG/5ML SUSP suspension Take 5 mLs by mouth every 4 hours as needed for Indigestion    Baldemar Quezada MD   brimonidine (ALPHAGAN P) 0.1 % SOLN Place 1 drop into both eyes 2 times daily    Baldemar Quezada MD   EPINEPHrine PF 1 MG/ML injection Inject 1 mL into the muscle daily as needed (Severe allergic reaction)    Baldemar Quezada MD   folic acid (FOLVITE) 1 MG tablet Take 1 tablet by mouth daily    Baldemar Quezada MD   haloperidol decanoate (HALDOL DECANOATE) 50 MG/ML injection Inject 1 mL into the muscle every 28 days    Baldemar Quezada MD   furosemide (LASIX) 20 MG tablet Take 2 tablets by mouth daily    Baldemar Quezada MD   magnesium hydroxide (MILK OF MAGNESIA) 400 MG/5ML suspension Take 30 mLs by mouth daily as needed for Constipation    Baldemar Quezada MD   spironolactone (ALDACTONE) 25 MG tablet Take 1 tablet by mouth daily    Baldemar Quezada MD   povidone-iodine (BETADINE) 10 % external solution Apply topically 2 times daily Apply topically to left heel twice daily    Baldemar Quezada MD   dorzolamide (TRUSOPT) 2 % ophthalmic solution Place 1 drop into both eyes 2 times daily    Baldemar Quezada MD   lactulose (CHRONULAC) 10 GM/15ML solution Take 15 mLs by mouth 3 times daily    Baldemar Quezada MD   Multiple Vitamin TABS Take 1 tablet by mouth daily    Baldemar Quezada MD   polyethylene glycol (GLYCOLAX) 17 g packet Take 1 packet by mouth 2 times

## 2024-11-03 NOTE — PROGRESS NOTES
During morning medication administration, pt was chugging water at an unusual rate, then coughing after. Pt NPO at this time, speech to see. He will withdraw in the middle of medication administration and become uncooperative.

## 2024-11-03 NOTE — PROGRESS NOTES
Bipap alarming. Upon entry to room, pt thrashing head back and forth in attempt to take bipap off. Informed pt that we could trial off the bipap as long as we placed a cannula on. Pt began swinging arms around. Hit this RN on the chin. Pt able to follow commands at this time, but speaking in incoherent mumblings. Told pt that hitting is not ok and would end up with patient being restrained for safety if that behavior continued. Pt allowed this RN to place HFNC on without any further issue. O2 sat staying between 91-94% on 10L HFNC.

## 2024-11-04 ENCOUNTER — APPOINTMENT (OUTPATIENT)
Age: 64
End: 2024-11-04
Attending: INTERNAL MEDICINE
Payer: MEDICAID

## 2024-11-04 ENCOUNTER — APPOINTMENT (OUTPATIENT)
Dept: GENERAL RADIOLOGY | Age: 64
End: 2024-11-04
Payer: MEDICAID

## 2024-11-04 PROBLEM — J96.02 ACUTE RESPIRATORY FAILURE WITH HYPOXIA AND HYPERCAPNIA: Status: ACTIVE | Noted: 2024-11-04

## 2024-11-04 PROBLEM — J96.01 ACUTE RESPIRATORY FAILURE WITH HYPOXIA AND HYPERCAPNIA: Status: ACTIVE | Noted: 2024-11-04

## 2024-11-04 LAB
ANION GAP SERPL CALCULATED.3IONS-SCNC: 11 MMOL/L (ref 3–16)
BASE EXCESS BLDV CALC-SCNC: 9 MMOL/L
BASOPHILS # BLD: 0 K/UL (ref 0–0.2)
BASOPHILS NFR BLD: 0.4 %
BUN SERPL-MCNC: 21 MG/DL (ref 7–20)
CALCIUM SERPL-MCNC: 9.6 MG/DL (ref 8.3–10.6)
CHLORIDE SERPL-SCNC: 99 MMOL/L (ref 99–110)
CO2 BLDV-SCNC: 38 MMOL/L
CO2 SERPL-SCNC: 31 MMOL/L (ref 21–32)
COHGB MFR BLDV: 0.7 %
CREAT SERPL-MCNC: 0.6 MG/DL (ref 0.8–1.3)
DEPRECATED RDW RBC AUTO: 14.6 % (ref 12.4–15.4)
EOSINOPHIL # BLD: 0 K/UL (ref 0–0.6)
EOSINOPHIL NFR BLD: 0.5 %
GFR SERPLBLD CREATININE-BSD FMLA CKD-EPI: >90 ML/MIN/{1.73_M2}
GLUCOSE BLD-MCNC: 121 MG/DL (ref 70–99)
GLUCOSE BLD-MCNC: 84 MG/DL (ref 70–99)
GLUCOSE BLD-MCNC: 92 MG/DL (ref 70–99)
GLUCOSE SERPL-MCNC: 67 MG/DL (ref 70–99)
HCO3 BLDV-SCNC: 36 MMOL/L (ref 23–29)
HCT VFR BLD AUTO: 44.3 % (ref 40.5–52.5)
HGB BLD-MCNC: 14.3 G/DL (ref 13.5–17.5)
LYMPHOCYTES # BLD: 1 K/UL (ref 1–5.1)
LYMPHOCYTES NFR BLD: 9.1 %
MCH RBC QN AUTO: 28.6 PG (ref 26–34)
MCHC RBC AUTO-ENTMCNC: 32.4 G/DL (ref 31–36)
MCV RBC AUTO: 88.3 FL (ref 80–100)
METHGB MFR BLDV: 0.3 %
MONOCYTES # BLD: 1.3 K/UL (ref 0–1.3)
MONOCYTES NFR BLD: 12.2 %
NEUTROPHILS # BLD: 8.1 K/UL (ref 1.7–7.7)
NEUTROPHILS NFR BLD: 77.8 %
O2 THERAPY: ABNORMAL
PCO2 BLDV: 56.7 MMHG (ref 40–50)
PERFORMED ON: ABNORMAL
PERFORMED ON: NORMAL
PERFORMED ON: NORMAL
PH BLDV: 7.41 [PH] (ref 7.35–7.45)
PLATELET # BLD AUTO: 193 K/UL (ref 135–450)
PMV BLD AUTO: 9.5 FL (ref 5–10.5)
PO2 BLDV: 72 MMHG
POTASSIUM SERPL-SCNC: 4.1 MMOL/L (ref 3.5–5.1)
RBC # BLD AUTO: 5.02 M/UL (ref 4.2–5.9)
SAO2 % BLDV: 94 %
SODIUM SERPL-SCNC: 141 MMOL/L (ref 136–145)
WBC # BLD AUTO: 10.4 K/UL (ref 4–11)

## 2024-11-04 PROCEDURE — 2100000000 HC CCU R&B

## 2024-11-04 PROCEDURE — 80048 BASIC METABOLIC PNL TOTAL CA: CPT

## 2024-11-04 PROCEDURE — 94761 N-INVAS EAR/PLS OXIMETRY MLT: CPT

## 2024-11-04 PROCEDURE — 85025 COMPLETE CBC W/AUTO DIFF WBC: CPT

## 2024-11-04 PROCEDURE — 36415 COLL VENOUS BLD VENIPUNCTURE: CPT

## 2024-11-04 PROCEDURE — 2580000003 HC RX 258: Performed by: INTERNAL MEDICINE

## 2024-11-04 PROCEDURE — APPNB15 APP NON BILLABLE TIME 0-15 MINS: Performed by: NURSE PRACTITIONER

## 2024-11-04 PROCEDURE — 2580000003 HC RX 258: Performed by: NURSE PRACTITIONER

## 2024-11-04 PROCEDURE — 94640 AIRWAY INHALATION TREATMENT: CPT

## 2024-11-04 PROCEDURE — 2700000000 HC OXYGEN THERAPY PER DAY

## 2024-11-04 PROCEDURE — 6360000002 HC RX W HCPCS: Performed by: INTERNAL MEDICINE

## 2024-11-04 PROCEDURE — 82803 BLOOD GASES ANY COMBINATION: CPT

## 2024-11-04 PROCEDURE — 92610 EVALUATE SWALLOWING FUNCTION: CPT

## 2024-11-04 PROCEDURE — 94660 CPAP INITIATION&MGMT: CPT

## 2024-11-04 PROCEDURE — 99233 SBSQ HOSP IP/OBS HIGH 50: CPT | Performed by: INTERNAL MEDICINE

## 2024-11-04 PROCEDURE — 6370000000 HC RX 637 (ALT 250 FOR IP): Performed by: HOSPITALIST

## 2024-11-04 PROCEDURE — 71045 X-RAY EXAM CHEST 1 VIEW: CPT

## 2024-11-04 RX ORDER — NICOTINE 21 MG/24HR
1 PATCH, TRANSDERMAL 24 HOURS TRANSDERMAL DAILY
Status: DISCONTINUED | OUTPATIENT
Start: 2024-11-04 | End: 2024-11-08 | Stop reason: HOSPADM

## 2024-11-04 RX ORDER — DEXTROSE MONOHYDRATE 100 MG/ML
INJECTION, SOLUTION INTRAVENOUS CONTINUOUS PRN
Status: DISCONTINUED | OUTPATIENT
Start: 2024-11-04 | End: 2024-11-08 | Stop reason: HOSPADM

## 2024-11-04 RX ORDER — GLUCAGON 1 MG/ML
1 KIT INJECTION PRN
Status: DISCONTINUED | OUTPATIENT
Start: 2024-11-04 | End: 2024-11-08 | Stop reason: HOSPADM

## 2024-11-04 RX ORDER — SODIUM CHLORIDE FOR INHALATION 3 %
4 VIAL, NEBULIZER (ML) INHALATION 3 TIMES DAILY
Status: DISCONTINUED | OUTPATIENT
Start: 2024-11-04 | End: 2024-11-08 | Stop reason: HOSPADM

## 2024-11-04 RX ADMIN — CEFEPIME 2000 MG: 2 INJECTION, POWDER, FOR SOLUTION INTRAVENOUS at 16:15

## 2024-11-04 RX ADMIN — CEFEPIME 2000 MG: 2 INJECTION, POWDER, FOR SOLUTION INTRAVENOUS at 08:02

## 2024-11-04 RX ADMIN — BRIMONIDINE TARTRATE 1 DROP: 2 SOLUTION/ DROPS OPHTHALMIC at 08:05

## 2024-11-04 RX ADMIN — ENOXAPARIN SODIUM 30 MG: 100 INJECTION SUBCUTANEOUS at 22:31

## 2024-11-04 RX ADMIN — ALBUTEROL SULFATE 2.5 MG: 2.5 SOLUTION RESPIRATORY (INHALATION) at 11:11

## 2024-11-04 RX ADMIN — SODIUM CHLORIDE, PRESERVATIVE FREE 10 ML: 5 INJECTION INTRAVENOUS at 20:03

## 2024-11-04 RX ADMIN — DEXTROSE MONOHYDRATE 125 ML: 100 INJECTION, SOLUTION INTRAVENOUS at 05:49

## 2024-11-04 RX ADMIN — ENOXAPARIN SODIUM 30 MG: 100 INJECTION SUBCUTANEOUS at 08:03

## 2024-11-04 RX ADMIN — DORZOLAMIDE HYDROCHLORIDE 1 DROP: 20 SOLUTION OPHTHALMIC at 08:05

## 2024-11-04 RX ADMIN — SODIUM CHLORIDE, PRESERVATIVE FREE 10 ML: 5 INJECTION INTRAVENOUS at 08:04

## 2024-11-04 RX ADMIN — DORZOLAMIDE HYDROCHLORIDE 1 DROP: 20 SOLUTION OPHTHALMIC at 19:56

## 2024-11-04 RX ADMIN — FUROSEMIDE 40 MG: 10 INJECTION, SOLUTION INTRAMUSCULAR; INTRAVENOUS at 08:02

## 2024-11-04 RX ADMIN — LATANOPROST 1 DROP: 50 SOLUTION OPHTHALMIC at 19:56

## 2024-11-04 RX ADMIN — BRIMONIDINE TARTRATE 1 DROP: 2 SOLUTION/ DROPS OPHTHALMIC at 19:56

## 2024-11-04 RX ADMIN — SODIUM CHLORIDE SOLN NEBU 3% 4 ML: 3 NEBU SOLN at 11:11

## 2024-11-04 ASSESSMENT — PAIN SCALES - GENERAL: PAINLEVEL_OUTOF10: 0

## 2024-11-04 NOTE — DISCHARGE INSTR - COC
Continuity of Care Form    Patient Name: Armando Lewis   :  1960  MRN:  [id number]    Admit date:  2024  Discharge date:  2024    Code Status Order: Full Code   Advance Directives:   Advance Care Flowsheet Documentation             Admitting Physician:  Danya Pineda MD  PCP: No primary care provider on file.    Discharging Nurse: SABAS Ray  Discharging Hospital Unit/Room#: S9X-6457/1309-01  Discharging Unit Phone Number: [phone]    Emergency Contact:   Extended Emergency Contact Information  Primary Emergency Contact: Shirley Lewis  Home Phone: [phone]  Relation: Parent  Secondary Emergency Contact: Armando Lewis  Home Phone: [phone]  Relation: Parent    Past Surgical History:  No past surgical history on file.    Immunization History:   Immunization History   Administered Date(s) Administered    COVID-19, J&J, (age 18y+), IM, 0.5 mL 2021       Active Problems:  Patient Active Problem List   Diagnosis Code    Acute metabolic encephalopathy G93.41    Hypernatremia E87.0    E. coli UTI N39.0, B96.20    Sepsis (HCC) A41.9    Fever and chills R50.9    Neutrophilia D72.828    CRP elevated R79.82    Disorientation R41.0    LFT elevation R79.89    Septicemia (HCC) A41.9    AMS (altered mental status) R41.82    Altered mental status R41.82    Pneumonia of left lower lobe due to infectious organism J18.9       Isolation/Infection:   Isolation            No Isolation          Patient Infection Status       None to display                     Nurse Assessment:  Last Vital Signs: /84   Pulse 96   Temp 97.8 °F (36.6 °C) (Bladder)   Resp 23   Ht 1.778 m (5' 10\")   Wt 123.9 kg (273 lb 2.4 oz)   SpO2 92%   BMI 39.19 kg/m²     Last documented pain score (0-10 scale):    Last Weight:   Wt Readings from Last 1 Encounters:   24 123.9 kg (273 lb 2.4 oz)     Mental Status:  oriented x1 to self, disoriented x3    IV Access:  - None    Nursing Mobility/ADLs:  Walking

## 2024-11-04 NOTE — PROGRESS NOTES
Pt yelling out at anyone who walks by in ko. RN to bedside. Pt mostly rambling incoherent speech but was able to make out \"I need food\" Attempted education about poor speech eval and remaining NPO. No evidence of learning, pt just kept yelling \"I need food\" over RN when being spoken too. Standard safety measures in place before RN left bedside

## 2024-11-04 NOTE — PROGRESS NOTES
Latest Reference Range & Units 11/03/24 20:30   pH, [place] 7.350 - 7.450  7.382   pCO2, [place] 40.0 - 50.0 mmHg 63.2 (H)   pO2, [place] Not Established mmHg 51   Bicarbonate, Venous 23 - 29 mmol/L 38 (H)     Pt is currently on bipap
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
## 2024-11-04 NOTE — PROGRESS NOTES
Pt's BG 67, No PRN glucose orders available and pt in NPO for aspiration precautions. On-call hospitalist notified at this time.

## 2024-11-04 NOTE — PLAN OF CARE
Problem: Safety - Adult  Goal: Free from fall injury  Outcome: Progressing  Flowsheets (Taken 11/2/2024 2212 by Etelvina Junior, RN)  Free From Fall Injury: Instruct family/caregiver on patient safety     Problem: Discharge Planning  Goal: Discharge to home or other facility with appropriate resources  Outcome: Progressing  Flowsheets (Taken 11/3/2024 2000 by Yoel Addison, RN)  Discharge to home or other facility with appropriate resources:   Identify barriers to discharge with patient and caregiver   Arrange for needed discharge resources and transportation as appropriate   Identify discharge learning needs (meds, wound care, etc)     Problem: Pain  Goal: Verbalizes/displays adequate comfort level or baseline comfort level  Outcome: Progressing  Flowsheets (Taken 11/3/2024 2000 by Yoel Addison, RN)  Verbalizes/displays adequate comfort level or baseline comfort level:   Encourage patient to monitor pain and request assistance   Assess pain using appropriate pain scale   Administer analgesics based on type and severity of pain and evaluate response   Implement non-pharmacological measures as appropriate and evaluate response     Problem: Skin/Tissue Integrity  Goal: Absence of new skin breakdown  Description: 1.  Monitor for areas of redness and/or skin breakdown  2.  Assess vascular access sites hourly  3.  Every 4-6 hours minimum:  Change oxygen saturation probe site  4.  Every 4-6 hours:  If on nasal continuous positive airway pressure, respiratory therapy assess nares and determine need for appliance change or resting period.  Outcome: Progressing     Problem: ABCDS Injury Assessment  Goal: Absence of physical injury  Outcome: Progressing  Flowsheets (Taken 11/3/2024 0134 by Etelvina Junior, RN)  Absence of Physical Injury: Implement safety measures based on patient assessment     Problem: Safety - Medical Restraint  Goal: Remains free of injury from restraints (Restraint for Interference with  Medical Device)  Description: INTERVENTIONS:  1. Determine that other, less restrictive measures have been tried or would not be effective before applying the restraint  2. Evaluate the patient's condition at the time of restraint application  3. Inform patient/family regarding the reason for restraint  4. Q2H: Monitor safety, psychosocial status, comfort, nutrition and hydration  Outcome: Progressing  Flowsheets (Taken 11/4/2024 1749)  Remains free of injury from restraints (restraint for interference with medical device):   Determine that other, less restrictive measures have been tried or would not be effective before applying the restraint   Evaluate the patient's condition at the time of restraint application   Inform patient/family regarding the reason for restraint   Every 2 hours: Monitor safety, psychosocial status, comfort, nutrition and hydration     Problem: Respiratory - Adult  Goal: Achieves optimal ventilation and oxygenation  Outcome: Progressing  Flowsheets (Taken 11/4/2024 1749)  Achieves optimal ventilation and oxygenation:   Assess for changes in respiratory status   Assess for changes in mentation and behavior   Oxygen supplementation based on oxygen saturation or arterial blood gases   Encourage broncho-pulmonary hygiene including cough, deep breathe, incentive spirometry   Position to facilitate oxygenation and minimize respiratory effort   Assess the need for suctioning and aspirate as needed   Respiratory therapy support as indicated     Problem: Cardiovascular - Adult  Goal: Maintains optimal cardiac output and hemodynamic stability  Outcome: Progressing  Flowsheets (Taken 11/4/2024 1749)  Maintains optimal cardiac output and hemodynamic stability:   Monitor blood pressure and heart rate   Monitor urine output and notify Licensed Independent Practitioner for values outside of normal range   Assess for signs of decreased cardiac output   Administer fluid and/or volume expanders as ordered
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
## 2024-11-04 NOTE — CARE COORDINATION
Chart Reviewed.  Current Nursing Home Information:  Patient admitted from:  [facility]    Call to Bhanu, [phone], at  who confirmed the patient is: Long Term Care, no Precert required for return.      Patient Covid vaccination status:    Internal Administration   First Dose COVID-19, J&J, (age 18y+), IM, 0.5 mL  05/13/2021   Second Dose           Last COVID Lab SARS-CoV-2, NAAT (no units)   Date Value   11/02/2024 Not Detected            Covid Test requirement for return: No Rapid/PCR Covid test needed before return    LISA Jalloh     Case Management   [phone]    11/4/2024  9:54 AM
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
## 2024-11-04 NOTE — PROGRESS NOTES
NAME:  Armando Lewis  YOB: 1960  MEDICAL RECORD NUMBER:  [id number]    Shift Summary: Pt on BiPAP overnight. CO2 slowly improving on ABGs. Pt took off BiPAP and started refusing it around 0520. Placed to 10L NC. BG 67 this AM. PRN dextrose given.     Family updated: No    Rhythm: Normal Sinus Rhythm     Most recent vitals:   Visit Vitals  /71   Pulse 67   Temp 98.3 °F (36.8 °C) (Bladder)   Resp 19   Ht 1.778 m (5' 10\")   Wt 123.9 kg (273 lb 2.4 oz)   SpO2 (!) 89%   BMI 39.19 kg/m²           No data found.    No data found.      Respiratory support needed (if any):  - O2 - HFNC 10 lpm    Admission weight Weight - Scale: 127.6 kg (281 lb 3.2 oz) (11/02/24 0351)    Today's weight    Wt Readings from Last 1 Encounters:   11/03/24 123.9 kg (273 lb 2.4 oz)        Quiroz need assessed each shift: YES -  - continue quiroz r/t - Adequate fluid volume management of critically ill.   UOP >30ml/hr: YES  Last documented BM (in last 48 hrs):  No data found.             Restraints (in use currently or dc'd in last 12 hrs): No    Order current and documentation up to date? No    Lines/Drains reviewed @ bedside.  Peripheral IV 11/02/24 Right Antecubital (Active)   Number of days: 2       Peripheral IV Posterior;Right Hand (Active)   Number of days:        Urinary Catheter 11/02/24 (Active)   Number of days: 1         Drip rates at handoff:    dextrose      sodium chloride Stopped (11/03/24 0825)       Lab Data:   CBC:   Recent Labs     11/03/24  0715 11/04/24  0402   WBC 13.4* 10.4   HGB 13.8 14.3   HCT 43.2 44.3   MCV 89.1 88.3    193     BMP:    Recent Labs     11/03/24  0715 11/04/24  0402    141   K 4.2 4.1   CO2 34* 31   BUN 21* 21*   CREATININE 0.7* 0.6*     LIVR:   Recent Labs     11/02/24  0326   AST 23   ALT 15     PT/INR: No results for input(s): \"INR\" in the last 72 hours.    Invalid input(s): \"PROT\"  APTT: No results for input(s): \"APTT\" in the last 72 hours.  ABG:   Recent Labs

## 2024-11-04 NOTE — PROGRESS NOTES
[facility]: [facility] 1W CVICU  DYSPHAGIA BEDSIDE SWALLOW EVALUATION     Patient: Armando Lewis   : 1960   MRN: [id number]      Evaluation Date: 2024     Admitting Diagnosis:   Septicemia (HCC) [A41.9]  Acute respiratory failure with hypoxia and hypercapnia [J96.01, J96.02]  Pneumonia of left lower lobe due to infectious organism [J18.9]   has a past medical history of Antisocial personality disorder (HCC), Blindness right eye category 5, normal vision left eye, Drug induced subacute dyskinesia, Insomnia, Major depressive disorder, recurrent, unspecified (HCC), Melena, Schizoaffective disorder (HCC), and Substance abuse (HCC).   has no past surgical history on file.  Allergies: medication allergies noted  Dysphagia History: Pt previously seen by Sanjay GARCÍA, most recently in 2024, recommended for minced and moist diet with thin liquids, d/c on soft and bite sized with thin liquids.   GI history: None per EMR  ENT history: None per EMR  Baseline/Prior Level of Function: Living Status: Pt resides in SNF; Baseline diet: presumed regular/thin    Onset Date: 2024        Reason for referral: SLP evaluation orders received due to coughing with intake                         CURRENT ENCOUNTER DIAGNOSTICS/COURSE OF ADMISSION     H&P: Pt is a 63 y/o M with PMHx significant for antisocial personality disorder, R eye blindness, dyskinesia, depression, schizoaffective disorder and substance abuse who presents with chief complaint of shortness of breath and cough which is getting worse.     Consults: Pulmonology    Imaging:  Chest X-ray: 24 \"IMPRESSION:  Decreased pleural-parenchymal disease on the left\"    Chest X-ray: 11/3/24 \"IMPRESSION:  Worsening left-sided atelectasis versus pneumonia with enlarging pleural  effusion.    Chest X-ray: 24  \"IMPRESSION:  New infiltrates in the left lung, concerning for pneumonia.    Current Diet Level (prior to evaluation): NPO  Respiratory Status: HHFNC, 12L    SUBJECTIVE  needs  Cognitive/behavioral: alert, agitated, verbally responsive, follows one step commands  Consistencies Presented: Regular texture, Puree texture, Thin liquids     PO Trial Feeding Assistance: Total feed, pt is in restraints  Patient Positioning: Upright in bed   Dentition: Missing majority of dentition  Baseline Vocal Quality: unremarkable  Volitional Cough: Weak, nonproductive  Volitional Swallow:  Required use of drink    Oral Mechanism Exam:    Oral Hygiene: Moist, Clean  Mandible: WFL  Labial: WFL  Lingual: WFL    Oral Phase:  Deficit areas of concern:  Prolonged, though functional mastication with advanced solids.      Pharyngeal Phase:  Deficit areas of concern:  Overt s/s of aspiration appreciated across all PO trials presented this date.     Pt reporting s/s of concern for Esophageal problems: None reported    EDUCATION     Provided education regarding role of SLP, results of assessment, recommendations and general speech pathology plan of care.   Patient Response: Needs reinforcement  Family education: Family not present/unavailable  Staff education: RN/MD aware of SLP recommendations    If patient discharges prior to next visit, this note will serve as discharge.     Timed Code Minutes: 0  Total Treatment Minutes:  27    Electronically signed by:    Layne Beckman M.A., [facility]-SLP  Speech-Language Pathologist  [facility]  [phone]    11/04/24  1:29 PM

## 2024-11-05 ENCOUNTER — APPOINTMENT (OUTPATIENT)
Dept: GENERAL RADIOLOGY | Age: 64
End: 2024-11-05
Payer: MEDICAID

## 2024-11-05 LAB
ANION GAP SERPL CALCULATED.3IONS-SCNC: 9 MMOL/L (ref 3–16)
BASOPHILS # BLD: 0 K/UL (ref 0–0.2)
BASOPHILS NFR BLD: 0.2 %
BUN SERPL-MCNC: 19 MG/DL (ref 7–20)
CALCIUM SERPL-MCNC: 9.4 MG/DL (ref 8.3–10.6)
CHLORIDE SERPL-SCNC: 100 MMOL/L (ref 99–110)
CO2 SERPL-SCNC: 33 MMOL/L (ref 21–32)
CREAT SERPL-MCNC: 0.5 MG/DL (ref 0.8–1.3)
DEPRECATED RDW RBC AUTO: 14.8 % (ref 12.4–15.4)
EOSINOPHIL # BLD: 0.1 K/UL (ref 0–0.6)
EOSINOPHIL NFR BLD: 0.9 %
GFR SERPLBLD CREATININE-BSD FMLA CKD-EPI: >90 ML/MIN/{1.73_M2}
GLUCOSE BLD-MCNC: 82 MG/DL (ref 70–99)
GLUCOSE SERPL-MCNC: 85 MG/DL (ref 70–99)
HCT VFR BLD AUTO: 45.4 % (ref 40.5–52.5)
HGB BLD-MCNC: 14.6 G/DL (ref 13.5–17.5)
LYMPHOCYTES # BLD: 0.7 K/UL (ref 1–5.1)
LYMPHOCYTES NFR BLD: 6.5 %
MAGNESIUM SERPL-MCNC: 2.16 MG/DL (ref 1.8–2.4)
MCH RBC QN AUTO: 28.6 PG (ref 26–34)
MCHC RBC AUTO-ENTMCNC: 32.2 G/DL (ref 31–36)
MCV RBC AUTO: 88.9 FL (ref 80–100)
MONOCYTES # BLD: 1.4 K/UL (ref 0–1.3)
MONOCYTES NFR BLD: 12.3 %
NEUTROPHILS # BLD: 9.1 K/UL (ref 1.7–7.7)
NEUTROPHILS NFR BLD: 80.1 %
PERFORMED ON: NORMAL
PHOSPHATE SERPL-MCNC: 3.6 MG/DL (ref 2.5–4.9)
PLATELET # BLD AUTO: 169 K/UL (ref 135–450)
PMV BLD AUTO: 8.4 FL (ref 5–10.5)
POTASSIUM SERPL-SCNC: 3.8 MMOL/L (ref 3.5–5.1)
PROCALCITONIN SERPL IA-MCNC: 0.05 NG/ML (ref 0–0.15)
RBC # BLD AUTO: 5.1 M/UL (ref 4.2–5.9)
SODIUM SERPL-SCNC: 142 MMOL/L (ref 136–145)
WBC # BLD AUTO: 11.4 K/UL (ref 4–11)

## 2024-11-05 PROCEDURE — 83735 ASSAY OF MAGNESIUM: CPT

## 2024-11-05 PROCEDURE — 6370000000 HC RX 637 (ALT 250 FOR IP): Performed by: INTERNAL MEDICINE

## 2024-11-05 PROCEDURE — 94761 N-INVAS EAR/PLS OXIMETRY MLT: CPT

## 2024-11-05 PROCEDURE — 94669 MECHANICAL CHEST WALL OSCILL: CPT

## 2024-11-05 PROCEDURE — 84100 ASSAY OF PHOSPHORUS: CPT

## 2024-11-05 PROCEDURE — 80048 BASIC METABOLIC PNL TOTAL CA: CPT

## 2024-11-05 PROCEDURE — 84145 PROCALCITONIN (PCT): CPT

## 2024-11-05 PROCEDURE — 36415 COLL VENOUS BLD VENIPUNCTURE: CPT

## 2024-11-05 PROCEDURE — 1200000000 HC SEMI PRIVATE

## 2024-11-05 PROCEDURE — 6360000002 HC RX W HCPCS: Performed by: INTERNAL MEDICINE

## 2024-11-05 PROCEDURE — 92526 ORAL FUNCTION THERAPY: CPT

## 2024-11-05 PROCEDURE — 2580000003 HC RX 258: Performed by: INTERNAL MEDICINE

## 2024-11-05 PROCEDURE — 92611 MOTION FLUOROSCOPY/SWALLOW: CPT

## 2024-11-05 PROCEDURE — 94640 AIRWAY INHALATION TREATMENT: CPT

## 2024-11-05 PROCEDURE — 74230 X-RAY XM SWLNG FUNCJ C+: CPT

## 2024-11-05 PROCEDURE — 99233 SBSQ HOSP IP/OBS HIGH 50: CPT | Performed by: INTERNAL MEDICINE

## 2024-11-05 PROCEDURE — 85025 COMPLETE CBC W/AUTO DIFF WBC: CPT

## 2024-11-05 PROCEDURE — 6370000000 HC RX 637 (ALT 250 FOR IP): Performed by: HOSPITALIST

## 2024-11-05 PROCEDURE — 2700000000 HC OXYGEN THERAPY PER DAY

## 2024-11-05 RX ORDER — ACETAMINOPHEN 650 MG/1
650 SUPPOSITORY RECTAL EVERY 4 HOURS PRN
Status: DISCONTINUED | OUTPATIENT
Start: 2024-11-05 | End: 2024-11-07

## 2024-11-05 RX ADMIN — SENNOSIDES 17.2 MG: 8.6 TABLET, FILM COATED ORAL at 22:23

## 2024-11-05 RX ADMIN — CEFEPIME 2000 MG: 2 INJECTION, POWDER, FOR SOLUTION INTRAVENOUS at 17:15

## 2024-11-05 RX ADMIN — BRIMONIDINE TARTRATE 1 DROP: 2 SOLUTION/ DROPS OPHTHALMIC at 08:11

## 2024-11-05 RX ADMIN — ENOXAPARIN SODIUM 30 MG: 100 INJECTION SUBCUTANEOUS at 08:11

## 2024-11-05 RX ADMIN — ATORVASTATIN CALCIUM 40 MG: 40 TABLET, FILM COATED ORAL at 22:23

## 2024-11-05 RX ADMIN — ENOXAPARIN SODIUM 30 MG: 100 INJECTION SUBCUTANEOUS at 22:28

## 2024-11-05 RX ADMIN — SODIUM CHLORIDE 5 ML/HR: 9 INJECTION, SOLUTION INTRAVENOUS at 17:12

## 2024-11-05 RX ADMIN — FUROSEMIDE 40 MG: 10 INJECTION, SOLUTION INTRAMUSCULAR; INTRAVENOUS at 08:11

## 2024-11-05 RX ADMIN — DORZOLAMIDE HYDROCHLORIDE 1 DROP: 20 SOLUTION OPHTHALMIC at 22:30

## 2024-11-05 RX ADMIN — BRIMONIDINE TARTRATE 1 DROP: 2 SOLUTION/ DROPS OPHTHALMIC at 22:30

## 2024-11-05 RX ADMIN — SODIUM CHLORIDE SOLN NEBU 3% 4 ML: 3 NEBU SOLN at 11:54

## 2024-11-05 RX ADMIN — ALBUTEROL SULFATE 2.5 MG: 2.5 SOLUTION RESPIRATORY (INHALATION) at 08:05

## 2024-11-05 RX ADMIN — CEFEPIME 2000 MG: 2 INJECTION, POWDER, FOR SOLUTION INTRAVENOUS at 08:27

## 2024-11-05 RX ADMIN — RISPERIDONE 2 MG: 1 TABLET, FILM COATED ORAL at 22:23

## 2024-11-05 RX ADMIN — TAMSULOSIN HYDROCHLORIDE 0.4 MG: 0.4 CAPSULE ORAL at 22:23

## 2024-11-05 RX ADMIN — RISPERIDONE 2 MG: 1 TABLET, FILM COATED ORAL at 14:15

## 2024-11-05 RX ADMIN — CEFEPIME 2000 MG: 2 INJECTION, POWDER, FOR SOLUTION INTRAVENOUS at 00:09

## 2024-11-05 RX ADMIN — DORZOLAMIDE HYDROCHLORIDE 1 DROP: 20 SOLUTION OPHTHALMIC at 08:12

## 2024-11-05 RX ADMIN — POLYETHYLENE GLYCOL 3350 17 G: 17 POWDER, FOR SOLUTION ORAL at 22:24

## 2024-11-05 RX ADMIN — SODIUM CHLORIDE, PRESERVATIVE FREE 10 ML: 5 INJECTION INTRAVENOUS at 08:11

## 2024-11-05 RX ADMIN — SODIUM CHLORIDE SOLN NEBU 3% 4 ML: 3 NEBU SOLN at 08:06

## 2024-11-05 RX ADMIN — ALBUTEROL SULFATE 2.5 MG: 2.5 SOLUTION RESPIRATORY (INHALATION) at 11:54

## 2024-11-05 RX ADMIN — LATANOPROST 1 DROP: 50 SOLUTION OPHTHALMIC at 22:30

## 2024-11-05 ASSESSMENT — PAIN SCALES - GENERAL
PAINLEVEL_OUTOF10: 0
PAINLEVEL_OUTOF10: 5
PAINLEVEL_OUTOF10: 0
PAINLEVEL_OUTOF10: 0

## 2024-11-05 ASSESSMENT — PAIN DESCRIPTION - LOCATION: LOCATION: BACK

## 2024-11-05 NOTE — PROGRESS NOTES
4 Eyes Skin Assessment     NAME:  Armando Lewis  YOB: 1960  MEDICAL RECORD NUMBER:  [id number]    The patient is being assessed for  Transfer to New Unit    I agree that at least one RN has performed a thorough Head to Toe Skin Assessment on the patient. ALL assessment sites listed below have been assessed.      Areas assessed by both nurses:    Head, Face, Ears, Shoulders, Back, Chest, Arms, Elbows, Hands, Sacrum. Buttock, Coccyx, Ischium, Legs. Feet and Heels, and Under Medical Devices         Does the Patient have a Wound? No noted wound(s)       Bhanu Prevention initiated by RN: Yes  Wound Care Orders initiated by RN: No    Pressure Injury (Stage 3,4, Unstageable, DTI, NWPT, and Complex wounds) if present, place Wound referral order by RN under : No    New Ostomies, if present place, Ostomy referral order under : No     Nurse 1 eSignature: Electronically signed by Lou Christina RN on 11/5/24 at 2:35 PM EST    **SHARE this note so that the co-signing nurse can place an eSignature**    Nurse 2 eSignature: {Esignature:[id number]}

## 2024-11-05 NOTE — PROGRESS NOTES
4 Eyes Skin Assessment     NAME:  Armando Lewis  YOB: 1960  MEDICAL RECORD NUMBER:  [id number]    The patient is being assessed for  Transfer to New Unit    I agree that at least one RN has performed a thorough Head to Toe Skin Assessment on the patient. ALL assessment sites listed below have been assessed.      Areas assessed by both nurses:    Head, Face, Ears, Shoulders, Back, Chest, Arms, Elbows, Hands, Sacrum. Buttock, Coccyx, Ischium, and Legs. Feet and Heels        Does the Patient have a Wound? No noted wound(s)       Bhanu Prevention initiated by RN: Yes  Wound Care Orders initiated by RN: No    Pressure Injury (Stage 3,4, Unstageable, DTI, NWPT, and Complex wounds) if present, place Wound referral order by RN under : No    New Ostomies, if present place, Ostomy referral order under : No     Nurse 1 eSignature: Electronically signed by Emily Oliva RN on 11/5/24 at 4:51 PM EST    **SHARE this note so that the co-signing nurse can place an eSignature**    Nurse 2 eSignature: Electronically signed by Anival Oakes RN on 11/5/24 at 5:06 PM EST

## 2024-11-05 NOTE — PROCEDURES
Facility/Department: [address] CVICU  MODIFIED BARIUM SWALLOW EVALUATION    Patient: Armando Lewis   : 1960   MRN: [id number]      Evaluation Date: 2024   Ordering MD: Dr. Bautista  Radiologist: Dr. Alatorre  SLP:  FRANCOISE Bhakta     Admitting Diagnosis: Septicemia (HCC) [A41.9]  Acute respiratory failure with hypoxia and hypercapnia [J96.01, J96.02]  Pneumonia of left lower lobe due to infectious organism [J18.9]   has a past medical history of Antisocial personality disorder (HCC), Blindness right eye category 5, normal vision left eye, Drug induced subacute dyskinesia, Insomnia, Major depressive disorder, recurrent, unspecified (HCC), Melena, Schizoaffective disorder (HCC), and Substance abuse (HCC).   has no past surgical history on file.  Allergies: NKA  Baseline diet: presumed regular/thin     Date of Onset: 2024    H&P: Pt is a 63 y/o M with PMHx significant for antisocial personality disorder, R eye blindness, dyskinesia, depression, schizoaffective disorder and substance abuse who presents with chief complaint of shortness of breath and cough which is getting worse.      Consults: Pulmonology     Imaging:  Chest X-ray: 24 \"IMPRESSION:  Decreased pleural-parenchymal disease on the left\"     Chest X-ray: 11/3/24 \"IMPRESSION:  Worsening left-sided atelectasis versus pneumonia with enlarging pleural  effusion.     Chest X-ray: 24  \"IMPRESSION:  New infiltrates in the left lung, concerning for pneumonia.     Current Diet Level (prior to evaluation): NPO  Respiratory Status: HHFNC, 2L    ASSESSMENT / IMPRESSIONS     MBSS Assessment Impression: Patient presents with WFL oropharyngeal swallow. Consistent posterior bolus loss and delayed swallow initiation, this did not impact swallow safety. Swallow safety remains intact as evidenced by absence of penetration/aspiration across all trials of thin liquids, puree and regular solids. Swallow efficiency mildly impaired as evidenced by prolonged  assessment of swallow function with diet to be determined as indicated>Goal met, d/c.     If s/s of pharyngeal phase dysphagia continue to be noted pt will participate in Modified Barium Swallow Study to further assess pharyngeal phase of swallow, assist with diet recommendations, and to further direct plan of care>Goal met, d/c.       Dysphagia Therapeutic Intervention: Diet Tolerance Monitoring , Patient/Family Education , Therapeutic Trials with SLP     DATA     Dentition: Missing majority of dentition   Vision: R eye blindness   Hearing: Adequate for assessment/tx needs  Behavioral/ cognitive/communication: alert, cooperative, limited verbal responses, follows one step commands  Respiratory Status: HHFNC, 2L   Pain: Did not state  Positioning: Fully upright, Lateral view  Consistencies given: Regular texture, Puree texture, Thin liquids  via tsp, via cup, and via straw    Oral Impairment  Premature loss  Mastication- prolonged with regular solids  A-P oral transit- slowed    Pharyngeal Impairment  Initiation of Pharyngeal Swallow      Upper Esophageal Screening  All consistencies cleared UES without event in lateral view    Analysis of compensatory strategies  Not clinically indicated given absence of penetration/aspiration    EDUCATION     Provided education regarding role of SLP, results of assessment, recommendations and general speech pathology plan of care.   Patient Response: Needs reinforcement  Family education: Family not present/unavailable  Staff education: RN/MD aware of SLP recommendations    Timed Code Minutes: 0  Total Treatment Minutes:  24    Electronically signed by:    Layne Beckman M.A., CCC-SLP  Speech-Language Pathologist  [facility]  [phone]  11/05/24  10:47 AM

## 2024-11-05 NOTE — PROGRESS NOTES
Patient transferred from ICU to [facility] floor room 4114.  Patient awake and alert to person and place  Speech unintelligible    Vitals stable on 4 L oxygen via NC  Denies pain    Pichardo catheter in place  Draining rishabh colored urine    Patient's mother Shirley updated on care and transfer.    Patient left in bed  Call light within reach    Fall precautions applied

## 2024-11-05 NOTE — PROGRESS NOTES
[facility]  SLP DYSPHAGIA THERAPY    Patient: Armando Lewis   : 1960   MRN: [id number]    Treatment Date: 2024   Admitting Diagnosis:   Septicemia (HCC) [A41.9]  Acute respiratory failure with hypoxia and hypercapnia [J96.01, J96.02]  Pneumonia of left lower lobe due to infectious organism [J18.9]   has a past medical history of Antisocial personality disorder ([facility]), Blindness right eye category 5, normal vision left eye, Drug induced subacute dyskinesia, Insomnia, Major depressive disorder, recurrent, unspecified ([facility]), Melena, Schizoaffective disorder ([facility]), and Substance abuse ([facility]).   has no past surgical history on file.  Allergies: medication allergies noted  Dysphagia History: Pt previously seen by Sanjay GARCÍA, most recently in 2024, recommended for minced and moist diet with thin liquids, d/c on soft and bite sized with thin liquids.   GI history: None per EMR  ENT history: None per EMR  Baseline/Prior Level of Function: Living Status: Pt resides in SNF; Baseline diet: presumed regular/thin    Onset: 2024     CURRENT ENCOUNTER DIAGNOSTICS/COURSE OF ADMISSION       H&P: Pt is a 63 y/o M with PMHx significant for antisocial personality disorder, R eye blindness, dyskinesia, depression, schizoaffective disorder and substance abuse who presents with chief complaint of shortness of breath and cough which is getting worse.      Consults: Pulmonology     Imaging:  Chest X-ray: 24 \"IMPRESSION:  Decreased pleural-parenchymal disease on the left\"     Chest X-ray: 11/3/24 \"IMPRESSION:  Worsening left-sided atelectasis versus pneumonia with enlarging pleural  effusion.     Chest X-ray: 24  \"IMPRESSION:  New infiltrates in the left lung, concerning for pneumonia.     Current Diet Level (prior to evaluation): NPO  Respiratory Status: HHFNC, 2L    SUBJECTIVE     Subjective: Pt seen at bedside, awake, lethargic, intermittently verbally responsive. Agreeable to PO trials. Complete oral care prior to PO

## 2024-11-05 NOTE — PLAN OF CARE
Problem: Safety - Adult  Goal: Free from fall injury  Outcome: Progressing  Flowsheets (Taken 11/5/2024 1545)  Free From Fall Injury: Instruct family/caregiver on patient safety     Problem: Discharge Planning  Goal: Discharge to home or other facility with appropriate resources  Outcome: Progressing  Flowsheets  Taken 11/5/2024 1545 by Emily Oliva RN  Discharge to home or other facility with appropriate resources:   Identify barriers to discharge with patient and caregiver   Arrange for needed discharge resources and transportation as appropriate   Identify discharge learning needs (meds, wound care, etc)     Problem: Pain  Goal: Verbalizes/displays adequate comfort level or baseline comfort level  Outcome: Progressing  Flowsheets (Taken 11/5/2024 0800 by Lou Christina RN)  Verbalizes/displays adequate comfort level or baseline comfort level:   Encourage patient to monitor pain and request assistance   Administer analgesics based on type and severity of pain and evaluate response   Assess pain using appropriate pain scale     Problem: Skin/Tissue Integrity  Goal: Absence of new skin breakdown  Description: 1.  Monitor for areas of redness and/or skin breakdown  Outcome: Progressing     Problem: ABCDS Injury Assessment  Goal: Absence of physical injury  Outcome: Progressing  Flowsheets (Taken 11/5/2024 1545)  Absence of Physical Injury: Implement safety measures based on patient assessment      Problem: Respiratory - Adult  Goal: Achieves optimal ventilation and oxygenation  Outcome: Progressing  Flowsheets  Taken 11/5/2024 1545 by Emily Oliva RN  Achieves optimal ventilation and oxygenation:   Assess for changes in respiratory status   Assess for changes in mentation and behavior   Position to facilitate oxygenation and minimize respiratory effort   Oxygen supplementation based on oxygen saturation or arterial blood gases     Problem: Cardiovascular - Adult  Goal: Maintains optimal cardiac

## 2024-11-05 NOTE — PLAN OF CARE
Problem: Discharge Planning  Goal: Discharge to home or other facility with appropriate resources  Recent Flowsheet Documentation  Taken 11/5/2024 0800 by Lou Christina RN  Discharge to home or other facility with appropriate resources:   Identify barriers to discharge with patient and caregiver   Arrange for needed discharge resources and transportation as appropriate   Identify discharge learning needs (meds, wound care, etc)     Problem: Pain  Goal: Verbalizes/displays adequate comfort level or baseline comfort level  Recent Flowsheet Documentation  Taken 11/5/2024 0800 by Lou Christina RN  Verbalizes/displays adequate comfort level or baseline comfort level:   Encourage patient to monitor pain and request assistance   Administer analgesics based on type and severity of pain and evaluate response   Assess pain using appropriate pain scale     Problem: Safety - Medical Restraint  Goal: Remains free of injury from restraints (Restraint for Interference with Medical Device)  Description: INTERVENTIONS:  1. Determine that other, less restrictive measures have been tried or would not be effective before applying the restraint  2. Evaluate the patient's condition at the time of restraint application  3. Inform patient/family regarding the reason for restraint  4. Q2H: Monitor safety, psychosocial status, comfort, nutrition and hydration  Recent Flowsheet Documentation  Taken 11/5/2024 0800 by Lou Christina RN  Remains free of injury from restraints (restraint for interference with medical device):   Determine that other, less restrictive measures have been tried or would not be effective before applying the restraint   Evaluate the patient's condition at the time of restraint application   Every 2 hours: Monitor safety, psychosocial status, comfort, nutrition and hydration   Inform patient/family regarding the reason for restraint     Problem: Respiratory - Adult  Goal: Achieves optimal ventilation and  oxygenation  Recent Flowsheet Documentation  Taken 11/5/2024 0800 by Lou Christina RN  Achieves optimal ventilation and oxygenation:   Assess for changes in respiratory status   Assess for changes in mentation and behavior   Position to facilitate oxygenation and minimize respiratory effort     Problem: Cardiovascular - Adult  Goal: Maintains optimal cardiac output and hemodynamic stability  Recent Flowsheet Documentation  Taken 11/5/2024 0800 by Lou Christina RN  Maintains optimal cardiac output and hemodynamic stability: Monitor blood pressure and heart rate     Problem: Skin/Tissue Integrity - Adult  Goal: Skin integrity remains intact  Recent Flowsheet Documentation  Taken 11/5/2024 0800 by Lou Christina RN  Skin Integrity Remains Intact: Monitor for areas of redness and/or skin breakdown     Problem: Musculoskeletal - Adult  Goal: Return ADL status to a safe level of function  Recent Flowsheet Documentation  Taken 11/5/2024 0800 by Lou Christina RN  Return ADL Status to a Safe Level of Function:   Assess activities of daily living deficits and provide assistive devices as needed   Administer medication as ordered   Obtain physical therapy/occupational therapy consults as needed     Problem: Genitourinary - Adult  Goal: Urinary catheter remains patent  Recent Flowsheet Documentation  Taken 11/5/2024 0800 by Lou Christina RN  Urinary catheter remains patent:   Assess patency of urinary catheter   Irrigate catheter per Licensed Independent Practitioner order if indicated and notify Licensed Independent Practitioner if unable to irrigate   Assess need for a larger catheter size or a 3-way catheter for continuous bladder irrigation

## 2024-11-05 NOTE — PLAN OF CARE
Problem: Safety - Adult  Goal: Free from fall injury  11/4/2024 2111 by Zaire Umanzor RN  Outcome: Progressing  11/4/2024 1749 by Binta Andrews RN  Outcome: Progressing  Flowsheets (Taken 11/2/2024 2212 by Etelvina Junior, RN)  Free From Fall Injury: Instruct family/caregiver on patient safety     Problem: Discharge Planning  Goal: Discharge to home or other facility with appropriate resources  11/4/2024 2111 by Zaire Umanzor RN  Outcome: Progressing  11/4/2024 1749 by Binta Andrews RN  Outcome: Progressing  Flowsheets (Taken 11/3/2024 2000 by Yoel Addison, RN)  Discharge to home or other facility with appropriate resources:   Identify barriers to discharge with patient and caregiver   Arrange for needed discharge resources and transportation as appropriate   Identify discharge learning needs (meds, wound care, etc)     Problem: Pain  Goal: Verbalizes/displays adequate comfort level or baseline comfort level  11/4/2024 2111 by Zaire Umanzor RN  Outcome: Progressing  11/4/2024 1749 by Binta Andrews RN  Outcome: Progressing  Flowsheets (Taken 11/3/2024 2000 by Yoel Addison, RN)  Verbalizes/displays adequate comfort level or baseline comfort level:   Encourage patient to monitor pain and request assistance   Assess pain using appropriate pain scale   Administer analgesics based on type and severity of pain and evaluate response   Implement non-pharmacological measures as appropriate and evaluate response     Problem: Skin/Tissue Integrity  Goal: Absence of new skin breakdown  Description: 1.  Monitor for areas of redness and/or skin breakdown  2.  Assess vascular access sites hourly  3.  Every 4-6 hours minimum:  Change oxygen saturation probe site  4.  Every 4-6 hours:  If on nasal continuous positive airway pressure, respiratory therapy assess nares and determine need for appliance change or resting period.  11/4/2024 2111 by Zaire Umanzor RN  Outcome: Progressing  11/4/2024 1749 by Binta Andrews

## 2024-11-05 NOTE — PROGRESS NOTES
Pulmonary Progress Note    Date of Admission: 11/2/2024   LOS: 3 days       CC:  Chief Complaint   Patient presents with    Respiratory Distress        Subjective:  Pulmonary clearance.    Weaned to 3 L NC         Assessment:         Plan:     This note may have been transcribed using Dragon Dictation software. Please disregard any translational errors.       Hospital Day: 3     Acute hypercapnic respiratory failure  Acute hypoxemic respiratory failure  Multifocal pneumonia with mucous plug  Mucous plug removed yesterday.  Hypoxemia improved.  Currently at 3 L and weaning.  Finish empiric antibiotic  Okay to move to floor  DC BiPAP      Psych  Psychiatry consulted.  Multiple baseline psychiatric issues.  Currently calm.          Nutrition  - Diet NPO  -   Intake/Output Summary (Last 24 hours) at 11/5/2024 0807  Last data filed at 11/5/2024 0600  Gross per 24 hour   Intake 100 ml   Output 1925 ml   Net -1825 ml       Mobility      Access  Arterial      PICC          CVC                   Data:        PHYSICAL EXAM:   Blood pressure 110/71, pulse 65, temperature 97.3 °F (36.3 °C), temperature source Bladder, resp. rate 15, height 1.778 m (5' 10\"), weight 123.8 kg (272 lb 14.9 oz), SpO2 94%.'  Body mass index is 39.16 kg/m².   Gen: No distress.    ENT:   Resp: No accessory muscle use. No crackles. No wheezes. No rhonchi.    CV: Regular rate. Regular rhythm. No murmur or rub. No edema.   Skin: Warm, dry, normal texture and turgor. No nodule on exposed extremities.   M/S: No cyanosis. No clubbing. No joint deformity.  Psych: Oriented x 3. No anxiety.  Awake. Alert. Intact judgement and insight. Good Mood / Affect.  Memory appears in tact       Medications:    Scheduled Meds:   sodium chloride (Inhalant)  4 mL Nebulization TID    nicotine  1 patch TransDERmal Daily    albuterol  2.5 mg Nebulization 4x Daily RT    furosemide  40 mg IntraVENous Daily    sodium chloride flush  5-40 mL IntraVENous 2 times per day     enoxaparin  30 mg SubCUTAneous BID    polyethylene glycol  17 g Oral BID    atorvastatin  40 mg Oral Nightly    [Held by provider] clopidogrel  75 mg Oral Daily    cyanocobalamin  1,000 mcg Oral Daily    dorzolamide  1 drop Both Eyes BID    ferrous sulfate  325 mg Oral Daily with breakfast    [Held by provider] lactulose  10 g Oral TID    latanoprost  1 drop Both Eyes Nightly    senna  2 tablet Oral BID    tamsulosin  0.4 mg Oral Nightly    brimonidine  1 drop Both Eyes BID    risperiDONE  2 mg Oral TID    [Held by provider] spironolactone  25 mg Oral Daily    cefepime  2,000 mg IntraVENous Q8H       Continuous Infusions:   dextrose      sodium chloride Stopped (11/03/24 0825)       PRN Meds:  acetaminophen, glucose, dextrose bolus **OR** dextrose bolus, glucagon (rDNA), dextrose, sodium chloride (Inhalant), sodium chloride (Inhalant), sodium chloride flush, sodium chloride, ondansetron **OR** ondansetron, aluminum & magnesium hydroxide-simethicone, ipratropium 0.5 mg-albuterol 2.5 mg, guaiFENesin-dextromethorphan    Labs reviewed:  CBC:   Recent Labs     11/03/24  0715 11/04/24  0402 11/05/24  0400   WBC 13.4* 10.4 11.4*   HGB 13.8 14.3 14.6   HCT 43.2 44.3 45.4   MCV 89.1 88.3 88.9    193 169     BMP:   Recent Labs     11/03/24  0715 11/04/24  0402 11/05/24  0400    141 142   K 4.2 4.1 3.8   CL 99 99 100   CO2 34* 31 33*   PHOS  --   --  3.6   BUN 21* 21* 19   CREATININE 0.7* 0.6* 0.5*     LIVER PROFILE:   No results for input(s): \"AST\", \"ALT\", \"LIPASE\", \"AMYLASE\", \"BILIDIR\", \"BILITOT\", \"ALKPHOS\" in the last 72 hours.    Invalid input(s): \"ALB\"    PT/INR: No results for input(s): \"PROTIME\", \"INR\" in the last 72 hours.  APTT: No results for input(s): \"APTT\" in the last 72 hours.  UA:  Recent Labs     11/02/24  1540   COLORU Yellow   PHUR 5.0   CLARITYU Clear   LEUKOCYTESUR Negative   UROBILINOGEN 0.2   BILIRUBINUR Negative   BLOODU Negative   GLUCOSEU Negative     No results for input(s): \"PH\", \"PCO2\",

## 2024-11-05 NOTE — PROGRESS NOTES
NAME:  Armando Lewis  YOB: 1960  MEDICAL RECORD NUMBER:  [id number]    Shift Summary: pt irritable and uncooperative on/off with yelling and incomprehensible speech. Pt O2 demands improving overnight to 3L from 7L.     Family updated: No    Rhythm: Normal Sinus Rhythm     Most recent vitals:   Visit Vitals  /71   Pulse 65   Temp 97.3 °F (36.3 °C) (Bladder)   Resp 15   Ht 1.778 m (5' 10\")   Wt 123.8 kg (272 lb 14.9 oz)   SpO2 94%   BMI 39.16 kg/m²           No data found.    No data found.      Respiratory support needed (if any):  - O2 - NC - 3 lpm    Admission weight Weight - Scale: 127.6 kg (281 lb 3.2 oz) (11/02/24 0351)    Today's weight    Wt Readings from Last 1 Encounters:   11/05/24 123.8 kg (272 lb 14.9 oz)        Quiroz need assessed each shift: YES -  - continue quiroz r/t -    UOP >30ml/hr: YES  Last documented BM (in last 48 hrs):  No data found.             Restraints (in use currently or dc'd in last 12 hrs): Yes    Order current and documentation up to date? Yes    Lines/Drains reviewed @ bedside.  Peripheral IV 11/02/24 Right Antecubital (Active)   Number of days: 3       Peripheral IV Posterior;Right Hand (Active)   Number of days:        Urinary Catheter 11/02/24 (Active)   Number of days: 2         Drip rates at handoff:    dextrose      sodium chloride Stopped (11/03/24 0825)       Lab Data:   CBC:   Recent Labs     11/04/24  0402 11/05/24  0400   WBC 10.4 11.4*   HGB 14.3 14.6   HCT 44.3 45.4   MCV 88.3 88.9    169     BMP:    Recent Labs     11/04/24  0402 11/05/24  0400    142   K 4.1 3.8   CO2 31 33*   BUN 21* 19   CREATININE 0.6* 0.5*     LIVR: No results for input(s): \"AST\", \"ALT\" in the last 72 hours.  PT/INR: No results for input(s): \"INR\" in the last 72 hours.    Invalid input(s): \"PROT\"  APTT: No results for input(s): \"APTT\" in the last 72 hours.  ABG:   Recent Labs     11/02/24  1700   PHART 7.317*   UWP0OBP 74.5*   PO2ART 58.1*       Any consults during

## 2024-11-05 NOTE — PROGRESS NOTES
V2.0  [facility] [place] [facility] Hospitalist Progress Note      Name:  Armando Lewis /Age/Sex: 1960  (64 y.o. male)   MRN & CSN:  [id number] & [id number] Encounter Date/Time: 2024 11:40 AM EST    Location:  [place] [address] PCP: No primary care provider on file.       Hospital Day: 4  Subjective:   Chief Complaint: Follow-up shortness of breath and agitation    Send patient seen and evaluated at bedside, doing better today, seems to be less agitated, cooperative with staff, was able to complete his modified barium swallow study, diet was advanced, oxygen needs are down.    Review of Systems:    Review of Systems    10 point ROS negative except as stated above in \"subjective\" section    Objective:     Intake/Output Summary (Last 24 hours) at 2024 1140  Last data filed at 2024 1127  Gross per 24 hour   Intake 100 ml   Output 1950 ml   Net -1850 ml      Vitals:   Vitals:    24 0807   BP:    Pulse: 63   Resp: 16   Temp:    SpO2: 98%     Physical Exam:   General: Awake, alert but does not interact much in meaningful way, did not answer any of my questions.  Cardiovascular: S1S2 present, regular rate and rhythm, no murmurs  Respiratory: Diminished at bases bilaterally  Gastrointestinal: Soft, non tender, positive bowel sounds   Genitourinary: no suprapubic tenderness  Musculoskeletal: Dependent edema of extremities noted neuro: Alert.    Medications:     Medications:    sodium chloride (Inhalant)  4 mL Nebulization TID    nicotine  1 patch TransDERmal Daily    albuterol  2.5 mg Nebulization 4x Daily RT    sodium chloride flush  5-40 mL IntraVENous 2 times per day    enoxaparin  30 mg SubCUTAneous BID    polyethylene glycol  17 g Oral BID    atorvastatin  40 mg Oral Nightly    [Held by provider] clopidogrel  75 mg Oral Daily    cyanocobalamin  1,000 mcg Oral Daily    dorzolamide  1 drop Both Eyes BID    ferrous sulfate  325 mg Oral Daily with breakfast    latanoprost  1 drop Both Eyes Nightly    senna  2 tablet Oral  pneumonia with enlarging pleural effusion.     XR CHEST PORTABLE    Result Date: 11/2/2024  EXAMINATION: ONE XRAY VIEW OF THE CHEST 11/2/2024 3:28 am COMPARISON: 04/07/2024. HISTORY: ORDERING SYSTEM PROVIDED HISTORY: sob TECHNOLOGIST PROVIDED HISTORY: Reason for exam:->sob Reason for Exam: sob FINDINGS: The cardiac silhouette is enlarged.  Mediastinal contours are normal. There are new infiltrates in the left lung, concerning for pneumonia.  The right lung is clear. Old left-sided rib fractures are noted. No pneumothorax.     New infiltrates in the left lung, concerning for pneumonia.       Assessment and Plan:   Armando Lewis is a 64 y.o. male     Conditions under treatment:      # Acute hypoxemic respiratory failure  # Aspiration pneumonia  # Acute hypercapnic respiratory failure  # Multiple behavioral health issues (schizoaffective disorder, antisocial personality disorder, depression)  # Dysphagia  # Elevated troponin     Plan:     -Patient's hypoxemia is better, currently without any increased work of breathing, oxygen is down to 3 L. Seen by speech pathology, had a modified barium swallow, no evidence of any overt aspiration, diet will be advanced, he will be able to continue with his home medications, continue empiric antibiotics  Continue with pulmonary toileting, transfer out of the ICU today    -For his dysphagia, has seen by speech, diet advanced, should be able to take his medications     -Patient with multiple behavioral health issues, current behavior remains a challenge, on respirdal, patient is from a long-term facility, will return to the facility     -Elevated troponin, likely demand ischemia, EKG negative, not a great historian, ACS less likely, echo was planned yesterday unfortunately patient was aggressive and violent with the echo tech, I do not see any urgency to get it done, will be deferred to outpatient once his behavior is under control.    -Will hold off on his diuretics until the time his

## 2024-11-06 LAB
ANION GAP SERPL CALCULATED.3IONS-SCNC: 6 MMOL/L (ref 3–16)
BACTERIA BLD CULT ORG #2: NORMAL
BACTERIA BLD CULT: NORMAL
BASOPHILS # BLD: 0 K/UL (ref 0–0.2)
BASOPHILS NFR BLD: 0.2 %
BUN SERPL-MCNC: 14 MG/DL (ref 7–20)
CALCIUM SERPL-MCNC: 9.3 MG/DL (ref 8.3–10.6)
CHLORIDE SERPL-SCNC: 95 MMOL/L (ref 99–110)
CO2 SERPL-SCNC: 35 MMOL/L (ref 21–32)
CREAT SERPL-MCNC: 0.6 MG/DL (ref 0.8–1.3)
DEPRECATED RDW RBC AUTO: 15.3 % (ref 12.4–15.4)
EOSINOPHIL # BLD: 0.2 K/UL (ref 0–0.6)
EOSINOPHIL NFR BLD: 2 %
GFR SERPLBLD CREATININE-BSD FMLA CKD-EPI: >90 ML/MIN/{1.73_M2}
GLUCOSE BLD-MCNC: 144 MG/DL (ref 70–99)
GLUCOSE SERPL-MCNC: 129 MG/DL (ref 70–99)
HCT VFR BLD AUTO: 46.3 % (ref 40.5–52.5)
HGB BLD-MCNC: 15.3 G/DL (ref 13.5–17.5)
LYMPHOCYTES # BLD: 0.8 K/UL (ref 1–5.1)
LYMPHOCYTES NFR BLD: 7 %
MAGNESIUM SERPL-MCNC: 2.03 MG/DL (ref 1.8–2.4)
MCH RBC QN AUTO: 28.9 PG (ref 26–34)
MCHC RBC AUTO-ENTMCNC: 33 G/DL (ref 31–36)
MCV RBC AUTO: 87.5 FL (ref 80–100)
MONOCYTES # BLD: 1.1 K/UL (ref 0–1.3)
MONOCYTES NFR BLD: 10.2 %
NEUTROPHILS # BLD: 8.8 K/UL (ref 1.7–7.7)
NEUTROPHILS NFR BLD: 80.6 %
PERFORMED ON: ABNORMAL
PHOSPHATE SERPL-MCNC: 3.7 MG/DL (ref 2.5–4.9)
PLATELET # BLD AUTO: 174 K/UL (ref 135–450)
PMV BLD AUTO: 9 FL (ref 5–10.5)
POTASSIUM SERPL-SCNC: 3.6 MMOL/L (ref 3.5–5.1)
RBC # BLD AUTO: 5.29 M/UL (ref 4.2–5.9)
REASON FOR REJECTION: NORMAL
REJECTED TEST: NORMAL
SODIUM SERPL-SCNC: 136 MMOL/L (ref 136–145)
WBC # BLD AUTO: 10.9 K/UL (ref 4–11)

## 2024-11-06 PROCEDURE — 6370000000 HC RX 637 (ALT 250 FOR IP): Performed by: HOSPITALIST

## 2024-11-06 PROCEDURE — 85025 COMPLETE CBC W/AUTO DIFF WBC: CPT

## 2024-11-06 PROCEDURE — 2580000003 HC RX 258: Performed by: INTERNAL MEDICINE

## 2024-11-06 PROCEDURE — 6360000002 HC RX W HCPCS: Performed by: INTERNAL MEDICINE

## 2024-11-06 PROCEDURE — 1200000000 HC SEMI PRIVATE

## 2024-11-06 PROCEDURE — 99233 SBSQ HOSP IP/OBS HIGH 50: CPT | Performed by: INTERNAL MEDICINE

## 2024-11-06 PROCEDURE — 2580000003 HC RX 258: Performed by: NURSE PRACTITIONER

## 2024-11-06 PROCEDURE — 97129 THER IVNTJ 1ST 15 MIN: CPT

## 2024-11-06 PROCEDURE — 83735 ASSAY OF MAGNESIUM: CPT

## 2024-11-06 PROCEDURE — 84100 ASSAY OF PHOSPHORUS: CPT

## 2024-11-06 PROCEDURE — 94640 AIRWAY INHALATION TREATMENT: CPT

## 2024-11-06 PROCEDURE — 94761 N-INVAS EAR/PLS OXIMETRY MLT: CPT

## 2024-11-06 PROCEDURE — 80048 BASIC METABOLIC PNL TOTAL CA: CPT

## 2024-11-06 PROCEDURE — 94669 MECHANICAL CHEST WALL OSCILL: CPT

## 2024-11-06 PROCEDURE — 36415 COLL VENOUS BLD VENIPUNCTURE: CPT

## 2024-11-06 PROCEDURE — 92526 ORAL FUNCTION THERAPY: CPT

## 2024-11-06 PROCEDURE — 6370000000 HC RX 637 (ALT 250 FOR IP): Performed by: INTERNAL MEDICINE

## 2024-11-06 PROCEDURE — 2700000000 HC OXYGEN THERAPY PER DAY

## 2024-11-06 RX ADMIN — SENNOSIDES 17.2 MG: 8.6 TABLET, FILM COATED ORAL at 08:53

## 2024-11-06 RX ADMIN — CYANOCOBALAMIN TAB 1000 MCG 1000 MCG: 1000 TAB at 08:54

## 2024-11-06 RX ADMIN — CEFEPIME 2000 MG: 2 INJECTION, POWDER, FOR SOLUTION INTRAVENOUS at 00:27

## 2024-11-06 RX ADMIN — POLYETHYLENE GLYCOL 3350 17 G: 17 POWDER, FOR SOLUTION ORAL at 20:35

## 2024-11-06 RX ADMIN — CEFEPIME 2000 MG: 2 INJECTION, POWDER, FOR SOLUTION INTRAVENOUS at 15:42

## 2024-11-06 RX ADMIN — FERROUS SULFATE TAB 325 MG (65 MG ELEMENTAL FE) 325 MG: 325 (65 FE) TAB at 08:54

## 2024-11-06 RX ADMIN — ALBUTEROL SULFATE 2.5 MG: 2.5 SOLUTION RESPIRATORY (INHALATION) at 16:24

## 2024-11-06 RX ADMIN — BRIMONIDINE TARTRATE 1 DROP: 2 SOLUTION/ DROPS OPHTHALMIC at 09:06

## 2024-11-06 RX ADMIN — DORZOLAMIDE HYDROCHLORIDE 1 DROP: 20 SOLUTION OPHTHALMIC at 20:34

## 2024-11-06 RX ADMIN — TAMSULOSIN HYDROCHLORIDE 0.4 MG: 0.4 CAPSULE ORAL at 20:35

## 2024-11-06 RX ADMIN — DORZOLAMIDE HYDROCHLORIDE 1 DROP: 20 SOLUTION OPHTHALMIC at 09:07

## 2024-11-06 RX ADMIN — ENOXAPARIN SODIUM 30 MG: 100 INJECTION SUBCUTANEOUS at 20:34

## 2024-11-06 RX ADMIN — GUAIFENESIN AND DEXTROMETHORPHAN 5 ML: 20; 200 SYRUP ORAL at 20:38

## 2024-11-06 RX ADMIN — SENNOSIDES 17.2 MG: 8.6 TABLET, FILM COATED ORAL at 20:35

## 2024-11-06 RX ADMIN — CEFEPIME 2000 MG: 2 INJECTION, POWDER, FOR SOLUTION INTRAVENOUS at 23:47

## 2024-11-06 RX ADMIN — SODIUM CHLORIDE SOLN NEBU 3% 4 ML: 3 NEBU SOLN at 20:01

## 2024-11-06 RX ADMIN — CEFEPIME 2000 MG: 2 INJECTION, POWDER, FOR SOLUTION INTRAVENOUS at 08:55

## 2024-11-06 RX ADMIN — ATORVASTATIN CALCIUM 40 MG: 40 TABLET, FILM COATED ORAL at 20:35

## 2024-11-06 RX ADMIN — POLYETHYLENE GLYCOL 3350 17 G: 17 POWDER, FOR SOLUTION ORAL at 09:43

## 2024-11-06 RX ADMIN — RISPERIDONE 2 MG: 1 TABLET, FILM COATED ORAL at 08:54

## 2024-11-06 RX ADMIN — ALBUTEROL SULFATE 2.5 MG: 2.5 SOLUTION RESPIRATORY (INHALATION) at 07:37

## 2024-11-06 RX ADMIN — SODIUM CHLORIDE SOLN NEBU 3% 4 ML: 3 NEBU SOLN at 07:37

## 2024-11-06 RX ADMIN — LATANOPROST 1 DROP: 50 SOLUTION OPHTHALMIC at 20:34

## 2024-11-06 RX ADMIN — ENOXAPARIN SODIUM 30 MG: 100 INJECTION SUBCUTANEOUS at 08:53

## 2024-11-06 RX ADMIN — ALBUTEROL SULFATE 2.5 MG: 2.5 SOLUTION RESPIRATORY (INHALATION) at 20:00

## 2024-11-06 RX ADMIN — RISPERIDONE 2 MG: 1 TABLET, FILM COATED ORAL at 14:17

## 2024-11-06 RX ADMIN — BRIMONIDINE TARTRATE 1 DROP: 2 SOLUTION/ DROPS OPHTHALMIC at 20:34

## 2024-11-06 RX ADMIN — RISPERIDONE 2 MG: 1 TABLET, FILM COATED ORAL at 20:35

## 2024-11-06 NOTE — PROGRESS NOTES
Pulmonary progress Note     Patient's name:  Armando Lewis  Medical Record Number: [id number]  Patient's account/billing number: [id number]  Patient's YOB: 1960  Age: 64 y.o.  Date of Admission: 11/2/2024  3:04 AM  Date of Consult: 11/6/2024      Primary Care Physician: No primary care provider on file.      Code Status: Full Code    Reason for consult: Acute hypoxic and hypercapnic respiratory failure    Assessment and Plan     Acute on chronic hypoxic and hypercapnic respiratory failure  Multifocal gram-negative pneumonia with mucous plugging  Obesity         Plan:  Cefepime x 7 dats   Albuterol and 3% nebulized  Aspiration precautions  O2 to keep sat > 90%    Subjective:  Out of ICU  No fever  On O2    HISTORY OF PRESENT ILLNESS:   Mr./Ms. Armando Lewis is a 64 y.o. gentleman with past medical history stated below significant for schizoaffective disorder, major depressive disorder, was transferred to the ICU from the floor yesterday for hypoxic and hypercapnic respiratory failure.  History obtained from chart patient is poor historian.  Chest x-ray with multifocal left lung airspace disease with mucous plugging.            REVIEW OF SYSTEMS:  Review of Systems -   Unable to obtain        Physical Exam:    Vitals: BP (!) 144/82   Pulse 85   Temp 98.3 °F (36.8 °C) (Axillary)   Resp 18   Ht 1.778 m (5' 10\")   Wt 120.4 kg (265 lb 6.9 oz)   SpO2 90%   BMI 38.09 kg/m²     Last Body weight:   Wt Readings from Last 3 Encounters:   11/05/24 120.4 kg (265 lb 6.9 oz)   04/14/24 115 kg (253 lb 8.5 oz)   07/06/23 108.2 kg (238 lb 8.6 oz)       Body Mass Index : Body mass index is 38.09 kg/m².      Intake and Output summary:   Intake/Output Summary (Last 24 hours) at 11/6/2024 1052  Last data filed at 11/6/2024 0910  Gross per 24 hour   Intake 840 ml   Output 1300 ml   Net -460 ml       Physical Examination:     PHYSICAL EXAM:    Gen: no  acute distress  Eyes:

## 2024-11-06 NOTE — PROGRESS NOTES
Pt refused midday breathing treatment with agitation.  Difficult to understand most of what he is saying, but it was clear enough that he was NOT going to take breathing medication.  Pt received treatment this morning as he slept.

## 2024-11-06 NOTE — PROGRESS NOTES
aspiration appreciated with PO trials this date. Pt expectorating secretions during PO intake.      Pt will advance to least restrictive diet as indicated>Ongoing, not appropriate for upgrade this date. Will continue to monitor     Pt will functionally tolerate ongoing assessment of swallow function with diet to be determined as indicated>Goal met, d/c.     If s/s of pharyngeal phase dysphagia continue to be noted pt will participate in Modified Barium Swallow Study to further assess pharyngeal phase of swallow, assist with diet recommendations, and to further direct plan of care>Goal met, d/c.     Dysphagia Therapeutic Intervention: Instrumental assessment of swallow function (Modified Barium Swallow Study)     DATA     Respiratory Status: NC, 4L  Pain: Did not state     Vision:  R eye blindness  Hearing: Adequate for assessment/tx needs  Cognitive/behavioral: alert, agitated, verbally responsive, follows one step commands  Consistencies Presented: Regular texture, Puree texture, Thin liquids     PO Trial Feeding Assistance: Total feed,  Patient Positioning: Upright in bed   Dentition: Missing majority of dentition  Baseline Vocal Quality: unremarkable  Volitional Cough: productive    PO Trials:   IDDSI 0 (thin):  No overt s/s of aspiration appreciated.    IDDSI 2 (mildly thick):  DNT  IDDSI 3 (moderately thick): DNT  IDDSI 4 (puree): DNT  IDDSI 5 (minced and moist): DNT  IDDSI 6 (soft and bite-sized): No overt s/s of aspiration appreciated. Functional mastication with full oral clearance  IDDSI 7 (regular): DNT    Dysphagia Tx:   Direct Dysphagia tx: rec consideration for instrumental assessment  Dysphagia ex: Not completed   Training in compensatory strategies: Ongoing  Pt response to ex/training: Dependent      EDUCATION     Provided education regarding role of SLP, results of assessment, recommendations and general speech pathology plan of care.   Patient Response: Needs reinforcement  Family education: Family  not present/unavailable  Staff education: RN/MD aware of SLP recommendations    If patient discharges prior to next visit, this note will serve as discharge.     Timed Code Minutes: 15  Total Treatment Minutes: 34    Electronically signed by:    Layne Beckman M.A., [facility]-SLP  Speech-Language Pathologist  [facility]  [phone]    11/06/24  9:10 AM

## 2024-11-06 NOTE — PLAN OF CARE
Problem: Safety - Adult  Goal: Free from fall injury  11/6/2024 0536 by Angie Henson RN  Outcome: Progressing  11/5/2024 1850 by Emily Oliva RN  Outcome: Progressing  Flowsheets (Taken 11/5/2024 1545)  Free From Fall Injury: Instruct family/caregiver on patient safety     Problem: Discharge Planning  Goal: Discharge to home or other facility with appropriate resources  11/6/2024 0536 by Angie Henson RN  Outcome: Progressing  11/5/2024 1850 by Emily Oliva RN  Outcome: Progressing  Flowsheets  Taken 11/5/2024 1545 by Emily Oliva RN  Discharge to home or other facility with appropriate resources:   Identify barriers to discharge with patient and caregiver   Arrange for needed discharge resources and transportation as appropriate   Identify discharge learning needs (meds, wound care, etc)  Taken 11/5/2024 0800 by Lou Christina RN  Discharge to home or other facility with appropriate resources:   Identify barriers to discharge with patient and caregiver   Arrange for needed discharge resources and transportation as appropriate   Identify discharge learning needs (meds, wound care, etc)     Problem: Pain  Goal: Verbalizes/displays adequate comfort level or baseline comfort level  11/6/2024 0536 by Angie Henson RN  Outcome: Progressing  11/5/2024 1850 by Emily Oliva RN  Outcome: Progressing  Flowsheets (Taken 11/5/2024 0800 by Lou Christina RN)  Verbalizes/displays adequate comfort level or baseline comfort level:   Encourage patient to monitor pain and request assistance   Administer analgesics based on type and severity of pain and evaluate response   Assess pain using appropriate pain scale     Problem: Skin/Tissue Integrity  Goal: Absence of new skin breakdown  Description: 1.  Monitor for areas of redness and/or skin breakdown  2.  Assess vascular access sites hourly  3.  Every 4-6 hours minimum:  Change oxygen saturation probe site  4.  Every 4-6 hours:  If on nasal continuous

## 2024-11-06 NOTE — PROGRESS NOTES
[place]2.[place]  [facility] [place]      Name:  Armando eLwis /Age/Sex: 1960  (64 y.o. male)   MRN & CSN:  [id number] & [id number] Encounter Date/Time: 2024 11:24 AM EST    Location:  [address]- PCP: No primary care provider on file.       Hospital Day: 5  Subjective:   Chief Complaint: Follow-up aspiration pneumonia    Patient seen and evaluated at bedside, oxygen needs are down to 4 L, no fevers or chills, no issues with aspiration, no major behavioral issues, off restraints    Review of Systems:    Review of Systems    10 point ROS negative except as stated above in \"subjective\" section    Objective:     Intake/Output Summary (Last 24 hours) at 2024 1124  Last data filed at 2024 0910  Gross per 24 hour   Intake 840 ml   Output 1300 ml   Net -460 ml      Vitals:   Vitals:    24 0815   BP: (!) 144/82   Pulse: 85   Resp: 18   Temp: 98.3 °F (36.8 °C)   SpO2: 90%     Physical Exam:   General: Awake, alert and oriented, NAD  Cardiovascular: S1S2 present, regular rate and rhythm, no murmurs  Respiratory: Diminished at bases with bibasilar crackles  Gastrointestinal: Soft, non tender, positive bowel sounds, abdomen is distended though  Genitourinary: no suprapubic tenderness  Musculoskeletal: No edema  Neuro: Alert.    Medications:     Medications:    sodium chloride (Inhalant)  4 mL Nebulization TID    nicotine  1 patch TransDERmal Daily    albuterol  2.5 mg Nebulization 4x Daily RT    sodium chloride flush  5-40 mL IntraVENous 2 times per day    enoxaparin  30 mg SubCUTAneous BID    polyethylene glycol  17 g Oral BID    atorvastatin  40 mg Oral Nightly    [Held by provider] clopidogrel  75 mg Oral Daily    cyanocobalamin  1,000 mcg Oral Daily    dorzolamide  1 drop Both Eyes BID    ferrous sulfate  325 mg Oral Daily with breakfast    latanoprost  1 drop Both Eyes Nightly    senna  2 tablet Oral BID    tamsulosin  0.4 mg Oral Nightly    brimonidine  1 drop Both Eyes BID    risperiDONE

## 2024-11-06 NOTE — CARE COORDINATION
Discharge Planning:    Call placed to Bhanu, admissions with [facility], to inform him of potential discharge timeline and patient possibly needing oxygen therapy as OP. Bhanu verbalized understanding. CM to continue to follow and keep facility updated, as appropriate.    Electronically signed by NEELAM JEFFERSON RN on 11/6/2024 at 4:28 PM

## 2024-11-07 ENCOUNTER — APPOINTMENT (OUTPATIENT)
Dept: GENERAL RADIOLOGY | Age: 64
End: 2024-11-07
Payer: MEDICAID

## 2024-11-07 LAB
ANION GAP SERPL CALCULATED.3IONS-SCNC: 8 MMOL/L (ref 3–16)
BASOPHILS # BLD: 0 K/UL (ref 0–0.2)
BASOPHILS NFR BLD: 0.1 %
BUN SERPL-MCNC: 11 MG/DL (ref 7–20)
CALCIUM SERPL-MCNC: 9.5 MG/DL (ref 8.3–10.6)
CHLORIDE SERPL-SCNC: 98 MMOL/L (ref 99–110)
CO2 SERPL-SCNC: 33 MMOL/L (ref 21–32)
CREAT SERPL-MCNC: 0.6 MG/DL (ref 0.8–1.3)
DEPRECATED RDW RBC AUTO: 14.8 % (ref 12.4–15.4)
EOSINOPHIL # BLD: 0.2 K/UL (ref 0–0.6)
EOSINOPHIL NFR BLD: 2.1 %
GFR SERPLBLD CREATININE-BSD FMLA CKD-EPI: >90 ML/MIN/{1.73_M2}
GLUCOSE BLD-MCNC: 136 MG/DL (ref 70–99)
GLUCOSE BLD-MCNC: 96 MG/DL (ref 70–99)
GLUCOSE SERPL-MCNC: 121 MG/DL (ref 70–99)
HCT VFR BLD AUTO: 43 % (ref 40.5–52.5)
HGB BLD-MCNC: 13.6 G/DL (ref 13.5–17.5)
LYMPHOCYTES # BLD: 0.7 K/UL (ref 1–5.1)
LYMPHOCYTES NFR BLD: 6.4 %
MAGNESIUM SERPL-MCNC: 2 MG/DL (ref 1.8–2.4)
MCH RBC QN AUTO: 28 PG (ref 26–34)
MCHC RBC AUTO-ENTMCNC: 31.7 G/DL (ref 31–36)
MCV RBC AUTO: 88.3 FL (ref 80–100)
MONOCYTES # BLD: 1.2 K/UL (ref 0–1.3)
MONOCYTES NFR BLD: 11.1 %
NEUTROPHILS # BLD: 8.4 K/UL (ref 1.7–7.7)
NEUTROPHILS NFR BLD: 80.3 %
PERFORMED ON: ABNORMAL
PERFORMED ON: NORMAL
PHOSPHATE SERPL-MCNC: 2.8 MG/DL (ref 2.5–4.9)
PLATELET # BLD AUTO: 171 K/UL (ref 135–450)
PMV BLD AUTO: 8.9 FL (ref 5–10.5)
POTASSIUM SERPL-SCNC: 3.3 MMOL/L (ref 3.5–5.1)
RBC # BLD AUTO: 4.86 M/UL (ref 4.2–5.9)
SODIUM SERPL-SCNC: 139 MMOL/L (ref 136–145)
WBC # BLD AUTO: 10.4 K/UL (ref 4–11)

## 2024-11-07 PROCEDURE — 6370000000 HC RX 637 (ALT 250 FOR IP): Performed by: NURSE PRACTITIONER

## 2024-11-07 PROCEDURE — 71045 X-RAY EXAM CHEST 1 VIEW: CPT

## 2024-11-07 PROCEDURE — 94761 N-INVAS EAR/PLS OXIMETRY MLT: CPT

## 2024-11-07 PROCEDURE — 6370000000 HC RX 637 (ALT 250 FOR IP): Performed by: HOSPITALIST

## 2024-11-07 PROCEDURE — 2580000003 HC RX 258: Performed by: NURSE PRACTITIONER

## 2024-11-07 PROCEDURE — 2700000000 HC OXYGEN THERAPY PER DAY

## 2024-11-07 PROCEDURE — 85025 COMPLETE CBC W/AUTO DIFF WBC: CPT

## 2024-11-07 PROCEDURE — 2580000003 HC RX 258: Performed by: INTERNAL MEDICINE

## 2024-11-07 PROCEDURE — 94640 AIRWAY INHALATION TREATMENT: CPT

## 2024-11-07 PROCEDURE — 92526 ORAL FUNCTION THERAPY: CPT

## 2024-11-07 PROCEDURE — 6360000002 HC RX W HCPCS: Performed by: INTERNAL MEDICINE

## 2024-11-07 PROCEDURE — 99233 SBSQ HOSP IP/OBS HIGH 50: CPT | Performed by: INTERNAL MEDICINE

## 2024-11-07 PROCEDURE — 1200000000 HC SEMI PRIVATE

## 2024-11-07 PROCEDURE — 97129 THER IVNTJ 1ST 15 MIN: CPT

## 2024-11-07 PROCEDURE — 80048 BASIC METABOLIC PNL TOTAL CA: CPT

## 2024-11-07 PROCEDURE — 84100 ASSAY OF PHOSPHORUS: CPT

## 2024-11-07 PROCEDURE — 05HY33Z INSERTION OF INFUSION DEVICE INTO UPPER VEIN, PERCUTANEOUS APPROACH: ICD-10-PCS | Performed by: HOSPITALIST

## 2024-11-07 PROCEDURE — 6370000000 HC RX 637 (ALT 250 FOR IP): Performed by: INTERNAL MEDICINE

## 2024-11-07 PROCEDURE — 83735 ASSAY OF MAGNESIUM: CPT

## 2024-11-07 PROCEDURE — 36415 COLL VENOUS BLD VENIPUNCTURE: CPT

## 2024-11-07 RX ORDER — SODIUM CHLORIDE 0.9 % (FLUSH) 0.9 %
5-40 SYRINGE (ML) INJECTION EVERY 12 HOURS SCHEDULED
Status: DISCONTINUED | OUTPATIENT
Start: 2024-11-07 | End: 2024-11-08 | Stop reason: HOSPADM

## 2024-11-07 RX ORDER — SODIUM CHLORIDE 9 MG/ML
INJECTION, SOLUTION INTRAVENOUS PRN
Status: DISCONTINUED | OUTPATIENT
Start: 2024-11-07 | End: 2024-11-08 | Stop reason: HOSPADM

## 2024-11-07 RX ORDER — SODIUM CHLORIDE 0.9 % (FLUSH) 0.9 %
5-40 SYRINGE (ML) INJECTION PRN
Status: DISCONTINUED | OUTPATIENT
Start: 2024-11-07 | End: 2024-11-08 | Stop reason: HOSPADM

## 2024-11-07 RX ORDER — ACETAMINOPHEN 650 MG/1
650 SUPPOSITORY RECTAL EVERY 6 HOURS PRN
Status: DISCONTINUED | OUTPATIENT
Start: 2024-11-07 | End: 2024-11-08 | Stop reason: HOSPADM

## 2024-11-07 RX ORDER — LIDOCAINE HYDROCHLORIDE 10 MG/ML
50 INJECTION, SOLUTION EPIDURAL; INFILTRATION; INTRACAUDAL; PERINEURAL ONCE
Status: DISCONTINUED | OUTPATIENT
Start: 2024-11-07 | End: 2024-11-08 | Stop reason: HOSPADM

## 2024-11-07 RX ORDER — ACETAMINOPHEN 160 MG/5ML
650 SUSPENSION ORAL EVERY 6 HOURS PRN
Status: DISCONTINUED | OUTPATIENT
Start: 2024-11-07 | End: 2024-11-08 | Stop reason: HOSPADM

## 2024-11-07 RX ADMIN — RISPERIDONE 2 MG: 1 TABLET, FILM COATED ORAL at 20:21

## 2024-11-07 RX ADMIN — ALBUTEROL SULFATE 2.5 MG: 2.5 SOLUTION RESPIRATORY (INHALATION) at 08:25

## 2024-11-07 RX ADMIN — SODIUM CHLORIDE SOLN NEBU 3% 4 ML: 3 NEBU SOLN at 08:25

## 2024-11-07 RX ADMIN — ENOXAPARIN SODIUM 30 MG: 100 INJECTION SUBCUTANEOUS at 09:21

## 2024-11-07 RX ADMIN — LATANOPROST 1 DROP: 50 SOLUTION OPHTHALMIC at 20:41

## 2024-11-07 RX ADMIN — TAMSULOSIN HYDROCHLORIDE 0.4 MG: 0.4 CAPSULE ORAL at 20:21

## 2024-11-07 RX ADMIN — SODIUM CHLORIDE, PRESERVATIVE FREE 10 ML: 5 INJECTION INTRAVENOUS at 09:21

## 2024-11-07 RX ADMIN — CYANOCOBALAMIN TAB 1000 MCG 1000 MCG: 1000 TAB at 09:21

## 2024-11-07 RX ADMIN — GUAIFENESIN AND DEXTROMETHORPHAN 5 ML: 20; 200 SYRUP ORAL at 20:19

## 2024-11-07 RX ADMIN — ENOXAPARIN SODIUM 30 MG: 100 INJECTION SUBCUTANEOUS at 20:21

## 2024-11-07 RX ADMIN — ALBUTEROL SULFATE 2.5 MG: 2.5 SOLUTION RESPIRATORY (INHALATION) at 12:10

## 2024-11-07 RX ADMIN — DORZOLAMIDE HYDROCHLORIDE 1 DROP: 20 SOLUTION OPHTHALMIC at 20:41

## 2024-11-07 RX ADMIN — CEFEPIME 2000 MG: 2 INJECTION, POWDER, FOR SOLUTION INTRAVENOUS at 17:20

## 2024-11-07 RX ADMIN — CEFEPIME 2000 MG: 2 INJECTION, POWDER, FOR SOLUTION INTRAVENOUS at 09:21

## 2024-11-07 RX ADMIN — BRIMONIDINE TARTRATE 1 DROP: 2 SOLUTION/ DROPS OPHTHALMIC at 20:41

## 2024-11-07 RX ADMIN — FERROUS SULFATE TAB 325 MG (65 MG ELEMENTAL FE) 325 MG: 325 (65 FE) TAB at 09:21

## 2024-11-07 RX ADMIN — CLOPIDOGREL BISULFATE 75 MG: 75 TABLET ORAL at 09:21

## 2024-11-07 RX ADMIN — ATORVASTATIN CALCIUM 40 MG: 40 TABLET, FILM COATED ORAL at 20:21

## 2024-11-07 RX ADMIN — POLYETHYLENE GLYCOL 3350 17 G: 17 POWDER, FOR SOLUTION ORAL at 20:18

## 2024-11-07 RX ADMIN — POLYETHYLENE GLYCOL 3350 17 G: 17 POWDER, FOR SOLUTION ORAL at 10:25

## 2024-11-07 RX ADMIN — SODIUM CHLORIDE SOLN NEBU 3% 4 ML: 3 NEBU SOLN at 12:10

## 2024-11-07 RX ADMIN — ACETAMINOPHEN 650 MG: 160 SUSPENSION ORAL at 21:33

## 2024-11-07 RX ADMIN — POTASSIUM BICARBONATE 40 MEQ: 391 TABLET, EFFERVESCENT ORAL at 09:21

## 2024-11-07 RX ADMIN — RISPERIDONE 2 MG: 1 TABLET, FILM COATED ORAL at 14:24

## 2024-11-07 RX ADMIN — RISPERIDONE 2 MG: 1 TABLET, FILM COATED ORAL at 09:21

## 2024-11-07 RX ADMIN — SENNOSIDES 17.2 MG: 8.6 TABLET, FILM COATED ORAL at 09:21

## 2024-11-07 RX ADMIN — SENNOSIDES 17.2 MG: 8.6 TABLET, FILM COATED ORAL at 20:21

## 2024-11-07 ASSESSMENT — PAIN DESCRIPTION - ORIENTATION: ORIENTATION: RIGHT

## 2024-11-07 ASSESSMENT — PAIN DESCRIPTION - DESCRIPTORS: DESCRIPTORS: ACHING;CRAMPING;DISCOMFORT

## 2024-11-07 ASSESSMENT — PAIN SCALES - GENERAL
PAINLEVEL_OUTOF10: 8
PAINLEVEL_OUTOF10: 6

## 2024-11-07 NOTE — PROGRESS NOTES
[facility]  SLP DYSPHAGIA THERAPY    Patient: Armando Lewis   : 1960   MRN: [id number]    Treatment Date: 2024   Admitting Diagnosis:   Septicemia (HCC) [A41.9]  Acute respiratory failure with hypoxia and hypercapnia [J96.01, J96.02]  Pneumonia of left lower lobe due to infectious organism [J18.9]   has a past medical history of Antisocial personality disorder ([facility]), Blindness right eye category 5, normal vision left eye, Drug induced subacute dyskinesia, Insomnia, Major depressive disorder, recurrent, unspecified ([facility]), Melena, Schizoaffective disorder ([facility]), and Substance abuse ([facility]).   has no past surgical history on file.  Allergies: medication allergies noted  Dysphagia History: MBS  \"MBSS Assessment Impression: Patient presents with WFL oropharyngeal swallow. Consistent posterior bolus loss and delayed swallow initiation, this did not impact swallow safety. Swallow safety remains intact as evidenced by absence of penetration/aspiration across all trials of thin liquids, puree and regular solids. Swallow efficiency mildly impaired as evidenced by prolonged mastication and slowed anterior-posterior bolus transit. Considering the above factors recommend soft and bite sized diet with thin liquids. SLP will continue to follow. \"  GI history: None per EMR  ENT history: None per EMR  Baseline/Prior Level of Function: Living Status: Pt resides in SNF; Baseline diet: presumed regular/thin    Onset: 2024     CURRENT ENCOUNTER DIAGNOSTICS/COURSE OF ADMISSION       H&P: Pt is a 63 y/o M with PMHx significant for antisocial personality disorder, R eye blindness, dyskinesia, depression, schizoaffective disorder and substance abuse who presents with chief complaint of shortness of breath and cough which is getting worse.      Consults: Pulmonology     Imaging:  Chest X-ray: 24 \"IMPRESSION:  Decreased pleural-parenchymal disease on the left\"     Current Diet Level (prior to evaluation): Soft & Bite

## 2024-11-07 NOTE — CARE COORDINATION
Discharge Planning:  MIGUEL ANGEL spoke with Bhanu with [facility].  Bhanu confirmed this facility can accommodate IV Cefapime 2xs a day.  MIGUEL ANGEL notified Dr. Bautista.  Dr. Bautista will order a PICC.  AMBROSIO Hinkle  [phone]  Electronically signed by AMBROSIO Wells on 11/7/2024 at 3:14 PM

## 2024-11-07 NOTE — PROGRESS NOTES
Pulmonary progress Note     Patient's name:  Armando Lewis  Medical Record Number: [id number]  Patient's account/billing number: [id number]  Patient's YOB: 1960  Age: 64 y.o.  Date of Admission: 11/2/2024  3:04 AM  Date of Consult: 11/7/2024      Primary Care Physician: No primary care provider on file.      Code Status: Full Code    Reason for consult: Acute hypoxic and hypercapnic respiratory failure    Assessment and Plan     Acute on chronic hypoxic and hypercapnic respiratory failure  Multifocal gram-negative pneumonia with mucous plugging  Obesity         Plan:  Cefepime x 7 dats   Check CXR   Albuterol and 3% nebulized  Aspiration precautions  O2 to keep sat > 90%    Subjective:  No acute events overnight  Afebrile  Lethargic     HISTORY OF PRESENT ILLNESS:   Mr./Ms. Armando Lewis is a 64 y.o. gentleman with past medical history stated below significant for schizoaffective disorder, major depressive disorder, was transferred to the ICU from the floor yesterday for hypoxic and hypercapnic respiratory failure.  History obtained from chart patient is poor historian.  Chest x-ray with multifocal left lung airspace disease with mucous plugging.            REVIEW OF SYSTEMS:  Review of Systems -   Unable to obtain        Physical Exam:    Vitals: /83   Pulse 78   Temp 98.2 °F (36.8 °C) (Oral)   Resp 18   Ht 1.778 m (5' 10\")   Wt 121 kg (266 lb 12.1 oz)   SpO2 93%   BMI 38.28 kg/m²     Last Body weight:   Wt Readings from Last 3 Encounters:   11/07/24 121 kg (266 lb 12.1 oz)   04/14/24 115 kg (253 lb 8.5 oz)   07/06/23 108.2 kg (238 lb 8.6 oz)       Body Mass Index : Body mass index is 38.28 kg/m².      Intake and Output summary:   Intake/Output Summary (Last 24 hours) at 11/7/2024 0951  Last data filed at 11/7/2024 0614  Gross per 24 hour   Intake 1624.06 ml   Output 1460 ml   Net 164.06 ml       Physical Examination:     PHYSICAL EXAM:    Gen:

## 2024-11-07 NOTE — PLAN OF CARE
Problem: Safety - Adult  Goal: Free from fall injury  11/7/2024 1204 by Mariaa Byrnes RN  Outcome: Progressing     Problem: Discharge Planning  Goal: Discharge to home or other facility with appropriate resources  11/7/2024 1204 by Mariaa Byrnes RN  Outcome: Progressing     Problem: Pain  Goal: Verbalizes/displays adequate comfort level or baseline comfort level  11/7/2024 1204 by Mariaa Byrnes RN  Outcome: Progressing     Problem: Skin/Tissue Integrity  Goal: Absence of new skin breakdown  Description: 1.  Monitor for areas of redness and/or skin breakdown  2.  Assess vascular access sites hourly  3.  Every 4-6 hours minimum:  Change oxygen saturation probe site  4.  Every 4-6 hours:  If on nasal continuous positive airway pressure, respiratory therapy assess nares and determine need for appliance change or resting period.  11/7/2024 1204 by Mariaa Byrnes RN  Outcome: Progressing     Problem: ABCDS Injury Assessment  Goal: Absence of physical injury  11/7/2024 1204 by Mariaa Byrnes RN  Outcome: Progressing     Problem: Safety - Medical Restraint  Goal: Remains free of injury from restraints (Restraint for Interference with Medical Device)  Description: INTERVENTIONS:  1. Determine that other, less restrictive measures have been tried or would not be effective before applying the restraint  2. Evaluate the patient's condition at the time of restraint application  3. Inform patient/family regarding the reason for restraint  4. Q2H: Monitor safety, psychosocial status, comfort, nutrition and hydration  11/7/2024 1204 by Mariaa Byrnes RN  Outcome: Progressing     Problem: Respiratory - Adult  Goal: Achieves optimal ventilation and oxygenation  11/7/2024 1204 by Mariaa Byrnes RN  Outcome: Progressing     Problem: Cardiovascular - Adult  Goal: Maintains optimal cardiac output and hemodynamic stability  11/7/2024 1204 by Mariaa Byrnes RN  Outcome: Progressing     Problem: Skin/Tissue Integrity - Adult  Goal:  Skin integrity remains intact  11/7/2024 1204 by Mariaa Byrnes RN  Outcome: Progressing     Problem: Musculoskeletal - Adult  Goal: Return ADL status to a safe level of function  11/7/2024 1204 by Mariaa Byrnes RN  Outcome: Progressing     Problem: Genitourinary - Adult  Goal: Urinary catheter remains patent  11/7/2024 1204 by Mariaa Byrnes RN  Outcome: Progressing     Problem: Neurosensory - Adult  Goal: Achieves stable or improved neurological status  11/7/2024 1204 by Mariaa Byrnes RN  Outcome: Progressing     Problem: Gastrointestinal - Adult  Goal: Minimal or absence of nausea and vomiting  11/7/2024 1204 by Mariaa Byrnes RN  Outcome: Progressing

## 2024-11-07 NOTE — PROGRESS NOTES
V2.0  [facility] [place]      Name:  Armando Lewis /Age/Sex: 1960  (64 y.o. male)   MRN & CSN:  [id number] & [id number] Encounter Date/Time: 2024 1:41 PM EST    Location:  [place] [address]- PCP: No primary care provider on file.       Hospital Day: 6  Subjective:   Chief Complaint: Follow-up hypoxemia    Patient seen and evaluated at bedside, this morning, his demeanor was okay, later it seems she was refusing his eyedrops and was getting a little aggressive with nursing staff.  No restraints needed, oxygen needs are at 4 L    Review of Systems:    Review of Systems    10 point ROS negative except as stated above in \"subjective\" section    Objective:     Intake/Output Summary (Last 24 hours) at 2024 1341  Last data filed at 2024 1012  Gross per 24 hour   Intake 1864.06 ml   Output 1280 ml   Net 584.06 ml      Vitals:   Vitals:    24 0833   BP:    Pulse:    Resp:    Temp:    SpO2: 93%     Physical Exam:   General: Awake, alert oftentimes his speech is not comprehensible and I do not believe that is too far from baseline  Cardiovascular: S1S2 present, regular rate and rhythm, no murmurs  Respiratory: Bibasilar crackles  Gastrointestinal: Soft, non tender, positive bowel sounds   Genitourinary: no suprapubic tenderness  Musculoskeletal: Chronic 1+ lower extremity edema neuro: Alert.    Medications:     Medications:    sodium chloride (Inhalant)  4 mL Nebulization TID    nicotine  1 patch TransDERmal Daily    albuterol  2.5 mg Nebulization 4x Daily RT    sodium chloride flush  5-40 mL IntraVENous 2 times per day    enoxaparin  30 mg SubCUTAneous BID    polyethylene glycol  17 g Oral BID    atorvastatin  40 mg Oral Nightly    clopidogrel  75 mg Oral Daily    cyanocobalamin  1,000 mcg Oral Daily    dorzolamide  1 drop Both Eyes BID    ferrous sulfate  325 mg Oral Daily with breakfast    latanoprost  1 drop Both Eyes Nightly    senna  2 tablet Oral BID    tamsulosin  0.4 mg Oral  troponin, likely demand ischemia, EKG negative, not a great historian, ACS less likely, echo was planned however patient was aggressive with technician, I do not think needs to be investigated further however patient can pursue     -Patient has chronic lower extremity edema, his diuretics are on hold, his oral intake is still diminished     -Abdominal is distended, will watch for developing ileus, continue oral laxatives, if not successful, will need enema     Personally reviewed Lab Studies and Imaging      Telemetry strip interpreted personally and results sinus rhythm     Imaging that was interpreted personally includes chest x-ray and results new basilar infiltrates    Electronically signed by Noemí Bautista MD on 11/7/2024 at 1:41 PM    This not was generated completely or in part using Dragon, speech recognition software, please excuse any inaccuracies or misstatements.

## 2024-11-07 NOTE — CARE COORDINATION
Case Management Assessment  Initial Evaluation    Date/Time of Evaluation: 11/7/2024 1:50 PM  Assessment Completed by: AMBROSIO Wells    If patient is discharged prior to next notation, then this note serves as note for discharge by case management.    Patient Name: Armando Lewis                   YOB: 1960  Diagnosis: Septicemia (HCC) [A41.9]  Acute respiratory failure with hypoxia and hypercapnia [J96.01, J96.02]  Pneumonia of left lower lobe due to infectious organism [J18.9]                   Date / Time: 11/2/2024  3:04 AM    Patient Admission Status: Inpatient   Readmission Risk (Low < 19, Mod (19-27), High > 27): Readmission Risk Score: 14.4    Current PCP: No primary care provider on file.  PCP verified by CM? (P) Yes    Chart Reviewed: Yes      History Provided by: (P) Child/Family (Spoke with pts mother)  Patient Orientation: (P) Other (see comment) (Spoke with pts mother)    Patient Cognition: (P) Other (see comment) (Spoke with pts mother)    Hospitalization in the last 30 days (Readmission):  No    If yes, Readmission Assessment in CM Navigator will be completed.    Advance Directives:      Code Status: Full Code   Patient's Primary Decision Maker is: (P) Legal Next of Kin    Primary Decision Maker: Shirley Lewis [facility] - [phone]    Discharge Planning:    Patient lives with: (P) Other (Comment) (LTC) Type of Home: (P) Long-Term Care  Primary Care Giver: (P) Other (Comment) (LTC at [facility])  Patient Support Systems include: (P) Parent, Family Members   Current Financial resources: (P) Medicaid  Current community resources: (P) ECF/Home Care (LTC)  Current services prior to admission: None            Current DME:              Type of Home Care services:  None    ADLS  Prior functional level: (P) Other (see comment) (LTC at [facility])  Current functional level: (P) Other (see comment) (LTC at [facility])    PT AM-PAC:   /24  OT AM-PAC:   /24    Family can  mother who confirmed the plan is for this pt to return to the ECF at [place]. MIGUEL ANGEL spoke with Bhanu at [facility].  Bhanu confirmed this pt can return and will check to see if the facility is able to accommodate IV Cefhapime 2xs a day.  Pt will need a PICC.     PLAN: From [facility] LTC.  Can return at d/c.  Awaiting clarification from Bhanu at the ECF to ensure this facility can accommodate the IV abx needs.  Will need PICC.  Will need BLS transport.     The Plan for Transition of Care is related to the following treatment goals of Septicemia (HCC) [A41.9]  Acute respiratory failure with hypoxia and hypercapnia [J96.01, J96.02]  Pneumonia of left lower lobe due to infectious organism [J18.9]    IF APPLICABLE: The Patient and/or patient representative Armando and his family were provided with a choice of provider and agrees with the discharge plan. Freedom of choice list with basic dialogue that supports the patient's individualized plan of care/goals and shares the quality data associated with the providers was provided to: (P) Patient Representative   Patient Representative Name: (P) Pts mother     The Patient and/or Patient Representative Agree with the Discharge Plan? (P) Yes    AMBROSIO Wells  Case Management Department  Ph: [phone] Fax: [phone]  /Electronically signed by AMBROSIO Wells on 11/7/2024 at 1:55 PM       11/07/24 1347   Service Assessment   Patient Orientation Other (see comment)  (Spoke with pts mother)   Cognition Other (see comment)  (Spoke with pts mother)   History Provided By Child/Family  (Spoke with pts mother)   Primary Caregiver Other (Comment)  (LTC at [facility])   Accompanied By/Relationship self   Support Systems Parent;Family Members   Patient's Healthcare Decision Maker is: Legal Next of Kin   PCP Verified by CM Yes   Last Visit to PCP Within last 3 months   Prior Functional Level Other (see comment)  (LTC at [facility])   Current Functional

## 2024-11-07 NOTE — PLAN OF CARE
Problem: Safety - Adult  Goal: Free from fall injury  11/6/2024 2314 by Priscilla Ortiz RN  Outcome: Progressing  11/6/2024 1852 by Mariaa Byrnes RN  Outcome: Progressing     Problem: Discharge Planning  Goal: Discharge to home or other facility with appropriate resources  11/6/2024 2314 by Priscilla Ortiz RN  Outcome: Progressing  Flowsheets (Taken 11/6/2024 2043)  Discharge to home or other facility with appropriate resources: Identify barriers to discharge with patient and caregiver  11/6/2024 1852 by Mariaa Byrnes RN  Outcome: Progressing     Problem: Pain  Goal: Verbalizes/displays adequate comfort level or baseline comfort level  11/6/2024 2314 by Priscilla Ortiz RN  Outcome: Progressing  11/6/2024 1852 by Mariaa Byrnes RN  Outcome: Progressing     Problem: Skin/Tissue Integrity  Goal: Absence of new skin breakdown  Description: 1.  Monitor for areas of redness and/or skin breakdown  2.  Assess vascular access sites hourly  3.  Every 4-6 hours minimum:  Change oxygen saturation probe site  4.  Every 4-6 hours:  If on nasal continuous positive airway pressure, respiratory therapy assess nares and determine need for appliance change or resting period.  11/6/2024 2314 by Priscilla Ortiz RN  Outcome: Progressing  11/6/2024 1852 by Mariaa Byrnes RN  Outcome: Progressing     Problem: ABCDS Injury Assessment  Goal: Absence of physical injury  11/6/2024 2314 by Priscilla Ortiz RN  Outcome: Progressing  11/6/2024 1852 by Mariaa Byrnes RN  Outcome: Progressing     Problem: Safety - Medical Restraint  Goal: Remains free of injury from restraints (Restraint for Interference with Medical Device)  Description: INTERVENTIONS:  1. Determine that other, less restrictive measures have been tried or would not be effective before applying the restraint  2. Evaluate the patient's condition at the time of restraint application  3. Inform patient/family regarding the reason for restraint  4. Q2H: Monitor

## 2024-11-08 VITALS
TEMPERATURE: 97.8 F | WEIGHT: 273.59 LBS | SYSTOLIC BLOOD PRESSURE: 106 MMHG | HEART RATE: 67 BPM | RESPIRATION RATE: 19 BRPM | OXYGEN SATURATION: 90 % | DIASTOLIC BLOOD PRESSURE: 72 MMHG | HEIGHT: 70 IN | BODY MASS INDEX: 39.17 KG/M2

## 2024-11-08 LAB
ANION GAP SERPL CALCULATED.3IONS-SCNC: 8 MMOL/L (ref 3–16)
BUN SERPL-MCNC: 13 MG/DL (ref 7–20)
CALCIUM SERPL-MCNC: 9.8 MG/DL (ref 8.3–10.6)
CHLORIDE SERPL-SCNC: 100 MMOL/L (ref 99–110)
CO2 SERPL-SCNC: 33 MMOL/L (ref 21–32)
CREAT SERPL-MCNC: 0.6 MG/DL (ref 0.8–1.3)
GFR SERPLBLD CREATININE-BSD FMLA CKD-EPI: >90 ML/MIN/{1.73_M2}
GLUCOSE BLD-MCNC: 122 MG/DL (ref 70–99)
GLUCOSE BLD-MCNC: 94 MG/DL (ref 70–99)
GLUCOSE SERPL-MCNC: 93 MG/DL (ref 70–99)
PERFORMED ON: ABNORMAL
PERFORMED ON: NORMAL
POTASSIUM SERPL-SCNC: 3.9 MMOL/L (ref 3.5–5.1)
SODIUM SERPL-SCNC: 141 MMOL/L (ref 136–145)

## 2024-11-08 PROCEDURE — 80048 BASIC METABOLIC PNL TOTAL CA: CPT

## 2024-11-08 PROCEDURE — 2580000003 HC RX 258: Performed by: HOSPITALIST

## 2024-11-08 PROCEDURE — 94761 N-INVAS EAR/PLS OXIMETRY MLT: CPT

## 2024-11-08 PROCEDURE — 2700000000 HC OXYGEN THERAPY PER DAY

## 2024-11-08 PROCEDURE — 99233 SBSQ HOSP IP/OBS HIGH 50: CPT | Performed by: INTERNAL MEDICINE

## 2024-11-08 PROCEDURE — 2580000003 HC RX 258: Performed by: INTERNAL MEDICINE

## 2024-11-08 PROCEDURE — 6370000000 HC RX 637 (ALT 250 FOR IP): Performed by: HOSPITALIST

## 2024-11-08 PROCEDURE — 94640 AIRWAY INHALATION TREATMENT: CPT

## 2024-11-08 PROCEDURE — 2580000003 HC RX 258: Performed by: NURSE PRACTITIONER

## 2024-11-08 PROCEDURE — 6360000002 HC RX W HCPCS: Performed by: INTERNAL MEDICINE

## 2024-11-08 PROCEDURE — 6370000000 HC RX 637 (ALT 250 FOR IP): Performed by: INTERNAL MEDICINE

## 2024-11-08 PROCEDURE — 94669 MECHANICAL CHEST WALL OSCILL: CPT

## 2024-11-08 RX ADMIN — CEFEPIME 2000 MG: 2 INJECTION, POWDER, FOR SOLUTION INTRAVENOUS at 00:46

## 2024-11-08 RX ADMIN — CYANOCOBALAMIN TAB 1000 MCG 1000 MCG: 1000 TAB at 08:41

## 2024-11-08 RX ADMIN — RISPERIDONE 2 MG: 1 TABLET, FILM COATED ORAL at 08:41

## 2024-11-08 RX ADMIN — CLOPIDOGREL BISULFATE 75 MG: 75 TABLET ORAL at 08:41

## 2024-11-08 RX ADMIN — POLYETHYLENE GLYCOL 3350 17 G: 17 POWDER, FOR SOLUTION ORAL at 08:41

## 2024-11-08 RX ADMIN — CEFEPIME 2000 MG: 2 INJECTION, POWDER, FOR SOLUTION INTRAVENOUS at 09:05

## 2024-11-08 RX ADMIN — SODIUM CHLORIDE SOLN NEBU 3% 4 ML: 3 NEBU SOLN at 08:01

## 2024-11-08 RX ADMIN — ALBUTEROL SULFATE 2.5 MG: 2.5 SOLUTION RESPIRATORY (INHALATION) at 11:30

## 2024-11-08 RX ADMIN — RISPERIDONE 2 MG: 1 TABLET, FILM COATED ORAL at 15:41

## 2024-11-08 RX ADMIN — SENNOSIDES 17.2 MG: 8.6 TABLET, FILM COATED ORAL at 08:41

## 2024-11-08 RX ADMIN — ENOXAPARIN SODIUM 30 MG: 100 INJECTION SUBCUTANEOUS at 08:38

## 2024-11-08 RX ADMIN — FERROUS SULFATE TAB 325 MG (65 MG ELEMENTAL FE) 325 MG: 325 (65 FE) TAB at 08:41

## 2024-11-08 RX ADMIN — ALBUTEROL SULFATE 2.5 MG: 2.5 SOLUTION RESPIRATORY (INHALATION) at 08:00

## 2024-11-08 RX ADMIN — BRIMONIDINE TARTRATE 1 DROP: 2 SOLUTION/ DROPS OPHTHALMIC at 08:37

## 2024-11-08 RX ADMIN — SODIUM CHLORIDE, PRESERVATIVE FREE 10 ML: 5 INJECTION INTRAVENOUS at 08:37

## 2024-11-08 RX ADMIN — DORZOLAMIDE HYDROCHLORIDE 1 DROP: 20 SOLUTION OPHTHALMIC at 08:36

## 2024-11-08 NOTE — PROGRESS NOTES
When tried to get pt to take HHN treatments pt pulled mask off face and threw to floor. Refused to let me put back on again.

## 2024-11-08 NOTE — FLOWSHEET NOTE
Pt. C/o pain, right arm., scored 8/10. VSS    Secure message sent to Hortensia Rocha NP, obtained an order for Tylenol, order acknowledged.

## 2024-11-08 NOTE — PROGRESS NOTES
Discharge orders acknowledged by RN. Patient PICC line removed without complications. Transparent dressing applied. Patient discharged with belongings. Patient being transported back to [facility] by [facility].

## 2024-11-08 NOTE — PROCEDURES
Left message to return phone call PROCEDURE NOTE  Date: 11/7/2024   Name: Armando Lewis  YOB: 1960    Procedures    PICC line placed in left left basilic in 1 attempts.  CHG dressing applied.  Report given to Mariaa OBREGON.

## 2024-11-08 NOTE — PROGRESS NOTES
This RN assuming care at this time. Patient does not voice any questions or concerns at this time.

## 2024-11-08 NOTE — PROGRESS NOTES
Pulmonary progress Note     Patient's name:  Armando Lewis  Medical Record Number: [id number]  Patient's account/billing number: [id number]  Patient's YOB: 1960  Age: 64 y.o.  Date of Admission: 11/2/2024  3:04 AM  Date of Consult: 11/8/2024      Primary Care Physician: No primary care provider on file.      Code Status: Full Code    Reason for consult: Acute hypoxic and hypercapnic respiratory failure    Assessment and Plan     Acute on chronic hypoxic and hypercapnic respiratory failure  Multifocal gram-negative pneumonia with mucous plugging  Obesity         Plan:  Cefepime x 7 dats   Chest x-ray reviewed improving left lower lobe pneumonia  Albuterol and 3% nebulized  Aspiration precautions  O2 to keep sat > 90%    Subjective:  No acute events overnight  Afebrile  Lethargic     HISTORY OF PRESENT ILLNESS:   Mr./Ms. Armando Lewis is a 64 y.o. gentleman with past medical history stated below significant for schizoaffective disorder, major depressive disorder, was transferred to the ICU from the floor yesterday for hypoxic and hypercapnic respiratory failure.  History obtained from chart patient is poor historian.  Chest x-ray with multifocal left lung airspace disease with mucous plugging.            REVIEW OF SYSTEMS:  Review of Systems -   Unable to obtain        Physical Exam:    Vitals: /72   Pulse 67   Temp 99.3 °F (37.4 °C) (Axillary)   Resp 19   Ht 1.778 m (5' 10\")   Wt 124.1 kg (273 lb 9.5 oz)   SpO2 92%   BMI 39.26 kg/m²     Last Body weight:   Wt Readings from Last 3 Encounters:   11/08/24 124.1 kg (273 lb 9.5 oz)   04/14/24 115 kg (253 lb 8.5 oz)   07/06/23 108.2 kg (238 lb 8.6 oz)       Body Mass Index : Body mass index is 39.26 kg/m².      Intake and Output summary:   Intake/Output Summary (Last 24 hours) at 11/8/2024 0930  Last data filed at 11/8/2024 0913  Gross per 24 hour   Intake 1401.31 ml   Output 1500 ml   Net -98.69 ml

## 2024-11-08 NOTE — DISCHARGE SUMMARY
of residual aerated lung is present within the apex.  The right lung is clear. Heart size and vascularity are stable.     Worsening left-sided atelectasis versus pneumonia with enlarging pleural effusion.     XR CHEST PORTABLE    Result Date: 11/2/2024  EXAMINATION: ONE XRAY VIEW OF THE CHEST 11/2/2024 3:28 am COMPARISON: 04/07/2024. HISTORY: ORDERING SYSTEM PROVIDED HISTORY: sob TECHNOLOGIST PROVIDED HISTORY: Reason for exam:->sob Reason for Exam: sob FINDINGS: The cardiac silhouette is enlarged.  Mediastinal contours are normal. There are new infiltrates in the left lung, concerning for pneumonia.  The right lung is clear. Old left-sided rib fractures are noted. No pneumothorax.     New infiltrates in the left lung, concerning for pneumonia.       CBC:   Recent Labs     11/06/24  0515 11/07/24  0559   WBC 10.9 10.4   HGB 15.3 13.6    171     BMP:    Recent Labs     11/06/24  0707 11/07/24  0559 11/08/24  0415    139 141   K 3.6 3.3* 3.9   CL 95* 98* 100   CO2 35* 33* 33*   BUN 14 11 13   CREATININE 0.6* 0.6* 0.6*   GLUCOSE 129* 121* 93     Hepatic: No results for input(s): \"AST\", \"ALT\", \"BILITOT\", \"ALKPHOS\" in the last 72 hours.    Invalid input(s): \"ALB\"  Lipids: No results found for: \"CHOL\", \"HDL\", \"TRIG\"  Hemoglobin A1C:   Lab Results   Component Value Date/Time    LABA1C 6.3 07/16/2022 04:21 AM     TSH: No results found for: \"TSH\"  Troponin: No results found for: \"TROPONINT\"  Lactic Acid: No results for input(s): \"LACTA\" in the last 72 hours.  BNP: No results for input(s): \"PROBNP\" in the last 72 hours.  UA:  Lab Results   Component Value Date/Time    NITRU Negative 11/02/2024 03:40 PM    COLORU Yellow 11/02/2024 03:40 PM    PHUR 5.0 11/02/2024 03:40 PM    PHUR 5.0 04/08/2024 06:00 AM    WBCUA 1 07/04/2023 10:49 AM    RBCUA 2 07/04/2023 10:49 AM    BACTERIA 4+ 07/04/2023 10:49 AM    CLARITYU Clear 11/02/2024 03:40 PM    LEUKOCYTESUR Negative 11/02/2024 03:40 PM    UROBILINOGEN 0.2 11/02/2024 03:40

## 2024-11-08 NOTE — PLAN OF CARE
Problem: Safety - Adult  Goal: Free from fall injury  11/7/2024 2216 by Priscilla Ortiz RN  Outcome: Progressing  11/7/2024 1204 by Mariaa Byrnes RN  Outcome: Progressing     Problem: Discharge Planning  Goal: Discharge to home or other facility with appropriate resources  11/7/2024 2216 by Priscilla Ortiz RN  Outcome: Progressing  11/7/2024 1204 by Mariaa Byrnes RN  Outcome: Progressing     Problem: Pain  Goal: Verbalizes/displays adequate comfort level or baseline comfort level  11/7/2024 2216 by Priscilla Ortiz RN  Outcome: Progressing  11/7/2024 1204 by Mariaa Byrnes RN  Outcome: Progressing     Problem: Skin/Tissue Integrity  Goal: Absence of new skin breakdown  Description: 1.  Monitor for areas of redness and/or skin breakdown  2.  Assess vascular access sites hourly  3.  Every 4-6 hours minimum:  Change oxygen saturation probe site  4.  Every 4-6 hours:  If on nasal continuous positive airway pressure, respiratory therapy assess nares and determine need for appliance change or resting period.  11/7/2024 2216 by Priscilla Ortiz RN  Outcome: Progressing  11/7/2024 1204 by Mariaa Byrnes RN  Outcome: Progressing     Problem: ABCDS Injury Assessment  Goal: Absence of physical injury  11/7/2024 2216 by Priscilla Ortiz RN  Outcome: Progressing  11/7/2024 1204 by Mariaa Byrnes RN  Outcome: Progressing     Problem: Safety - Medical Restraint  Goal: Remains free of injury from restraints (Restraint for Interference with Medical Device)  Description: INTERVENTIONS:  1. Determine that other, less restrictive measures have been tried or would not be effective before applying the restraint  2. Evaluate the patient's condition at the time of restraint application  3. Inform patient/family regarding the reason for restraint  4. Q2H: Monitor safety, psychosocial status, comfort, nutrition and hydration  11/7/2024 2216 by Priscilla Ortiz RN  Outcome: Progressing  11/7/2024 1204 by Mariaa Byrnes  RN  Outcome: Progressing     Problem: Gastrointestinal - Adult  Goal: Minimal or absence of nausea and vomiting  11/7/2024 2216 by Priscilla Ortiz RN  Outcome: Progressing  Flowsheets (Taken 11/7/2024 2112)  Minimal or absence of nausea and vomiting:   Provide nonpharmacologic comfort measures as appropriate   Administer ordered antiemetic medications as needed  11/7/2024 1204 by Mariaa Byrnes RN  Outcome: Progressing

## 2024-11-08 NOTE — CARE COORDINATION
Case Management Discharge Note          Date / Time of Note: 11/8/2024 1:45 PM                  Patient Name: Armando Lewis   YOB: 1960  Diagnosis: Septicemia (HCC) [A41.9]  Acute respiratory failure with hypoxia and hypercapnia [J96.01, J96.02]  Pneumonia of left lower lobe due to infectious organism [J18.9]   Date / Time: 11/2/2024  3:04 AM    Financial:  Pay[place] / Plan: [place]PT OF [place] / Product Type: *No Product type* /      Pharmacy:    [facility] - [facility] ([place], [place] - [address] [facility] -  [phone] - F [phone]  [address] [facility] [address]  Phone: [phone] Fax: [phone]      Assistance purchasing medications?:    Assistance provided by Case Management: None at this time    DISCHARGE Disposition: Long Term Care Facility (LTC)    Nursing Facility:   Discharging to Facility/ Agency   Name:  [facility]  Address:  [address] [place] [address]   Phone:  [phone]  Fax:  [phone]      LOC at discharge: Long Term Care  SHANNAN Completed: Yes             NURSING REPORT NUMBER: [phone]               NURSING FAX NUMBER: [phone]    Notification completed in [facility]/PAS?:  Not Applicable      Transportation:  Transportation PLAN for discharge: EMS transportation   Mode of Transport: Ambulance stretcher - BLS  Reason for medical transport: Confused due to mental health issues and requires ambulance transport due to safety  Name of Transport Company: [employer]  Phone: [phone]  Time of Transport: 1600    Transport form completed: Yes    IMM Completed:   Not Indicated    Additional CM Notes: SW spoke with Bhanu at [facility] to inform him this pt will be returning today at 1600.  Pts mother notified via VM.  Transport arranged (BLS) for 1600.    The Plan for Transition of Care is related to the following treatment goals of Septicemia (HCC) [A41.9]  Acute respiratory

## 2025-07-21 ENCOUNTER — APPOINTMENT (OUTPATIENT)
Dept: GENERAL RADIOLOGY | Age: 65
DRG: 139 | End: 2025-07-21
Payer: MEDICAID

## 2025-07-21 ENCOUNTER — HOSPITAL ENCOUNTER (INPATIENT)
Age: 65
LOS: 2 days | Discharge: NURSING, REHAB OR LONG-TERM CARE | DRG: 139 | End: 2025-07-23
Attending: STUDENT IN AN ORGANIZED HEALTH CARE EDUCATION/TRAINING PROGRAM | Admitting: STUDENT IN AN ORGANIZED HEALTH CARE EDUCATION/TRAINING PROGRAM
Payer: MEDICAID

## 2025-07-21 ENCOUNTER — APPOINTMENT (OUTPATIENT)
Dept: CT IMAGING | Age: 65
DRG: 139 | End: 2025-07-21
Payer: MEDICAID

## 2025-07-21 DIAGNOSIS — I50.9 ACUTE CONGESTIVE HEART FAILURE, UNSPECIFIED HEART FAILURE TYPE (HCC): Primary | ICD-10-CM

## 2025-07-21 DIAGNOSIS — R06.02 SHORTNESS OF BREATH: ICD-10-CM

## 2025-07-21 DIAGNOSIS — I21.4 NSTEMI (NON-ST ELEVATED MYOCARDIAL INFARCTION) (HCC): ICD-10-CM

## 2025-07-21 DIAGNOSIS — J18.9 PNEUMONIA DUE TO INFECTIOUS ORGANISM, UNSPECIFIED LATERALITY, UNSPECIFIED PART OF LUNG: ICD-10-CM

## 2025-07-21 DIAGNOSIS — R09.02 HYPOXIA: ICD-10-CM

## 2025-07-21 PROBLEM — I24.89 DEMAND ISCHEMIA (HCC): Status: ACTIVE | Noted: 2025-07-21

## 2025-07-21 LAB
ALBUMIN SERPL-MCNC: 3.7 G/DL (ref 3.4–5)
ALBUMIN/GLOB SERPL: 1 {RATIO} (ref 1.1–2.2)
ALP SERPL-CCNC: 96 U/L (ref 40–129)
ALT SERPL-CCNC: 14 U/L (ref 10–40)
ANION GAP SERPL CALCULATED.3IONS-SCNC: 7 MMOL/L (ref 3–16)
APTT BLD: 30.6 SEC (ref 22.8–35.8)
AST SERPL-CCNC: 29 U/L (ref 15–37)
BASE EXCESS BLDV CALC-SCNC: 3.4 MMOL/L
BASE EXCESS BLDV CALC-SCNC: 7.1 MMOL/L
BASE EXCESS BLDV CALC-SCNC: 7.6 MMOL/L
BASOPHILS # BLD: 0.1 K/UL (ref 0–0.2)
BASOPHILS NFR BLD: 0.5 %
BILIRUB SERPL-MCNC: 0.3 MG/DL (ref 0–1)
BUN SERPL-MCNC: 16 MG/DL (ref 7–20)
CALCIUM SERPL-MCNC: 9.3 MG/DL (ref 8.3–10.6)
CHLORIDE SERPL-SCNC: 95 MMOL/L (ref 99–110)
CO2 BLDV-SCNC: 36 MMOL/L
CO2 BLDV-SCNC: 40 MMOL/L
CO2 BLDV-SCNC: 42 MMOL/L
CO2 SERPL-SCNC: 35 MMOL/L (ref 21–32)
COHGB MFR BLDV: 2.4 %
COHGB MFR BLDV: 3 %
COHGB MFR BLDV: 3.5 %
CREAT SERPL-MCNC: 0.9 MG/DL (ref 0.8–1.3)
DEPRECATED RDW RBC AUTO: 15.5 % (ref 12.4–15.4)
EKG ATRIAL RATE: 87 BPM
EKG DIAGNOSIS: NORMAL
EKG P AXIS: 43 DEGREES
EKG P-R INTERVAL: 172 MS
EKG Q-T INTERVAL: 358 MS
EKG QRS DURATION: 80 MS
EKG QTC CALCULATION (BAZETT): 430 MS
EKG R AXIS: 63 DEGREES
EKG T AXIS: 56 DEGREES
EKG VENTRICULAR RATE: 87 BPM
EOSINOPHIL # BLD: 0.1 K/UL (ref 0–0.6)
EOSINOPHIL NFR BLD: 1.2 %
GFR SERPLBLD CREATININE-BSD FMLA CKD-EPI: >90 ML/MIN/{1.73_M2}
GLUCOSE SERPL-MCNC: 111 MG/DL (ref 70–99)
HCO3 BLDV-SCNC: 34 MMOL/L (ref 23–29)
HCO3 BLDV-SCNC: 38 MMOL/L (ref 23–29)
HCO3 BLDV-SCNC: 39 MMOL/L (ref 23–29)
HCT VFR BLD AUTO: 44.9 % (ref 40.5–52.5)
HGB BLD-MCNC: 14.2 G/DL (ref 13.5–17.5)
LACTATE BLDV-SCNC: 1.2 MMOL/L (ref 0.4–1.9)
LYMPHOCYTES # BLD: 0.7 K/UL (ref 1–5.1)
LYMPHOCYTES NFR BLD: 6.3 %
MCH RBC QN AUTO: 28.7 PG (ref 26–34)
MCHC RBC AUTO-ENTMCNC: 31.7 G/DL (ref 31–36)
MCV RBC AUTO: 90.6 FL (ref 80–100)
METHGB MFR BLDV: 0.4 %
METHGB MFR BLDV: 1 %
METHGB MFR BLDV: 1 %
MONOCYTES # BLD: 0.9 K/UL (ref 0–1.3)
MONOCYTES NFR BLD: 7.8 %
NEUTROPHILS # BLD: 9.3 K/UL (ref 1.7–7.7)
NEUTROPHILS NFR BLD: 84.2 %
NT-PROBNP SERPL-MCNC: 478 PG/ML (ref 0–124)
O2 THERAPY: ABNORMAL
PCO2 BLDV: 79.5 MMHG (ref 40–50)
PCO2 BLDV: 82.2 MMHG (ref 40–50)
PCO2 BLDV: 95.9 MMHG (ref 40–50)
PH BLDV: 7.22 [PH] (ref 7.35–7.45)
PH BLDV: 7.22 [PH] (ref 7.35–7.45)
PH BLDV: 7.29 [PH] (ref 7.35–7.45)
PLATELET # BLD AUTO: 216 K/UL (ref 135–450)
PMV BLD AUTO: 9.4 FL (ref 5–10.5)
PO2 BLDV: 35 MMHG
PO2 BLDV: 43 MMHG
PO2 BLDV: 46 MMHG
POTASSIUM SERPL-SCNC: 4.9 MMOL/L (ref 3.5–5.1)
PROCALCITONIN SERPL IA-MCNC: 0.09 NG/ML (ref 0–0.15)
PROT SERPL-MCNC: 7.4 G/DL (ref 6.4–8.2)
RBC # BLD AUTO: 4.95 M/UL (ref 4.2–5.9)
SAO2 % BLDV: 64 %
SAO2 % BLDV: 73 %
SAO2 % BLDV: 76 %
SODIUM SERPL-SCNC: 137 MMOL/L (ref 136–145)
TROPONIN, HIGH SENSITIVITY: 105 NG/L (ref 0–22)
TROPONIN, HIGH SENSITIVITY: 69 NG/L (ref 0–22)
TROPONIN, HIGH SENSITIVITY: 79 NG/L (ref 0–22)
WBC # BLD AUTO: 11 K/UL (ref 4–11)

## 2025-07-21 PROCEDURE — 71045 X-RAY EXAM CHEST 1 VIEW: CPT

## 2025-07-21 PROCEDURE — 93010 ELECTROCARDIOGRAM REPORT: CPT | Performed by: INTERNAL MEDICINE

## 2025-07-21 PROCEDURE — 96374 THER/PROPH/DIAG INJ IV PUSH: CPT

## 2025-07-21 PROCEDURE — 2580000003 HC RX 258: Performed by: STUDENT IN AN ORGANIZED HEALTH CARE EDUCATION/TRAINING PROGRAM

## 2025-07-21 PROCEDURE — 6360000004 HC RX CONTRAST MEDICATION: Performed by: PHYSICIAN ASSISTANT

## 2025-07-21 PROCEDURE — 83605 ASSAY OF LACTIC ACID: CPT

## 2025-07-21 PROCEDURE — 99291 CRITICAL CARE FIRST HOUR: CPT | Performed by: INTERNAL MEDICINE

## 2025-07-21 PROCEDURE — 87040 BLOOD CULTURE FOR BACTERIA: CPT

## 2025-07-21 PROCEDURE — 99223 1ST HOSP IP/OBS HIGH 75: CPT | Performed by: INTERNAL MEDICINE

## 2025-07-21 PROCEDURE — 2000000000 HC ICU R&B

## 2025-07-21 PROCEDURE — 6360000002 HC RX W HCPCS: Performed by: PHYSICIAN ASSISTANT

## 2025-07-21 PROCEDURE — 36415 COLL VENOUS BLD VENIPUNCTURE: CPT

## 2025-07-21 PROCEDURE — 84145 PROCALCITONIN (PCT): CPT

## 2025-07-21 PROCEDURE — 82803 BLOOD GASES ANY COMBINATION: CPT

## 2025-07-21 PROCEDURE — 6370000000 HC RX 637 (ALT 250 FOR IP): Performed by: STUDENT IN AN ORGANIZED HEALTH CARE EDUCATION/TRAINING PROGRAM

## 2025-07-21 PROCEDURE — 2700000000 HC OXYGEN THERAPY PER DAY

## 2025-07-21 PROCEDURE — 6360000002 HC RX W HCPCS: Performed by: STUDENT IN AN ORGANIZED HEALTH CARE EDUCATION/TRAINING PROGRAM

## 2025-07-21 PROCEDURE — 94660 CPAP INITIATION&MGMT: CPT

## 2025-07-21 PROCEDURE — 2500000003 HC RX 250 WO HCPCS: Performed by: STUDENT IN AN ORGANIZED HEALTH CARE EDUCATION/TRAINING PROGRAM

## 2025-07-21 PROCEDURE — 94640 AIRWAY INHALATION TREATMENT: CPT

## 2025-07-21 PROCEDURE — 99285 EMERGENCY DEPT VISIT HI MDM: CPT

## 2025-07-21 PROCEDURE — 71260 CT THORAX DX C+: CPT

## 2025-07-21 PROCEDURE — 85025 COMPLETE CBC W/AUTO DIFF WBC: CPT

## 2025-07-21 PROCEDURE — 85730 THROMBOPLASTIN TIME PARTIAL: CPT

## 2025-07-21 PROCEDURE — 6370000000 HC RX 637 (ALT 250 FOR IP): Performed by: PHYSICIAN ASSISTANT

## 2025-07-21 PROCEDURE — 80053 COMPREHEN METABOLIC PANEL: CPT

## 2025-07-21 PROCEDURE — 93005 ELECTROCARDIOGRAM TRACING: CPT | Performed by: PHYSICIAN ASSISTANT

## 2025-07-21 PROCEDURE — 83880 ASSAY OF NATRIURETIC PEPTIDE: CPT

## 2025-07-21 PROCEDURE — 84484 ASSAY OF TROPONIN QUANT: CPT

## 2025-07-21 PROCEDURE — 5A09357 ASSISTANCE WITH RESPIRATORY VENTILATION, LESS THAN 24 CONSECUTIVE HOURS, CONTINUOUS POSITIVE AIRWAY PRESSURE: ICD-10-PCS | Performed by: STUDENT IN AN ORGANIZED HEALTH CARE EDUCATION/TRAINING PROGRAM

## 2025-07-21 PROCEDURE — 87641 MR-STAPH DNA AMP PROBE: CPT

## 2025-07-21 PROCEDURE — 6360000002 HC RX W HCPCS: Performed by: INTERNAL MEDICINE

## 2025-07-21 PROCEDURE — 94761 N-INVAS EAR/PLS OXIMETRY MLT: CPT

## 2025-07-21 RX ORDER — LORAZEPAM 0.5 MG/1
0.5 TABLET ORAL EVERY 12 HOURS PRN
Status: ON HOLD | COMMUNITY
End: 2025-07-23 | Stop reason: HOSPADM

## 2025-07-21 RX ORDER — BRIMONIDINE TARTRATE 2 MG/ML
1 SOLUTION/ DROPS OPHTHALMIC 2 TIMES DAILY
Status: DISCONTINUED | OUTPATIENT
Start: 2025-07-21 | End: 2025-07-23 | Stop reason: HOSPADM

## 2025-07-21 RX ORDER — SPIRONOLACTONE 25 MG/1
25 TABLET ORAL DAILY
Status: DISCONTINUED | OUTPATIENT
Start: 2025-07-21 | End: 2025-07-23 | Stop reason: HOSPADM

## 2025-07-21 RX ORDER — LATANOPROST 50 UG/ML
1 SOLUTION/ DROPS OPHTHALMIC NIGHTLY
Status: DISCONTINUED | OUTPATIENT
Start: 2025-07-21 | End: 2025-07-23 | Stop reason: HOSPADM

## 2025-07-21 RX ORDER — ASPIRIN 81 MG/1
81 TABLET ORAL DAILY
COMMUNITY

## 2025-07-21 RX ORDER — POLYETHYLENE GLYCOL 3350 17 G/17G
17 POWDER, FOR SOLUTION ORAL 2 TIMES DAILY
Status: DISCONTINUED | OUTPATIENT
Start: 2025-07-21 | End: 2025-07-23 | Stop reason: HOSPADM

## 2025-07-21 RX ORDER — ATORVASTATIN CALCIUM 40 MG/1
40 TABLET, FILM COATED ORAL NIGHTLY
Status: DISCONTINUED | OUTPATIENT
Start: 2025-07-21 | End: 2025-07-23 | Stop reason: HOSPADM

## 2025-07-21 RX ORDER — LEVOFLOXACIN 500 MG/1
500 TABLET, FILM COATED ORAL DAILY
COMMUNITY
Start: 2025-07-18 | End: 2025-07-26

## 2025-07-21 RX ORDER — DORZOLAMIDE HCL 20 MG/ML
1 SOLUTION/ DROPS OPHTHALMIC 2 TIMES DAILY
Status: DISCONTINUED | OUTPATIENT
Start: 2025-07-21 | End: 2025-07-23 | Stop reason: HOSPADM

## 2025-07-21 RX ORDER — HALOPERIDOL 1 MG/1
0.5 TABLET ORAL EVERY 12 HOURS PRN
Status: DISCONTINUED | OUTPATIENT
Start: 2025-07-21 | End: 2025-07-23 | Stop reason: HOSPADM

## 2025-07-21 RX ORDER — POLYVINYL ALCOHOL 14 MG/ML
1 SOLUTION/ DROPS OPHTHALMIC 2 TIMES DAILY
COMMUNITY

## 2025-07-21 RX ORDER — FOLIC ACID 1 MG/1
1 TABLET ORAL DAILY
Status: DISCONTINUED | OUTPATIENT
Start: 2025-07-21 | End: 2025-07-23 | Stop reason: HOSPADM

## 2025-07-21 RX ORDER — HEPARIN SODIUM 1000 [USP'U]/ML
4000 INJECTION, SOLUTION INTRAVENOUS; SUBCUTANEOUS PRN
Status: DISCONTINUED | OUTPATIENT
Start: 2025-07-21 | End: 2025-07-21

## 2025-07-21 RX ORDER — METHYLPREDNISOLONE SODIUM SUCCINATE 125 MG/2ML
125 INJECTION INTRAMUSCULAR; INTRAVENOUS ONCE
Status: COMPLETED | OUTPATIENT
Start: 2025-07-21 | End: 2025-07-21

## 2025-07-21 RX ORDER — IOPAMIDOL 755 MG/ML
75 INJECTION, SOLUTION INTRAVASCULAR
Status: COMPLETED | OUTPATIENT
Start: 2025-07-21 | End: 2025-07-21

## 2025-07-21 RX ORDER — SENNOSIDES 8.6 MG/1
2 TABLET ORAL 2 TIMES DAILY
Status: DISCONTINUED | OUTPATIENT
Start: 2025-07-21 | End: 2025-07-23 | Stop reason: HOSPADM

## 2025-07-21 RX ORDER — TORSEMIDE 20 MG/1
40 TABLET ORAL DAILY
Status: DISCONTINUED | OUTPATIENT
Start: 2025-07-21 | End: 2025-07-23 | Stop reason: HOSPADM

## 2025-07-21 RX ORDER — LANOLIN ALCOHOL/MO/W.PET/CERES
1000 CREAM (GRAM) TOPICAL DAILY
Status: DISCONTINUED | OUTPATIENT
Start: 2025-07-21 | End: 2025-07-23 | Stop reason: HOSPADM

## 2025-07-21 RX ORDER — MAGNESIUM SULFATE IN WATER 40 MG/ML
2000 INJECTION, SOLUTION INTRAVENOUS PRN
Status: DISCONTINUED | OUTPATIENT
Start: 2025-07-21 | End: 2025-07-23 | Stop reason: HOSPADM

## 2025-07-21 RX ORDER — LACTULOSE 10 G/15ML
10 SOLUTION ORAL 3 TIMES DAILY
Status: DISCONTINUED | OUTPATIENT
Start: 2025-07-21 | End: 2025-07-23 | Stop reason: HOSPADM

## 2025-07-21 RX ORDER — POLYVINYL ALCOHOL 14 MG/ML
1 SOLUTION/ DROPS OPHTHALMIC 2 TIMES DAILY
Status: DISCONTINUED | OUTPATIENT
Start: 2025-07-21 | End: 2025-07-23 | Stop reason: HOSPADM

## 2025-07-21 RX ORDER — IPRATROPIUM BROMIDE AND ALBUTEROL SULFATE 2.5; .5 MG/3ML; MG/3ML
1 SOLUTION RESPIRATORY (INHALATION) ONCE
Status: COMPLETED | OUTPATIENT
Start: 2025-07-21 | End: 2025-07-21

## 2025-07-21 RX ORDER — HEPARIN SODIUM 1000 [USP'U]/ML
4000 INJECTION, SOLUTION INTRAVENOUS; SUBCUTANEOUS ONCE
Status: COMPLETED | OUTPATIENT
Start: 2025-07-21 | End: 2025-07-21

## 2025-07-21 RX ORDER — SODIUM CHLORIDE 0.9 % (FLUSH) 0.9 %
5-40 SYRINGE (ML) INJECTION PRN
Status: DISCONTINUED | OUTPATIENT
Start: 2025-07-21 | End: 2025-07-23 | Stop reason: HOSPADM

## 2025-07-21 RX ORDER — PREDNISONE 20 MG/1
40 TABLET ORAL DAILY
Status: DISCONTINUED | OUTPATIENT
Start: 2025-07-21 | End: 2025-07-22

## 2025-07-21 RX ORDER — POLYETHYLENE GLYCOL 3350 17 G/17G
17 POWDER, FOR SOLUTION ORAL DAILY PRN
Status: DISCONTINUED | OUTPATIENT
Start: 2025-07-21 | End: 2025-07-23 | Stop reason: HOSPADM

## 2025-07-21 RX ORDER — HEPARIN SODIUM 1000 [USP'U]/ML
2000 INJECTION, SOLUTION INTRAVENOUS; SUBCUTANEOUS PRN
Status: DISCONTINUED | OUTPATIENT
Start: 2025-07-21 | End: 2025-07-21

## 2025-07-21 RX ORDER — POTASSIUM CHLORIDE 7.45 MG/ML
10 INJECTION INTRAVENOUS PRN
Status: DISCONTINUED | OUTPATIENT
Start: 2025-07-21 | End: 2025-07-23 | Stop reason: HOSPADM

## 2025-07-21 RX ORDER — RISPERIDONE 1 MG/1
2 TABLET ORAL 3 TIMES DAILY
Status: DISCONTINUED | OUTPATIENT
Start: 2025-07-21 | End: 2025-07-23 | Stop reason: HOSPADM

## 2025-07-21 RX ORDER — HEPARIN SODIUM AND DEXTROSE 10000; 5 [USP'U]/100ML; G/100ML
0-4000 INJECTION INTRAVENOUS CONTINUOUS
Status: DISCONTINUED | OUTPATIENT
Start: 2025-07-21 | End: 2025-07-21

## 2025-07-21 RX ORDER — ASPIRIN 81 MG/1
81 TABLET ORAL DAILY
Status: DISCONTINUED | OUTPATIENT
Start: 2025-07-21 | End: 2025-07-21 | Stop reason: SDUPTHER

## 2025-07-21 RX ORDER — ONDANSETRON 4 MG/1
4 TABLET, ORALLY DISINTEGRATING ORAL EVERY 8 HOURS PRN
Status: DISCONTINUED | OUTPATIENT
Start: 2025-07-21 | End: 2025-07-23 | Stop reason: HOSPADM

## 2025-07-21 RX ORDER — IPRATROPIUM BROMIDE AND ALBUTEROL SULFATE 2.5; .5 MG/3ML; MG/3ML
1 SOLUTION RESPIRATORY (INHALATION)
Status: DISCONTINUED | OUTPATIENT
Start: 2025-07-21 | End: 2025-07-23 | Stop reason: HOSPADM

## 2025-07-21 RX ORDER — HALOPERIDOL 0.5 MG/1
0.5 TABLET ORAL EVERY 12 HOURS PRN
COMMUNITY

## 2025-07-21 RX ORDER — HYDROCODONE BITARTRATE AND ACETAMINOPHEN 5; 325 MG/1; MG/1
1 TABLET ORAL EVERY 6 HOURS PRN
Status: ON HOLD | COMMUNITY
End: 2025-07-23 | Stop reason: HOSPADM

## 2025-07-21 RX ORDER — HYDROCODONE BITARTRATE AND ACETAMINOPHEN 5; 325 MG/1; MG/1
1 TABLET ORAL EVERY 6 HOURS PRN
Status: DISCONTINUED | OUTPATIENT
Start: 2025-07-21 | End: 2025-07-23

## 2025-07-21 RX ORDER — MULTIVITAMIN WITH IRON
1 TABLET ORAL DAILY
Status: DISCONTINUED | OUTPATIENT
Start: 2025-07-22 | End: 2025-07-23 | Stop reason: HOSPADM

## 2025-07-21 RX ORDER — TAMSULOSIN HYDROCHLORIDE 0.4 MG/1
0.4 CAPSULE ORAL NIGHTLY
Status: DISCONTINUED | OUTPATIENT
Start: 2025-07-21 | End: 2025-07-23 | Stop reason: HOSPADM

## 2025-07-21 RX ORDER — SODIUM CHLORIDE 0.9 % (FLUSH) 0.9 %
5-40 SYRINGE (ML) INJECTION EVERY 12 HOURS SCHEDULED
Status: DISCONTINUED | OUTPATIENT
Start: 2025-07-21 | End: 2025-07-23 | Stop reason: HOSPADM

## 2025-07-21 RX ORDER — ENOXAPARIN SODIUM 100 MG/ML
30 INJECTION SUBCUTANEOUS 2 TIMES DAILY
Status: DISCONTINUED | OUTPATIENT
Start: 2025-07-21 | End: 2025-07-21

## 2025-07-21 RX ORDER — ENOXAPARIN SODIUM 100 MG/ML
30 INJECTION SUBCUTANEOUS 2 TIMES DAILY
Status: DISCONTINUED | OUTPATIENT
Start: 2025-07-21 | End: 2025-07-23 | Stop reason: HOSPADM

## 2025-07-21 RX ORDER — ACETAMINOPHEN 650 MG
TABLET, EXTENDED RELEASE ORAL 2 TIMES DAILY
Status: DISCONTINUED | OUTPATIENT
Start: 2025-07-21 | End: 2025-07-21

## 2025-07-21 RX ORDER — ONDANSETRON 2 MG/ML
4 INJECTION INTRAMUSCULAR; INTRAVENOUS EVERY 6 HOURS PRN
Status: DISCONTINUED | OUTPATIENT
Start: 2025-07-21 | End: 2025-07-23 | Stop reason: HOSPADM

## 2025-07-21 RX ORDER — SODIUM CHLORIDE 9 MG/ML
INJECTION, SOLUTION INTRAVENOUS PRN
Status: DISCONTINUED | OUTPATIENT
Start: 2025-07-21 | End: 2025-07-23 | Stop reason: HOSPADM

## 2025-07-21 RX ORDER — VANCOMYCIN 1.25 G/250ML
1750 INJECTION, SOLUTION INTRAVENOUS ONCE
Status: COMPLETED | OUTPATIENT
Start: 2025-07-21 | End: 2025-07-21

## 2025-07-21 RX ORDER — ENOXAPARIN SODIUM 100 MG/ML
40 INJECTION SUBCUTANEOUS DAILY
Status: DISCONTINUED | OUTPATIENT
Start: 2025-07-22 | End: 2025-07-21

## 2025-07-21 RX ORDER — ALBUTEROL SULFATE 1.25 MG/3ML
1 SOLUTION RESPIRATORY (INHALATION) EVERY 6 HOURS PRN
Status: DISCONTINUED | OUTPATIENT
Start: 2025-07-21 | End: 2025-07-23 | Stop reason: HOSPADM

## 2025-07-21 RX ORDER — ALBUTEROL SULFATE 1.25 MG/3ML
1 SOLUTION RESPIRATORY (INHALATION) EVERY 6 HOURS PRN
COMMUNITY

## 2025-07-21 RX ORDER — POTASSIUM CHLORIDE 1500 MG/1
40 TABLET, EXTENDED RELEASE ORAL PRN
Status: DISCONTINUED | OUTPATIENT
Start: 2025-07-21 | End: 2025-07-23 | Stop reason: HOSPADM

## 2025-07-21 RX ORDER — ALBUTEROL SULFATE 0.83 MG/ML
5 SOLUTION RESPIRATORY (INHALATION) ONCE
Status: COMPLETED | OUTPATIENT
Start: 2025-07-21 | End: 2025-07-21

## 2025-07-21 RX ORDER — MAGNESIUM HYDROXIDE/ALUMINUM HYDROXICE/SIMETHICONE 120; 1200; 1200 MG/30ML; MG/30ML; MG/30ML
5 SUSPENSION ORAL EVERY 4 HOURS PRN
Status: DISCONTINUED | OUTPATIENT
Start: 2025-07-21 | End: 2025-07-23 | Stop reason: HOSPADM

## 2025-07-21 RX ORDER — ASPIRIN 81 MG/1
81 TABLET, CHEWABLE ORAL DAILY
Status: DISCONTINUED | OUTPATIENT
Start: 2025-07-22 | End: 2025-07-23 | Stop reason: HOSPADM

## 2025-07-21 RX ORDER — LORAZEPAM 0.5 MG/1
0.5 TABLET ORAL EVERY 12 HOURS PRN
Status: DISCONTINUED | OUTPATIENT
Start: 2025-07-21 | End: 2025-07-23

## 2025-07-21 RX ADMIN — CEFEPIME 2000 MG: 2 INJECTION, POWDER, FOR SOLUTION INTRAVENOUS at 13:14

## 2025-07-21 RX ADMIN — IPRATROPIUM BROMIDE AND ALBUTEROL SULFATE 1 DOSE: .5; 2.5 SOLUTION RESPIRATORY (INHALATION) at 08:59

## 2025-07-21 RX ADMIN — SPIRONOLACTONE 25 MG: 25 TABLET ORAL at 13:18

## 2025-07-21 RX ADMIN — ATORVASTATIN CALCIUM 40 MG: 40 TABLET, FILM COATED ORAL at 19:55

## 2025-07-21 RX ADMIN — HALOPERIDOL 0.5 MG: 1 TABLET ORAL at 19:55

## 2025-07-21 RX ADMIN — BRIMONIDINE TARTRATE 1 DROP: 2 SOLUTION OPHTHALMIC at 19:56

## 2025-07-21 RX ADMIN — SODIUM CHLORIDE, PRESERVATIVE FREE 10 ML: 5 INJECTION INTRAVENOUS at 19:57

## 2025-07-21 RX ADMIN — HEPARIN SODIUM 4000 UNITS: 1000 INJECTION INTRAVENOUS; SUBCUTANEOUS at 13:05

## 2025-07-21 RX ADMIN — ASPIRIN 81 MG: 81 TABLET, DELAYED RELEASE ORAL at 13:17

## 2025-07-21 RX ADMIN — POLYETHYLENE GLYCOL 3350 17 G: 17 POWDER, FOR SOLUTION ORAL at 19:55

## 2025-07-21 RX ADMIN — ALBUTEROL SULFATE 5 MG: 2.5 SOLUTION RESPIRATORY (INHALATION) at 11:51

## 2025-07-21 RX ADMIN — IOPAMIDOL 75 ML: 755 INJECTION, SOLUTION INTRAVENOUS at 10:28

## 2025-07-21 RX ADMIN — IPRATROPIUM BROMIDE AND ALBUTEROL SULFATE 1 DOSE: .5; 2.5 SOLUTION RESPIRATORY (INHALATION) at 16:26

## 2025-07-21 RX ADMIN — SENNOSIDES 17.2 MG: 8.6 TABLET, FILM COATED ORAL at 19:55

## 2025-07-21 RX ADMIN — PREDNISONE 40 MG: 20 TABLET ORAL at 13:17

## 2025-07-21 RX ADMIN — VANCOMYCIN 1750 MG: 1.25 INJECTION, SOLUTION INTRAVENOUS at 14:11

## 2025-07-21 RX ADMIN — HEPARIN SODIUM AND DEXTROSE 1000 UNITS/HR: 10000; 5 INJECTION INTRAVENOUS at 13:07

## 2025-07-21 RX ADMIN — SENNOSIDES 17.2 MG: 8.6 TABLET, FILM COATED ORAL at 13:17

## 2025-07-21 RX ADMIN — FOLIC ACID 1 MG: 1 TABLET ORAL at 13:17

## 2025-07-21 RX ADMIN — METHYLPREDNISOLONE SODIUM SUCCINATE 125 MG: 125 INJECTION INTRAMUSCULAR; INTRAVENOUS at 09:45

## 2025-07-21 RX ADMIN — LACTULOSE 10 G: 10 SOLUTION ORAL at 13:22

## 2025-07-21 RX ADMIN — EMPAGLIFLOZIN 10 MG: 10 TABLET, FILM COATED ORAL at 13:17

## 2025-07-21 RX ADMIN — DORZOLAMIDE HYDROCHLORIDE 1 DROP: 20 SOLUTION/ DROPS OPHTHALMIC at 19:56

## 2025-07-21 RX ADMIN — TORSEMIDE 40 MG: 20 TABLET ORAL at 13:17

## 2025-07-21 RX ADMIN — TAMSULOSIN HYDROCHLORIDE 0.4 MG: 0.4 CAPSULE ORAL at 19:55

## 2025-07-21 RX ADMIN — RISPERIDONE 2 MG: 1 TABLET, FILM COATED ORAL at 19:55

## 2025-07-21 RX ADMIN — IPRATROPIUM BROMIDE AND ALBUTEROL SULFATE 1 DOSE: .5; 2.5 SOLUTION RESPIRATORY (INHALATION) at 20:23

## 2025-07-21 RX ADMIN — CYANOCOBALAMIN TAB 1000 MCG 1000 MCG: 1000 TAB at 13:17

## 2025-07-21 RX ADMIN — POLYVINYL ALCOHOL 1 DROP: 1.4 SOLUTION/ DROPS OPHTHALMIC at 13:33

## 2025-07-21 RX ADMIN — RISPERIDONE 2 MG: 1 TABLET, FILM COATED ORAL at 13:17

## 2025-07-21 RX ADMIN — POLYVINYL ALCOHOL 1 DROP: 1.4 SOLUTION/ DROPS OPHTHALMIC at 19:57

## 2025-07-21 RX ADMIN — LATANOPROST 1 DROP: 50 SOLUTION OPHTHALMIC at 19:56

## 2025-07-21 RX ADMIN — LACTULOSE 10 G: 10 SOLUTION ORAL at 19:54

## 2025-07-21 RX ADMIN — CEFEPIME 2000 MG: 2 INJECTION, POWDER, FOR SOLUTION INTRAVENOUS at 19:59

## 2025-07-21 RX ADMIN — ENOXAPARIN SODIUM 30 MG: 100 INJECTION SUBCUTANEOUS at 21:00

## 2025-07-21 ASSESSMENT — LIFESTYLE VARIABLES
HOW OFTEN DO YOU HAVE A DRINK CONTAINING ALCOHOL: PATIENT UNABLE TO ANSWER
HOW MANY STANDARD DRINKS CONTAINING ALCOHOL DO YOU HAVE ON A TYPICAL DAY: PATIENT UNABLE TO ANSWER

## 2025-07-21 ASSESSMENT — PAIN SCALES - GENERAL: PAINLEVEL_OUTOF10: 0

## 2025-07-21 NOTE — ED PROVIDER NOTES
[facility] 2[facility] ICU  EMERGENCY DEPARTMENT ENCOUNTER        Pt Name: Armando Lewis  MRN: [id number]  Birthdate 1960  Date of evaluation: 7/21/2025  Provider: Leslie Baker PA-C  PCP: No primary care provider on file.  Note Started: 8:53 AM EDT 7/21/25      RAY. I have evaluated this patient.        CHIEF COMPLAINT       Chief Complaint   Patient presents with    Shortness of Breath     Pt to ED from [facility] c/o SOB per nursing home staff. Pt was \"sleepier than usual\" and was saturaing 89% on 4L when EMS arrived. They increased him to 6L and then was saturating 92%. Pt is hard to understand, but Alert and oriented x4. 93% on 6L. 82% on room air.        HISTORY OF PRESENT ILLNESS: 1 or more Elements     History From: patient            Chief Complaint: Shortness of breath    Armando Lewis is a 64 y.o. male who presents from nursing home with shortness of breath.  He is alert and oriented x 4.  He endorses increased shortness of breath over the past several days.  He denies cough or fevers.  He denies any pain in his chest.  He denies any dysuria or hematuria.  He does have PMH of schizoaffective disorder.    Nursing Notes were all reviewed and agreed with or any disagreements were addressed in the HPI.    REVIEW OF SYSTEMS :      Review of Systems   All other systems reviewed and are negative.      Positives and Pertinent negatives as per HPI.     SURGICAL HISTORY   History reviewed. No pertinent surgical history.    CURRENTMEDICATIONS       Current Discharge Medication List        CONTINUE these medications which have NOT CHANGED    Details   albuterol (ACCUNEB) 1.25 MG/3ML nebulizer solution Inhale 3 mLs into the lungs every 6 hours as needed for Wheezing or Shortness of Breath      aspirin 81 MG EC tablet Take 1 tablet by mouth daily      LORazepam (ATIVAN) 0.5 MG tablet Take 1 tablet by mouth every 12 hours as needed for Anxiety. Max Daily Amount: 1 mg      polyvinyl alcohol (LIQUIFILM TEARS) 1.4 %

## 2025-07-21 NOTE — H&P
V2.0  History and Physical      Name:  Armando Lewis /Age/Sex: 1960  (64 y.o. male)   MRN & CSN:  [id number] & [id number] Encounter Date/Time: 2025 11:41 AM EDT   Location:  [id number]2107 PCP: No primary care provider on file.       Hospital Day: 1    Assessment and Plan:   Armando Lewis is a 64 y.o. male with a pertinent PMHx of T2DM, HFpEF, schizoaffective disorder, antisocial personality disorder, major depressive disorder, tardive dyskinesia, blindness who presented from nursing facility with hypoxemia and increased shortness of breath.    Acute respiratory failure with hypoxia and hypercapnia  - Continue supplemental oxygen, wean as tolerated  - Recheck VBG, hopefully can avoid BiPAP  - Scheduled DuoNebs  - Prednisone 40 mg daily    Pneumonia  - Multiple admissions this year and from nursing facility therefore at risk for resistant organisms  - Start empiric IV vancomycin and IV cefepime  - Check pneumonia panel and respiratory culture  - Check MRSA nares  - Check procalcitonin    NSTEMI  - EKG with some nonspecific ST changes  - High-sensitivity troponin elevated though downtrending  - Cardiology following    HFpEF without acute exacerbation  - Recent echo back in March showed EF 55 to 60% with normal wall motion and grade 1 diastolic dysfunction  - Continue torsemide  - Continue spironolactone  - Continue Jardiance    Schizoaffective disorder  - Continue risperidone      Disposition:   Current Living situation: SNF  Expected Disposition: SNF  Estimated D/C:     Diet ADULT DIET; Dysphagia - Soft and Bite Sized   DVT Prophylaxis [x] Lovenox, []  Heparin, [] SCDs, [] Ambulation,  [] Eliquis, [] Xarelto, [] Coumadin   Code Status Full Code         Medical Decision Making:  The following items were considered in medical decision making:  Discussion of patient care with other providers, including ED provider via [employer] recommended inpatient mission  Reviewed clinical lab tests such as BMP,

## 2025-07-21 NOTE — CONSULTS
[facility]    Armando Lewis  1960    July 21, 2025    Reason for Consult: Elevated Troponin    CC: Shortness of breath    HPI:  The patient is 64 y.o. male with a past medical history significant for Chronic heart failure, SCZ Affective Disorder and Antisocial personality disorder [facility] I was asked to see him for elevated troponin assays.     The patient will not speak with me except to tell me who he \"hates\". Further evaluation was only with head nods.     He has swelling that he states is not new for him. He denies any chest pain or tightness.     In the ED on presentation, he was saturating 84% on room air. ABG showed a pH of 7.2 and a pCO2 of 95.    Review of Systems:  ROS is as above and otherwise unobtainable due to lack of patient cooperation.        Past Medical History:   Diagnosis Date    (HFpEF) heart failure with preserved ejection fraction (HCC)     Antisocial personality disorder (HCC)     Blindness right eye category 5, normal vision left eye     Drug induced subacute dyskinesia     Insomnia     Major depressive disorder, recurrent, unspecified     Melena     Schizoaffective disorder (HCC)     Substance abuse (HCC)        Family History:  Denies any    Social History     Tobacco Use    Smoking status: Unknown    Smokeless tobacco: Never    Tobacco comments:     Quit since in [facility]    Vaping Use    Vaping status: Unknown   Substance Use Topics    Alcohol use: Not Currently     Comment: unknown    Drug use: Not Currently     Types: Cocaine     Comment: unknown       Allergies   Allergen Reactions    Codeine     Thorazine [Chlorpromazine]     Tylenol [Acetaminophen]      Current Facility-Administered Medications   Medication Dose Route Frequency Provider Last Rate Last Admin    sodium chloride flush 0.9 % injection 5-40 mL  5-40 mL IntraVENous 2 times per day Nicolás Beauchamp I, DO        sodium chloride flush 0.9 % injection 5-40 mL

## 2025-07-21 NOTE — PROGRESS NOTES
4 Eyes Skin Assessment     NAME:  Armando Lewis  YOB: 1960  MEDICAL RECORD NUMBER:  [id number]    The patient is being assessed for  Admission    I agree that at least one RN has performed a thorough Head to Toe Skin Assessment on the patient. ALL assessment sites listed below have been assessed.      Areas assessed by both nurses:    Head, Face, Ears, Shoulders, Back, Chest, Arms, Elbows, Hands, Sacrum. Buttock, Coccyx, Ischium, Legs. Feet and Heels, and Under Medical Devices         Does the Patient have a Wound? Yes wound(s) were present on assessment. LDA wound assessment was Initiated and completed by RN       Bhanu Prevention initiated by RN: Yes  Wound Care Orders initiated by RN: Yes    For hospital-acquired stage 1 & 2 and ALL Stage 3,4, Unstageable, DTI, NWPT, and Complex wounds: place order “IP Wound Care/Ostomy Nurse Eval and Treat” by RN under : Yes    New Ostomies, if present place, Ostomy referral order under : No     Nurse 1 eSignature: Electronically signed by Cassandra Kaur RN on 7/21/25 at 4:41 PM EDT    **SHARE this note so that the co-signing nurse can place an eSignature**    Nurse 2 eSignature: Electronically signed by Elias Batres RN on 7/21/25 at 4:42 PM EDT

## 2025-07-21 NOTE — FLOWSHEET NOTE
07/21/25 1213   Readmission Assessment   Number of Days since last admission? 8-30 days   Previous Disposition Home Alone   Who is being Interviewed Patient   What was the patient's/caregiver's perception as to why they think they needed to return back to the hospital? Other (Comment)  (new concern)   Did you visit your Primary Care Physician after you left the hospital, before you returned this time? No   Why weren't you able to visit your PCP? Did not have an appointment   Did you see a specialist, such as Cardiac, Pulmonary, Orthopedic Physician, etc. after you left the hospital? No   Who advised the patient to return to the hospital? Self-referral   Does the patient report anything that got in the way of taking their medications? No   In our efforts to provide the best possible care to you and others like you, can you think of anything that we could have done to help you after you left the hospital the first time, so that you might not have needed to return so soon? Other (Comment)  (nothing)

## 2025-07-21 NOTE — CONSULTS
Pulmonary Critical Care Consult Note     Patient's name:  Armando Lewis  Medical Record Number: [id number]  Patient's account/billing number: [id number]  Patient's YOB: 1960  Age: 64 y.o.  Date of Admission: 7/21/2025  8:34 AM  Date of Consult: 7/21/2025      Primary Care Physician: No primary care provider on file.      Code Status: Full Code    Reason for consult: Acute hypoxic and hypercapnic respiratory failure    Assessment and Plan     Acute hypoxic and hypercapnic respiratory failure  Bibasilar airspace disease favors atelectasis  Elevated troponin  History of diastolic heart failure  Obesity  Schizoaffective disorder      Plan:  Titrate O2 to keep sats 88 to 92%  Check VBG if remains hypercapnic will start BiPAP  Aspiration precautions  Diuresis  Check 2D echocardiogram  Check PCT   Doubt infection  Heparin per cardiology  Due to the immediate potential for life-threatening deterioration due to above , I spent 35 minutes providing critical care.  This time is excluding time spent performing procedures.  This note was transcribed using Dragon Dictation software. Please disregard any translational errors.        HISTORY OF PRESENT ILLNESS:   Mr./MsRubio Lewis is a 64 y.o. gentleman with past medical history as below significant for nursing home resident, schizoaffective disorder, congestive heart failure, tardive dyskinesia, hypertension and diabetes mellitus type 2 presented from the nursing home with worsening shortness of breath.  Found hypoxic and hypercapnic.  CT chest with no PE, bibasilar airspace disease favors atelectasis.  No significant leukocytosis or elevation in procalcitonin.            Past Medical History:        Diagnosis Date    (HFpEF) heart failure with preserved ejection fraction (HCC)     Antisocial personality disorder (HCC)     Blindness right eye category 5, normal vision left eye     Drug induced subacute dyskinesia

## 2025-07-21 NOTE — ED TRIAGE NOTES
Pt to ED from [facility] c/o SOB per nursing home staff. Pt was \"sleepier than usual\" and was saturaing 89% on 4L when EMS arrived. They increased him to 6L and then was saturating 92%. Pt is hard to understand, but Alert and oriented x4. 93% on 6L. 82% on room air.

## 2025-07-21 NOTE — PROGRESS NOTES
Medication Reconciliation    List of medications patient is currently taking is complete.     Source of information: 1. Conversation with patient at bedside                                      2. EPIC records                                       3. Medication records from  [facility]     Notes regarding home medications:   1. Patient did not receive any of his morning home medications prior to arrival to the ER today.    Jens Kent [facility], PharmD, BCPS  7/21/2025 11:22 AM

## 2025-07-21 NOTE — PROGRESS NOTES
Patient brought to ICU room 2113. Report given to SABAS Trujillo to resume are over the phone prior to arrival to the unit. Pt settled into ICU bed and connected to ICU cardiac monitors. VSS upon this Rns exit.

## 2025-07-21 NOTE — CONSULTS
Clinical Pharmacy Note  Vancomycin Consult    Armando Lewis is a 64 y.o. male ordered vancomycin for CAP; consult received from Dr. Beauchamp to manage therapy. Also receiving cefepime.    Allergies:  Codeine, Thorazine [chlorpromazine], and Tylenol [acetaminophen]     Temp max:  Temp (24hrs), Av.1 °F (36.7 °C), Min:98.1 °F (36.7 °C), Max:98.1 °F (36.7 °C)      Recent Labs     25  0852   WBC 11.0       Recent Labs     25  0852   BUN 16   CREATININE 0.9       No intake or output data in the 24 hours ending 25 1226    Culture Results:  pending    Ht Readings from Last 1 Encounters:   25 1.854 m (6' 1\")        Wt Readings from Last 1 Encounters:   25 124 kg (273 lb 5.9 oz)         Estimated Creatinine Clearance: 111 mL/min (based on SCr of 0.9 mg/dL).    Assessment/Plan:  Day # 1 of vancomycin.  Vanco 1750 mg x 1 today  Random vanco level tomorrow am    Thank you for the consult.     Rebeca Mcintosh, PharmD.  2025  12:27 PM

## 2025-07-21 NOTE — PROGRESS NOTES
Patient up to floor. A & Ox3. Pt is very difficult to understand. Pt turned, multiple wounds found on his buttocks. New PIV placed, blood cultures collected.

## 2025-07-21 NOTE — PROGRESS NOTES
Clinical Pharmacy Note  Heparin Dosing Consult    Armando Lewis is a 64 y.o. male ordered heparin per CAD/STEMI/NSTEMI/UA/AFIB nomogram by Leslie Baker.     No results found for: \"ANTIXAUHEP\", \"LABHEPA\"   Lab Results   Component Value Date/Time    HGB 14.2 07/21/2025 08:52 AM    HCT 44.9 07/21/2025 08:52 AM     07/21/2025 08:52 AM    INR 1.02 04/07/2024 01:51 PM       Ht Readings from Last 1 Encounters:   07/21/25 1.854 m (6' 1\")        Wt Readings from Last 1 Encounters:   07/21/25 121 kg (266 lb 12.1 oz)        Assessment/Plan:  Initial bolus: 4000 units  Initial infusion rate: 1000 units/hr  Next anti-Xa:: 7/21/25 1900    Pharmacy will continue to monitor adjust heparin based on anti-Xa results using nomogram below:     CAD/STEMI/NSTEMI/UA/AFIB Heparin Nomogram     Initial Bolus: 60 units/kg Max Bolus: 4,000 units       Initial Rate: 12 units/kg/hr Max Initial Rate: 1,000 units/hr     anti-Xa Bolus Titration   < 0.1 Heparin 60 units/kg bolus Increase drip by 4 units/kg/hr   0.1 - 0.29 Heparin 30 units/kg bolus Increase drip by 2 units/kg/hr   0.3 - 0.7 No Bolus No Change   0.71 - 0.8 No Bolus Decrease drip by 1 units/kg/hr   0.81 - 0.99 No Bolus Decrease drip by 2 units/kg/hr   > 1 Hold Heparin for 1 hour Decrease drip by 3 units/kg/hr     Obtain anti-Xa 6 hours after initial bolus and 6 hours after any dose change until two consecutive therapeutic anti-Xa levels are achieved - then daily.  Jeanette Coreas, [facility],7/21/2025,1:15 PM

## 2025-07-21 NOTE — CARE COORDINATION
Advance Care Planning   Healthcare Decision Maker:    Primary Decision Maker: Shirley Lewis - Parent - [phone]    Secondary Decision Maker: Jessenia Obrien - Brother/Sister - [phone]      Today we documented Decision Maker(s) consistent with Legal Next of Kin hierarchy.        ACP completed with emergency contacts listed on SNF paperwork.    Electronically signed by Florencia Perez on 7/21/2025 at 1:14 PM

## 2025-07-21 NOTE — PROGRESS NOTES
NAME:  Armando Lewis  YOB: 1960  MEDICAL RECORD NUMBER:  [id number]    Shift Summary: Heparin gtt Dc'd. VBG improving. Need sputum sample for pna panel. BIPAP worn for total of 1 hr until pt took it off. Good UO.     Family updated: Yes:  Shirley    Rhythm: Normal Sinus Rhythm     Most recent vitals:   Visit Vitals  BP (!) 153/126   Pulse 92   Temp 98.5 °F (36.9 °C) (Oral)   Resp 20   Ht 1.854 m (6' 1\")   Wt 121 kg (266 lb 12.1 oz)   SpO2 91%   BMI 35.19 kg/m²           No data found.    No data found.      Respiratory support needed (if any):  - O2 - NC - 3 lpm    Admission weight Weight - Scale: 124 kg (273 lb 5.9 oz) (07/21/25 0845)    Today's weight    Wt Readings from Last 1 Encounters:   07/21/25 121 kg (266 lb 12.1 oz)        Quiroz need assessed each shift: N/A - no quiroz present  UOP >30ml/hr: YES  Last documented BM (in last 48 hrs):  No data found.             Restraints (in use currently or dc'd in last 12 hrs): No    Order current and documentation up to date? No    Lines/Drains reviewed @ bedside.  Peripheral IV 07/21/25 Left Antecubital (Active)   Number of days: 0       Peripheral IV 07/21/25 Posterior;Proximal;Right Forearm (Active)   Number of days: 0         Drip rates at handoff:    sodium chloride         Lab Data:   CBC:   Recent Labs     07/21/25  0852   WBC 11.0   HGB 14.2   HCT 44.9   MCV 90.6        BMP:    Recent Labs     07/21/25  0852      K 4.9   CO2 35*   BUN 16   CREATININE 0.9     ABG: No results for input(s): \"PHART\", \"USQ2ZHO\", \"PO2ART\" in the last 72 hours.    Any consults during the shift? Yes: Consults received: : critical care, cardiology    Any signed and held orders to be released?  No        4 Eyes Skin Assessment       The patient is being assessed for  Shift Handoff    I agree that at least one RN has performed a thorough Head to Toe Skin Assessment on the patient. ALL assessment sites listed below have been assessed.      Areas assessed by both

## 2025-07-22 LAB
ALBUMIN SERPL-MCNC: 3.7 G/DL (ref 3.4–5)
ALP SERPL-CCNC: 86 U/L (ref 40–129)
ALT SERPL-CCNC: 15 U/L (ref 10–40)
ANION GAP SERPL CALCULATED.3IONS-SCNC: 10 MMOL/L (ref 3–16)
AST SERPL-CCNC: 22 U/L (ref 15–37)
BASE EXCESS BLDV CALC-SCNC: 10.8 MMOL/L
BASOPHILS # BLD: 0 K/UL (ref 0–0.2)
BASOPHILS NFR BLD: 0.2 %
BILIRUB DIRECT SERPL-MCNC: <0.1 MG/DL (ref 0–0.3)
BILIRUB INDIRECT SERPL-MCNC: 0.2 MG/DL (ref 0–1)
BILIRUB SERPL-MCNC: 0.3 MG/DL (ref 0–1)
BUN SERPL-MCNC: 20 MG/DL (ref 7–20)
CALCIUM SERPL-MCNC: 9.6 MG/DL (ref 8.3–10.6)
CHLORIDE SERPL-SCNC: 94 MMOL/L (ref 99–110)
CO2 BLDV-SCNC: 40 MMOL/L
CO2 SERPL-SCNC: 34 MMOL/L (ref 21–32)
COHGB MFR BLDV: 1.8 %
CREAT SERPL-MCNC: 0.8 MG/DL (ref 0.8–1.3)
DEPRECATED RDW RBC AUTO: 15.4 % (ref 12.4–15.4)
EOSINOPHIL # BLD: 0 K/UL (ref 0–0.6)
EOSINOPHIL NFR BLD: 0 %
GFR SERPLBLD CREATININE-BSD FMLA CKD-EPI: >90 ML/MIN/{1.73_M2}
GLUCOSE SERPL-MCNC: 192 MG/DL (ref 70–99)
HCO3 BLDV-SCNC: 38 MMOL/L (ref 23–29)
HCT VFR BLD AUTO: 42.9 % (ref 40.5–52.5)
HGB BLD-MCNC: 13.5 G/DL (ref 13.5–17.5)
LYMPHOCYTES # BLD: 0.7 K/UL (ref 1–5.1)
LYMPHOCYTES NFR BLD: 4.5 %
MAGNESIUM SERPL-MCNC: 2.36 MG/DL (ref 1.8–2.4)
MCH RBC QN AUTO: 28.3 PG (ref 26–34)
MCHC RBC AUTO-ENTMCNC: 31.6 G/DL (ref 31–36)
MCV RBC AUTO: 89.7 FL (ref 80–100)
METHGB MFR BLDV: 0.3 %
MONOCYTES # BLD: 1.2 K/UL (ref 0–1.3)
MONOCYTES NFR BLD: 8.3 %
MRSA DNA SPEC QL NAA+PROBE: ABNORMAL
NEUTROPHILS # BLD: 12.5 K/UL (ref 1.7–7.7)
NEUTROPHILS NFR BLD: 87 %
O2 THERAPY: ABNORMAL
ORGANISM: ABNORMAL
PCO2 BLDV: 62.7 MMHG (ref 40–50)
PH BLDV: 7.39 [PH] (ref 7.35–7.45)
PHOSPHATE SERPL-MCNC: 1.7 MG/DL (ref 2.5–4.9)
PLATELET # BLD AUTO: 222 K/UL (ref 135–450)
PMV BLD AUTO: 8.8 FL (ref 5–10.5)
PO2 BLDV: 60 MMHG
POTASSIUM SERPL-SCNC: 4.4 MMOL/L (ref 3.5–5.1)
PROCALCITONIN SERPL IA-MCNC: 0.1 NG/ML (ref 0–0.15)
PROT SERPL-MCNC: 7.3 G/DL (ref 6.4–8.2)
RBC # BLD AUTO: 4.78 M/UL (ref 4.2–5.9)
SAO2 % BLDV: 91 %
SODIUM SERPL-SCNC: 138 MMOL/L (ref 136–145)
VANCOMYCIN SERPL-MCNC: 9 UG/ML
WBC # BLD AUTO: 14.4 K/UL (ref 4–11)

## 2025-07-22 PROCEDURE — 84100 ASSAY OF PHOSPHORUS: CPT

## 2025-07-22 PROCEDURE — 2500000003 HC RX 250 WO HCPCS: Performed by: STUDENT IN AN ORGANIZED HEALTH CARE EDUCATION/TRAINING PROGRAM

## 2025-07-22 PROCEDURE — 6370000000 HC RX 637 (ALT 250 FOR IP)

## 2025-07-22 PROCEDURE — 99233 SBSQ HOSP IP/OBS HIGH 50: CPT | Performed by: INTERNAL MEDICINE

## 2025-07-22 PROCEDURE — 6370000000 HC RX 637 (ALT 250 FOR IP): Performed by: STUDENT IN AN ORGANIZED HEALTH CARE EDUCATION/TRAINING PROGRAM

## 2025-07-22 PROCEDURE — 2060000000 HC ICU INTERMEDIATE R&B

## 2025-07-22 PROCEDURE — 36415 COLL VENOUS BLD VENIPUNCTURE: CPT

## 2025-07-22 PROCEDURE — 2580000003 HC RX 258: Performed by: STUDENT IN AN ORGANIZED HEALTH CARE EDUCATION/TRAINING PROGRAM

## 2025-07-22 PROCEDURE — 6360000002 HC RX W HCPCS: Performed by: INTERNAL MEDICINE

## 2025-07-22 PROCEDURE — 85025 COMPLETE CBC W/AUTO DIFF WBC: CPT

## 2025-07-22 PROCEDURE — 94761 N-INVAS EAR/PLS OXIMETRY MLT: CPT

## 2025-07-22 PROCEDURE — 80076 HEPATIC FUNCTION PANEL: CPT

## 2025-07-22 PROCEDURE — 83735 ASSAY OF MAGNESIUM: CPT

## 2025-07-22 PROCEDURE — 2700000000 HC OXYGEN THERAPY PER DAY

## 2025-07-22 PROCEDURE — 82803 BLOOD GASES ANY COMBINATION: CPT

## 2025-07-22 PROCEDURE — 80202 ASSAY OF VANCOMYCIN: CPT

## 2025-07-22 PROCEDURE — 6360000002 HC RX W HCPCS: Performed by: STUDENT IN AN ORGANIZED HEALTH CARE EDUCATION/TRAINING PROGRAM

## 2025-07-22 PROCEDURE — 94640 AIRWAY INHALATION TREATMENT: CPT

## 2025-07-22 PROCEDURE — 80048 BASIC METABOLIC PNL TOTAL CA: CPT

## 2025-07-22 PROCEDURE — 94660 CPAP INITIATION&MGMT: CPT

## 2025-07-22 PROCEDURE — 84145 PROCALCITONIN (PCT): CPT

## 2025-07-22 PROCEDURE — 94760 N-INVAS EAR/PLS OXIMETRY 1: CPT

## 2025-07-22 RX ADMIN — SENNOSIDES 17.2 MG: 8.6 TABLET, FILM COATED ORAL at 21:04

## 2025-07-22 RX ADMIN — LORAZEPAM 0.5 MG: 0.5 TABLET ORAL at 00:48

## 2025-07-22 RX ADMIN — RISPERIDONE 2 MG: 1 TABLET, FILM COATED ORAL at 08:01

## 2025-07-22 RX ADMIN — SODIUM CHLORIDE, PRESERVATIVE FREE 10 ML: 5 INJECTION INTRAVENOUS at 08:07

## 2025-07-22 RX ADMIN — CYANOCOBALAMIN TAB 1000 MCG 1000 MCG: 1000 TAB at 08:01

## 2025-07-22 RX ADMIN — SODIUM CHLORIDE, PRESERVATIVE FREE 10 ML: 5 INJECTION INTRAVENOUS at 21:04

## 2025-07-22 RX ADMIN — SPIRONOLACTONE 25 MG: 25 TABLET ORAL at 08:07

## 2025-07-22 RX ADMIN — BRIMONIDINE TARTRATE 1 DROP: 2 SOLUTION OPHTHALMIC at 21:06

## 2025-07-22 RX ADMIN — POLYETHYLENE GLYCOL 3350 17 G: 17 POWDER, FOR SOLUTION ORAL at 08:05

## 2025-07-22 RX ADMIN — THERA TABS 1 TABLET: TAB at 08:01

## 2025-07-22 RX ADMIN — FOLIC ACID 1 MG: 1 TABLET ORAL at 08:01

## 2025-07-22 RX ADMIN — LACTULOSE 10 G: 10 SOLUTION ORAL at 08:02

## 2025-07-22 RX ADMIN — IPRATROPIUM BROMIDE AND ALBUTEROL SULFATE 1 DOSE: .5; 2.5 SOLUTION RESPIRATORY (INHALATION) at 08:40

## 2025-07-22 RX ADMIN — IPRATROPIUM BROMIDE AND ALBUTEROL SULFATE 1 DOSE: .5; 2.5 SOLUTION RESPIRATORY (INHALATION) at 11:28

## 2025-07-22 RX ADMIN — TAMSULOSIN HYDROCHLORIDE 0.4 MG: 0.4 CAPSULE ORAL at 21:04

## 2025-07-22 RX ADMIN — HALOPERIDOL 0.5 MG: 1 TABLET ORAL at 08:02

## 2025-07-22 RX ADMIN — ANORECTAL OINTMENT: 15.7; .44; 24; 20.6 OINTMENT TOPICAL at 21:04

## 2025-07-22 RX ADMIN — CEFEPIME 2000 MG: 2 INJECTION, POWDER, FOR SOLUTION INTRAVENOUS at 08:17

## 2025-07-22 RX ADMIN — POLYETHYLENE GLYCOL 3350 17 G: 17 POWDER, FOR SOLUTION ORAL at 21:04

## 2025-07-22 RX ADMIN — BRIMONIDINE TARTRATE 1 DROP: 2 SOLUTION OPHTHALMIC at 08:00

## 2025-07-22 RX ADMIN — IPRATROPIUM BROMIDE AND ALBUTEROL SULFATE 1 DOSE: .5; 2.5 SOLUTION RESPIRATORY (INHALATION) at 15:59

## 2025-07-22 RX ADMIN — TORSEMIDE 40 MG: 20 TABLET ORAL at 08:01

## 2025-07-22 RX ADMIN — ATORVASTATIN CALCIUM 40 MG: 40 TABLET, FILM COATED ORAL at 21:04

## 2025-07-22 RX ADMIN — LATANOPROST 1 DROP: 50 SOLUTION OPHTHALMIC at 21:07

## 2025-07-22 RX ADMIN — ENOXAPARIN SODIUM 30 MG: 100 INJECTION SUBCUTANEOUS at 08:14

## 2025-07-22 RX ADMIN — ENOXAPARIN SODIUM 30 MG: 100 INJECTION SUBCUTANEOUS at 21:03

## 2025-07-22 RX ADMIN — POLYVINYL ALCOHOL 1 DROP: 1.4 SOLUTION/ DROPS OPHTHALMIC at 21:07

## 2025-07-22 RX ADMIN — DORZOLAMIDE HYDROCHLORIDE 1 DROP: 20 SOLUTION/ DROPS OPHTHALMIC at 08:08

## 2025-07-22 RX ADMIN — RISPERIDONE 2 MG: 1 TABLET, FILM COATED ORAL at 21:04

## 2025-07-22 RX ADMIN — IPRATROPIUM BROMIDE AND ALBUTEROL SULFATE 1 DOSE: .5; 2.5 SOLUTION RESPIRATORY (INHALATION) at 19:27

## 2025-07-22 RX ADMIN — PREDNISONE 40 MG: 20 TABLET ORAL at 08:01

## 2025-07-22 RX ADMIN — ASPIRIN 81 MG 81 MG: 81 TABLET ORAL at 08:01

## 2025-07-22 RX ADMIN — LACTULOSE 10 G: 10 SOLUTION ORAL at 21:03

## 2025-07-22 RX ADMIN — SENNOSIDES 17.2 MG: 8.6 TABLET, FILM COATED ORAL at 08:02

## 2025-07-22 RX ADMIN — EMPAGLIFLOZIN 10 MG: 10 TABLET, FILM COATED ORAL at 08:02

## 2025-07-22 RX ADMIN — DORZOLAMIDE HYDROCHLORIDE 1 DROP: 20 SOLUTION/ DROPS OPHTHALMIC at 21:07

## 2025-07-22 RX ADMIN — POLYVINYL ALCOHOL 1 DROP: 1.4 SOLUTION/ DROPS OPHTHALMIC at 08:06

## 2025-07-22 RX ADMIN — ANORECTAL OINTMENT: 15.7; .44; 24; 20.6 OINTMENT TOPICAL at 10:10

## 2025-07-22 ASSESSMENT — PAIN SCALES - GENERAL
PAINLEVEL_OUTOF10: 0

## 2025-07-22 NOTE — PROGRESS NOTES
V2.0    [facility] Progress Note      Name:  Armando Lewis /Age/Sex: 1960  (64 y.o. male)   MRN & CSN:  [id number] & [id number] Encounter Date/Time: 2025 5:05 PM EDT   Location:  [id number] PCP: No primary care provider on file.     Attending:Jaquelin Ferreira MD       Hospital Day: 2    Assessment and Recommendations   Armando Lewis is a 64 y.o. male who presents with Acute respiratory failure with hypoxia and hypercapnia (HCC)      Plan:     Acute hypoxic/hypercapnic resp failure  - cont O2, wean as tolerated  - needs to be more compliant with BIPAP  - cont duoneb/pred    Suspected PNA  - started on IV abx  - CT chest reviewed  - pulm consulted, dc abx    NSTEMI  - EKG reviewed  - cardio consulted, signed off    Acute on chr dCHF  - cont  torsemide/aldactone and jardiance    Resume home meds, will consult palliative care, tx out of ICU      Diet ADULT DIET; Dysphagia - Soft and Bite Sized   DVT Prophylaxis [x] Lovenox, []  Heparin, [] SCDs, [] Ambulation,  [] Eliquis, [] Xarelto  [] Coumadin   Code Status Full Code             Personally reviewed Lab Studies and Imaging         Subjective:     Pt appears very sleepy today, non compliant with BIPAP    Review of Systems:      Pertinent positives and negatives discussed in HPI    Objective:     Intake/Output Summary (Last 24 hours) at 2025 1705  Last data filed at 2025 1548  Gross per 24 hour   Intake 1298 ml   Output 4650 ml   Net -3352 ml      Vitals:   Vitals:    25 1321 25 1552 25 1601 25 1610   BP:  107/75     Pulse: 69 86 74 77   Resp: 19 19 18    Temp:  98.6 °F (37 °C)     TempSrc:  Axillary     SpO2: 94% 95% 94%    Weight:       Height:             Physical Exam:      General: sleepy  Eyes: EOMI  ENT: neck supple  Cardiovascular: Regular rate.  Respiratory: Clear to auscultation  Gastrointestinal: Soft, non tender, BS +  Musculoskeletal: No edema  Skin: warm, dry  Neuro: sleepy  Psych: Mood appropriate.

## 2025-07-22 NOTE — PROGRESS NOTES
Report given to 5N RN. Questions answered. Pt mother, Shirley updated on transfer. Transferred to [id number].

## 2025-07-22 NOTE — CONSULTS
[facility]  Palliative Care   Consult Note    NAME:  Armando Lewis  MEDICAL RECORD NUMBER:  [id number]  AGE: 64 y.o.   GENDER: male  : 1960  TODAY'S DATE:  2025    Subjective     Reason for Consult:  goals of care  Visit Type: Initial Consult      Armando Lewis is a 64 y.o. male referred by:   [x] Physician    PAST MEDICAL HISTORY      Diagnosis Date    (HFpEF) heart failure with preserved ejection fraction (HCC)     Antisocial personality disorder (HCC)     Blindness right eye category 5, normal vision left eye     Drug induced subacute dyskinesia     Insomnia     Major depressive disorder, recurrent, unspecified     Melena     Schizoaffective disorder (HCC)     Substance abuse (HCC)        PAST SURGICAL HISTORY  History reviewed. No pertinent surgical history.    FAMILY HISTORY  History reviewed. No pertinent family history.    SOCIAL HISTORY  Social History     Tobacco Use    Smoking status: Unknown    Smokeless tobacco: Never    Tobacco comments:     Quit since in [facility]    Vaping Use    Vaping status: Unknown   Substance Use Topics    Alcohol use: Not Currently     Comment: unknown    Drug use: Not Currently     Types: Cocaine     Comment: unknown       ALLERGIES  Allergies   Allergen Reactions    Codeine     Thorazine [Chlorpromazine]     Tylenol [Acetaminophen]        MEDICATIONS  No current facility-administered medications on file prior to encounter.     Current Outpatient Medications on File Prior to Encounter   Medication Sig Dispense Refill    albuterol (ACCUNEB) 1.25 MG/3ML nebulizer solution Inhale 3 mLs into the lungs every 6 hours as needed for Wheezing or Shortness of Breath      aspirin 81 MG EC tablet Take 1 tablet by mouth daily      LORazepam (ATIVAN) 0.5 MG tablet Take 1 tablet by mouth every 12 hours as needed for Anxiety. Max Daily Amount: 1 mg      polyvinyl alcohol (LIQUIFILM TEARS) 1.4 % ophthalmic solution Place 1 drop into both eyes in the morning and at bedtime

## 2025-07-22 NOTE — PLAN OF CARE
Problem: Discharge Planning  Goal: Discharge to home or other facility with appropriate resources  7/22/2025 0857 by Carolyn Pruitt RN  Outcome: Progressing  Flowsheets (Taken 7/22/2025 0800 by Cassandra Kaur RN)  Discharge to home or other facility with appropriate resources:   Identify barriers to discharge with patient and caregiver   Arrange for needed discharge resources and transportation as appropriate  7/22/2025 0746 by Aura Tejeda RN  Outcome: Progressing  7/21/2025 2254 by Aura Tejeda RN  Outcome: Progressing     Problem: Safety - Adult  Goal: Free from fall injury  7/22/2025 0857 by Carolyn Pruitt RN  Outcome: Progressing  7/22/2025 0746 by Aura Tejeda RN  Outcome: Progressing  7/21/2025 2254 by Aura Tejeda RN  Outcome: Progressing  Flowsheets (Taken 7/21/2025 2000)  Free From Fall Injury: Instruct family/caregiver on patient safety     Problem: Chronic Conditions and Co-morbidities  Goal: Patient's chronic conditions and co-morbidity symptoms are monitored and maintained or improved  7/22/2025 0857 by Carolyn Pruitt RN  Outcome: Progressing  Flowsheets (Taken 7/22/2025 0800 by Cassandra Kaur RN)  Care Plan - Patient's Chronic Conditions and Co-Morbidity Symptoms are Monitored and Maintained or Improved:   Collaborate with multidisciplinary team to address chronic and comorbid conditions and prevent exacerbation or deterioration   Monitor and assess patient's chronic conditions and comorbid symptoms for stability, deterioration, or improvement  7/22/2025 0746 by Aura Tejeda RN  Outcome: Progressing  7/21/2025 2254 by Aura Tejeda RN  Outcome: Progressing     Problem: Skin/Tissue Integrity  Goal: Skin integrity remains intact  Description: 1.  Monitor for areas of redness and/or skin breakdown  2.  Assess vascular access sites hourly  3.  Every 4-6 hours minimum:  Change oxygen saturation probe site  4.  Every 4-6 hours:  If on nasal continuous positive airway  Progressing  Flowsheets (Taken 7/22/2025 0800 by Cassandra Kaur RN)  Absence of urinary retention:   Assess patient’s ability to void and empty bladder   Monitor intake/output and perform bladder scan as needed  7/22/2025 0746 by Aura Tejeda RN  Outcome: Progressing  7/21/2025 2254 by Aura Tejeda RN  Outcome: Progressing  Flowsheets (Taken 7/21/2025 2000)  Absence of urinary retention:   Assess patient’s ability to void and empty bladder   Monitor intake/output and perform bladder scan as needed     Problem: Metabolic/Fluid and Electrolytes - Adult  Goal: Electrolytes maintained within normal limits  7/22/2025 0857 by Carolyn Pruitt RN  Outcome: Progressing  Flowsheets (Taken 7/22/2025 0800 by Cassandra Kaur RN)  Electrolytes maintained within normal limits: Monitor labs and assess patient for signs and symptoms of electrolyte imbalances  7/22/2025 0746 by Aura Tejeda RN  Outcome: Progressing  7/21/2025 2254 by Aura Tejeda RN  Outcome: Progressing  Flowsheets (Taken 7/21/2025 2000)  Electrolytes maintained within normal limits:   Monitor labs and assess patient for signs and symptoms of electrolyte imbalances   Administer electrolyte replacement as ordered   Monitor response to electrolyte replacements, including repeat lab results as appropriate  Goal: Hemodynamic stability and optimal renal function maintained  7/22/2025 0857 by Carolyn Pruitt RN  Outcome: Progressing  Flowsheets (Taken 7/22/2025 0800 by Cassandra Kaur RN)  Hemodynamic stability and optimal renal function maintained: Monitor labs and assess for signs and symptoms of volume excess or deficit  7/22/2025 0746 by Aura Tejeda RN  Outcome: Progressing  7/21/2025 2254 by Aura Tejeda RN  Outcome: Progressing  Flowsheets (Taken 7/21/2025 2000)  Hemodynamic stability and optimal renal function maintained:   Monitor labs and assess for signs and symptoms of volume excess or deficit   Monitor intake, output and patient

## 2025-07-22 NOTE — PROGRESS NOTES
Pulmonary Critical Care progress Note     Patient's name:  Armando Lewis  Medical Record Number: [id number]  Patient's account/billing number: [id number]  Patient's YOB: 1960  Age: 64 y.o.  Date of Admission: 7/21/2025  8:34 AM  Date of Consult: 7/22/2025      Primary Care Physician: No primary care provider on file.      Code Status: Full Code    Reason for consult: Acute hypoxic and hypercapnic respiratory failure    Assessment and Plan     Acute hypoxic and hypercapnic respiratory failure  Bibasilar airspace disease favors atelectasis  Elevated troponin  History of diastolic heart failure/secondary pulm hypertension  Obesity  Schizoaffective disorder      Plan:  Titrate O2 to keep sats 88 to 92%  Refusing BiPAP, minimize sedatives and narcotics  Aspiration precautions  Diuresis  D/c antibiotics  Stable to transfer out of ICU     Subjective:  No acute events overnight.  Refusing BiPAP.  No fever.  Blood cultures negative to date      HISTORY OF PRESENT ILLNESS:   Mr./Ms. Armando Lewis is a 64 y.o. gentleman with past medical history as below significant for nursing home resident, schizoaffective disorder, congestive heart failure, tardive dyskinesia, hypertension and diabetes mellitus type 2 presented from the nursing home with worsening shortness of breath.  Found hypoxic and hypercapnic.  CT chest with no PE, bibasilar airspace disease favors atelectasis.  No significant leukocytosis or elevation in procalcitonin.            Past Medical History:        Diagnosis Date    (HFpEF) heart failure with preserved ejection fraction (HCC)     Antisocial personality disorder (HCC)     Blindness right eye category 5, normal vision left eye     Drug induced subacute dyskinesia     Insomnia     Major depressive disorder, recurrent, unspecified     Melena     Schizoaffective disorder (HCC)     Substance abuse (HCC)        Past Surgical History:  History reviewed. No  Labs     07/22/25  0522   WBC 14.4*   HGB 13.5        BMP:    Recent Labs     07/21/25  0852 07/22/25  0522    138   K 4.9 4.4   CL 95* 94*   CO2 35* 34*   BUN 16 20   CREATININE 0.9 0.8   GLUCOSE 111* 192*     S. Calcium:  Recent Labs     07/22/25  0522   CALCIUM 9.6         Radiology Review:  Pertinent images / reports were reviewed as a part of this visit.    CT Chest w/ contrast: No results found for this or any previous visit.      CT Chest w/o contrast: No results found for this or any previous visit.      CTPA: Results for orders placed during the hospital encounter of 07/21/25    CT CHEST PULMONARY EMBOLISM W CONTRAST    Narrative  EXAMINATION:  CTA OF THE CHEST 7/21/2025 10:25 am    TECHNIQUE:  CTA of the chest was performed after the administration of intravenous  contrast.  Multiplanar reformatted images are provided for review.  MIP  images are provided for review. Automated exposure control, iterative  reconstruction, and/or weight based adjustment of the mA/kV was utilized to  reduce the radiation dose to as low as reasonably achievable.    COMPARISON:  12/05/2022    HISTORY:  ORDERING SYSTEM PROVIDED HISTORY: hypoxia, elevated trop  TECHNOLOGIST PROVIDED HISTORY:  Reason for exam:->hypoxia, elevated trop  Additional Contrast?->1  Reason for Exam: hypoxia, elevated trop    FINDINGS:  Pulmonary Arteries: Pulmonary arteries are adequately opacified for  evaluation.  No evidence of intraluminal filling defect to suggest pulmonary  embolism.  Main pulmonary artery is normal in caliber.    Mediastinum: No evidence of mediastinal lymphadenopathy.  The heart and  pericardium demonstrate no acute abnormality.  There is no acute abnormality  of the thoracic aorta.    Lungs/pleura: There are mild bibasilar segmental airspace opacities.  The  upper lobes are clear.  There is no pneumothorax or effusion.    Upper Abdomen: Limited images of the upper abdomen are unremarkable.    Soft Tissues/Bones: No

## 2025-07-22 NOTE — PLAN OF CARE
Problem: Safety - Adult  Goal: Free from fall injury  Outcome: Progressing  Flowsheets (Taken 7/21/2025 2000)  Free From Fall Injury: Instruct family/caregiver on patient safety     Problem: Skin/Tissue Integrity  Goal: Skin integrity remains intact  Description: 1.  Monitor for areas of redness and/or skin breakdown  2.  Assess vascular access sites hourly  3.  Every 4-6 hours minimum:  Change oxygen saturation probe site  4.  Every 4-6 hours:  If on nasal continuous positive airway pressure, respiratory therapy assess nares and determine need for appliance change or resting period  Outcome: Progressing  Flowsheets (Taken 7/21/2025 2000)  Skin Integrity Remains Intact:   Monitor for areas of redness and/or skin breakdown   Every 4-6 hours minimum:  Change oxygen saturation probe site   Turn and reposition as indicated   Pressure redistribution bed/mattress (bed type)   Check visual cues for pain   Monitor skin under medical devices     Problem: Respiratory - Adult  Goal: Achieves optimal ventilation and oxygenation  Outcome: Progressing  Flowsheets (Taken 7/21/2025 2000)  Achieves optimal ventilation and oxygenation:   Assess for changes in respiratory status   Assess for changes in mentation and behavior   Position to facilitate oxygenation and minimize respiratory effort   Oxygen supplementation based on oxygen saturation or arterial blood gases   Encourage broncho-pulmonary hygiene including cough, deep breathe, incentive spirometry   Assess and instruct to report shortness of breath or any respiratory difficulty   Respiratory therapy support as indicated     Problem: Cardiovascular - Adult  Goal: Maintains optimal cardiac output and hemodynamic stability  Outcome: Progressing  Flowsheets (Taken 7/21/2025 2000)  Maintains optimal cardiac output and hemodynamic stability:   Monitor blood pressure and heart rate   Monitor urine output and notify Licensed Independent Practitioner for values outside of normal

## 2025-07-22 NOTE — PROGRESS NOTES
4 Eyes Skin Assessment     NAME:  Armando Lewis  YOB: 1960  MEDICAL RECORD NUMBER:  [id number]    The patient is being assessed for  Transfer to New Unit    I agree that at least one RN has performed a thorough Head to Toe Skin Assessment on the patient. ALL assessment sites listed below have been assessed.      Areas assessed by both nurses:    Head, Face, Ears, Shoulders, Back, Chest, Arms, Elbows, Hands, Sacrum. Buttock, Coccyx, Ischium, Legs. Feet and Heels, and Under Medical Devices         Does the Patient have a Wound? Yes wound(s) were present on assessment. LDA wound assessment was Initiated and completed by RN       Bhanu Prevention initiated by RN: Yes  Wound Care Orders initiated by RN: No, wound care orders already placed    For hospital-acquired stage 1 & 2 and ALL Stage 3,4, Unstageable, DTI, NWPT, and Complex wounds: place order “IP Wound Care/Ostomy Nurse Eval and Treat” by RN under : Yes    New Ostomies, if present place, Ostomy referral order under : No     Nurse 1 eSignature: Electronically signed by June Wilson RN on 7/22/25 at 12:21 PM EDT    **SHARE this note so that the co-signing nurse can place an eSignature**    Nurse 2 eSignature: Electronically signed by Gary Chery RN on 7/22/25 at 6:53 PM EDT

## 2025-07-22 NOTE — PROGRESS NOTES
Occupational Therapy  Armando Lewis  1960  [id number]    OT orders received and pt chart reviewed. Per OT note in April '25, the pt is totally dependent at baseline and no acute OT needs identified at this time. OT to sign off.     Electronically signed by NATALIE Panchal on 7/22/2025 at 1:43 PM

## 2025-07-22 NOTE — FLOWSHEET NOTE
Pt to PCU from ICU. Vitals listed below. Pt hooked up to bedside tele monitor and verified with [facility]U. Pt in bed in lowest position with call light light within reach. Pt hooked up to purwick. Pt has no expressed needs at this time.     Electronically signed by June Wilsno RN on 7/22/25 at 11:25 AM EDT    07/22/25 1115   Vital Signs   Temp 97.8 °F (36.6 °C)   Temp Source Oral   Pulse 75   Heart Rate Source Monitor   Respirations 20   /65   MAP (Calculated) 77   BP Location Right lower arm   BP Method Automatic   Patient Position Semi fowlers   Pain Assessment   Pain Assessment Worthington-Smith FACES   Pain Level 0   Oxygen Therapy   SpO2 90 %   Pulse via Oximetry 72 beats per minute   O2 Device None (Room air)

## 2025-07-22 NOTE — PROGRESS NOTES
NAME:  Armando Lewis  YOB: 1960  MEDICAL RECORD NUMBER:  [id number]    Shift Summary: Tolerated BiPAP for only 30 min overnight. VBG improving. Vital signs have been stable. Possible downgrade today.    Family updated: No    Rhythm: Normal Sinus Rhythm     Most recent vitals:   Visit Vitals  /67   Pulse 86   Temp 98.6 °F (37 °C) (Axillary)   Resp 30   Ht 1.854 m (6' 0.99\")   Wt 120.8 kg (266 lb 5.1 oz)   SpO2 90%   BMI 35.14 kg/m²           No data found.    No data found.      Respiratory support needed (if any):  - RA    Admission weight Weight - Scale: 124 kg (273 lb 5.9 oz) (07/21/25 0845)    Today's weight    Wt Readings from Last 1 Encounters:   07/22/25 120.8 kg (266 lb 5.1 oz)        Quiroz need assessed each shift: N/A - no quiroz present  UOP >30ml/hr: YES  Last documented BM (in last 48 hrs):  No data found.             Restraints (in use currently or dc'd in last 12 hrs): No    Order current and documentation up to date? N/A    Lines/Drains reviewed @ bedside.  Peripheral IV 07/21/25 Left Antecubital (Active)   Number of days: 0       Peripheral IV 07/21/25 Posterior;Proximal;Right Forearm (Active)   Number of days: 0         Drip rates at handoff:    sodium chloride         Lab Data:   CBC:   Recent Labs     07/21/25  0852 07/22/25  0522   WBC 11.0 14.4*   HGB 14.2 13.5   HCT 44.9 42.9   MCV 90.6 89.7    222     BMP:    Recent Labs     07/21/25  0852 07/22/25  0522    138   K 4.9 4.4   CO2 35* 34*   BUN 16 20   CREATININE 0.9 0.8     ABG: No results for input(s): \"PHART\", \"MSU2XPG\", \"PO2ART\" in the last 72 hours.    Any consults during the shift? No    Any signed and held orders to be released?  No        4 Eyes Skin Assessment       The patient is being assessed for  Shift Handoff    I agree that at least one RN has performed a thorough Head to Toe Skin Assessment on the patient. ALL assessment sites listed below have been assessed.      Areas assessed by both nurses: Head,

## 2025-07-22 NOTE — PROGRESS NOTES
Physical Therapy  Armando Lewis    Received orders for PT eval and treat.  Per chart, pt is dependent at baseline requiring use of armin lift.  No acute care PT needs.  Will sign off.    Electronically signed by Traci Torres PT [id number] on 7/22/2025 at 1:43 PM

## 2025-07-22 NOTE — CARE COORDINATION
Wound care consulted for wounds on bottom.   Pt with NEYDA. Would recommend calmoseptine.   Cleanse with barrier wipe. Will not continue to follow. Please re-consult if needed.Electronically signed by Gloria Vitale RN CWOCN on 7/22/2025 at 9:44 AM

## 2025-07-22 NOTE — PLAN OF CARE
Problem: Safety - Adult  Goal: Free from fall injury  7/21/2025 2254 by Aura Tejeda, RN  Outcome: Progressing  Flowsheets (Taken 7/21/2025 2000)  Free From Fall Injury: Instruct family/caregiver on patient safety     Problem: Skin/Tissue Integrity  Goal: Skin integrity remains intact  Description: 1.  Monitor for areas of redness and/or skin breakdown  2.  Assess vascular access sites hourly  3.  Every 4-6 hours minimum:  Change oxygen saturation probe site  4.  Every 4-6 hours:  If on nasal continuous positive airway pressure, respiratory therapy assess nares and determine need for appliance change or resting period  7/21/2025 2254 by Aura Tejeda, RN  Outcome: Progressing  Flowsheets (Taken 7/21/2025 2000)  Skin Integrity Remains Intact:   Monitor for areas of redness and/or skin breakdown   Every 4-6 hours minimum:  Change oxygen saturation probe site   Turn and reposition as indicated   Pressure redistribution bed/mattress (bed type)   Check visual cues for pain   Monitor skin under medical devices     Problem: Respiratory - Adult  Goal: Achieves optimal ventilation and oxygenation  7/21/2025 2254 by Aura Tejeda RN  Outcome: Progressing  Flowsheets (Taken 7/21/2025 2000)  Achieves optimal ventilation and oxygenation:   Assess for changes in respiratory status   Assess for changes in mentation and behavior   Position to facilitate oxygenation and minimize respiratory effort   Oxygen supplementation based on oxygen saturation or arterial blood gases   Encourage broncho-pulmonary hygiene including cough, deep breathe, incentive spirometry   Assess and instruct to report shortness of breath or any respiratory difficulty   Respiratory therapy support as indicated     Problem: Cardiovascular - Adult  Goal: Maintains optimal cardiac output and hemodynamic stability  7/21/2025 2254 by Aura Tejeda, RN  Outcome: Progressing  Flowsheets (Taken 7/21/2025 2000)  Maintains optimal cardiac output and

## 2025-07-23 VITALS
HEART RATE: 76 BPM | RESPIRATION RATE: 23 BRPM | TEMPERATURE: 98.3 F | DIASTOLIC BLOOD PRESSURE: 101 MMHG | WEIGHT: 258.38 LBS | BODY MASS INDEX: 34.24 KG/M2 | OXYGEN SATURATION: 96 % | SYSTOLIC BLOOD PRESSURE: 147 MMHG | HEIGHT: 73 IN

## 2025-07-23 PROCEDURE — 94761 N-INVAS EAR/PLS OXIMETRY MLT: CPT

## 2025-07-23 PROCEDURE — 6370000000 HC RX 637 (ALT 250 FOR IP): Performed by: STUDENT IN AN ORGANIZED HEALTH CARE EDUCATION/TRAINING PROGRAM

## 2025-07-23 PROCEDURE — 6370000000 HC RX 637 (ALT 250 FOR IP)

## 2025-07-23 PROCEDURE — 99232 SBSQ HOSP IP/OBS MODERATE 35: CPT | Performed by: INTERNAL MEDICINE

## 2025-07-23 PROCEDURE — 6360000002 HC RX W HCPCS: Performed by: INTERNAL MEDICINE

## 2025-07-23 PROCEDURE — 94640 AIRWAY INHALATION TREATMENT: CPT

## 2025-07-23 PROCEDURE — 2700000000 HC OXYGEN THERAPY PER DAY

## 2025-07-23 PROCEDURE — 2500000003 HC RX 250 WO HCPCS: Performed by: STUDENT IN AN ORGANIZED HEALTH CARE EDUCATION/TRAINING PROGRAM

## 2025-07-23 RX ORDER — DIAZEPAM 10 MG/2ML
5 INJECTION, SOLUTION INTRAMUSCULAR; INTRAVENOUS ONCE
Status: COMPLETED | OUTPATIENT
Start: 2025-07-23 | End: 2025-07-23

## 2025-07-23 RX ADMIN — SENNOSIDES 17.2 MG: 8.6 TABLET, FILM COATED ORAL at 10:20

## 2025-07-23 RX ADMIN — DORZOLAMIDE HYDROCHLORIDE 1 DROP: 20 SOLUTION/ DROPS OPHTHALMIC at 14:09

## 2025-07-23 RX ADMIN — IPRATROPIUM BROMIDE AND ALBUTEROL SULFATE 1 DOSE: .5; 2.5 SOLUTION RESPIRATORY (INHALATION) at 15:53

## 2025-07-23 RX ADMIN — TORSEMIDE 40 MG: 20 TABLET ORAL at 10:20

## 2025-07-23 RX ADMIN — POLYETHYLENE GLYCOL 3350 17 G: 17 POWDER, FOR SOLUTION ORAL at 10:22

## 2025-07-23 RX ADMIN — FOLIC ACID 1 MG: 1 TABLET ORAL at 10:20

## 2025-07-23 RX ADMIN — CYANOCOBALAMIN TAB 1000 MCG 1000 MCG: 1000 TAB at 10:20

## 2025-07-23 RX ADMIN — LACTULOSE 10 G: 10 SOLUTION ORAL at 10:22

## 2025-07-23 RX ADMIN — IPRATROPIUM BROMIDE AND ALBUTEROL SULFATE 1 DOSE: .5; 2.5 SOLUTION RESPIRATORY (INHALATION) at 11:27

## 2025-07-23 RX ADMIN — IPRATROPIUM BROMIDE AND ALBUTEROL SULFATE 1 DOSE: .5; 2.5 SOLUTION RESPIRATORY (INHALATION) at 07:38

## 2025-07-23 RX ADMIN — SPIRONOLACTONE 25 MG: 25 TABLET ORAL at 10:20

## 2025-07-23 RX ADMIN — ENOXAPARIN SODIUM 30 MG: 100 INJECTION SUBCUTANEOUS at 10:21

## 2025-07-23 RX ADMIN — RISPERIDONE 2 MG: 1 TABLET, FILM COATED ORAL at 10:20

## 2025-07-23 RX ADMIN — HALOPERIDOL 0.5 MG: 1 TABLET ORAL at 14:22

## 2025-07-23 RX ADMIN — LACTULOSE 10 G: 10 SOLUTION ORAL at 14:08

## 2025-07-23 RX ADMIN — POLYVINYL ALCOHOL 1 DROP: 1.4 SOLUTION/ DROPS OPHTHALMIC at 10:22

## 2025-07-23 RX ADMIN — ANORECTAL OINTMENT: 15.7; .44; 24; 20.6 OINTMENT TOPICAL at 10:20

## 2025-07-23 RX ADMIN — DIAZEPAM 5 MG: 5 INJECTION, SOLUTION INTRAMUSCULAR; INTRAVENOUS at 18:34

## 2025-07-23 RX ADMIN — BRIMONIDINE TARTRATE 1 DROP: 2 SOLUTION OPHTHALMIC at 14:09

## 2025-07-23 RX ADMIN — THERA TABS 1 TABLET: TAB at 10:20

## 2025-07-23 RX ADMIN — EMPAGLIFLOZIN 10 MG: 10 TABLET, FILM COATED ORAL at 10:20

## 2025-07-23 RX ADMIN — Medication 10 ML: at 10:38

## 2025-07-23 RX ADMIN — SODIUM CHLORIDE, PRESERVATIVE FREE 10 ML: 5 INJECTION INTRAVENOUS at 10:23

## 2025-07-23 RX ADMIN — RISPERIDONE 2 MG: 1 TABLET, FILM COATED ORAL at 14:23

## 2025-07-23 RX ADMIN — ASPIRIN 81 MG 81 MG: 81 TABLET ORAL at 10:20

## 2025-07-23 ASSESSMENT — PAIN SCALES - GENERAL: PAINLEVEL_OUTOF10: 0

## 2025-07-23 NOTE — CARE COORDINATION
SW received message back from MD advising that he was still cleared to leave at 1730. She will sign the SHANNAN momentarily,     Respectfully submitted,    Zeina VALENTE, MIGUEL  [facility]   [phone]    Electronically signed by AMBROSIO Martinez, LSW on 7/23/2025 at 5:08 PM

## 2025-07-23 NOTE — PLAN OF CARE
Problem: Discharge Planning  Goal: Discharge to home or other facility with appropriate resources  Outcome: Completed     Problem: Safety - Adult  Goal: Free from fall injury  Outcome: Completed     Problem: Chronic Conditions and Co-morbidities  Goal: Patient's chronic conditions and co-morbidity symptoms are monitored and maintained or improved  Outcome: Completed     Problem: Skin/Tissue Integrity  Goal: Skin integrity remains intact  Description: 1.  Monitor for areas of redness and/or skin breakdown  2.  Assess vascular access sites hourly  3.  Every 4-6 hours minimum:  Change oxygen saturation probe site  4.  Every 4-6 hours:  If on nasal continuous positive airway pressure, respiratory therapy assess nares and determine need for appliance change or resting period  Outcome: Completed     Problem: Respiratory - Adult  Goal: Achieves optimal ventilation and oxygenation  Outcome: Completed     Problem: Cardiovascular - Adult  Goal: Maintains optimal cardiac output and hemodynamic stability  Outcome: Completed  Goal: Absence of cardiac dysrhythmias or at baseline  Outcome: Completed     Problem: Skin/Tissue Integrity - Adult  Goal: Skin integrity remains intact  Description: 1.  Monitor for areas of redness and/or skin breakdown  2.  Assess vascular access sites hourly  3.  Every 4-6 hours minimum:  Change oxygen saturation probe site  4.  Every 4-6 hours:  If on nasal continuous positive airway pressure, respiratory therapy assess nares and determine need for appliance change or resting period  Outcome: Completed  Goal: Incisions, wounds, or drain sites healing without S/S of infection  Outcome: Completed  Goal: Oral mucous membranes remain intact  Outcome: Completed     Problem: Genitourinary - Adult  Goal: Absence of urinary retention  Outcome: Completed     Problem: Metabolic/Fluid and Electrolytes - Adult  Goal: Electrolytes maintained within normal limits  Outcome: Completed  Goal: Hemodynamic stability

## 2025-07-23 NOTE — PROGRESS NOTES
Discharge orders acknowledged by RN . Discharge teaching completed with pt and family. IV removed. Bedside monitor removed from pt. Pt vitals WNL. Pt discharged with all belongings to [facility] with EMS transport. Pt transported off of unit via stretcher. No complications. Electronically signed by Sonia Siddiqi RN on 7/23/2025 at 7:05 PM

## 2025-07-23 NOTE — CARE COORDINATION
MIGUEL ANGEL faxed AVS to [facility] today.   Transport form is on soft chart. No further needs noted at this time.     Respectfully submitted,    Zeina Vaughn-Ap VALENTE, MIGUEL  [facility]   [phone]    Electronically signed by AMBROSIO Martinez, LSW on 7/23/2025 at 5:21 PM

## 2025-07-23 NOTE — CARE COORDINATION
MIGUEL ANGEL faxed bipap settings to [facility] to review at [phone].    Respectfully submitted,    Zeina Vaughn-Ap VALENTE, MIGUEL  [facility]   [phone]    Electronically signed by AMBROSIO Martinez, LSW on 7/23/2025 at 9:30 AM

## 2025-07-23 NOTE — CARE COORDINATION
MIGUEL ANGEL left message for Bhanu at [facility] to find out what time patient's bipap might be able to be delivered. We are currently waiting on a response.     Respectfully submitted,    Zeina VALENTE, MIGUEL  [facility]   [phone]    Electronically signed by AMBROSIO Martinez, LISA on 7/23/2025 at 11:54 AM

## 2025-07-23 NOTE — CARE COORDINATION
SW reached out to bedside nurse asking if patient was truly discharging. We are still waiting on MD and RN to sign the SHANNAN. SW messaged MD as well.     Bedside nurse advised that patient started getting anxious after learning that he was headed back to his long term care facility.     At this time transport is not being cancelled.     Respectfully submitted,    Zeina VALENTE, MIGUEL  [facility]   [phone]    Electronically signed by AMBROSIO Martinez, LISA on 7/23/2025 at 4:35 PM

## 2025-07-23 NOTE — CARE COORDINATION
Discharge order noted. SW will call [facility] after rounds to find out how soon they can get the bipap in his room. He can't discharge without it.     Respectfully submitted,    Zeina VALENTE, MIGUEL  [facility]   [phone]    Electronically signed by AMBROSIO Martinez, LISA on 7/23/2025 at 10:49 AM

## 2025-07-23 NOTE — PROGRESS NOTES
Pulmonary Progress Note    Date of Admission: 7/21/2025   LOS: 2 days       CC:  Chief Complaint   Patient presents with    Shortness of Breath     Pt to ED from [facility] c/o SOB per nursing home staff. Pt was \"sleepier than usual\" and was saturaing 89% on 4L when EMS arrived. They increased him to 6L and then was saturating 92%. Pt is hard to understand, but Alert and oriented x4. 93% on 6L. 82% on room air.         Subjective:  Exam was completed several hours ago while patient was sleeping       Assessment:     Acute hypoxic and hypercapnic respiratory failure  Bibasilar airspace disease favors atelectasis  Elevated troponin  History of diastolic heart failure/secondary pulm hypertension  Obesity  Schizoaffective disorder    Plan:     This note may have been transcribed using Dragon Dictation software. Please disregard any translational errors.       Hospital Day: 2       BiPAP in room.  He has used BiPAP much for the last 2 days  pH corrected  Patient planned to be discharged today.  Attempting to get BiPAP at new facility.  Unfortunately, this may not be used based on his inpatient compliance.  Antibiotics stopped         Data:        PHYSICAL EXAM:   Blood pressure 115/76, pulse 77, temperature 98 °F (36.7 °C), temperature source Oral, resp. rate 15, height 1.854 m (6' 0.99\"), weight 117.2 kg (258 lb 6.1 oz), SpO2 95%.'  Body mass index is 34.1 kg/m².   Gen: No distress.    ENT:   Resp: No accessory muscle use. No crackles. No wheezes. No rhonchi.    CV: Regular rate. Regular rhythm. No murmur or rub. No edema.   Skin: Warm, dry, normal texture and turgor. No nodule on exposed extremities.   M/S: No cyanosis. No clubbing. No joint deformity.  Psych: Oriented x 3. No anxiety.  Awake. Alert. Intact judgement and insight. Good Mood / Affect.  Memory appears in tact       Medications:    Scheduled Meds:   menthol-zinc oxide   Topical BID    sodium chloride flush  5-40 mL IntraVENous 2 times per day

## 2025-07-23 NOTE — DISCHARGE INSTR - COC
Continuity of Care Form    Patient Name: Armando Lewis   :  1960  MRN:  [id number]    Admit date:  2025  Discharge date:  2025    Code Status Order: Full Code   Advance Directives:     Admitting Physician:  Nicolás FONG DO  PCP: No primary care provider on file.    Discharging Nurse: Sonia Siddiqi RN  Discharging Hospital Unit/Room#: J2V-5479/5262-01  Discharging Unit Phone Number: [phone]      Emergency Contact:   Extended Emergency Contact Information  Primary Emergency Contact: Shirley Lewis  Home Phone: [phone]  Relation: Parent  Secondary Emergency Contact: Jessenia Obrien  Mobile Phone: [phone]  Relation: Brother/Sister    Past Surgical History:  History reviewed. No pertinent surgical history.    Immunization History:   Immunization History   Administered Date(s) Administered    COVID-19, J&J, (age 18y+), IM, 0.5 mL 2021       Active Problems:  Patient Active Problem List   Diagnosis Code    Acute metabolic encephalopathy G93.41    Hypernatremia E87.0    E. coli UTI N39.0, B96.20    Sepsis ([facility]) A41.9    Fever and chills R50.9    Neutrophilia D72.828    CRP elevated R79.82    Disorientation R41.0    LFT elevation R79.89    Septicemia ([facility]) A41.9    AMS (altered mental status) R41.82    Altered mental status R41.82    Pneumonia of left lower lobe due to infectious organism J18.9    Acute respiratory failure with hypoxia and hypercapnia ([facility]) J96.01, J96.02    Acute congestive heart failure ([facility]) I50.9    Demand ischemia ([facility]) I24.89       Isolation/Infection:   Isolation            Contact          Patient Infection Status        Infection Onset Added Last Indicated Last Indicated By Review Planned Expiration    MRSA 25 MRSA DNA Probe, Nasal                           Nurse Assessment:  Last Vital Signs: /76   Pulse 77   Temp 98 °F (36.7 °C) (Oral)   Resp 15   Ht 1.854 m (6' 0.99\")   Wt 117.2 kg (258 lb 6.1 oz)   SpO2 93%   BMI 34.10  kg/m²     Last documented pain score (0-10 scale): Pain Level: 0  Last Weight:   Wt Readings from Last 1 Encounters:   07/23/25 117.2 kg (258 lb 6.1 oz)     Mental Status:  disoriented    IV Access:  - None    Nursing Mobility/ADLs:  Walking   Dependent  Transfer  Dependent  Bathing  Dependent  Dressing  Dependent  Toileting  Dependent  Feeding  Dependent  Med Admin  Dependent  Med Delivery   crushed and prefers mixed with apple sauce or ice cream    Wound Care Documentation and Therapy:  Wound 12/04/22 Buttocks Right;Left stage 2 ulcers (one on left and one on right buttocks) (Active)   Number of days: 962        Elimination:  Continence:   Bowel: No  Bladder: No  Urinary Catheter: None   Colostomy/Ileostomy/Ileal Conduit: No       Date of Last BM: 07/23/2025    Intake/Output Summary (Last 24 hours) at 7/23/2025 1407  Last data filed at 7/23/2025 1405  Gross per 24 hour   Intake 550 ml   Output 2350 ml   Net -1800 ml     I/O last 3 completed shifts:  In: 1098 [P.O.:1098]  Out: 5050 [Urine:5050]    Safety Concerns:     At Risk for Falls    Impairments/Disabilities:      Speech, vision      Nutrition Therapy:  Current Nutrition Therapy:   - Oral Diet:  General and Dysphagia - Soft and Bite Sized    Routes of Feeding: Oral  Liquids: Thin Liquids  Daily Fluid Restriction: no  Last Modified Barium Swallow with Video (Video Swallowing Test): not done    Treatments at the Time of Hospital Discharge:   Respiratory Treatments: Yes  Oxygen Therapy:  is on oxygen at 2 L/min per nasal cannula.  Ventilator:    - No ventilator support    Rehab Therapies: Physical Therapy and Occupational Therapy  Weight Bearing Status/Restrictions: No weight bearing restrictions  Other Medical Equipment (for information only, NOT a DME order):  wheelchair, bath bench, hospital bed, and O2  Other Treatments: Oxygen - Bi-PAP    Patient's personal belongings (please select all that are sent with patient):  All patient belongs sent with

## 2025-07-23 NOTE — CARE COORDINATION
Case Management Discharge Note          Date / Time of Note: 7/23/2025 1:55 PM                  Patient Name: Armando Lewis   YOB: 1960  Diagnosis: Shortness of breath [R06.02]  Acute respiratory failure with hypoxia and hypercapnia (HCC) [J96.01, J96.02]  Acute congestive heart failure, unspecified heart failure type (HCC) [I50.9]   Date / Time: 7/21/2025  8:34 AM    Financial:  Pay[place] / Plan: [place]PT OF [place] / Product Type: *No Product type* /      Pharmacy:    [facility] ([place], [place] - [address] - P [phone] - F [phone]  [address] [address]  Phone: [phone] Fax: [phone]      Assistance purchasing medications?:    Assistance provided by Case Management: None at this time    DISCHARGE Disposition: Long Term Care Facility (LTC)    Nursing Facility:   Discharging to Facility/ Agency   Name:  [facility]  Address:  [address] [place], [place] [address] [place] [address]   Report Phone:  [phone]  Fax:  [phone]     LOC at discharge: Long Term Care  SHANNAN Completed: Yes          Notification completed in HENS/PAS?:  Not Applicable    Transportation:  Transportation PLAN for discharge: EMS transportation   Mode of Transport: Ambulance stretcher - BLS  Reason for medical transport: Other: Patient has a history of diastolic heart failure/secondary to pulmonary hypertension and schizoaffective disorder.  Name of Transport Company: [employer]  Phone: [phone]  Time of Transport: 1730    Transport form completed: Yes    IMM Completed:   Not Indicated    Additional CM Notes: SW spoke to patient's mother and she is aware of the new bipap in his room. She is in agreement with the discharge and the plan for continued care listed above.     The Plan for Transition of Care is related to the following treatment goals of Shortness of breath [R06.02]  Acute respiratory failure

## 2025-07-23 NOTE — CARE COORDINATION
SW received message from MD advising that patient will need a bipap at discharge. SW reached out to Bhanu at [facility] and he will need the settings for it. SW reached out to respiratory and as soon as we have it we will fax it over for review.    Respectfully submitted,    Zeina VALENTE, MIGUEL  [facility]   [phone]    Electronically signed by AMBROSIO Martinez, LISA on 7/23/2025 at 8:59 AM

## 2025-07-25 ENCOUNTER — HOSPITAL ENCOUNTER (OUTPATIENT)
Age: 65
Setting detail: OBSERVATION
Discharge: NURSING, REHAB OR LONG-TERM CARE | End: 2025-07-27
Attending: EMERGENCY MEDICINE | Admitting: INTERNAL MEDICINE
Payer: MEDICAID

## 2025-07-25 ENCOUNTER — APPOINTMENT (OUTPATIENT)
Dept: GENERAL RADIOLOGY | Age: 65
End: 2025-07-25
Payer: MEDICAID

## 2025-07-25 DIAGNOSIS — J96.21 ACUTE ON CHRONIC RESPIRATORY FAILURE WITH HYPOXIA (HCC): Primary | ICD-10-CM

## 2025-07-25 LAB
ALBUMIN SERPL-MCNC: 4 G/DL (ref 3.4–5)
ALBUMIN/GLOB SERPL: 1.1 {RATIO} (ref 1.1–2.2)
ALP SERPL-CCNC: 91 U/L (ref 40–129)
ALT SERPL-CCNC: 22 U/L (ref 10–40)
ANION GAP SERPL CALCULATED.3IONS-SCNC: 9 MMOL/L (ref 3–16)
AST SERPL-CCNC: 28 U/L (ref 15–37)
BACTERIA BLD CULT ORG #2: NORMAL
BACTERIA BLD CULT: NORMAL
BACTERIA URNS QL MICRO: NORMAL /HPF
BASE EXCESS BLDV CALC-SCNC: 13 MMOL/L
BASOPHILS # BLD: 0.1 K/UL (ref 0–0.2)
BASOPHILS NFR BLD: 0.6 %
BILIRUB SERPL-MCNC: 0.7 MG/DL (ref 0–1)
BILIRUB UR QL STRIP.AUTO: NEGATIVE
BUN SERPL-MCNC: 18 MG/DL (ref 7–20)
CALCIUM SERPL-MCNC: 9.8 MG/DL (ref 8.3–10.6)
CHLORIDE SERPL-SCNC: 93 MMOL/L (ref 99–110)
CLARITY UR: CLEAR
CO2 BLDV-SCNC: 43 MMOL/L
CO2 SERPL-SCNC: 37 MMOL/L (ref 21–32)
COHGB MFR BLDV: 2.4 %
COLOR UR: YELLOW
CREAT SERPL-MCNC: 0.8 MG/DL (ref 0.8–1.3)
D-DIMER QUANTITATIVE: 0.64 UG/ML FEU (ref 0–0.6)
DEPRECATED RDW RBC AUTO: 15.3 % (ref 12.4–15.4)
EOSINOPHIL # BLD: 0.1 K/UL (ref 0–0.6)
EOSINOPHIL NFR BLD: 0.6 %
EPI CELLS #/AREA URNS AUTO: 0 /HPF (ref 0–5)
FLUAV + FLUBV AG NOSE IA.RAPID: NOT DETECTED
FLUAV + FLUBV AG NOSE IA.RAPID: NOT DETECTED
GFR SERPLBLD CREATININE-BSD FMLA CKD-EPI: >90 ML/MIN/{1.73_M2}
GLUCOSE SERPL-MCNC: 104 MG/DL (ref 70–99)
GLUCOSE UR STRIP.AUTO-MCNC: 500 MG/DL
HCO3 BLDV-SCNC: 41 MMOL/L (ref 23–29)
HCT VFR BLD AUTO: 45.6 % (ref 40.5–52.5)
HGB BLD-MCNC: 14.5 G/DL (ref 13.5–17.5)
HGB UR QL STRIP.AUTO: NEGATIVE
HYALINE CASTS #/AREA URNS AUTO: 0 /LPF (ref 0–8)
KETONES UR STRIP.AUTO-MCNC: NEGATIVE MG/DL
LACTATE BLDV-SCNC: 0.9 MMOL/L (ref 0.4–1.9)
LEUKOCYTE ESTERASE UR QL STRIP.AUTO: ABNORMAL
LYMPHOCYTES # BLD: 1 K/UL (ref 1–5.1)
LYMPHOCYTES NFR BLD: 7.9 %
MCH RBC QN AUTO: 28.5 PG (ref 26–34)
MCHC RBC AUTO-ENTMCNC: 31.7 G/DL (ref 31–36)
MCV RBC AUTO: 89.8 FL (ref 80–100)
METHGB MFR BLDV: 0.4 %
MONOCYTES # BLD: 0.9 K/UL (ref 0–1.3)
MONOCYTES NFR BLD: 6.6 %
NEUTROPHILS # BLD: 11.1 K/UL (ref 1.7–7.7)
NEUTROPHILS NFR BLD: 84.3 %
NITRITE UR QL STRIP.AUTO: NEGATIVE
NT-PROBNP SERPL-MCNC: 85 PG/ML (ref 0–124)
O2 THERAPY: ABNORMAL
PCO2 BLDV: 66.7 MMHG (ref 40–50)
PH BLDV: 7.4 [PH] (ref 7.35–7.45)
PH UR STRIP.AUTO: 5.5 [PH] (ref 5–8)
PLATELET # BLD AUTO: 210 K/UL (ref 135–450)
PMV BLD AUTO: 8.2 FL (ref 5–10.5)
PO2 BLDV: 50 MMHG
POTASSIUM SERPL-SCNC: 4.1 MMOL/L (ref 3.5–5.1)
PROT SERPL-MCNC: 7.7 G/DL (ref 6.4–8.2)
PROT UR STRIP.AUTO-MCNC: NEGATIVE MG/DL
RBC # BLD AUTO: 5.08 M/UL (ref 4.2–5.9)
RBC CLUMPS #/AREA URNS AUTO: 0 /HPF (ref 0–4)
SAO2 % BLDV: 84 %
SARS-COV-2 RDRP RESP QL NAA+PROBE: NOT DETECTED
SODIUM SERPL-SCNC: 139 MMOL/L (ref 136–145)
SP GR UR STRIP.AUTO: 1.01 (ref 1–1.03)
TROPONIN, HIGH SENSITIVITY: 57 NG/L (ref 0–22)
TROPONIN, HIGH SENSITIVITY: 67 NG/L (ref 0–22)
UA COMPLETE W REFLEX CULTURE PNL UR: ABNORMAL
UA DIPSTICK W REFLEX MICRO PNL UR: YES
URN SPEC COLLECT METH UR: ABNORMAL
UROBILINOGEN UR STRIP-ACNC: 0.2 E.U./DL
WBC # BLD AUTO: 13.1 K/UL (ref 4–11)
WBC #/AREA URNS AUTO: 1 /HPF (ref 0–5)

## 2025-07-25 PROCEDURE — 93005 ELECTROCARDIOGRAM TRACING: CPT | Performed by: EMERGENCY MEDICINE

## 2025-07-25 PROCEDURE — 87502 INFLUENZA DNA AMP PROBE: CPT

## 2025-07-25 PROCEDURE — 85025 COMPLETE CBC W/AUTO DIFF WBC: CPT

## 2025-07-25 PROCEDURE — 85379 FIBRIN DEGRADATION QUANT: CPT

## 2025-07-25 PROCEDURE — 99285 EMERGENCY DEPT VISIT HI MDM: CPT

## 2025-07-25 PROCEDURE — 82803 BLOOD GASES ANY COMBINATION: CPT

## 2025-07-25 PROCEDURE — 71045 X-RAY EXAM CHEST 1 VIEW: CPT

## 2025-07-25 PROCEDURE — 81001 URINALYSIS AUTO W/SCOPE: CPT

## 2025-07-25 PROCEDURE — 83880 ASSAY OF NATRIURETIC PEPTIDE: CPT

## 2025-07-25 PROCEDURE — 36415 COLL VENOUS BLD VENIPUNCTURE: CPT

## 2025-07-25 PROCEDURE — 83605 ASSAY OF LACTIC ACID: CPT

## 2025-07-25 PROCEDURE — 6370000000 HC RX 637 (ALT 250 FOR IP): Performed by: EMERGENCY MEDICINE

## 2025-07-25 PROCEDURE — 84484 ASSAY OF TROPONIN QUANT: CPT

## 2025-07-25 PROCEDURE — 80053 COMPREHEN METABOLIC PANEL: CPT

## 2025-07-25 PROCEDURE — 94640 AIRWAY INHALATION TREATMENT: CPT

## 2025-07-25 PROCEDURE — 87040 BLOOD CULTURE FOR BACTERIA: CPT

## 2025-07-25 PROCEDURE — 87635 SARS-COV-2 COVID-19 AMP PRB: CPT

## 2025-07-25 RX ORDER — IPRATROPIUM BROMIDE AND ALBUTEROL SULFATE 2.5; .5 MG/3ML; MG/3ML
1 SOLUTION RESPIRATORY (INHALATION) ONCE
Status: COMPLETED | OUTPATIENT
Start: 2025-07-25 | End: 2025-07-25

## 2025-07-25 RX ADMIN — IPRATROPIUM BROMIDE AND ALBUTEROL SULFATE 1 DOSE: .5; 2.5 SOLUTION RESPIRATORY (INHALATION) at 19:48

## 2025-07-25 ASSESSMENT — PAIN - FUNCTIONAL ASSESSMENT: PAIN_FUNCTIONAL_ASSESSMENT: NONE - DENIES PAIN

## 2025-07-25 NOTE — ED PROVIDER NOTES
[facility] Emergency Department _ physician note     CHIEF COMPLAINT  Respiratory Distress (Pt arrived by EMS for diff breathing from nursing facility. Pt was on NRB at 10 lpm. Pt was on bipap at facility and was given breathing treatments today without improvement before he was transported here for eval. )       HISTORY OF PRESENT ILLNESS  Armando Lewis is a 64 y.o. male who presents to the ED w/ respiratory distress.     Presented from his nursing facility for difficulty breathing.  He was hypoxic and had respiratory distress per SNF. They placed him on BiPAP and gave him a breathing treatment.  He was placed on 10 L nonrebreather by EMS.  Saturations were appropriate by EMS.  Patient states has been having shortness of breath and coughing but does not know how long he has been coughing oral how long he has had shortness of breath.  Denies chest pain.  History somewhat limited by patient's mental status    I have reviewed the following from the nursing documentation:    Past Medical History:   Diagnosis Date    (HFpEF) heart failure with preserved ejection fraction (HCC)     Antisocial personality disorder (HCC)     Blindness right eye category 5, normal vision left eye     Chronic respiratory failure with hypoxia and hypercapnia (HCC)     On 2 L home oxygen    COPD (chronic obstructive pulmonary disease) with emphysema (HCC)     Drug induced subacute dyskinesia     Insomnia     Major depressive disorder, recurrent, unspecified     Melena     ETELVINA (obstructive sleep apnea)     On BiPAP    Schizoaffective disorder (HCC)     Substance abuse (HCC)      History reviewed. No pertinent surgical history.  History reviewed. No pertinent family history.  Social History     Socioeconomic History    Marital status: Single     Spouse name: Not on file    Number of children: Not on file    Years of education: Not on file    Highest education level: Not on file   Occupational History    Not on file   Tobacco Use    Smoking status:

## 2025-07-26 PROBLEM — J96.21 ACUTE ON CHRONIC RESPIRATORY FAILURE WITH HYPOXIA AND HYPERCAPNIA (HCC): Status: ACTIVE | Noted: 2025-07-26

## 2025-07-26 PROBLEM — J96.22 ACUTE ON CHRONIC RESPIRATORY FAILURE WITH HYPOXIA AND HYPERCAPNIA (HCC): Status: ACTIVE | Noted: 2025-07-26

## 2025-07-26 PROBLEM — J44.1 COPD EXACERBATION (HCC): Status: ACTIVE | Noted: 2025-07-26

## 2025-07-26 PROBLEM — G47.33 OSA (OBSTRUCTIVE SLEEP APNEA): Status: ACTIVE | Noted: 2025-07-26

## 2025-07-26 LAB
ANION GAP SERPL CALCULATED.3IONS-SCNC: 7 MMOL/L (ref 3–16)
BASE EXCESS BLDV CALC-SCNC: 11 MMOL/L
BASE EXCESS BLDV CALC-SCNC: 11.3 MMOL/L
BASOPHILS # BLD: 0 K/UL (ref 0–0.2)
BASOPHILS NFR BLD: 0.4 %
BUN SERPL-MCNC: 17 MG/DL (ref 7–20)
CALCIUM SERPL-MCNC: 9 MG/DL (ref 8.3–10.6)
CHLORIDE SERPL-SCNC: 96 MMOL/L (ref 99–110)
CO2 BLDV-SCNC: 41 MMOL/L
CO2 BLDV-SCNC: 43 MMOL/L
CO2 SERPL-SCNC: 38 MMOL/L (ref 21–32)
COHGB MFR BLDV: 1.6 %
COHGB MFR BLDV: 1.8 %
CREAT SERPL-MCNC: 0.7 MG/DL (ref 0.8–1.3)
DEPRECATED RDW RBC AUTO: 15.8 % (ref 12.4–15.4)
EKG ATRIAL RATE: 103 BPM
EKG DIAGNOSIS: NORMAL
EKG P AXIS: 40 DEGREES
EKG P-R INTERVAL: 154 MS
EKG Q-T INTERVAL: 330 MS
EKG QRS DURATION: 74 MS
EKG QTC CALCULATION (BAZETT): 432 MS
EKG R AXIS: 69 DEGREES
EKG T AXIS: 70 DEGREES
EKG VENTRICULAR RATE: 103 BPM
EOSINOPHIL # BLD: 0.1 K/UL (ref 0–0.6)
EOSINOPHIL NFR BLD: 0.8 %
GFR SERPLBLD CREATININE-BSD FMLA CKD-EPI: >90 ML/MIN/{1.73_M2}
GLUCOSE BLD-MCNC: 103 MG/DL (ref 70–99)
GLUCOSE SERPL-MCNC: 95 MG/DL (ref 70–99)
HCO3 BLDV-SCNC: 39 MMOL/L (ref 23–29)
HCO3 BLDV-SCNC: 40 MMOL/L (ref 23–29)
HCT VFR BLD AUTO: 44.1 % (ref 40.5–52.5)
HGB BLD-MCNC: 14.3 G/DL (ref 13.5–17.5)
LACTATE BLDV-SCNC: 0.9 MMOL/L (ref 0.4–1.9)
LYMPHOCYTES # BLD: 0.9 K/UL (ref 1–5.1)
LYMPHOCYTES NFR BLD: 8.1 %
MCH RBC QN AUTO: 28.8 PG (ref 26–34)
MCHC RBC AUTO-ENTMCNC: 32.5 G/DL (ref 31–36)
MCV RBC AUTO: 88.6 FL (ref 80–100)
METHGB MFR BLDV: 0.4 %
METHGB MFR BLDV: 0.5 %
MONOCYTES # BLD: 0.7 K/UL (ref 0–1.3)
MONOCYTES NFR BLD: 6.6 %
NEUTROPHILS # BLD: 9.2 K/UL (ref 1.7–7.7)
NEUTROPHILS NFR BLD: 84.1 %
O2 THERAPY: ABNORMAL
O2 THERAPY: ABNORMAL
PCO2 BLDV: 61.1 MMHG (ref 40–50)
PCO2 BLDV: 73.1 MMHG (ref 40–50)
PERFORMED ON: ABNORMAL
PH BLDV: 7.35 [PH] (ref 7.35–7.45)
PH BLDV: 7.41 [PH] (ref 7.35–7.45)
PHOSPHATE SERPL-MCNC: 4.8 MG/DL (ref 2.5–4.9)
PLATELET # BLD AUTO: 214 K/UL (ref 135–450)
PMV BLD AUTO: 9.6 FL (ref 5–10.5)
PO2 BLDV: 171 MMHG
PO2 BLDV: 54 MMHG
POTASSIUM SERPL-SCNC: 4.1 MMOL/L (ref 3.5–5.1)
PROCALCITONIN SERPL IA-MCNC: 0.15 NG/ML (ref 0–0.15)
RBC # BLD AUTO: 4.97 M/UL (ref 4.2–5.9)
SAO2 % BLDV: 88 %
SAO2 % BLDV: 99 %
SODIUM SERPL-SCNC: 141 MMOL/L (ref 136–145)
WBC # BLD AUTO: 10.9 K/UL (ref 4–11)

## 2025-07-26 PROCEDURE — 85025 COMPLETE CBC W/AUTO DIFF WBC: CPT

## 2025-07-26 PROCEDURE — 96366 THER/PROPH/DIAG IV INF ADDON: CPT

## 2025-07-26 PROCEDURE — 82803 BLOOD GASES ANY COMBINATION: CPT

## 2025-07-26 PROCEDURE — 94640 AIRWAY INHALATION TREATMENT: CPT

## 2025-07-26 PROCEDURE — 83605 ASSAY OF LACTIC ACID: CPT

## 2025-07-26 PROCEDURE — 94660 CPAP INITIATION&MGMT: CPT

## 2025-07-26 PROCEDURE — 96365 THER/PROPH/DIAG IV INF INIT: CPT

## 2025-07-26 PROCEDURE — 80048 BASIC METABOLIC PNL TOTAL CA: CPT

## 2025-07-26 PROCEDURE — 6360000002 HC RX W HCPCS: Performed by: INTERNAL MEDICINE

## 2025-07-26 PROCEDURE — 6370000000 HC RX 637 (ALT 250 FOR IP): Performed by: STUDENT IN AN ORGANIZED HEALTH CARE EDUCATION/TRAINING PROGRAM

## 2025-07-26 PROCEDURE — 36415 COLL VENOUS BLD VENIPUNCTURE: CPT

## 2025-07-26 PROCEDURE — 84145 PROCALCITONIN (PCT): CPT

## 2025-07-26 PROCEDURE — 93010 ELECTROCARDIOGRAM REPORT: CPT | Performed by: INTERNAL MEDICINE

## 2025-07-26 PROCEDURE — 94761 N-INVAS EAR/PLS OXIMETRY MLT: CPT

## 2025-07-26 PROCEDURE — 6370000000 HC RX 637 (ALT 250 FOR IP): Performed by: INTERNAL MEDICINE

## 2025-07-26 PROCEDURE — 2700000000 HC OXYGEN THERAPY PER DAY

## 2025-07-26 PROCEDURE — 96372 THER/PROPH/DIAG INJ SC/IM: CPT

## 2025-07-26 PROCEDURE — 2060000000 HC ICU INTERMEDIATE R&B

## 2025-07-26 PROCEDURE — 84100 ASSAY OF PHOSPHORUS: CPT

## 2025-07-26 PROCEDURE — 87040 BLOOD CULTURE FOR BACTERIA: CPT

## 2025-07-26 PROCEDURE — 2580000003 HC RX 258: Performed by: INTERNAL MEDICINE

## 2025-07-26 PROCEDURE — 99223 1ST HOSP IP/OBS HIGH 75: CPT | Performed by: INTERNAL MEDICINE

## 2025-07-26 RX ORDER — TAMSULOSIN HYDROCHLORIDE 0.4 MG/1
0.4 CAPSULE ORAL NIGHTLY
Status: DISCONTINUED | OUTPATIENT
Start: 2025-07-26 | End: 2025-07-27 | Stop reason: HOSPADM

## 2025-07-26 RX ORDER — HALOPERIDOL 1 MG/1
0.5 TABLET ORAL EVERY 12 HOURS PRN
Status: DISCONTINUED | OUTPATIENT
Start: 2025-07-26 | End: 2025-07-27 | Stop reason: HOSPADM

## 2025-07-26 RX ORDER — ENOXAPARIN SODIUM 100 MG/ML
30 INJECTION SUBCUTANEOUS 2 TIMES DAILY
Status: DISCONTINUED | OUTPATIENT
Start: 2025-07-26 | End: 2025-07-27 | Stop reason: HOSPADM

## 2025-07-26 RX ORDER — ACETAMINOPHEN 325 MG/1
650 TABLET ORAL EVERY 6 HOURS PRN
Status: DISCONTINUED | OUTPATIENT
Start: 2025-07-26 | End: 2025-07-27 | Stop reason: HOSPADM

## 2025-07-26 RX ORDER — SODIUM CHLORIDE 0.9 % (FLUSH) 0.9 %
5-40 SYRINGE (ML) INJECTION PRN
Status: DISCONTINUED | OUTPATIENT
Start: 2025-07-26 | End: 2025-07-27 | Stop reason: HOSPADM

## 2025-07-26 RX ORDER — LANOLIN ALCOHOL/MO/W.PET/CERES
1000 CREAM (GRAM) TOPICAL DAILY
Status: DISCONTINUED | OUTPATIENT
Start: 2025-07-26 | End: 2025-07-27 | Stop reason: HOSPADM

## 2025-07-26 RX ORDER — LACTULOSE 10 G/15ML
10 SOLUTION ORAL 3 TIMES DAILY
Status: DISCONTINUED | OUTPATIENT
Start: 2025-07-26 | End: 2025-07-27 | Stop reason: HOSPADM

## 2025-07-26 RX ORDER — ACETAMINOPHEN 650 MG/1
650 SUPPOSITORY RECTAL EVERY 6 HOURS PRN
Status: DISCONTINUED | OUTPATIENT
Start: 2025-07-26 | End: 2025-07-27 | Stop reason: HOSPADM

## 2025-07-26 RX ORDER — PREDNISONE 20 MG/1
40 TABLET ORAL DAILY
Status: DISCONTINUED | OUTPATIENT
Start: 2025-07-26 | End: 2025-07-27 | Stop reason: HOSPADM

## 2025-07-26 RX ORDER — SODIUM CHLORIDE 9 MG/ML
INJECTION, SOLUTION INTRAVENOUS PRN
Status: DISCONTINUED | OUTPATIENT
Start: 2025-07-26 | End: 2025-07-27 | Stop reason: HOSPADM

## 2025-07-26 RX ORDER — ASPIRIN 81 MG/1
81 TABLET ORAL DAILY
Status: DISCONTINUED | OUTPATIENT
Start: 2025-07-26 | End: 2025-07-27 | Stop reason: HOSPADM

## 2025-07-26 RX ORDER — ALBUTEROL SULFATE 0.83 MG/ML
2.5 SOLUTION RESPIRATORY (INHALATION)
Status: DISCONTINUED | OUTPATIENT
Start: 2025-07-26 | End: 2025-07-27 | Stop reason: HOSPADM

## 2025-07-26 RX ORDER — DORZOLAMIDE HCL 20 MG/ML
1 SOLUTION/ DROPS OPHTHALMIC 2 TIMES DAILY
Status: DISCONTINUED | OUTPATIENT
Start: 2025-07-26 | End: 2025-07-27 | Stop reason: HOSPADM

## 2025-07-26 RX ORDER — SODIUM CHLORIDE 0.9 % (FLUSH) 0.9 %
5-40 SYRINGE (ML) INJECTION EVERY 12 HOURS SCHEDULED
Status: DISCONTINUED | OUTPATIENT
Start: 2025-07-26 | End: 2025-07-27 | Stop reason: HOSPADM

## 2025-07-26 RX ORDER — ONDANSETRON 4 MG/1
4 TABLET, ORALLY DISINTEGRATING ORAL EVERY 8 HOURS PRN
Status: DISCONTINUED | OUTPATIENT
Start: 2025-07-26 | End: 2025-07-27 | Stop reason: HOSPADM

## 2025-07-26 RX ORDER — POLYETHYLENE GLYCOL 3350 17 G/17G
17 POWDER, FOR SOLUTION ORAL 2 TIMES DAILY
Status: DISCONTINUED | OUTPATIENT
Start: 2025-07-26 | End: 2025-07-27 | Stop reason: HOSPADM

## 2025-07-26 RX ORDER — MENTHOL AND ZINC OXIDE .44; 20.625 G/100G; G/100G
OINTMENT TOPICAL PRN
COMMUNITY

## 2025-07-26 RX ORDER — AZITHROMYCIN 500 MG/1
500 TABLET, FILM COATED ORAL
Status: DISCONTINUED | OUTPATIENT
Start: 2025-07-26 | End: 2025-07-27 | Stop reason: HOSPADM

## 2025-07-26 RX ORDER — SPIRONOLACTONE 25 MG/1
25 TABLET ORAL DAILY
Status: DISCONTINUED | OUTPATIENT
Start: 2025-07-26 | End: 2025-07-27 | Stop reason: HOSPADM

## 2025-07-26 RX ORDER — MENTHOL AND ZINC OXIDE .44; 20.625 G/100G; G/100G
OINTMENT TOPICAL 2 TIMES DAILY
COMMUNITY

## 2025-07-26 RX ORDER — BRIMONIDINE TARTRATE 2 MG/ML
1 SOLUTION/ DROPS OPHTHALMIC 2 TIMES DAILY
Status: DISCONTINUED | OUTPATIENT
Start: 2025-07-26 | End: 2025-07-27 | Stop reason: HOSPADM

## 2025-07-26 RX ORDER — POLYVINYL ALCOHOL 14 MG/ML
1 SOLUTION/ DROPS OPHTHALMIC 2 TIMES DAILY
Status: DISCONTINUED | OUTPATIENT
Start: 2025-07-26 | End: 2025-07-27 | Stop reason: HOSPADM

## 2025-07-26 RX ORDER — ONDANSETRON 2 MG/ML
4 INJECTION INTRAMUSCULAR; INTRAVENOUS EVERY 6 HOURS PRN
Status: DISCONTINUED | OUTPATIENT
Start: 2025-07-26 | End: 2025-07-27 | Stop reason: HOSPADM

## 2025-07-26 RX ORDER — ATORVASTATIN CALCIUM 40 MG/1
40 TABLET, FILM COATED ORAL NIGHTLY
Status: DISCONTINUED | OUTPATIENT
Start: 2025-07-26 | End: 2025-07-27 | Stop reason: HOSPADM

## 2025-07-26 RX ORDER — TORSEMIDE 20 MG/1
40 TABLET ORAL DAILY
Status: DISCONTINUED | OUTPATIENT
Start: 2025-07-26 | End: 2025-07-27 | Stop reason: HOSPADM

## 2025-07-26 RX ORDER — LATANOPROST 50 UG/ML
1 SOLUTION/ DROPS OPHTHALMIC NIGHTLY
Status: DISCONTINUED | OUTPATIENT
Start: 2025-07-26 | End: 2025-07-27 | Stop reason: HOSPADM

## 2025-07-26 RX ORDER — IPRATROPIUM BROMIDE AND ALBUTEROL SULFATE 2.5; .5 MG/3ML; MG/3ML
1 SOLUTION RESPIRATORY (INHALATION)
Status: DISCONTINUED | OUTPATIENT
Start: 2025-07-26 | End: 2025-07-27 | Stop reason: HOSPADM

## 2025-07-26 RX ORDER — RISPERIDONE 1 MG/1
2 TABLET ORAL 3 TIMES DAILY
Status: DISCONTINUED | OUTPATIENT
Start: 2025-07-26 | End: 2025-07-27 | Stop reason: HOSPADM

## 2025-07-26 RX ORDER — POLYETHYLENE GLYCOL 3350 17 G/17G
17 POWDER, FOR SOLUTION ORAL DAILY PRN
Status: DISCONTINUED | OUTPATIENT
Start: 2025-07-26 | End: 2025-07-27 | Stop reason: HOSPADM

## 2025-07-26 RX ADMIN — POLYETHYLENE GLYCOL 3350 17 G: 17 POWDER, FOR SOLUTION ORAL at 10:53

## 2025-07-26 RX ADMIN — SODIUM CHLORIDE: 0.9 INJECTION, SOLUTION INTRAVENOUS at 06:13

## 2025-07-26 RX ADMIN — POLYETHYLENE GLYCOL 3350 17 G: 17 POWDER, FOR SOLUTION ORAL at 20:31

## 2025-07-26 RX ADMIN — AZITHROMYCIN 500 MG: 500 TABLET, FILM COATED ORAL at 20:31

## 2025-07-26 RX ADMIN — LACTULOSE 10 G: 10 SOLUTION ORAL at 10:53

## 2025-07-26 RX ADMIN — AZITHROMYCIN MONOHYDRATE 500 MG: 500 INJECTION, POWDER, LYOPHILIZED, FOR SOLUTION INTRAVENOUS at 06:14

## 2025-07-26 RX ADMIN — ENOXAPARIN SODIUM 30 MG: 100 INJECTION SUBCUTANEOUS at 10:53

## 2025-07-26 RX ADMIN — BRIMONIDINE TARTRATE 1 DROP: 2 SOLUTION OPHTHALMIC at 20:32

## 2025-07-26 RX ADMIN — IPRATROPIUM BROMIDE AND ALBUTEROL SULFATE 1 DOSE: .5; 2.5 SOLUTION RESPIRATORY (INHALATION) at 08:20

## 2025-07-26 RX ADMIN — SPIRONOLACTONE 25 MG: 25 TABLET ORAL at 12:09

## 2025-07-26 RX ADMIN — RISPERIDONE 2 MG: 1 TABLET, FILM COATED ORAL at 12:08

## 2025-07-26 RX ADMIN — RISPERIDONE 2 MG: 1 TABLET, FILM COATED ORAL at 20:31

## 2025-07-26 RX ADMIN — IPRATROPIUM BROMIDE AND ALBUTEROL SULFATE 1 DOSE: .5; 2.5 SOLUTION RESPIRATORY (INHALATION) at 11:42

## 2025-07-26 RX ADMIN — TORSEMIDE 40 MG: 20 TABLET ORAL at 12:08

## 2025-07-26 RX ADMIN — POLYVINYL ALCOHOL 1 DROP: 1.4 SOLUTION/ DROPS OPHTHALMIC at 12:18

## 2025-07-26 RX ADMIN — CYANOCOBALAMIN TAB 1000 MCG 1000 MCG: 1000 TAB at 10:53

## 2025-07-26 RX ADMIN — LATANOPROST 1 DROP: 50 SOLUTION OPHTHALMIC at 20:32

## 2025-07-26 RX ADMIN — DORZOLAMIDE HYDROCHLORIDE 1 DROP: 20 SOLUTION/ DROPS OPHTHALMIC at 12:10

## 2025-07-26 RX ADMIN — ASPIRIN 81 MG: 81 TABLET, DELAYED RELEASE ORAL at 10:53

## 2025-07-26 RX ADMIN — POLYVINYL ALCOHOL 1 DROP: 1.4 SOLUTION/ DROPS OPHTHALMIC at 20:32

## 2025-07-26 RX ADMIN — DORZOLAMIDE HYDROCHLORIDE 1 DROP: 20 SOLUTION/ DROPS OPHTHALMIC at 20:32

## 2025-07-26 RX ADMIN — ENOXAPARIN SODIUM 30 MG: 100 INJECTION SUBCUTANEOUS at 20:31

## 2025-07-26 RX ADMIN — IPRATROPIUM BROMIDE AND ALBUTEROL SULFATE 1 DOSE: .5; 2.5 SOLUTION RESPIRATORY (INHALATION) at 19:39

## 2025-07-26 RX ADMIN — PREDNISONE 40 MG: 20 TABLET ORAL at 10:53

## 2025-07-26 RX ADMIN — IPRATROPIUM BROMIDE AND ALBUTEROL SULFATE 1 DOSE: .5; 2.5 SOLUTION RESPIRATORY (INHALATION) at 16:21

## 2025-07-26 RX ADMIN — BRIMONIDINE TARTRATE 1 DROP: 2 SOLUTION OPHTHALMIC at 12:09

## 2025-07-26 RX ADMIN — LACTULOSE 10 G: 10 SOLUTION ORAL at 16:13

## 2025-07-26 RX ADMIN — EMPAGLIFLOZIN 10 MG: 10 TABLET, FILM COATED ORAL at 10:53

## 2025-07-26 ASSESSMENT — PAIN SCALES - GENERAL
PAINLEVEL_OUTOF10: 5
PAINLEVEL_OUTOF10: 5

## 2025-07-26 ASSESSMENT — PAIN DESCRIPTION - LOCATION
LOCATION: GENERALIZED
LOCATION: GENERALIZED

## 2025-07-26 NOTE — PROGRESS NOTES
4 Eyes Skin Assessment     NAME:  Armando Lewis  YOB: 1960  MEDICAL RECORD NUMBER:  [id number]    The patient is being assessed for  Admission    I agree that at least one RN has performed a thorough Head to Toe Skin Assessment on the patient. ALL assessment sites listed below have been assessed.      Areas assessed by both nurses:    Head, Face, Ears, Shoulders, Back, Chest, Arms, Elbows, Hands, Sacrum. Buttock, Coccyx, Ischium, Legs. Feet and Heels, and Under Medical Devices         Does the Patient have a Wound? Yes wound(s) were present on assessment. LDA wound assessment was Initiated and completed by RN       Bhanu Prevention initiated by RN: Yes  Wound Care Orders initiated by RN: Yes    For hospital-acquired stage 1 & 2 and ALL Stage 3,4, Unstageable, DTI, NWPT, and Complex wounds: place order “IP Wound Care/Ostomy Nurse Eval and Treat” by RN under : NA unable to determine etiology    New Ostomies, if present place, Ostomy referral order under : No     Nurse 1 eSignature: Electronically signed by Ira To RN on 7/26/25 at 6:41 AM EDT    **SHARE this note so that the co-signing nurse can place an eSignature**    Nurse 2 eSignature: Electronically signed by Mac Mi RN on 7/26/25 at 6:52 AM EDT

## 2025-07-26 NOTE — PROGRESS NOTES
Pt admitted to PCU.Pt hooked up to bedside monitor and verified with CMU. Bed in lowest position, bed locked, call light near pt and bed alarm is on. Pt is resting in bed. Care ongoing.    Electronically signed by Ira To RN on 7/26/2025 at 6:39 AM

## 2025-07-26 NOTE — CONSULTS
07/26/25  0324   CALCIUM 9.0         Radiology Review:  Pertinent images / reports were reviewed as a part of this visit.    CT Chest w/ contrast: No results found for this or any previous visit.      CT Chest w/o contrast: No results found for this or any previous visit.      CTPA: Results for orders placed during the hospital encounter of 07/21/25    CT CHEST PULMONARY EMBOLISM W CONTRAST    Narrative  EXAMINATION:  CTA OF THE CHEST 7/21/2025 10:25 am    TECHNIQUE:  CTA of the chest was performed after the administration of intravenous  contrast.  Multiplanar reformatted images are provided for review.  MIP  images are provided for review. Automated exposure control, iterative  reconstruction, and/or weight based adjustment of the mA/kV was utilized to  reduce the radiation dose to as low as reasonably achievable.    COMPARISON:  12/05/2022    HISTORY:  ORDERING SYSTEM PROVIDED HISTORY: hypoxia, elevated trop  TECHNOLOGIST PROVIDED HISTORY:  Reason for exam:->hypoxia, elevated trop  Additional Contrast?->1  Reason for Exam: hypoxia, elevated trop    FINDINGS:  Pulmonary Arteries: Pulmonary arteries are adequately opacified for  evaluation.  No evidence of intraluminal filling defect to suggest pulmonary  embolism.  Main pulmonary artery is normal in caliber.    Mediastinum: No evidence of mediastinal lymphadenopathy.  The heart and  pericardium demonstrate no acute abnormality.  There is no acute abnormality  of the thoracic aorta.    Lungs/pleura: There are mild bibasilar segmental airspace opacities.  The  upper lobes are clear.  There is no pneumothorax or effusion.    Upper Abdomen: Limited images of the upper abdomen are unremarkable.    Soft Tissues/Bones: No acute bone or soft tissue abnormality.    Impression  1. Negative for pulmonary embolus.  2. Mild bibasilar segmental atelectasis versus pneumonia.      CXR PA/LAT: No results found for this or any previous visit.      CXR portable: Results for orders

## 2025-07-26 NOTE — FLOWSHEET NOTE
07/26/25 1217   Treatment Team Notification   Reason for Communication   (PATIENT'S IV INFILTRATED THIS AM DURING SHIFT CHANGE. IV REMOVED. ATTEMPTING USGPIV. PATIENT CURRENTLY WITHOUT IV ACCESS.)   Name of Team Member Notified DR. SEDA GUADARRAMA   Treatment Team Role Attending Provider   Method of Communication Secure Message   Response Waiting for response   Notification Time 121

## 2025-07-26 NOTE — PROGRESS NOTES
Medication Reconciliation    List of medications patient is currently taking is complete.     Source of information: 1. List from Dalton Chery RPH, PharmD, 7/26/2025 2:55 PM

## 2025-07-26 NOTE — H&P
V2.0  History and Physical      Name:  Armando Lewis /Age/Sex: 1960  (64 y.o. male)   MRN & CSN:  [id number] & [id number] Encounter Date/Time: 2025 2:23 AM EDT   Location:   PCP: No primary care provider on file.       Hospital Day: 2    Assessment and Plan:   Armando Lewis is a 64 y.o. obese male smoker, polysubstance abuser, nursing home resident with a pmh of COPD, obstructive sleep apnea on BiPAP but noncompliant, chronic respiratory failure with hypoxia and hypercapnia, chronic diastolic heart failure, schizoaffective disorder, antisocial personality disorder who presents with COPD exacerbation (HCC) with acute on chronic respiratory failure.    Hospital Problems           Last Modified POA    * (Principal) COPD exacerbation (HCC) 2025 Yes    Acute metabolic encephalopathy 2025 Yes    Acute on chronic respiratory failure with hypoxia and hypercapnia (HCC) 2025 Yes    ETELVINA (obstructive sleep apnea) 2025 Yes       Plan:  Bronchodilators, steroids, azithromycin empiric, check procalcitonin and lactic acid  BiPAP and supplemental oxygen to keep sats between 90 and 94%, wean as tolerated, pulmonology consult  Resume home regimen for chronic stable conditions    Disposition:   Current Living situation: Long-term care  Expected Disposition: Long-term care  Estimated D/C: 2 3 days    Diet Diet NPO   DVT Prophylaxis [x] Lovenox, []  Heparin, [] SCDs, [] Ambulation,  [] Eliquis, [] Xarelto, [] Coumadin   Code Status Full Code   Surrogate Decision Maker/ POA Mother     Personally reviewed Lab Studies and Imaging     Discussed management of the case with no one who recommended n/a.    EKG interpreted personally and results sinus tachycardia with ventricular rate of 103, QTc 432, no acute ischemic changes.    Imaging that was interpreted personally includes chest x-ray and results negative for any acute cardiopulmonary process.    Drugs that require monitoring for toxicity include none and

## 2025-07-26 NOTE — PROGRESS NOTES
V2.0  [facility] [place]      Name:  Armando Lewis /Age/Sex: 1960  (64 y.o. male)   MRN & CSN:  [id number] & [id number] Encounter Date/Time: 2025 9:26 AM EDT    Location:  [id number] PCP: No primary care provider on file.       Hospital Day: 2    Assessment and Plan:   Armando Lewis is a 64 y.o. male with PMH of COPD and ETELVINA      Plan:  COPD exacerbation  - Continue steroids, bronchodilators, and azithromycin  -Patient refusing BiPAP this morning  - Pulmonology consulted. Note  reviewed: Patient recently discharged from hospital with very similar presentation.  Started on BiPAP in ED.  Encouraged to continue BiPAP.  Azithromycin and prednisone for 5 days.  Needs to continue BiPAP at nursing home.  Maintain negative fluid balance and aspiration precautions  Acute on chronic respiratory failure with hypoxia and hypercapnia  - Encouraged to use BiPAP, but pt refused throughout the day. Eventually agreed in the afternoon  - Treatment as above  ETELVINA  - Recommended BiPAP as above  - Patient recommended BiPAP at nursing home; however due to patient's frequent noncompliance seems it would be difficult to get it set up for  Chronic heart failure preserved ejection fraction  - does not appear to be in acute exacerbation  - Continue Jardiance, torsemide, Aldactone  Schizoaffective disorder  - Risperidone    Ppx: Lovenox  Dispo: Long-term care    Subjective:     Chief Complaint: Respiratory Distress (Pt arrived by EMS for diff breathing from nursing facility. Pt was on NRB at 10 lpm. Pt was on bipap at facility and was given breathing treatments today without improvement before he was transported here for eval. )     Interval history  Speech difficult to understand.  Denies shortness of breath or cough    Review of Systems:    Review of Systems    10 point ROS negative except as stated above in \"subjective\" section    Objective:   No intake or output data in the 24 hours ending 25 [id number]

## 2025-07-26 NOTE — PROGRESS NOTES
Respiratory Therapy    Placed pt on BIPAP for increased somnolence   16/5 12 FIO2 35% SPO2 99%  Pt sleeping on/off all day. Pt agreeable to BIPAP  RN aware Cont pulse oximetry in place   Skin barrier in place     Electronically signed by Naty Little RCP on 7/26/2025 at 4:30 PM

## 2025-07-26 NOTE — ED NOTES
ED TO INPATIENT SBAR HANDOFF    Patient Name: Armando Lewis   Preferred Name: Armando  : 1960  64 y.o.   Family/Caregiver Present: no   Code Status Order: Full Code  PO Status: NPO:Yes, not sure why, just because of BiPap?  Telemetry Order: Yes  C-SSRS: Risk of Suicide: No Risk  Sitter no none  Restraints:     Sepsis Risk Score      Situation  Chief Complaint   Patient presents with    Respiratory Distress     Pt arrived by EMS for diff breathing from nursing facility. Pt was on NRB at 10 lpm. Pt was on bipap at facility and was given breathing treatments today without improvement before he was transported here for eval.      Brief Description of Patient's Condition: He has schizoaffective disorder. He had SOB at SNF. Recently admitted here for hypercarbic resp failure. He has poor compliance with BiPap at facility.   Mental Status: alert to self and hospital. Otherwise he just kind of talked nonsense and wanted to eat  Arrived from:Skilled Care Facility  Imaging:   XR CHEST PORTABLE   Final Result   1. No acute pulmonary abnormality to account for patient's shortness of   breath.   2. Stable mild enlargement of the cardiac silhouette.           Abnormal labs:   Abnormal Labs Reviewed   CBC WITH AUTO DIFFERENTIAL - Abnormal; Notable for the following components:       Result Value    WBC 13.1 (*)     Neutrophils Absolute 11.1 (*)     All other components within normal limits   COMPREHENSIVE METABOLIC PANEL W/ REFLEX TO MG FOR LOW K - Abnormal; Notable for the following components:    Chloride 93 (*)     CO2 37 (*)     Glucose 104 (*)     All other components within normal limits   BLOOD GAS, VENOUS - Abnormal; Notable for the following components:    pCO2, Gunnar 66.7 (*)     HCO3, Venous 41 (*)     All other components within normal limits   TROPONIN - Abnormal; Notable for the following components:    Troponin, High Sensitivity 67 (*)     All other components within normal limits   TROPONIN - Abnormal; Notable for

## 2025-07-26 NOTE — PLAN OF CARE
Problem: Chronic Conditions and Co-morbidities  Goal: Patient's chronic conditions and co-morbidity symptoms are monitored and maintained or improved  7/26/2025 1512 by Alan Barney RN  Outcome: Progressing     Problem: Discharge Planning  Goal: Discharge to home or other facility with appropriate resources  7/26/2025 1512 by Alan Barney RN  Outcome: Progressing     Problem: Skin/Tissue Integrity  Goal: Skin integrity remains intact  Description: 1.  Monitor for areas of redness and/or skin breakdown  2.  Assess vascular access sites hourly  3.  Every 4-6 hours minimum:  Change oxygen saturation probe site  4.  Every 4-6 hours:  If on nasal continuous positive airway pressure, respiratory therapy assess nares and determine need for appliance change or resting period  7/26/2025 0629 by Ira To RN  Outcome: Progressing     Problem: Skin/Tissue Integrity  Goal: Skin integrity remains intact  Description: 1.  Monitor for areas of redness and/or skin breakdown  2.  Assess vascular access sites hourly  3.  Every 4-6 hours minimum:  Change oxygen saturation probe site  4.  Every 4-6 hours:  If on nasal continuous positive airway pressure, respiratory therapy assess nares and determine need for appliance change or resting period  7/26/2025 1512 by Alan Barney RN  Outcome: Progressing  7/26/2025 0629 by Ira To RN  Outcome: Progressing     Problem: Safety - Adult  Goal: Free from fall injury  7/26/2025 1512 by Alan Barney RN  Outcome: Progressing  7/26/2025 0629 by Ira To RN  Outcome: Progressing     Problem: Pain  Goal: Verbalizes/displays adequate comfort level or baseline comfort level  Outcome: Progressing

## 2025-07-27 VITALS
TEMPERATURE: 97.4 F | SYSTOLIC BLOOD PRESSURE: 113 MMHG | WEIGHT: 254.85 LBS | BODY MASS INDEX: 33.78 KG/M2 | HEIGHT: 73 IN | OXYGEN SATURATION: 100 % | DIASTOLIC BLOOD PRESSURE: 76 MMHG | HEART RATE: 88 BPM | RESPIRATION RATE: 16 BRPM

## 2025-07-27 PROCEDURE — 94660 CPAP INITIATION&MGMT: CPT

## 2025-07-27 PROCEDURE — 96372 THER/PROPH/DIAG INJ SC/IM: CPT

## 2025-07-27 PROCEDURE — 6370000000 HC RX 637 (ALT 250 FOR IP): Performed by: INTERNAL MEDICINE

## 2025-07-27 PROCEDURE — 6370000000 HC RX 637 (ALT 250 FOR IP): Performed by: STUDENT IN AN ORGANIZED HEALTH CARE EDUCATION/TRAINING PROGRAM

## 2025-07-27 PROCEDURE — 99233 SBSQ HOSP IP/OBS HIGH 50: CPT | Performed by: INTERNAL MEDICINE

## 2025-07-27 PROCEDURE — 94760 N-INVAS EAR/PLS OXIMETRY 1: CPT

## 2025-07-27 PROCEDURE — 6360000002 HC RX W HCPCS: Performed by: INTERNAL MEDICINE

## 2025-07-27 PROCEDURE — G0378 HOSPITAL OBSERVATION PER HR: HCPCS

## 2025-07-27 PROCEDURE — 94640 AIRWAY INHALATION TREATMENT: CPT

## 2025-07-27 RX ORDER — PREDNISONE 20 MG/1
40 TABLET ORAL DAILY
Qty: 6 TABLET | Refills: 0 | DISCHARGE
Start: 2025-07-28 | End: 2025-07-31

## 2025-07-27 RX ORDER — AZITHROMYCIN 500 MG/1
500 TABLET, FILM COATED ORAL
DISCHARGE
Start: 2025-07-27 | End: 2025-07-30

## 2025-07-27 RX ADMIN — DORZOLAMIDE HYDROCHLORIDE 1 DROP: 20 SOLUTION/ DROPS OPHTHALMIC at 09:11

## 2025-07-27 RX ADMIN — PREDNISONE 40 MG: 20 TABLET ORAL at 09:00

## 2025-07-27 RX ADMIN — LACTULOSE 10 G: 10 SOLUTION ORAL at 14:55

## 2025-07-27 RX ADMIN — SPIRONOLACTONE 25 MG: 25 TABLET ORAL at 09:04

## 2025-07-27 RX ADMIN — TORSEMIDE 40 MG: 20 TABLET ORAL at 09:04

## 2025-07-27 RX ADMIN — IPRATROPIUM BROMIDE AND ALBUTEROL SULFATE 1 DOSE: .5; 2.5 SOLUTION RESPIRATORY (INHALATION) at 16:15

## 2025-07-27 RX ADMIN — ENOXAPARIN SODIUM 30 MG: 100 INJECTION SUBCUTANEOUS at 09:00

## 2025-07-27 RX ADMIN — IPRATROPIUM BROMIDE AND ALBUTEROL SULFATE 1 DOSE: .5; 2.5 SOLUTION RESPIRATORY (INHALATION) at 12:17

## 2025-07-27 RX ADMIN — RISPERIDONE 2 MG: 1 TABLET, FILM COATED ORAL at 14:56

## 2025-07-27 RX ADMIN — CYANOCOBALAMIN TAB 1000 MCG 1000 MCG: 1000 TAB at 09:02

## 2025-07-27 RX ADMIN — LACTULOSE 10 G: 10 SOLUTION ORAL at 09:00

## 2025-07-27 RX ADMIN — POLYETHYLENE GLYCOL 3350 17 G: 17 POWDER, FOR SOLUTION ORAL at 09:00

## 2025-07-27 RX ADMIN — EMPAGLIFLOZIN 10 MG: 10 TABLET, FILM COATED ORAL at 09:02

## 2025-07-27 RX ADMIN — RISPERIDONE 2 MG: 1 TABLET, FILM COATED ORAL at 09:02

## 2025-07-27 RX ADMIN — BRIMONIDINE TARTRATE 1 DROP: 2 SOLUTION OPHTHALMIC at 09:10

## 2025-07-27 RX ADMIN — ASPIRIN 81 MG: 81 TABLET, DELAYED RELEASE ORAL at 09:02

## 2025-07-27 RX ADMIN — IPRATROPIUM BROMIDE AND ALBUTEROL SULFATE 1 DOSE: .5; 2.5 SOLUTION RESPIRATORY (INHALATION) at 08:20

## 2025-07-27 RX ADMIN — POLYVINYL ALCOHOL 1 DROP: 1.4 SOLUTION/ DROPS OPHTHALMIC at 09:10

## 2025-07-27 ASSESSMENT — PAIN SCALES - GENERAL: PAINLEVEL_OUTOF10: 3

## 2025-07-27 ASSESSMENT — PAIN DESCRIPTION - DESCRIPTORS: DESCRIPTORS: DULL;DISCOMFORT

## 2025-07-27 ASSESSMENT — PAIN DESCRIPTION - FREQUENCY: FREQUENCY: INTERMITTENT

## 2025-07-27 ASSESSMENT — PAIN DESCRIPTION - ORIENTATION: ORIENTATION: LOWER

## 2025-07-27 ASSESSMENT — PAIN DESCRIPTION - LOCATION: LOCATION: BACK

## 2025-07-27 NOTE — ACP (ADVANCE CARE PLANNING)
Advance Care Planning   Healthcare Decision Maker:    Primary Decision Maker: Shirley Lewis - Parent - [phone]    Secondary Decision Maker: Jessenia Obrien - Brother/Sister - [phone]    Copy of Adv Directive in chart.    LISA Jalloh     Case Management   [phone]    7/27/2025  2:28 PM

## 2025-07-27 NOTE — CARE COORDINATION
07/27/25 1425   Readmission Assessment   Number of Days since last admission? 1-7 days   Previous Disposition SNF   Who is being Interviewed Patient  (Attempted to gather information from the patient.  He speaks but is not able to be understood; dottie and bibi.)   What was the patient's/caregiver's perception as to why they think they needed to return back to the hospital? Other (Comment)  (Chart reports that he had difficulty breathing and is non compliant with bi pap at the facility.)   Did you visit your Primary Care Physician after you left the hospital, before you returned this time? No   Why weren't you able to visit your PCP? Did not have an appointment   Did you see a specialist, such as Cardiac, Pulmonary, Orthopedic Physician, etc. after you left the hospital? No   Who advised the patient to return to the hospital? Other (Comment)  ([facility] bed nursing facility)   Does the patient report anything that got in the way of taking their medications? No   In our efforts to provide the best possible care to you and others like you, can you think of anything that we could have done to help you after you left the hospital the first time, so that you might not have needed to return so soon? Other (Comment)  (Unable to answer this question)

## 2025-07-27 NOTE — CARE COORDINATION
Case Management Assessment  Initial Evaluation    Date/Time of Evaluation: 7/27/2025 2:32 PM  Assessment Completed by: LISA Lewis    If patient is discharged prior to next notation, then this note serves as note for discharge by case management.    Patient Name: Armando Lewis                   YOB: 1960  Diagnosis: COPD exacerbation (HCC) [J44.1]  Acute on chronic respiratory failure with hypoxia (HCC) [J96.21]                   Date / Time: 7/25/2025  6:54 PM    Patient Admission Status: Observation   Readmission Risk (Low < 19, Mod (19-27), High > 27): Readmission Risk Score: 18.9    Current PCP: No primary care provider on file.  PCP verified by CM? Yes (House MD at [facility] facility)    Chart Reviewed: Yes      History Provided by: Medical Record  Patient Orientation: Unable to Assess, Other (see comment) (Pt can respond but he is very difficult to understand)    Patient Cognition: Other (see comment) (alert but has difficulty speaking, studders and chatters)    Hospitalization in the last 30 days (Readmission):  Yes    If yes, Readmission Assessment in CM Navigator will be completed.    Advance Directives:      Code Status: Full Code   Patient's Primary Decision Maker is: Named in Scanned ACP Document (Mother and then sister)    Primary Decision Maker: Shirley Lewis - Parent - [phone]    Secondary Decision Maker: Jessenia Obrien - Brother/Sister - [phone]    Discharge Planning:    Patient lives with: Other (Comment) ([facility] bed) Type of Home: Long-Term Care  Primary Care Giver: Other (Comment) (Lives at [facility])  Patient Support Systems include: Parent   Current Financial resources: Medicaid  Current community resources: ECF/Home Care  Current services prior to admission: Extended Care Facility            Current DME:              Type of Home Care services:  None    ADLS  Prior functional level: Assistance with the following:, Bathing, Dressing,

## 2025-07-27 NOTE — PROGRESS NOTES
Attempted to call [facility] but they did not answer. Will try to call them later.   Alan Barney RN

## 2025-07-27 NOTE — PROGRESS NOTES
Discharge orders acknowledged by RN . Discharge teaching completed. AVS reviewed and all questions answered. Medication regimen reviewed. Follow up appointments also reviewed and resources given for discharge. Bedside monitor removed from pt. 60+ minutes of education completed. Required core measures completed. Pt vitals WDL. Pt discharged with all belongings to [facility] with transport. Pt transported off of unit via stretcher. RN attempted to call report to [facility] with no answer. No complications.

## 2025-07-27 NOTE — PROGRESS NOTES
Pulmonary Progress Note     Patient's name:  Armando Lewis  Medical Record Number: [id number]  Patient's account/billing number: [id number]  Patient's YOB: 1960  Age: 64 y.o.  Date of Admission: 7/25/2025  6:54 PM  Date of Consult: 7/27/2025      Primary Care Physician: No primary care provider on file.      Code Status: Full Code    Reason for consult: Acute hypoxic and hypercapnic respiratory failure    Assessment and Plan     Acute on chronic hypoxic and hypercapnic respiratory failure  Bibasilar  atelectasis  COPD  History of diastolic heart failure  Obesity  Schizoaffective disorder      Plan:  BiPAP nightly encouraged to use  O2 titrate to keep sats 88 to 92%  azithromycin and prednisone for 5 days  Maintain negative fluid balance.  Need to continue BiPAP at the nursing home.  Aspiration precautions.      HISTORY OF PRESENT ILLNESS:   MrRubio/Ms. Armando Lewis is a 64 y.o. gentleman with past medical history as below significant for nursing home resident, schizoaffective disorder, congestive heart failure, tardive dyskinesia, hypertension and diabetes mellitus type 2 presented from the nursing home with worsening shortness of breath.  He was recently discharged from the hospital with similar presentation.  Was placed on BiPAP in the emergency department.    Chest x-ray with no acute changes.  Venous gas with acute on chronic hypercapnia            REVIEW OF SYSTEMS:  Review of Systems -   Denied chest pain  Shortness of breath improving  No nausea vomiting diarrhea          Physical Exam:    Vitals: BP (!) 101/58   Pulse 77   Temp 97.2 °F (36.2 °C) (Oral)   Resp 21   Ht 1.854 m (6' 0.99\")   Wt 115.6 kg (254 lb 13.6 oz)   SpO2 99%   BMI 33.63 kg/m²     Last Body weight:   Wt Readings from Last 3 Encounters:   07/27/25 115.6 kg (254 lb 13.6 oz)   07/23/25 117.2 kg (258 lb 6.1 oz)   11/08/24 124.1 kg (273 lb 9.5 oz)       Body Mass Index : Body mass

## 2025-07-27 NOTE — CARE COORDINATION
07/27/25 1633   Service Assessment   Patient Orientation Unable to Assess;Other (see comment)  (Pt can respond but he is very difficult to understand)   Cognition Other (see comment)  (alert but has difficulty speaking, studders and chatters)   History Provided By Medical Record   Primary Caregiver Other (Comment)  (Lives at [facility] bed)   Accompanied By/Relationship none   Support Systems Parent   Patient's Healthcare Decision Maker is: Named in Scanned ACP Document  (Mother and then sister)   PCP Verified by CM Yes  (Derekc LINARES at [facility])   Last Visit to PCP Within last 3 months   Prior Functional Level Assistance with the following:;Bathing;Dressing;Toileting;Cooking;Housework;Shopping;Mobility   Current Functional Level Mobility;Shopping;Housework;Cooking;Toileting;Dressing;Bathing;Assistance with the following:   Can patient return to prior living arrangement Yes   Ability to make needs known: Poor   Family able to assist with home care needs: No   Would you like for me to discuss the discharge plan with any other family members/significant others, and if so, who? Yes  (Mother)   Financial Resources Medicaid   Community Resources ECF/Home Care   Discharge Planning   Type of Residence Long-Term Care   Living Arrangements Other (Comment)  ([facility] bed)   Current Services Prior To Admission Extended Care Facility   Potential Assistance Needed Extended Care Placement   DME Ordered? No   Potential Assistance Purchasing Medications No   Type of Home Care Services None   Patient expects to be discharged to: Long-term care   One/Two Story Residence One story   History of falls? 0   Services At/After Discharge   Transition of Care Consult (CM Consult) Long Term Care   Services At/After Discharge Long term care    Resource Information Provided? No   Mode of Transport at Discharge S   Hospital Transport Time of Discharge 1900   Confirm Follow Up Transport Other (see

## 2025-07-27 NOTE — PLAN OF CARE
Problem: Chronic Conditions and Co-morbidities  Goal: Patient's chronic conditions and co-morbidity symptoms are monitored and maintained or improved  7/27/2025 1412 by Alan Barney RN  Outcome: Completed     Problem: Discharge Planning  Goal: Discharge to home or other facility with appropriate resources  7/27/2025 1412 by Alan Barney RN  Outcome: Completed     Problem: Skin/Tissue Integrity  Goal: Skin integrity remains intact  Description: 1.  Monitor for areas of redness and/or skin breakdown  2.  Assess vascular access sites hourly  3.  Every 4-6 hours minimum:  Change oxygen saturation probe site  4.  Every 4-6 hours:  If on nasal continuous positive airway pressure, respiratory therapy assess nares and determine need for appliance change or resting period  7/27/2025 1412 by Alan Barney RN  Outcome: Completed  7/27/2025 0258 by Saba Barton RN  Outcome: Progressing     Problem: Safety - Adult  Goal: Free from fall injury  7/27/2025 1412 by Alan Barney RN  Outcome: Completed  7/27/2025 0258 by Saba Barton RN  Outcome: Progressing     Problem: Pain  Goal: Verbalizes/displays adequate comfort level or baseline comfort level  7/27/2025 1412 by Alan Barney RN  Outcome: Completed  7/27/2025 0258 by Saba Barton RN  Outcome: Progressing

## 2025-07-27 NOTE — CARE COORDINATION
DC order noted  Call placed and message left for Bhanu Savage.  Called to RN Alissa at the Center and she prefers he return later today after dinner.  Call mother, Shirley, left message of his return to facility today.  Initiated transport via portal.     arranged for 7:00 pm with [facility].    Call placed for Mother to update on Time of DC. She answered this time.   She had questions that I could not answer about his health, asked Bedside RN to call her directly.      Completed Readmission Assessment as best I could from chart.  [place] and over the phone she seemed to have some confusion.    LISA Jalloh     Case Management   [phone]    7/27/2025  2:24 PM

## 2025-07-27 NOTE — DISCHARGE INSTR - COC
Pulse 77   Temp 97.2 °F (36.2 °C) (Oral)   Resp 21   Ht 1.854 m (6' 0.99\")   Wt 115.6 kg (254 lb 13.6 oz)   SpO2 99%   BMI 33.63 kg/m²     Last documented pain score (0-10 scale): Pain Level: 3  Last Weight:   Wt Readings from Last 1 Encounters:   07/27/25 115.6 kg (254 lb 13.6 oz)     Mental Status:  oriented and alert    IV Access:  - None    Nursing Mobility/ADLs:  Walking   Dependent  Transfer  Dependent  Bathing  Dependent  Dressing  Dependent  Toileting  Dependent  Feeding  Assisted  Med Admin  Dependent  Med Delivery   whole    Wound Care Documentation and Therapy:  Wound 12/04/22 Buttocks Right;Left stage 2 ulcers (one on left and one on right buttocks) (Active)   Number of days: 966        Elimination:  Continence:   Bowel: No  Bladder: No  Urinary Catheter: None   Colostomy/Ileostomy/Ileal Conduit: No       Date of Last BM: Unknown    Intake/Output Summary (Last 24 hours) at 7/27/2025 1321  Last data filed at 7/27/2025 0953  Gross per 24 hour   Intake 1293 ml   Output 2100 ml   Net -807 ml     I/O last 3 completed shifts:  In: 813 [P.O.:813]  Out: 1300 [Urine:1300]    Safety Concerns:     At Risk for Falls and Aspiration Risk    Impairments/Disabilities:      Speech and Vision    Nutrition Therapy:  Current Nutrition Therapy:   - Oral Diet:  Dysphagia - Soft and Bite Sized, MCKENZIE    Routes of Feeding: Oral  Liquids: Thin Liquids  Daily Fluid Restriction: no  Last Modified Barium Swallow with Video (Video Swallowing Test): not done    Treatments at the Time of Hospital Discharge:   Respiratory Treatments: See MAR  Oxygen Therapy:  is on oxygen at 2 L/min per nasal cannula. Prn.  Ventilator:    - No ventilator support    Rehab Therapies: NA  Weight Bearing Status/Restrictions: No weight bearing restrictions  Other Medical Equipment (for information only, NOT a DME order):  NA  Other Treatments: Wound care to bilateral gluteal folds.    Patient's personal belongings (please select all that are sent with

## 2025-07-28 NOTE — PROGRESS NOTES
Physician Progress Note      PATIENT:               NUSRAT OLIVO  CSN #:                  [id number]  :                       1960  ADMIT DATE:       2025 8:34 AM  DISCH DATE:        2025 7:16 PM  RESPONDING  PROVIDER #:        Jaquelin Ferreira MD          QUERY TEXT:    Pneumonia is documented in H&P on . Patient was treated with IV Cefepime   and IV Vancomycin.  Please specify the type of pneumonia and the causative   organism:    The clinical indicators include:  --65 yo M admitted with sob. pmh:    --H&P on : \"Pneumonia- Multiple admissions this year and from nursing   facility therefore at risk for resistant organisms- Start empiric IV   vancomycin and IV cefepime- Check pneumonia panel and respiratory culture.\"    --Attending progress note on : \"Suspected PNA.\"    --Pulmonology progress note on : \"Bibasilar airspace disease favors   atelectasis.\"    --CT chest on : 1.Negative for pulmonary embolus. 2.Mild bibasilar   segmental atelectasis versus pneumonia.    --cxr, CT chest, pulmonology consult, IV Cefepime, Prednisone, IV Vancomycin  Options provided:  -- Gram negative pneumonia  -- Pneumonia was ruled out  -- Other - I will add my own diagnosis  -- Disagree - Not applicable / Not valid  -- Disagree - Clinically unable to determine / Unknown  -- Refer to Clinical Documentation Reviewer    PROVIDER RESPONSE TEXT:    This patient has gram negative pneumonia.    Query created by: Trina Ellsworth on 2025 9:23 AM      Electronically signed by:  Jaquelin Ferreira MD 2025 10:46 AM

## 2025-07-28 NOTE — DISCHARGE SUMMARY
V2.0  Discharge Summary    Name:  Armando Lewis /Age/Sex: 1960 (64 y.o. male)   Admit Date: 2025  Discharge Date: 25    MRN & CSN:  [id number] & [id number] Encounter Date and Time 25 10:17 PM EDT    Attending:  No att. providers found Discharging Provider: James Payton MD       Hospital Course:     Brief HPI: Armando Lewis is a 64 y.o. male who presented with shortness of breath    Brief Problem Based Course:   COPD exacerbation: Presented to the hospital due to increased work of breathing.  Patient was treated with BiPAP, steroids, and nebulizers.  Pulmonology was consulted.  Patient was recommended 5-day course of antibiotics and steroids.  Patient was able to be weaned to baseline oxygen level was taking only oral medications as such patient discharged back to facility to complete steroid burst.  Pulmonology recommended patient to use BiPAP overnight and with daytime naps after discharge  Acute on chronic respiratory failure with hypoxia and hypercapnia: Due to above.  Patient initially required BiPAP and high flow nasal cannula.  Was able to be weaned to home oxygen levels very quickly.  As noted above, patient would benefit from BiPAP use as outpatient.  ETELVINA: Recommended BiPAP as above      The patient expressed appropriate understanding of, and agreement with the discharge recommendations, medications, and plan.     Consults this admission:  IP CONSULT TO PULMONOLOGY    Discharge Diagnosis:   COPD exacerbation (HCC)  Acute on chronic respiratory failure with hypoxia and hypercapnia  ETELVINA    Discharge Instruction:   Follow up appointments:   Primary care physician: No primary care provider on file. within 2 weeks  Diet: regular diet   Activity: activity as tolerated  Disposition: Discharged to:   []Home, [x] long-term care, []SNF, []Acute Rehab, []Hospice   Condition on discharge: Stable  Labs and Tests to be Followed up as an outpatient by PCP or Specialist:     Discharge Medications:

## 2025-07-29 LAB — BACTERIA BLD CULT: NORMAL

## 2025-07-30 LAB — BACTERIA BLD CULT ORG #2: NORMAL

## 2025-08-04 NOTE — PROGRESS NOTES
Physician Progress Note      PATIENT:               NUSRAT OLIVO  CSN #:                  [id number]  :                       1960  ADMIT DATE:       2025 8:34 AM  DISCH DATE:        2025 7:16 PM  RESPONDING  PROVIDER #:        Jaquelin Ferreira MD          QUERY TEXT:    \"HFpEF without acute exacerbation\" is documented in the H&P. \"Acute on chr   dCHF\" is documented in the attending progress note on .  Please document   the acuity:    The clinical indicators include:  --65 yo M admitted with sob. pmh: T2DM, HFpEF, schizoaffective disorder,   antisocial personality disorder, major depressive disorder, tardive   dyskinesia, blindness    -- proBNP 478, cxr on : Interstitial thickening with scattered bibasilar   airspace opacities and small left effusion. Findings are most suggestive of   pulmonary edema.    --ED provider note on : \"right and left peripheral edema present, sob\"    --po Torsemide, po Aldactone, Jardiance, cxr, proBNP  Options provided:  -- Acute on Chronic Diastolic CHF/HFpEF  -- Chronic Diastolic CHF/HFpEF  -- Other - I will add my own diagnosis  -- Disagree - Not applicable / Not valid  -- Disagree - Clinically unable to determine / Unknown  -- Refer to Clinical Documentation Reviewer    PROVIDER RESPONSE TEXT:    This patient is in acute on chronic diastolic CHF/HFpEF.    Query created by: Trina Ellsworth on 2025 1:00 PM      Electronically signed by:  Jaquelin Ferreira MD 2025 3:04 PM

## 2025-08-29 ENCOUNTER — APPOINTMENT (OUTPATIENT)
Dept: GENERAL RADIOLOGY | Age: 65
End: 2025-08-29
Payer: MEDICAID

## 2025-08-29 ENCOUNTER — HOSPITAL ENCOUNTER (EMERGENCY)
Age: 65
Discharge: NURSING, REHAB OR LONG-TERM CARE | End: 2025-08-29
Payer: MEDICAID

## 2025-08-29 VITALS
OXYGEN SATURATION: 94 % | DIASTOLIC BLOOD PRESSURE: 55 MMHG | TEMPERATURE: 98.4 F | HEART RATE: 68 BPM | BODY MASS INDEX: 37.9 KG/M2 | WEIGHT: 285.94 LBS | RESPIRATION RATE: 16 BRPM | HEIGHT: 73 IN | SYSTOLIC BLOOD PRESSURE: 102 MMHG

## 2025-08-29 DIAGNOSIS — R06.02 SHORTNESS OF BREATH: ICD-10-CM

## 2025-08-29 DIAGNOSIS — Z78.9 RESIDES IN SKILLED NURSING FACILITY: Primary | ICD-10-CM

## 2025-08-29 LAB
ANION GAP SERPL CALCULATED.3IONS-SCNC: 8 MMOL/L (ref 3–16)
BASE EXCESS BLDV CALC-SCNC: 8.3 MMOL/L
BASOPHILS # BLD: 0.1 K/UL (ref 0–0.2)
BASOPHILS NFR BLD: 0.7 %
BILIRUB UR QL STRIP.AUTO: NEGATIVE
BUN SERPL-MCNC: 11 MG/DL (ref 7–20)
CALCIUM SERPL-MCNC: 9.3 MG/DL (ref 8.3–10.6)
CHLORIDE SERPL-SCNC: 98 MMOL/L (ref 99–110)
CLARITY UR: CLEAR
CO2 BLDV-SCNC: 39 MMOL/L
CO2 SERPL-SCNC: 34 MMOL/L (ref 21–32)
COHGB MFR BLDV: 4.5 %
COLOR UR: YELLOW
CREAT SERPL-MCNC: 0.6 MG/DL (ref 0.8–1.3)
DEPRECATED RDW RBC AUTO: 15.8 % (ref 12.4–15.4)
EKG ATRIAL RATE: 75 BPM
EKG DIAGNOSIS: NORMAL
EKG P AXIS: 49 DEGREES
EKG P-R INTERVAL: 182 MS
EKG Q-T INTERVAL: 380 MS
EKG QRS DURATION: 82 MS
EKG QTC CALCULATION (BAZETT): 424 MS
EKG R AXIS: 69 DEGREES
EKG T AXIS: 73 DEGREES
EKG VENTRICULAR RATE: 75 BPM
EOSINOPHIL # BLD: 0.2 K/UL (ref 0–0.6)
EOSINOPHIL NFR BLD: 1.7 %
FLUAV + FLUBV AG NOSE IA.RAPID: NOT DETECTED
FLUAV + FLUBV AG NOSE IA.RAPID: NOT DETECTED
GFR SERPLBLD CREATININE-BSD FMLA CKD-EPI: >90 ML/MIN/{1.73_M2}
GLUCOSE SERPL-MCNC: 139 MG/DL (ref 70–99)
GLUCOSE UR STRIP.AUTO-MCNC: >=1000 MG/DL
HCO3 BLDV-SCNC: 37 MMOL/L (ref 23–29)
HCT VFR BLD AUTO: 46.5 % (ref 40.5–52.5)
HGB BLD-MCNC: 14.7 G/DL (ref 13.5–17.5)
HGB UR QL STRIP.AUTO: NEGATIVE
KETONES UR STRIP.AUTO-MCNC: NEGATIVE MG/DL
LEUKOCYTE ESTERASE UR QL STRIP.AUTO: NEGATIVE
LYMPHOCYTES # BLD: 0.8 K/UL (ref 1–5.1)
LYMPHOCYTES NFR BLD: 8.3 %
MCH RBC QN AUTO: 28.3 PG (ref 26–34)
MCHC RBC AUTO-ENTMCNC: 31.7 G/DL (ref 31–36)
MCV RBC AUTO: 89.2 FL (ref 80–100)
METHGB MFR BLDV: 1.2 %
MONOCYTES # BLD: 1 K/UL (ref 0–1.3)
MONOCYTES NFR BLD: 10.2 %
NEUTROPHILS # BLD: 7.4 K/UL (ref 1.7–7.7)
NEUTROPHILS NFR BLD: 79.1 %
NITRITE UR QL STRIP.AUTO: NEGATIVE
O2 THERAPY: ABNORMAL
PCO2 BLDV: 67.8 MMHG (ref 40–50)
PH BLDV: 7.35 [PH] (ref 7.35–7.45)
PH UR STRIP.AUTO: 5 [PH] (ref 5–8)
PLATELET # BLD AUTO: 219 K/UL (ref 135–450)
PMV BLD AUTO: 8.7 FL (ref 5–10.5)
PO2 BLDV: 52 MMHG
POTASSIUM SERPL-SCNC: 4.4 MMOL/L (ref 3.5–5.1)
PROT UR STRIP.AUTO-MCNC: NEGATIVE MG/DL
RBC # BLD AUTO: 5.21 M/UL (ref 4.2–5.9)
SAO2 % BLDV: 85 %
SARS-COV-2 RDRP RESP QL NAA+PROBE: NOT DETECTED
SODIUM SERPL-SCNC: 140 MMOL/L (ref 136–145)
SP GR UR STRIP.AUTO: 1.01 (ref 1–1.03)
UA COMPLETE W REFLEX CULTURE PNL UR: ABNORMAL
UA DIPSTICK W REFLEX MICRO PNL UR: ABNORMAL
URN SPEC COLLECT METH UR: ABNORMAL
UROBILINOGEN UR STRIP-ACNC: 0.2 E.U./DL
WBC # BLD AUTO: 9.4 K/UL (ref 4–11)

## 2025-08-29 PROCEDURE — 93005 ELECTROCARDIOGRAM TRACING: CPT | Performed by: PHYSICIAN ASSISTANT

## 2025-08-29 PROCEDURE — 87502 INFLUENZA DNA AMP PROBE: CPT

## 2025-08-29 PROCEDURE — 85025 COMPLETE CBC W/AUTO DIFF WBC: CPT

## 2025-08-29 PROCEDURE — 99285 EMERGENCY DEPT VISIT HI MDM: CPT

## 2025-08-29 PROCEDURE — 71045 X-RAY EXAM CHEST 1 VIEW: CPT

## 2025-08-29 PROCEDURE — 93010 ELECTROCARDIOGRAM REPORT: CPT | Performed by: INTERNAL MEDICINE

## 2025-08-29 PROCEDURE — 82803 BLOOD GASES ANY COMBINATION: CPT

## 2025-08-29 PROCEDURE — 81003 URINALYSIS AUTO W/O SCOPE: CPT

## 2025-08-29 PROCEDURE — 87635 SARS-COV-2 COVID-19 AMP PRB: CPT

## 2025-08-29 PROCEDURE — 80048 BASIC METABOLIC PNL TOTAL CA: CPT

## 2025-08-29 ASSESSMENT — PAIN - FUNCTIONAL ASSESSMENT: PAIN_FUNCTIONAL_ASSESSMENT: 0-10

## 2025-08-29 ASSESSMENT — PAIN SCALES - GENERAL: PAINLEVEL_OUTOF10: 0
